# Patient Record
Sex: MALE | Race: WHITE | NOT HISPANIC OR LATINO | Employment: UNEMPLOYED | URBAN - NONMETROPOLITAN AREA
[De-identification: names, ages, dates, MRNs, and addresses within clinical notes are randomized per-mention and may not be internally consistent; named-entity substitution may affect disease eponyms.]

---

## 2021-03-22 ENCOUNTER — OFFICE VISIT (OUTPATIENT)
Dept: FAMILY MEDICINE | Facility: CLINIC | Age: 56
End: 2021-03-22
Payer: COMMERCIAL

## 2021-03-22 ENCOUNTER — LAB VISIT (OUTPATIENT)
Dept: LAB | Facility: CLINIC | Age: 56
End: 2021-03-22
Payer: COMMERCIAL

## 2021-03-22 VITALS
SYSTOLIC BLOOD PRESSURE: 128 MMHG | OXYGEN SATURATION: 98 % | HEART RATE: 70 BPM | TEMPERATURE: 98 F | BODY MASS INDEX: 33.49 KG/M2 | HEIGHT: 65 IN | WEIGHT: 201 LBS | DIASTOLIC BLOOD PRESSURE: 84 MMHG

## 2021-03-22 DIAGNOSIS — I10 ESSENTIAL HYPERTENSION: Primary | ICD-10-CM

## 2021-03-22 DIAGNOSIS — E11.65 CONTROLLED TYPE 2 DIABETES MELLITUS WITH HYPERGLYCEMIA, WITHOUT LONG-TERM CURRENT USE OF INSULIN: ICD-10-CM

## 2021-03-22 DIAGNOSIS — F98.8 ADULT ATTENTION DEFICIT DISORDER: ICD-10-CM

## 2021-03-22 LAB
EST. AVERAGE GLUCOSE BLD GHB EST-MCNC: 134 MG/DL
HBA1C MFR BLD HPLC: 6.6 %

## 2021-03-22 PROCEDURE — 99214 PR OFFICE/OUTPT VISIT, EST, LEVL IV, 30-39 MIN: ICD-10-PCS | Mod: ,,, | Performed by: FAMILY MEDICINE

## 2021-03-22 PROCEDURE — 83036 HEMOGLOBIN GLYCOSYLATED A1C: CPT

## 2021-03-22 PROCEDURE — 99214 OFFICE O/P EST MOD 30 MIN: CPT | Mod: ,,, | Performed by: FAMILY MEDICINE

## 2021-03-22 RX ORDER — DEXTROAMPHETAMINE SACCHARATE, AMPHETAMINE ASPARTATE MONOHYDRATE, DEXTROAMPHETAMINE SULFATE AND AMPHETAMINE SULFATE 5; 5; 5; 5 MG/1; MG/1; MG/1; MG/1
20 CAPSULE, EXTENDED RELEASE ORAL DAILY
Qty: 30 CAPSULE | Refills: 0 | Status: SHIPPED | OUTPATIENT
Start: 2021-03-22 | End: 2021-04-17 | Stop reason: SDUPTHER

## 2021-03-22 RX ORDER — DEXTROAMPHETAMINE SACCHARATE, AMPHETAMINE ASPARTATE MONOHYDRATE, DEXTROAMPHETAMINE SULFATE AND AMPHETAMINE SULFATE 5; 5; 5; 5 MG/1; MG/1; MG/1; MG/1
20 CAPSULE, EXTENDED RELEASE ORAL EVERY MORNING
COMMUNITY
End: 2021-04-17 | Stop reason: SDUPTHER

## 2021-03-22 RX ORDER — DEXTROAMPHETAMINE SACCHARATE, AMPHETAMINE ASPARTATE, DEXTROAMPHETAMINE SULFATE AND AMPHETAMINE SULFATE 5; 5; 5; 5 MG/1; MG/1; MG/1; MG/1
1 TABLET ORAL DAILY
Qty: 30 TABLET | Refills: 0 | Status: SHIPPED | OUTPATIENT
Start: 2021-03-22 | End: 2021-04-17 | Stop reason: SDUPTHER

## 2021-03-23 ENCOUNTER — TELEPHONE (OUTPATIENT)
Dept: FAMILY MEDICINE | Facility: CLINIC | Age: 56
End: 2021-03-23

## 2021-04-17 ENCOUNTER — OFFICE VISIT (OUTPATIENT)
Dept: FAMILY MEDICINE | Facility: CLINIC | Age: 56
End: 2021-04-17
Payer: COMMERCIAL

## 2021-04-17 VITALS
WEIGHT: 204 LBS | SYSTOLIC BLOOD PRESSURE: 160 MMHG | BODY MASS INDEX: 36.14 KG/M2 | HEIGHT: 63 IN | DIASTOLIC BLOOD PRESSURE: 108 MMHG | OXYGEN SATURATION: 96 % | TEMPERATURE: 98 F | HEART RATE: 59 BPM

## 2021-04-17 DIAGNOSIS — E11.9 TYPE 2 DIABETES MELLITUS WITHOUT COMPLICATION, WITHOUT LONG-TERM CURRENT USE OF INSULIN: ICD-10-CM

## 2021-04-17 DIAGNOSIS — I10 ESSENTIAL HYPERTENSION, BENIGN: Primary | ICD-10-CM

## 2021-04-17 DIAGNOSIS — F90.9 ATTENTION DEFICIT HYPERACTIVITY DISORDER (ADHD), UNSPECIFIED ADHD TYPE: ICD-10-CM

## 2021-04-17 PROBLEM — E11.65 CONTROLLED TYPE 2 DIABETES MELLITUS WITH HYPERGLYCEMIA, WITHOUT LONG-TERM CURRENT USE OF INSULIN: Status: RESOLVED | Noted: 2021-03-22 | Resolved: 2021-04-17

## 2021-04-17 LAB
ANION GAP SERPL CALCULATED.3IONS-SCNC: 7 MMOL/L (ref 7–16)
BASOPHILS # BLD AUTO: 0.03 K/UL (ref 0–0.2)
BASOPHILS NFR BLD AUTO: 0.6 % (ref 0–1)
BUN SERPL-MCNC: 14 MG/DL (ref 7–18)
BUN/CREAT SERPL: 13 (ref 6–20)
CALCIUM SERPL-MCNC: 9 MG/DL (ref 8.5–10.1)
CHLORIDE SERPL-SCNC: 101 MMOL/L (ref 98–107)
CO2 SERPL-SCNC: 31 MMOL/L (ref 21–32)
CREAT SERPL-MCNC: 1.11 MG/DL (ref 0.7–1.3)
DIFFERENTIAL METHOD BLD: ABNORMAL
EOSINOPHIL # BLD AUTO: 0.06 K/UL (ref 0–0.5)
EOSINOPHIL NFR BLD AUTO: 1.1 % (ref 1–4)
ERYTHROCYTE [DISTWIDTH] IN BLOOD BY AUTOMATED COUNT: 12.1 % (ref 11.5–14.5)
EST. AVERAGE GLUCOSE BLD GHB EST-MCNC: 157 MG/DL
GLUCOSE SERPL-MCNC: 162 MG/DL (ref 74–106)
HBA1C MFR BLD HPLC: 7.3 % (ref 4.5–6.6)
HCT VFR BLD AUTO: 43.7 % (ref 40–54)
HGB BLD-MCNC: 14.8 G/DL (ref 13.5–18)
IMM GRANULOCYTES # BLD AUTO: 0.02 K/UL (ref 0–0.04)
IMM GRANULOCYTES NFR BLD: 0.4 % (ref 0–0.4)
LYMPHOCYTES # BLD AUTO: 1.55 K/UL (ref 1–4.8)
LYMPHOCYTES NFR BLD AUTO: 28.5 % (ref 27–41)
MCH RBC QN AUTO: 30.2 PG (ref 27–31)
MCHC RBC AUTO-ENTMCNC: 33.9 G/DL (ref 32–36)
MCV RBC AUTO: 89.2 FL (ref 80–96)
MONOCYTES # BLD AUTO: 0.47 K/UL (ref 0–0.8)
MONOCYTES NFR BLD AUTO: 8.6 % (ref 2–6)
MPC BLD CALC-MCNC: 9.7 FL (ref 9.4–12.4)
NEUTROPHILS # BLD AUTO: 3.31 K/UL (ref 1.8–7.7)
NEUTROPHILS NFR BLD AUTO: 60.8 % (ref 53–65)
NRBC # BLD AUTO: 0 X10E3/UL
NRBC, AUTO (.00): 0 %
PLATELET # BLD AUTO: 328 K/UL (ref 150–400)
POTASSIUM SERPL-SCNC: 4.4 MMOL/L (ref 3.5–5.1)
RBC # BLD AUTO: 4.9 M/UL (ref 4.6–6.2)
SODIUM SERPL-SCNC: 135 MMOL/L (ref 136–145)
WBC # BLD AUTO: 5.44 K/UL (ref 4.5–11)

## 2021-04-17 PROCEDURE — 85025 CBC WITH DIFFERENTIAL: ICD-10-PCS | Mod: QW,,, | Performed by: CLINICAL MEDICAL LABORATORY

## 2021-04-17 PROCEDURE — 99051 MED SERV EVE/WKEND/HOLIDAY: CPT | Mod: ,,, | Performed by: NURSE PRACTITIONER

## 2021-04-17 PROCEDURE — 99214 OFFICE O/P EST MOD 30 MIN: CPT | Mod: ,,, | Performed by: NURSE PRACTITIONER

## 2021-04-17 PROCEDURE — 80048 BASIC METABOLIC PNL TOTAL CA: CPT | Mod: QW,,, | Performed by: CLINICAL MEDICAL LABORATORY

## 2021-04-17 PROCEDURE — 85025 COMPLETE CBC W/AUTO DIFF WBC: CPT | Mod: QW,,, | Performed by: CLINICAL MEDICAL LABORATORY

## 2021-04-17 PROCEDURE — 99214 PR OFFICE/OUTPT VISIT, EST, LEVL IV, 30-39 MIN: ICD-10-PCS | Mod: ,,, | Performed by: NURSE PRACTITIONER

## 2021-04-17 PROCEDURE — 99051 PR MEDICAL SERVICES, EVE/WKEND/HOLIDAY: ICD-10-PCS | Mod: ,,, | Performed by: NURSE PRACTITIONER

## 2021-04-17 PROCEDURE — 83036 HEMOGLOBIN A1C: ICD-10-PCS | Mod: QW,,, | Performed by: CLINICAL MEDICAL LABORATORY

## 2021-04-17 PROCEDURE — 83036 HEMOGLOBIN GLYCOSYLATED A1C: CPT | Mod: QW,,, | Performed by: CLINICAL MEDICAL LABORATORY

## 2021-04-17 PROCEDURE — 80048 BASIC METABOLIC PANEL: ICD-10-PCS | Mod: QW,,, | Performed by: CLINICAL MEDICAL LABORATORY

## 2021-04-17 RX ORDER — LISINOPRIL AND HYDROCHLOROTHIAZIDE 20; 25 MG/1; MG/1
1 TABLET ORAL DAILY
Qty: 90 TABLET | Refills: 0 | Status: SHIPPED | OUTPATIENT
Start: 2021-04-17 | End: 2021-05-10 | Stop reason: SDUPTHER

## 2021-04-17 RX ORDER — CITALOPRAM 20 MG/1
20 TABLET, FILM COATED ORAL DAILY
Qty: 90 TABLET | Refills: 0 | Status: SHIPPED | OUTPATIENT
Start: 2021-04-17 | End: 2021-08-06 | Stop reason: SDUPTHER

## 2021-04-17 RX ORDER — LISINOPRIL AND HYDROCHLOROTHIAZIDE 20; 25 MG/1; MG/1
1 TABLET ORAL
COMMUNITY
End: 2021-04-17 | Stop reason: SDUPTHER

## 2021-04-17 RX ORDER — CITALOPRAM 20 MG/1
20 TABLET, FILM COATED ORAL DAILY
COMMUNITY
End: 2021-04-17 | Stop reason: SDUPTHER

## 2021-04-17 RX ORDER — METOPROLOL TARTRATE 50 MG/1
50 TABLET ORAL
COMMUNITY
End: 2021-04-17 | Stop reason: SDUPTHER

## 2021-04-17 RX ORDER — DEXTROAMPHETAMINE SACCHARATE, AMPHETAMINE ASPARTATE MONOHYDRATE, DEXTROAMPHETAMINE SULFATE AND AMPHETAMINE SULFATE 5; 5; 5; 5 MG/1; MG/1; MG/1; MG/1
20 CAPSULE, EXTENDED RELEASE ORAL DAILY
Qty: 30 CAPSULE | Refills: 0 | Status: SHIPPED | OUTPATIENT
Start: 2021-04-17 | End: 2021-05-10 | Stop reason: SDUPTHER

## 2021-04-17 RX ORDER — METFORMIN HYDROCHLORIDE 500 MG/1
500 TABLET ORAL 2 TIMES DAILY WITH MEALS
Qty: 60 TABLET | Refills: 3 | Status: ON HOLD | OUTPATIENT
Start: 2021-04-17 | End: 2021-10-20 | Stop reason: HOSPADM

## 2021-04-17 RX ORDER — DEXTROAMPHETAMINE SACCHARATE, AMPHETAMINE ASPARTATE, DEXTROAMPHETAMINE SULFATE AND AMPHETAMINE SULFATE 5; 5; 5; 5 MG/1; MG/1; MG/1; MG/1
1 TABLET ORAL DAILY
Qty: 30 TABLET | Refills: 0 | Status: ON HOLD | OUTPATIENT
Start: 2021-04-17 | End: 2021-05-08 | Stop reason: HOSPADM

## 2021-04-17 RX ORDER — METOPROLOL TARTRATE 50 MG/1
50 TABLET ORAL 2 TIMES DAILY
Qty: 90 TABLET | Refills: 0 | Status: SHIPPED | OUTPATIENT
Start: 2021-04-17 | End: 2022-02-02 | Stop reason: SDUPTHER

## 2021-04-19 ENCOUNTER — TELEPHONE (OUTPATIENT)
Dept: FAMILY MEDICINE | Facility: CLINIC | Age: 56
End: 2021-04-19

## 2021-04-26 ENCOUNTER — TELEPHONE (OUTPATIENT)
Dept: FAMILY MEDICINE | Facility: CLINIC | Age: 56
End: 2021-04-26

## 2021-04-27 ENCOUNTER — TELEPHONE (OUTPATIENT)
Dept: FAMILY MEDICINE | Facility: CLINIC | Age: 56
End: 2021-04-27

## 2021-05-06 ENCOUNTER — HISTORICAL (OUTPATIENT)
Dept: ADMINISTRATIVE | Facility: HOSPITAL | Age: 56
End: 2021-05-06

## 2021-05-06 ENCOUNTER — HOSPITAL ENCOUNTER (OUTPATIENT)
Facility: HOSPITAL | Age: 56
Discharge: HOME | End: 2021-05-08
Attending: INTERNAL MEDICINE | Admitting: INTERNAL MEDICINE
Payer: COMMERCIAL

## 2021-05-06 ENCOUNTER — TELEPHONE (OUTPATIENT)
Dept: FAMILY MEDICINE | Facility: CLINIC | Age: 56
End: 2021-05-06

## 2021-05-06 DIAGNOSIS — I10 ESSENTIAL HYPERTENSION, BENIGN: ICD-10-CM

## 2021-05-06 DIAGNOSIS — E11.9 TYPE 2 DIABETES MELLITUS WITHOUT COMPLICATION, WITHOUT LONG-TERM CURRENT USE OF INSULIN: ICD-10-CM

## 2021-05-06 DIAGNOSIS — I20.0 UNSTABLE ANGINA: ICD-10-CM

## 2021-05-06 DIAGNOSIS — E87.6 HYPOKALEMIA: ICD-10-CM

## 2021-05-06 DIAGNOSIS — R07.9 CHEST PAIN, UNSPECIFIED TYPE: ICD-10-CM

## 2021-05-06 DIAGNOSIS — R07.9 CHEST PAIN: ICD-10-CM

## 2021-05-06 DIAGNOSIS — F98.8 ADULT ATTENTION DEFICIT DISORDER: ICD-10-CM

## 2021-05-06 LAB
ALBUMIN SERPL BCP-MCNC: 3.5 G/DL (ref 3.4–5)
ALBUMIN/GLOB SERPL: 1 {RATIO}
ALP SERPL-CCNC: 70 U/L (ref 50–136)
ALT SERPL W P-5'-P-CCNC: 22 U/L (ref 12–78)
ANION GAP SERPL CALCULATED.3IONS-SCNC: 10 MMOL/L
APTT PPP: 29.2 SECONDS (ref 25.2–37.3)
AST SERPL W P-5'-P-CCNC: 12 U/L (ref 15–37)
BASOPHILS # BLD AUTO: 0.01 X10E3/UL (ref 0–0.2)
BASOPHILS NFR BLD AUTO: 0.1 % (ref 0–1)
BILIRUB SERPL-MCNC: 0.3 MG/DL (ref 0.2–1)
BUN SERPL-MCNC: 14 MG/DL (ref 7–18)
CALCIUM SERPL-MCNC: 9 MG/DL (ref 8.5–10.1)
CHLORIDE SERPL-SCNC: 97 MMOL/L (ref 101–111)
CO2 SERPL-SCNC: 27 MMOL/L (ref 21–32)
CREAT SERPL-MCNC: 1.1 MG/DL (ref 0.6–1.3)
EOSINOPHIL # BLD AUTO: 0.09 X10E3/UL (ref 0–0.5)
EOSINOPHIL NFR BLD AUTO: 1.2 % (ref 1–4)
ERYTHROCYTE [DISTWIDTH] IN BLOOD BY AUTOMATED COUNT: 12.2 % (ref 11.5–14.5)
GLOBULIN SER-MCNC: 4 G/DL
GLUCOSE SERPL-MCNC: 181 MG/DL (ref 74–106)
HCT VFR BLD AUTO: 39.7 % (ref 40–54)
HGB BLD-MCNC: 13.9 G/DL (ref 13.5–18)
INR BLD: 0.88 (ref 0–3.3)
LYMPHOCYTES # BLD AUTO: 1.89 X10E3/UL (ref 1–4.8)
LYMPHOCYTES NFR BLD AUTO: 24.8 % (ref 27–41)
MCH RBC QN AUTO: 31 PG (ref 27–31)
MCHC RBC AUTO-ENTMCNC: 35 G/DL (ref 32–36)
MCV RBC AUTO: 88 FL (ref 80–96)
MONOCYTES # BLD AUTO: 0.72 X10E3/UL (ref 0–0.8)
MONOCYTES NFR BLD AUTO: 9.4 % (ref 2–6)
MPC BLD CALC-MCNC: 8.7 FL (ref 9.4–12.4)
NEUTROPHILS # BLD AUTO: 4.92 X10E3/UL (ref 1.8–7.7)
NEUTROPHILS NFR BLD AUTO: 64.5 % (ref 53–65)
PLATELET # BLD AUTO: 320 X10E3/UL (ref 150–400)
POTASSIUM SERPL-SCNC: 3.2 MMOL/L (ref 3.6–5)
PROT SERPL-MCNC: 7.5 G/DL (ref 6.4–8.2)
PROTHROMBIN TIME: 12.2 SECONDS (ref 11.7–14.7)
RBC # BLD AUTO: 4.49 X10E6/UL (ref 4.6–6.2)
SODIUM SERPL-SCNC: 131 MMOL/L (ref 135–145)
TROPONIN I SERPL-MCNC: 0 NG/ML (ref 0–0.06)
TROPONIN I SERPL-MCNC: 0 NG/ML (ref 0–0.06)
WBC # BLD AUTO: 7.63 X10E3/UL (ref 4.5–11)

## 2021-05-06 PROCEDURE — G0378 HOSPITAL OBSERVATION PER HR: HCPCS

## 2021-05-06 PROCEDURE — G0379 DIRECT REFER HOSPITAL OBSERV: HCPCS

## 2021-05-07 PROBLEM — E87.6 HYPOKALEMIA: Status: ACTIVE | Noted: 2021-05-07

## 2021-05-07 LAB
ALBUMIN SERPL BCP-MCNC: 3.3 G/DL (ref 3.5–5)
ALBUMIN/GLOB SERPL: 0.8 {RATIO}
ALP SERPL-CCNC: 61 U/L (ref 45–115)
ALT SERPL W P-5'-P-CCNC: 22 U/L (ref 16–61)
ANION GAP SERPL CALCULATED.3IONS-SCNC: 12 MMOL/L (ref 7–16)
AORTIC ROOT ANNULUS: 2.8 CM
AORTIC VALVE CUSP SEPERATION: 1.99 CM
AST SERPL W P-5'-P-CCNC: 14 U/L (ref 15–37)
AV INDEX (PROSTH): 0.64
AV MEAN GRADIENT: 4 MMHG
AV PEAK GRADIENT: 8 MMHG
AV VALVE AREA: 1.8 CM2
AV VELOCITY RATIO: 0.5
BASOPHILS # BLD AUTO: 0.02 K/UL (ref 0–0.2)
BASOPHILS NFR BLD AUTO: 0.3 % (ref 0–1)
BILIRUB SERPL-MCNC: 0.6 MG/DL (ref 0–1.2)
BSA FOR ECHO PROCEDURE: 2.03 M2
BUN SERPL-MCNC: 11 MG/DL (ref 7–18)
BUN/CREAT SERPL: 10 (ref 6–20)
CALCIUM SERPL-MCNC: 9 MG/DL (ref 8.5–10.1)
CHLORIDE SERPL-SCNC: 101 MMOL/L (ref 98–107)
CO2 SERPL-SCNC: 29 MMOL/L (ref 21–32)
CREAT SERPL-MCNC: 1.11 MG/DL (ref 0.7–1.3)
CV ECHO LV RWT: 0.28 CM
CV STRESS BASE HR: 96 BPM
DIASTOLIC BLOOD PRESSURE: 81 MMHG
DIFFERENTIAL METHOD BLD: ABNORMAL
DOP CALC AO PEAK VEL: 1.4 M/S
DOP CALC AO VTI: 22 CM
DOP CALC LVOT AREA: 2.8 CM2
DOP CALC LVOT DIAMETER: 1.9 CM
DOP CALC LVOT PEAK VEL: 0.7 M/S
DOP CALC LVOT STROKE VOLUME: 39.67 CM3
DOP CALCLVOT PEAK VEL VTI: 14 CM
E WAVE DECELERATION TIME: 204 MSEC
ECHO EF ESTIMATED: 60 %
ECHO LV POSTERIOR WALL: 0.71 CM (ref 0.6–1.1)
EJECTION FRACTION: 50 %
EOSINOPHIL # BLD AUTO: 0.04 K/UL (ref 0–0.5)
EOSINOPHIL NFR BLD AUTO: 0.5 % (ref 1–4)
ERYTHROCYTE [DISTWIDTH] IN BLOOD BY AUTOMATED COUNT: 12.1 % (ref 11.5–14.5)
FRACTIONAL SHORTENING: 39 % (ref 28–44)
GLOBULIN SER-MCNC: 3.9 G/DL (ref 2–4)
GLUCOSE SERPL-MCNC: 142 MG/DL (ref 70–105)
GLUCOSE SERPL-MCNC: 177 MG/DL (ref 74–106)
GLUCOSE SERPL-MCNC: 182 MG/DL (ref 70–105)
GLUCOSE SERPL-MCNC: 228 MG/DL (ref 70–105)
GLUCOSE SERPL-MCNC: 259 MG/DL (ref 70–105)
HCT VFR BLD AUTO: 38.6 % (ref 40–54)
HGB BLD-MCNC: 13.1 G/DL (ref 13.5–18)
IMM GRANULOCYTES # BLD AUTO: 0.02 K/UL (ref 0–0.04)
IMM GRANULOCYTES NFR BLD: 0.3 % (ref 0–0.4)
INTERVENTRICULAR SEPTUM: 0.73 CM (ref 0.6–1.1)
IVC OSTIUM: 0.8 CM
LEFT ATRIUM SIZE: 3.3 CM
LEFT INTERNAL DIMENSION IN SYSTOLE: 3.05 CM (ref 2.1–4)
LEFT VENTRICLE MASS INDEX: 60 G/M2
LEFT VENTRICULAR INTERNAL DIMENSION IN DIASTOLE: 5.02 CM (ref 3.5–6)
LEFT VENTRICULAR MASS: 119.64 G
LVOT MG: 1 MMHG
LYMPHOCYTES # BLD AUTO: 2.32 K/UL (ref 1–4.8)
LYMPHOCYTES NFR BLD AUTO: 29 % (ref 27–41)
MCH RBC QN AUTO: 31 PG (ref 27–31)
MCHC RBC AUTO-ENTMCNC: 33.9 G/DL (ref 32–36)
MCV RBC AUTO: 91.3 FL (ref 80–96)
MONOCYTES # BLD AUTO: 0.57 K/UL (ref 0–0.8)
MONOCYTES NFR BLD AUTO: 7.1 % (ref 2–6)
MPC BLD CALC-MCNC: 9 FL (ref 9.4–12.4)
MV PEAK E VEL: 0.63 M/S
NEUTROPHILS # BLD AUTO: 5.02 K/UL (ref 1.8–7.7)
NEUTROPHILS NFR BLD AUTO: 62.8 % (ref 53–65)
NRBC # BLD AUTO: 0 X10E3/UL
NRBC, AUTO (.00): 0 %
OHS CV CPX 1 MINUTE RECOVERY HEART RATE: 102 BPM
OHS CV CPX 85 PERCENT MAX PREDICTED HEART RATE MALE: 139
OHS CV CPX MAX PREDICTED HEART RATE: 164
OHS CV CPX PATIENT IS FEMALE: 0
OHS CV CPX PATIENT IS MALE: 1
OHS CV CPX PEAK DIASTOLIC BLOOD PRESSURE: 76 MMHG
OHS CV CPX PEAK HEAR RATE: 102 BPM
OHS CV CPX PEAK RATE PRESSURE PRODUCT: NORMAL
OHS CV CPX PEAK SYSTOLIC BLOOD PRESSURE: 120 MMHG
OHS CV CPX PERCENT MAX PREDICTED HEART RATE ACHIEVED: 62
OHS CV CPX RATE PRESSURE PRODUCT PRESENTING: NORMAL
PISA TR MAX VEL: 1.8 M/S
PLATELET # BLD AUTO: 288 K/UL (ref 150–400)
POTASSIUM SERPL-SCNC: 3.4 MMOL/L (ref 3.5–5.1)
PROT SERPL-MCNC: 7.2 G/DL (ref 6.4–8.2)
RA MAJOR: 4.8 CM
RA PRESSURE: 3 MMHG
RBC # BLD AUTO: 4.23 M/UL (ref 4.6–6.2)
RIGHT VENTRICULAR END-DIASTOLIC DIMENSION: 4.4 CM
SODIUM SERPL-SCNC: 139 MMOL/L (ref 136–145)
SYSTOLIC BLOOD PRESSURE: 114 MMHG
TR MAX PG: 13 MMHG
TRICUSPID ANNULAR PLANE SYSTOLIC EXCURSION: 2.1 CM
TROPONIN I SERPL-MCNC: <0.017 NG/ML
TV REST PULMONARY ARTERY PRESSURE: 16 MMHG
WBC # BLD AUTO: 7.99 K/UL (ref 4.5–11)

## 2021-05-07 PROCEDURE — G0378 HOSPITAL OBSERVATION PER HR: HCPCS

## 2021-05-07 PROCEDURE — 94640 AIRWAY INHALATION TREATMENT: CPT | Mod: XB

## 2021-05-07 PROCEDURE — 93458 L HRT ARTERY/VENTRICLE ANGIO: CPT | Mod: 26,,, | Performed by: INTERNAL MEDICINE

## 2021-05-07 PROCEDURE — 36415 COLL VENOUS BLD VENIPUNCTURE: CPT | Performed by: INTERNAL MEDICINE

## 2021-05-07 PROCEDURE — 93010 ELECTROCARDIOGRAM REPORT: CPT | Mod: ,,, | Performed by: INTERNAL MEDICINE

## 2021-05-07 PROCEDURE — C1894 INTRO/SHEATH, NON-LASER: HCPCS | Performed by: INTERNAL MEDICINE

## 2021-05-07 PROCEDURE — 27000080 OPTIME MED/SURG SUP & DEVICES GENERAL CLASSIFICATION: Performed by: INTERNAL MEDICINE

## 2021-05-07 PROCEDURE — 99245 PR OFFICE CONSULTATION,LEVEL V: ICD-10-PCS | Mod: 25,,, | Performed by: INTERNAL MEDICINE

## 2021-05-07 PROCEDURE — 96372 THER/PROPH/DIAG INJ SC/IM: CPT | Mod: 59

## 2021-05-07 PROCEDURE — 93458 PR CATH PLACE/CORON ANGIO, IMG SUPER/INTERP,W LEFT HEART VENTRICULOGRAPHY: ICD-10-PCS | Mod: 26,,, | Performed by: INTERNAL MEDICINE

## 2021-05-07 PROCEDURE — C1760 CLOSURE DEV, VASC: HCPCS | Performed by: INTERNAL MEDICINE

## 2021-05-07 PROCEDURE — 96372 THER/PROPH/DIAG INJ SC/IM: CPT

## 2021-05-07 PROCEDURE — 25000003 PHARM REV CODE 250: Performed by: INTERNAL MEDICINE

## 2021-05-07 PROCEDURE — 63600175 PHARM REV CODE 636 W HCPCS: Performed by: INTERNAL MEDICINE

## 2021-05-07 PROCEDURE — 25500020 PHARM REV CODE 255: Performed by: INTERNAL MEDICINE

## 2021-05-07 PROCEDURE — 96374 THER/PROPH/DIAG INJ IV PUSH: CPT

## 2021-05-07 PROCEDURE — 93005 ELECTROCARDIOGRAM TRACING: CPT

## 2021-05-07 PROCEDURE — 82962 GLUCOSE BLOOD TEST: CPT

## 2021-05-07 PROCEDURE — 99152 MOD SED SAME PHYS/QHP 5/>YRS: CPT | Performed by: INTERNAL MEDICINE

## 2021-05-07 PROCEDURE — 27800903 OPTIME MED/SURG SUP & DEVICES OTHER IMPLANTS: Performed by: INTERNAL MEDICINE

## 2021-05-07 PROCEDURE — 25000242 PHARM REV CODE 250 ALT 637 W/ HCPCS: Performed by: INTERNAL MEDICINE

## 2021-05-07 PROCEDURE — 27100168 OPTIME MED/SURG SUP & DEVICES NON-STERILE SUPPLY: Performed by: INTERNAL MEDICINE

## 2021-05-07 PROCEDURE — 99220 PR INITIAL OBSERVATION CARE,LEVL III: CPT | Mod: GC,,, | Performed by: INTERNAL MEDICINE

## 2021-05-07 PROCEDURE — 93458 L HRT ARTERY/VENTRICLE ANGIO: CPT | Performed by: INTERNAL MEDICINE

## 2021-05-07 PROCEDURE — 85025 COMPLETE CBC W/AUTO DIFF WBC: CPT | Performed by: INTERNAL MEDICINE

## 2021-05-07 PROCEDURE — 93010 EKG 12-LEAD: ICD-10-PCS | Mod: ,,, | Performed by: INTERNAL MEDICINE

## 2021-05-07 PROCEDURE — 27201423 OPTIME MED/SURG SUP & DEVICES STERILE SUPPLY: Performed by: INTERNAL MEDICINE

## 2021-05-07 PROCEDURE — 80053 COMPREHEN METABOLIC PANEL: CPT | Performed by: INTERNAL MEDICINE

## 2021-05-07 PROCEDURE — 84484 ASSAY OF TROPONIN QUANT: CPT | Performed by: INTERNAL MEDICINE

## 2021-05-07 PROCEDURE — 99220 PR INITIAL OBSERVATION CARE,LEVL III: ICD-10-PCS | Mod: GC,,, | Performed by: INTERNAL MEDICINE

## 2021-05-07 PROCEDURE — 94761 N-INVAS EAR/PLS OXIMETRY MLT: CPT

## 2021-05-07 PROCEDURE — 99245 OFF/OP CONSLTJ NEW/EST HI 55: CPT | Mod: 25,,, | Performed by: INTERNAL MEDICINE

## 2021-05-07 RX ORDER — SODIUM CHLORIDE 450 MG/100ML
INJECTION, SOLUTION INTRAVENOUS
Status: DISCONTINUED | OUTPATIENT
Start: 2021-05-07 | End: 2021-05-08 | Stop reason: HOSPADM

## 2021-05-07 RX ORDER — ASPIRIN 325 MG
325 TABLET ORAL DAILY
Status: DISCONTINUED | OUTPATIENT
Start: 2021-05-07 | End: 2021-05-08 | Stop reason: HOSPADM

## 2021-05-07 RX ORDER — FENTANYL CITRATE 50 UG/ML
INJECTION, SOLUTION INTRAMUSCULAR; INTRAVENOUS
Status: DISCONTINUED | OUTPATIENT
Start: 2021-05-07 | End: 2021-05-07 | Stop reason: HOSPADM

## 2021-05-07 RX ORDER — INSULIN ASPART 100 [IU]/ML
1-10 INJECTION, SOLUTION INTRAVENOUS; SUBCUTANEOUS
Status: DISCONTINUED | OUTPATIENT
Start: 2021-05-07 | End: 2021-05-08 | Stop reason: HOSPADM

## 2021-05-07 RX ORDER — SODIUM CHLORIDE 0.9 % (FLUSH) 0.9 %
10 SYRINGE (ML) INJECTION
Status: DISCONTINUED | OUTPATIENT
Start: 2021-05-07 | End: 2021-05-08 | Stop reason: HOSPADM

## 2021-05-07 RX ORDER — ACETAMINOPHEN 325 MG/1
650 TABLET ORAL EVERY 4 HOURS PRN
Status: DISCONTINUED | OUTPATIENT
Start: 2021-05-07 | End: 2021-05-08 | Stop reason: HOSPADM

## 2021-05-07 RX ORDER — LIDOCAINE HYDROCHLORIDE 10 MG/ML
INJECTION, SOLUTION EPIDURAL; INFILTRATION; INTRACAUDAL; PERINEURAL
Status: DISCONTINUED | OUTPATIENT
Start: 2021-05-07 | End: 2021-05-07 | Stop reason: HOSPADM

## 2021-05-07 RX ORDER — GLUCAGON 1 MG
1 KIT INJECTION
Status: DISCONTINUED | OUTPATIENT
Start: 2021-05-07 | End: 2021-05-08 | Stop reason: HOSPADM

## 2021-05-07 RX ORDER — REGADENOSON 0.08 MG/ML
0.4 INJECTION, SOLUTION INTRAVENOUS ONCE
Status: COMPLETED | OUTPATIENT
Start: 2021-05-07 | End: 2021-05-07

## 2021-05-07 RX ORDER — ONDANSETRON 4 MG/1
8 TABLET, ORALLY DISINTEGRATING ORAL EVERY 8 HOURS PRN
Status: DISCONTINUED | OUTPATIENT
Start: 2021-05-07 | End: 2021-05-08 | Stop reason: HOSPADM

## 2021-05-07 RX ORDER — SODIUM CHLORIDE 9 MG/ML
INJECTION, SOLUTION INTRAVENOUS CONTINUOUS
Status: DISCONTINUED | OUTPATIENT
Start: 2021-05-07 | End: 2021-05-08 | Stop reason: HOSPADM

## 2021-05-07 RX ORDER — IBUPROFEN 200 MG
16 TABLET ORAL
Status: DISCONTINUED | OUTPATIENT
Start: 2021-05-07 | End: 2021-05-08 | Stop reason: HOSPADM

## 2021-05-07 RX ORDER — POTASSIUM CHLORIDE 20 MEQ/1
40 TABLET, EXTENDED RELEASE ORAL 2 TIMES DAILY
Status: COMPLETED | OUTPATIENT
Start: 2021-05-07 | End: 2021-05-08

## 2021-05-07 RX ORDER — LISINOPRIL AND HYDROCHLOROTHIAZIDE 20; 25 MG/1; MG/1
1 TABLET ORAL DAILY
Status: DISCONTINUED | OUTPATIENT
Start: 2021-05-07 | End: 2021-05-08 | Stop reason: HOSPADM

## 2021-05-07 RX ORDER — IBUPROFEN 200 MG
24 TABLET ORAL
Status: DISCONTINUED | OUTPATIENT
Start: 2021-05-07 | End: 2021-05-08 | Stop reason: HOSPADM

## 2021-05-07 RX ORDER — CITALOPRAM 20 MG/1
20 TABLET, FILM COATED ORAL DAILY
Status: DISCONTINUED | OUTPATIENT
Start: 2021-05-07 | End: 2021-05-08 | Stop reason: HOSPADM

## 2021-05-07 RX ORDER — IPRATROPIUM BROMIDE AND ALBUTEROL SULFATE 2.5; .5 MG/3ML; MG/3ML
3 SOLUTION RESPIRATORY (INHALATION) EVERY 6 HOURS
Status: DISCONTINUED | OUTPATIENT
Start: 2021-05-07 | End: 2021-05-08 | Stop reason: HOSPADM

## 2021-05-07 RX ORDER — MIDAZOLAM HYDROCHLORIDE 1 MG/ML
INJECTION INTRAMUSCULAR; INTRAVENOUS
Status: DISCONTINUED | OUTPATIENT
Start: 2021-05-07 | End: 2021-05-07 | Stop reason: HOSPADM

## 2021-05-07 RX ORDER — ATORVASTATIN CALCIUM 80 MG/1
80 TABLET, FILM COATED ORAL DAILY
Status: DISCONTINUED | OUTPATIENT
Start: 2021-05-07 | End: 2021-05-08 | Stop reason: HOSPADM

## 2021-05-07 RX ORDER — HEPARIN SODIUM 5000 [USP'U]/ML
5000 INJECTION, SOLUTION INTRAVENOUS; SUBCUTANEOUS EVERY 8 HOURS
Status: DISCONTINUED | OUTPATIENT
Start: 2021-05-07 | End: 2021-05-08 | Stop reason: HOSPADM

## 2021-05-07 RX ADMIN — IPRATROPIUM BROMIDE AND ALBUTEROL SULFATE 3 ML: .5; 3 SOLUTION RESPIRATORY (INHALATION) at 12:05

## 2021-05-07 RX ADMIN — REGADENOSON 0.4 MG: 0.08 INJECTION, SOLUTION INTRAVENOUS at 10:05

## 2021-05-07 RX ADMIN — IPRATROPIUM BROMIDE AND ALBUTEROL SULFATE 3 ML: .5; 3 SOLUTION RESPIRATORY (INHALATION) at 07:05

## 2021-05-07 RX ADMIN — IPRATROPIUM BROMIDE AND ALBUTEROL SULFATE 3 ML: .5; 3 SOLUTION RESPIRATORY (INHALATION) at 08:05

## 2021-05-07 RX ADMIN — NITROGLYCERIN 1 INCH: 20 OINTMENT TOPICAL at 05:05

## 2021-05-07 RX ADMIN — HEPARIN SODIUM 5000 UNITS: 5000 INJECTION INTRAVENOUS; SUBCUTANEOUS at 06:05

## 2021-05-07 RX ADMIN — LISINOPRIL AND HYDROCHLOROTHIAZIDE 1 TABLET: 25; 20 TABLET ORAL at 12:05

## 2021-05-07 RX ADMIN — NITROGLYCERIN 1 INCH: 20 OINTMENT TOPICAL at 12:05

## 2021-05-07 RX ADMIN — HEPARIN SODIUM 5000 UNITS: 5000 INJECTION INTRAVENOUS; SUBCUTANEOUS at 03:05

## 2021-05-07 RX ADMIN — HEPARIN SODIUM 5000 UNITS: 5000 INJECTION INTRAVENOUS; SUBCUTANEOUS at 09:05

## 2021-05-07 RX ADMIN — ASPIRIN 325 MG ORAL TABLET 325 MG: 325 PILL ORAL at 08:05

## 2021-05-07 RX ADMIN — INSULIN ASPART 4 UNITS: 100 INJECTION, SOLUTION INTRAVENOUS; SUBCUTANEOUS at 05:05

## 2021-05-07 RX ADMIN — CITALOPRAM HYDROBROMIDE 20 MG: 20 TABLET ORAL at 08:05

## 2021-05-07 RX ADMIN — SODIUM CHLORIDE: 9 INJECTION, SOLUTION INTRAVENOUS at 12:05

## 2021-05-07 RX ADMIN — POTASSIUM CHLORIDE 40 MEQ: 1500 TABLET, EXTENDED RELEASE ORAL at 09:05

## 2021-05-07 RX ADMIN — NITROGLYCERIN 1 INCH: 20 OINTMENT TOPICAL at 06:05

## 2021-05-07 RX ADMIN — ACETAMINOPHEN 650 MG: 325 TABLET ORAL at 06:05

## 2021-05-07 RX ADMIN — POTASSIUM CHLORIDE 40 MEQ: 1500 TABLET, EXTENDED RELEASE ORAL at 08:05

## 2021-05-07 RX ADMIN — ATORVASTATIN CALCIUM 80 MG: 80 TABLET, FILM COATED ORAL at 08:05

## 2021-05-07 RX ADMIN — INSULIN ASPART 2 UNITS: 100 INJECTION, SOLUTION INTRAVENOUS; SUBCUTANEOUS at 12:05

## 2021-05-07 RX ADMIN — INSULIN ASPART 3 UNITS: 100 INJECTION, SOLUTION INTRAVENOUS; SUBCUTANEOUS at 09:05

## 2021-05-08 VITALS
SYSTOLIC BLOOD PRESSURE: 121 MMHG | RESPIRATION RATE: 15 BRPM | WEIGHT: 196 LBS | HEIGHT: 66 IN | OXYGEN SATURATION: 97 % | HEART RATE: 84 BPM | BODY MASS INDEX: 31.5 KG/M2 | TEMPERATURE: 98 F | DIASTOLIC BLOOD PRESSURE: 86 MMHG

## 2021-05-08 LAB
ALBUMIN SERPL BCP-MCNC: 3.2 G/DL (ref 3.5–5)
ALBUMIN/GLOB SERPL: 0.8 {RATIO}
ALP SERPL-CCNC: 59 U/L (ref 45–115)
ALT SERPL W P-5'-P-CCNC: 22 U/L (ref 16–61)
ANION GAP SERPL CALCULATED.3IONS-SCNC: 14 MMOL/L (ref 7–16)
AST SERPL W P-5'-P-CCNC: 12 U/L (ref 15–37)
BASOPHILS # BLD AUTO: 0.03 K/UL (ref 0–0.2)
BASOPHILS NFR BLD AUTO: 0.5 % (ref 0–1)
BILIRUB SERPL-MCNC: 0.4 MG/DL (ref 0–1.2)
BUN SERPL-MCNC: 10 MG/DL (ref 7–18)
BUN/CREAT SERPL: 10 (ref 6–20)
CALCIUM SERPL-MCNC: 8.8 MG/DL (ref 8.5–10.1)
CHLORIDE SERPL-SCNC: 103 MMOL/L (ref 98–107)
CO2 SERPL-SCNC: 28 MMOL/L (ref 21–32)
CREAT SERPL-MCNC: 1.01 MG/DL (ref 0.7–1.3)
DIFFERENTIAL METHOD BLD: ABNORMAL
EOSINOPHIL # BLD AUTO: 0.06 K/UL (ref 0–0.5)
EOSINOPHIL NFR BLD AUTO: 1 % (ref 1–4)
ERYTHROCYTE [DISTWIDTH] IN BLOOD BY AUTOMATED COUNT: 11.9 % (ref 11.5–14.5)
GLOBULIN SER-MCNC: 4 G/DL (ref 2–4)
GLUCOSE SERPL-MCNC: 137 MG/DL (ref 70–105)
GLUCOSE SERPL-MCNC: 143 MG/DL (ref 74–106)
HCT VFR BLD AUTO: 41 % (ref 40–54)
HGB BLD-MCNC: 13.4 G/DL (ref 13.5–18)
IMM GRANULOCYTES # BLD AUTO: 0.02 K/UL (ref 0–0.04)
IMM GRANULOCYTES NFR BLD: 0.3 % (ref 0–0.4)
LYMPHOCYTES # BLD AUTO: 2.08 K/UL (ref 1–4.8)
LYMPHOCYTES NFR BLD AUTO: 33.4 % (ref 27–41)
MCH RBC QN AUTO: 30 PG (ref 27–31)
MCHC RBC AUTO-ENTMCNC: 32.7 G/DL (ref 32–36)
MCV RBC AUTO: 91.9 FL (ref 80–96)
MONOCYTES # BLD AUTO: 0.54 K/UL (ref 0–0.8)
MONOCYTES NFR BLD AUTO: 8.7 % (ref 2–6)
MPC BLD CALC-MCNC: 9.3 FL (ref 9.4–12.4)
NEUTROPHILS # BLD AUTO: 3.5 K/UL (ref 1.8–7.7)
NEUTROPHILS NFR BLD AUTO: 56.1 % (ref 53–65)
NRBC # BLD AUTO: 0 X10E3/UL
NRBC, AUTO (.00): 0 %
PLATELET # BLD AUTO: 327 K/UL (ref 150–400)
POTASSIUM SERPL-SCNC: 3.5 MMOL/L (ref 3.5–5.1)
PROT SERPL-MCNC: 7.2 G/DL (ref 6.4–8.2)
RBC # BLD AUTO: 4.46 M/UL (ref 4.6–6.2)
SODIUM SERPL-SCNC: 141 MMOL/L (ref 136–145)
WBC # BLD AUTO: 6.23 K/UL (ref 4.5–11)

## 2021-05-08 PROCEDURE — 25000242 PHARM REV CODE 250 ALT 637 W/ HCPCS: Performed by: INTERNAL MEDICINE

## 2021-05-08 PROCEDURE — 25000003 PHARM REV CODE 250: Performed by: INTERNAL MEDICINE

## 2021-05-08 PROCEDURE — 63600175 PHARM REV CODE 636 W HCPCS: Performed by: INTERNAL MEDICINE

## 2021-05-08 PROCEDURE — 96372 THER/PROPH/DIAG INJ SC/IM: CPT

## 2021-05-08 PROCEDURE — 85025 COMPLETE CBC W/AUTO DIFF WBC: CPT | Performed by: INTERNAL MEDICINE

## 2021-05-08 PROCEDURE — 99217 PR OBSERVATION CARE DISCHARGE: ICD-10-PCS | Mod: ,,, | Performed by: INTERNAL MEDICINE

## 2021-05-08 PROCEDURE — 99217 PR OBSERVATION CARE DISCHARGE: CPT | Mod: ,,, | Performed by: INTERNAL MEDICINE

## 2021-05-08 PROCEDURE — 82962 GLUCOSE BLOOD TEST: CPT

## 2021-05-08 PROCEDURE — 36415 COLL VENOUS BLD VENIPUNCTURE: CPT | Performed by: INTERNAL MEDICINE

## 2021-05-08 PROCEDURE — 80053 COMPREHEN METABOLIC PANEL: CPT | Performed by: INTERNAL MEDICINE

## 2021-05-08 PROCEDURE — 94640 AIRWAY INHALATION TREATMENT: CPT

## 2021-05-08 PROCEDURE — G0378 HOSPITAL OBSERVATION PER HR: HCPCS

## 2021-05-08 PROCEDURE — 94761 N-INVAS EAR/PLS OXIMETRY MLT: CPT

## 2021-05-08 RX ORDER — ASPIRIN 81 MG/1
81 TABLET ORAL DAILY
Qty: 90 TABLET | Refills: 3 | Status: SHIPPED | OUTPATIENT
Start: 2021-05-08

## 2021-05-08 RX ORDER — PANTOPRAZOLE SODIUM 40 MG/1
40 TABLET, DELAYED RELEASE ORAL DAILY
Status: DISCONTINUED | OUTPATIENT
Start: 2021-05-08 | End: 2021-05-08 | Stop reason: HOSPADM

## 2021-05-08 RX ORDER — ATORVASTATIN CALCIUM 80 MG/1
80 TABLET, FILM COATED ORAL DAILY
Qty: 90 TABLET | Refills: 3 | Status: SHIPPED | OUTPATIENT
Start: 2021-05-08 | End: 2022-02-02 | Stop reason: SDUPTHER

## 2021-05-08 RX ORDER — PANTOPRAZOLE SODIUM 40 MG/1
40 TABLET, DELAYED RELEASE ORAL DAILY
Qty: 30 TABLET | Refills: 2 | Status: ON HOLD | OUTPATIENT
Start: 2021-05-09 | End: 2021-10-20 | Stop reason: HOSPADM

## 2021-05-08 RX ADMIN — NITROGLYCERIN 1 INCH: 20 OINTMENT TOPICAL at 05:05

## 2021-05-08 RX ADMIN — HEPARIN SODIUM 5000 UNITS: 5000 INJECTION INTRAVENOUS; SUBCUTANEOUS at 05:05

## 2021-05-08 RX ADMIN — CITALOPRAM HYDROBROMIDE 20 MG: 20 TABLET ORAL at 09:05

## 2021-05-08 RX ADMIN — ASPIRIN 325 MG ORAL TABLET 325 MG: 325 PILL ORAL at 09:05

## 2021-05-08 RX ADMIN — IPRATROPIUM BROMIDE AND ALBUTEROL SULFATE 3 ML: .5; 3 SOLUTION RESPIRATORY (INHALATION) at 07:05

## 2021-05-08 RX ADMIN — PANTOPRAZOLE SODIUM 40 MG: 40 TABLET, DELAYED RELEASE ORAL at 09:05

## 2021-05-08 RX ADMIN — POTASSIUM CHLORIDE 40 MEQ: 1500 TABLET, EXTENDED RELEASE ORAL at 09:05

## 2021-05-08 RX ADMIN — NITROGLYCERIN 1 INCH: 20 OINTMENT TOPICAL at 12:05

## 2021-05-08 RX ADMIN — IPRATROPIUM BROMIDE AND ALBUTEROL SULFATE 3 ML: .5; 3 SOLUTION RESPIRATORY (INHALATION) at 12:05

## 2021-05-10 ENCOUNTER — OFFICE VISIT (OUTPATIENT)
Dept: FAMILY MEDICINE | Facility: CLINIC | Age: 56
End: 2021-05-10
Payer: COMMERCIAL

## 2021-05-10 VITALS
SYSTOLIC BLOOD PRESSURE: 130 MMHG | BODY MASS INDEX: 35.08 KG/M2 | HEIGHT: 63 IN | DIASTOLIC BLOOD PRESSURE: 70 MMHG | OXYGEN SATURATION: 98 % | WEIGHT: 198 LBS | TEMPERATURE: 98 F | HEART RATE: 61 BPM

## 2021-05-10 DIAGNOSIS — E11.9 CONTROLLED TYPE 2 DIABETES MELLITUS WITHOUT COMPLICATION, WITHOUT LONG-TERM CURRENT USE OF INSULIN: Primary | ICD-10-CM

## 2021-05-10 DIAGNOSIS — F90.9 ATTENTION DEFICIT HYPERACTIVITY DISORDER (ADHD), UNSPECIFIED ADHD TYPE: ICD-10-CM

## 2021-05-10 DIAGNOSIS — I10 ESSENTIAL HYPERTENSION, BENIGN: ICD-10-CM

## 2021-05-10 LAB
ANION GAP SERPL CALCULATED.3IONS-SCNC: 14 MMOL/L (ref 7–16)
BASOPHILS # BLD AUTO: 0.04 K/UL (ref 0–0.2)
BASOPHILS NFR BLD AUTO: 0.6 % (ref 0–1)
BUN SERPL-MCNC: 15 MG/DL (ref 7–18)
BUN/CREAT SERPL: 13 (ref 6–20)
CALCIUM SERPL-MCNC: 9 MG/DL (ref 8.5–10.1)
CHLORIDE SERPL-SCNC: 95 MMOL/L (ref 98–107)
CO2 SERPL-SCNC: 28 MMOL/L (ref 21–32)
CREAT SERPL-MCNC: 1.16 MG/DL (ref 0.7–1.3)
DIFFERENTIAL METHOD BLD: ABNORMAL
EOSINOPHIL # BLD AUTO: 0.2 K/UL (ref 0–0.5)
EOSINOPHIL NFR BLD AUTO: 3 % (ref 1–4)
ERYTHROCYTE [DISTWIDTH] IN BLOOD BY AUTOMATED COUNT: 11.8 % (ref 11.5–14.5)
EST. AVERAGE GLUCOSE BLD GHB EST-MCNC: 144 MG/DL
GLUCOSE SERPL-MCNC: 263 MG/DL (ref 74–106)
HBA1C MFR BLD HPLC: 6.9 % (ref 4.5–6.6)
HCT VFR BLD AUTO: 39.6 % (ref 40–54)
HGB BLD-MCNC: 13.6 G/DL (ref 13.5–18)
IMM GRANULOCYTES # BLD AUTO: 0.02 K/UL (ref 0–0.04)
IMM GRANULOCYTES NFR BLD: 0.3 % (ref 0–0.4)
LYMPHOCYTES # BLD AUTO: 1.97 K/UL (ref 1–4.8)
LYMPHOCYTES NFR BLD AUTO: 29.9 % (ref 27–41)
MCH RBC QN AUTO: 30.2 PG (ref 27–31)
MCHC RBC AUTO-ENTMCNC: 34.3 G/DL (ref 32–36)
MCV RBC AUTO: 88 FL (ref 80–96)
MONOCYTES # BLD AUTO: 0.39 K/UL (ref 0–0.8)
MONOCYTES NFR BLD AUTO: 5.9 % (ref 2–6)
MPC BLD CALC-MCNC: 9.6 FL (ref 9.4–12.4)
NEUTROPHILS # BLD AUTO: 3.97 K/UL (ref 1.8–7.7)
NEUTROPHILS NFR BLD AUTO: 60.3 % (ref 53–65)
NRBC # BLD AUTO: 0 X10E3/UL
NRBC, AUTO (.00): 0 %
PLATELET # BLD AUTO: 374 K/UL (ref 150–400)
POTASSIUM SERPL-SCNC: 3.6 MMOL/L (ref 3.5–5.1)
RBC # BLD AUTO: 4.5 M/UL (ref 4.6–6.2)
SODIUM SERPL-SCNC: 133 MMOL/L (ref 136–145)
WBC # BLD AUTO: 6.59 K/UL (ref 4.5–11)

## 2021-05-10 PROCEDURE — 80048 BASIC METABOLIC PANEL: ICD-10-PCS | Mod: QW,,, | Performed by: CLINICAL MEDICAL LABORATORY

## 2021-05-10 PROCEDURE — 99214 PR OFFICE/OUTPT VISIT, EST, LEVL IV, 30-39 MIN: ICD-10-PCS | Mod: ,,, | Performed by: FAMILY MEDICINE

## 2021-05-10 PROCEDURE — 85025 CBC WITH DIFFERENTIAL: ICD-10-PCS | Mod: QW,,, | Performed by: CLINICAL MEDICAL LABORATORY

## 2021-05-10 PROCEDURE — 85025 COMPLETE CBC W/AUTO DIFF WBC: CPT | Mod: QW,,, | Performed by: CLINICAL MEDICAL LABORATORY

## 2021-05-10 PROCEDURE — 83036 HEMOGLOBIN A1C: ICD-10-PCS | Mod: QW,,, | Performed by: CLINICAL MEDICAL LABORATORY

## 2021-05-10 PROCEDURE — 80048 BASIC METABOLIC PNL TOTAL CA: CPT | Mod: QW,,, | Performed by: CLINICAL MEDICAL LABORATORY

## 2021-05-10 PROCEDURE — 99214 OFFICE O/P EST MOD 30 MIN: CPT | Mod: ,,, | Performed by: FAMILY MEDICINE

## 2021-05-10 PROCEDURE — 83036 HEMOGLOBIN GLYCOSYLATED A1C: CPT | Mod: QW,,, | Performed by: CLINICAL MEDICAL LABORATORY

## 2021-05-10 RX ORDER — LISINOPRIL AND HYDROCHLOROTHIAZIDE 20; 25 MG/1; MG/1
1 TABLET ORAL DAILY
Qty: 90 TABLET | Refills: 0 | Status: SHIPPED | OUTPATIENT
Start: 2021-05-10 | End: 2021-10-21

## 2021-05-10 RX ORDER — DEXTROAMPHETAMINE SACCHARATE, AMPHETAMINE ASPARTATE MONOHYDRATE, DEXTROAMPHETAMINE SULFATE AND AMPHETAMINE SULFATE 5; 5; 5; 5 MG/1; MG/1; MG/1; MG/1
20 CAPSULE, EXTENDED RELEASE ORAL DAILY
Qty: 30 CAPSULE | Refills: 0 | Status: SHIPPED | OUTPATIENT
Start: 2021-05-10 | End: 2021-06-12 | Stop reason: SDUPTHER

## 2021-05-10 RX ORDER — DEXTROAMPHETAMINE SACCHARATE, AMPHETAMINE ASPARTATE, DEXTROAMPHETAMINE SULFATE AND AMPHETAMINE SULFATE 5; 5; 5; 5 MG/1; MG/1; MG/1; MG/1
1 TABLET ORAL DAILY
Qty: 30 TABLET | Refills: 0 | Status: SHIPPED | OUTPATIENT
Start: 2021-05-10 | End: 2021-06-12 | Stop reason: SDUPTHER

## 2021-05-11 ENCOUNTER — TELEPHONE (OUTPATIENT)
Dept: FAMILY MEDICINE | Facility: CLINIC | Age: 56
End: 2021-05-11

## 2021-06-03 ENCOUNTER — OFFICE VISIT (OUTPATIENT)
Dept: CARDIOLOGY | Facility: CLINIC | Age: 56
End: 2021-06-03
Payer: COMMERCIAL

## 2021-06-03 VITALS
HEIGHT: 66 IN | SYSTOLIC BLOOD PRESSURE: 122 MMHG | WEIGHT: 200 LBS | BODY MASS INDEX: 32.14 KG/M2 | OXYGEN SATURATION: 97 % | DIASTOLIC BLOOD PRESSURE: 74 MMHG | HEART RATE: 69 BPM

## 2021-06-03 DIAGNOSIS — I25.110 ATHEROSCLEROSIS OF NATIVE CORONARY ARTERY OF NATIVE HEART WITH UNSTABLE ANGINA PECTORIS: ICD-10-CM

## 2021-06-03 PROCEDURE — 99213 PR OFFICE/OUTPT VISIT, EST, LEVL III, 20-29 MIN: ICD-10-PCS | Mod: S$PBB,,, | Performed by: INTERNAL MEDICINE

## 2021-06-03 PROCEDURE — 99213 OFFICE O/P EST LOW 20 MIN: CPT | Mod: S$PBB,,, | Performed by: INTERNAL MEDICINE

## 2021-06-03 PROCEDURE — 99214 OFFICE O/P EST MOD 30 MIN: CPT | Mod: PBBFAC | Performed by: INTERNAL MEDICINE

## 2021-06-03 RX ORDER — ACETAMINOPHEN 500 MG
1 TABLET ORAL DAILY
COMMUNITY

## 2021-06-12 ENCOUNTER — OFFICE VISIT (OUTPATIENT)
Dept: FAMILY MEDICINE | Facility: CLINIC | Age: 56
End: 2021-06-12
Payer: COMMERCIAL

## 2021-06-12 VITALS
WEIGHT: 198 LBS | HEIGHT: 66 IN | SYSTOLIC BLOOD PRESSURE: 138 MMHG | BODY MASS INDEX: 31.82 KG/M2 | HEART RATE: 72 BPM | DIASTOLIC BLOOD PRESSURE: 78 MMHG | TEMPERATURE: 98 F | OXYGEN SATURATION: 97 %

## 2021-06-12 DIAGNOSIS — F90.9 ATTENTION DEFICIT HYPERACTIVITY DISORDER (ADHD), UNSPECIFIED ADHD TYPE: Primary | ICD-10-CM

## 2021-06-12 PROCEDURE — 99499 NO LOS: ICD-10-PCS | Mod: ,,, | Performed by: NURSE PRACTITIONER

## 2021-06-12 PROCEDURE — 99499 UNLISTED E&M SERVICE: CPT | Mod: ,,, | Performed by: NURSE PRACTITIONER

## 2021-06-12 RX ORDER — DEXTROAMPHETAMINE SACCHARATE, AMPHETAMINE ASPARTATE, DEXTROAMPHETAMINE SULFATE AND AMPHETAMINE SULFATE 5; 5; 5; 5 MG/1; MG/1; MG/1; MG/1
1 TABLET ORAL DAILY
Qty: 30 TABLET | Refills: 0 | Status: SHIPPED | OUTPATIENT
Start: 2021-06-12 | End: 2021-08-06 | Stop reason: SDUPTHER

## 2021-06-12 RX ORDER — DEXTROAMPHETAMINE SACCHARATE, AMPHETAMINE ASPARTATE MONOHYDRATE, DEXTROAMPHETAMINE SULFATE AND AMPHETAMINE SULFATE 5; 5; 5; 5 MG/1; MG/1; MG/1; MG/1
20 CAPSULE, EXTENDED RELEASE ORAL DAILY
Qty: 30 CAPSULE | Refills: 0 | Status: SHIPPED | OUTPATIENT
Start: 2021-06-12 | End: 2021-09-10 | Stop reason: SDUPTHER

## 2021-07-19 ENCOUNTER — TELEPHONE (OUTPATIENT)
Dept: FAMILY MEDICINE | Facility: CLINIC | Age: 56
End: 2021-07-19

## 2021-08-06 ENCOUNTER — OFFICE VISIT (OUTPATIENT)
Dept: FAMILY MEDICINE | Facility: CLINIC | Age: 56
End: 2021-08-06
Payer: COMMERCIAL

## 2021-08-06 VITALS
TEMPERATURE: 98 F | BODY MASS INDEX: 36.5 KG/M2 | WEIGHT: 206 LBS | OXYGEN SATURATION: 97 % | HEART RATE: 76 BPM | DIASTOLIC BLOOD PRESSURE: 70 MMHG | SYSTOLIC BLOOD PRESSURE: 130 MMHG | HEIGHT: 63 IN

## 2021-08-06 DIAGNOSIS — F90.9 ATTENTION DEFICIT HYPERACTIVITY DISORDER (ADHD), UNSPECIFIED ADHD TYPE: ICD-10-CM

## 2021-08-06 DIAGNOSIS — E11.9 CONTROLLED TYPE 2 DIABETES MELLITUS WITHOUT COMPLICATION, WITHOUT LONG-TERM CURRENT USE OF INSULIN: Primary | ICD-10-CM

## 2021-08-06 DIAGNOSIS — I10 ESSENTIAL HYPERTENSION: ICD-10-CM

## 2021-08-06 LAB
ANION GAP SERPL CALCULATED.3IONS-SCNC: 9 MMOL/L (ref 7–16)
BUN SERPL-MCNC: 20 MG/DL (ref 7–18)
BUN/CREAT SERPL: 19 (ref 6–20)
CALCIUM SERPL-MCNC: 9.1 MG/DL (ref 8.5–10.1)
CHLORIDE SERPL-SCNC: 98 MMOL/L (ref 98–107)
CO2 SERPL-SCNC: 31 MMOL/L (ref 21–32)
CREAT SERPL-MCNC: 1.08 MG/DL (ref 0.7–1.3)
EST. AVERAGE GLUCOSE BLD GHB EST-MCNC: 180 MG/DL
GLUCOSE SERPL-MCNC: 257 MG/DL (ref 74–106)
HBA1C MFR BLD HPLC: 8 % (ref 4.5–6.6)
POTASSIUM SERPL-SCNC: 3.8 MMOL/L (ref 3.5–5.1)
SODIUM SERPL-SCNC: 134 MMOL/L (ref 136–145)

## 2021-08-06 PROCEDURE — 99214 OFFICE O/P EST MOD 30 MIN: CPT | Mod: ,,, | Performed by: FAMILY MEDICINE

## 2021-08-06 PROCEDURE — 83036 HEMOGLOBIN GLYCOSYLATED A1C: CPT | Mod: ,,, | Performed by: CLINICAL MEDICAL LABORATORY

## 2021-08-06 PROCEDURE — 80048 BASIC METABOLIC PNL TOTAL CA: CPT | Mod: ,,, | Performed by: CLINICAL MEDICAL LABORATORY

## 2021-08-06 PROCEDURE — 99214 PR OFFICE/OUTPT VISIT, EST, LEVL IV, 30-39 MIN: ICD-10-PCS | Mod: ,,, | Performed by: FAMILY MEDICINE

## 2021-08-06 PROCEDURE — 80048 BASIC METABOLIC PANEL: ICD-10-PCS | Mod: ,,, | Performed by: CLINICAL MEDICAL LABORATORY

## 2021-08-06 PROCEDURE — 83036 HEMOGLOBIN A1C: ICD-10-PCS | Mod: ,,, | Performed by: CLINICAL MEDICAL LABORATORY

## 2021-08-06 RX ORDER — CITALOPRAM 20 MG/1
20 TABLET, FILM COATED ORAL DAILY
Qty: 90 TABLET | Refills: 0 | Status: SHIPPED | OUTPATIENT
Start: 2021-08-06 | End: 2021-10-11 | Stop reason: SDUPTHER

## 2021-08-06 RX ORDER — DEXTROAMPHETAMINE SACCHARATE, AMPHETAMINE ASPARTATE, DEXTROAMPHETAMINE SULFATE AND AMPHETAMINE SULFATE 5; 5; 5; 5 MG/1; MG/1; MG/1; MG/1
1 TABLET ORAL 2 TIMES DAILY
Qty: 60 TABLET | Refills: 0 | Status: SHIPPED | OUTPATIENT
Start: 2021-08-06 | End: 2021-09-10 | Stop reason: SDUPTHER

## 2021-08-16 ENCOUNTER — TELEPHONE (OUTPATIENT)
Dept: FAMILY MEDICINE | Facility: CLINIC | Age: 56
End: 2021-08-16

## 2021-09-10 ENCOUNTER — OFFICE VISIT (OUTPATIENT)
Dept: FAMILY MEDICINE | Facility: CLINIC | Age: 56
End: 2021-09-10
Payer: COMMERCIAL

## 2021-09-10 VITALS
SYSTOLIC BLOOD PRESSURE: 142 MMHG | OXYGEN SATURATION: 97 % | HEIGHT: 63 IN | HEART RATE: 68 BPM | TEMPERATURE: 99 F | DIASTOLIC BLOOD PRESSURE: 90 MMHG | WEIGHT: 209 LBS | BODY MASS INDEX: 37.03 KG/M2

## 2021-09-10 DIAGNOSIS — F90.9 ATTENTION DEFICIT HYPERACTIVITY DISORDER (ADHD), UNSPECIFIED ADHD TYPE: ICD-10-CM

## 2021-09-10 DIAGNOSIS — E11.9 TYPE 2 DIABETES MELLITUS WITHOUT COMPLICATION, WITHOUT LONG-TERM CURRENT USE OF INSULIN: Primary | ICD-10-CM

## 2021-09-10 PROCEDURE — 99214 OFFICE O/P EST MOD 30 MIN: CPT | Mod: ,,, | Performed by: FAMILY MEDICINE

## 2021-09-10 PROCEDURE — 99214 PR OFFICE/OUTPT VISIT, EST, LEVL IV, 30-39 MIN: ICD-10-PCS | Mod: ,,, | Performed by: FAMILY MEDICINE

## 2021-09-10 RX ORDER — DEXTROAMPHETAMINE SACCHARATE, AMPHETAMINE ASPARTATE, DEXTROAMPHETAMINE SULFATE AND AMPHETAMINE SULFATE 5; 5; 5; 5 MG/1; MG/1; MG/1; MG/1
1 TABLET ORAL DAILY
Qty: 30 TABLET | Refills: 0 | Status: SHIPPED | OUTPATIENT
Start: 2021-09-10 | End: 2021-10-11 | Stop reason: SDUPTHER

## 2021-09-10 RX ORDER — DEXTROAMPHETAMINE SACCHARATE, AMPHETAMINE ASPARTATE MONOHYDRATE, DEXTROAMPHETAMINE SULFATE AND AMPHETAMINE SULFATE 5; 5; 5; 5 MG/1; MG/1; MG/1; MG/1
20 CAPSULE, EXTENDED RELEASE ORAL DAILY
Qty: 30 CAPSULE | Refills: 0 | Status: ON HOLD | OUTPATIENT
Start: 2021-09-10 | End: 2021-10-20 | Stop reason: HOSPADM

## 2021-09-10 RX ORDER — METFORMIN HYDROCHLORIDE 1000 MG/1
1000 TABLET ORAL 2 TIMES DAILY WITH MEALS
Qty: 180 TABLET | Refills: 0 | Status: SHIPPED | OUTPATIENT
Start: 2021-09-10 | End: 2021-10-11 | Stop reason: SDUPTHER

## 2021-10-11 ENCOUNTER — OFFICE VISIT (OUTPATIENT)
Dept: FAMILY MEDICINE | Facility: CLINIC | Age: 56
End: 2021-10-11
Payer: COMMERCIAL

## 2021-10-11 VITALS
HEIGHT: 63 IN | DIASTOLIC BLOOD PRESSURE: 70 MMHG | WEIGHT: 202 LBS | OXYGEN SATURATION: 98 % | TEMPERATURE: 99 F | BODY MASS INDEX: 35.79 KG/M2 | SYSTOLIC BLOOD PRESSURE: 134 MMHG | HEART RATE: 76 BPM

## 2021-10-11 DIAGNOSIS — F90.9 ATTENTION DEFICIT HYPERACTIVITY DISORDER (ADHD), UNSPECIFIED ADHD TYPE: ICD-10-CM

## 2021-10-11 PROCEDURE — 99213 OFFICE O/P EST LOW 20 MIN: CPT | Mod: ,,, | Performed by: FAMILY MEDICINE

## 2021-10-11 PROCEDURE — 99213 PR OFFICE/OUTPT VISIT, EST, LEVL III, 20-29 MIN: ICD-10-PCS | Mod: ,,, | Performed by: FAMILY MEDICINE

## 2021-10-11 RX ORDER — DEXTROAMPHETAMINE SACCHARATE, AMPHETAMINE ASPARTATE, DEXTROAMPHETAMINE SULFATE AND AMPHETAMINE SULFATE 5; 5; 5; 5 MG/1; MG/1; MG/1; MG/1
1 TABLET ORAL DAILY
Qty: 30 TABLET | Refills: 0 | Status: ON HOLD | OUTPATIENT
Start: 2021-10-11 | End: 2021-10-20 | Stop reason: HOSPADM

## 2021-10-11 RX ORDER — DEXTROAMPHETAMINE SULFATE, DEXTROAMPHETAMINE SACCHARATE, AMPHETAMINE SULFATE AND AMPHETAMINE ASPARTATE 5; 5; 5; 5 MG/1; MG/1; MG/1; MG/1
20 CAPSULE, EXTENDED RELEASE ORAL EVERY MORNING
Qty: 30 CAPSULE | Refills: 0 | Status: SHIPPED | OUTPATIENT
Start: 2021-10-11 | End: 2021-10-28

## 2021-10-11 RX ORDER — CITALOPRAM 20 MG/1
20 TABLET, FILM COATED ORAL DAILY
Qty: 90 TABLET | Refills: 0 | Status: SHIPPED | OUTPATIENT
Start: 2021-10-11 | End: 2022-02-02 | Stop reason: SDUPTHER

## 2021-10-11 RX ORDER — DEXTROAMPHETAMINE SACCHARATE, AMPHETAMINE ASPARTATE MONOHYDRATE, DEXTROAMPHETAMINE SULFATE AND AMPHETAMINE SULFATE 5; 5; 5; 5 MG/1; MG/1; MG/1; MG/1
20 CAPSULE, EXTENDED RELEASE ORAL DAILY
Qty: 30 CAPSULE | Refills: 0 | Status: CANCELLED | OUTPATIENT
Start: 2021-10-11

## 2021-10-11 RX ORDER — METFORMIN HYDROCHLORIDE 1000 MG/1
1000 TABLET ORAL 2 TIMES DAILY WITH MEALS
Qty: 180 TABLET | Refills: 0 | Status: SHIPPED | OUTPATIENT
Start: 2021-10-11 | End: 2022-02-02 | Stop reason: SDUPTHER

## 2021-10-18 ENCOUNTER — HOSPITAL ENCOUNTER (INPATIENT)
Facility: HOSPITAL | Age: 56
LOS: 2 days | Discharge: HOME | DRG: 638 | End: 2021-10-20
Attending: INTERNAL MEDICINE | Admitting: INTERNAL MEDICINE
Payer: COMMERCIAL

## 2021-10-18 ENCOUNTER — HOSPITAL ENCOUNTER (EMERGENCY)
Facility: HOSPITAL | Age: 56
Discharge: ANOTHER HOSPITAL | End: 2021-10-18
Attending: FAMILY MEDICINE
Payer: COMMERCIAL

## 2021-10-18 VITALS
DIASTOLIC BLOOD PRESSURE: 87 MMHG | RESPIRATION RATE: 20 BRPM | HEART RATE: 75 BPM | WEIGHT: 210 LBS | TEMPERATURE: 98 F | OXYGEN SATURATION: 96 % | HEIGHT: 63 IN | BODY MASS INDEX: 37.21 KG/M2 | SYSTOLIC BLOOD PRESSURE: 135 MMHG

## 2021-10-18 DIAGNOSIS — E11.01 HHNC (HYPERGLYCEMIC HYPEROSMOLAR NONKETOTIC COMA): ICD-10-CM

## 2021-10-18 DIAGNOSIS — E11.00 HYPEROSMOLAR HYPERGLYCEMIC STATE (HHS): Primary | ICD-10-CM

## 2021-10-18 PROBLEM — N17.9 AKI (ACUTE KIDNEY INJURY): Status: ACTIVE | Noted: 2021-10-18

## 2021-10-18 PROBLEM — R07.9 CHEST PAIN: Status: RESOLVED | Noted: 2021-05-06 | Resolved: 2021-10-18

## 2021-10-18 PROBLEM — F90.9 ATTENTION DEFICIT HYPERACTIVITY DISORDER (ADHD): Status: RESOLVED | Noted: 2021-04-17 | Resolved: 2021-10-18

## 2021-10-18 PROBLEM — E87.6 HYPOKALEMIA: Status: RESOLVED | Noted: 2021-05-07 | Resolved: 2021-10-18

## 2021-10-18 PROBLEM — E78.5 HLD (HYPERLIPIDEMIA): Status: ACTIVE | Noted: 2021-10-18

## 2021-10-18 PROBLEM — I25.110 ATHEROSCLEROSIS OF NATIVE CORONARY ARTERY OF NATIVE HEART WITH UNSTABLE ANGINA PECTORIS: Status: RESOLVED | Noted: 2021-06-03 | Resolved: 2021-10-18

## 2021-10-18 LAB
ALBUMIN SERPL BCP-MCNC: 3.6 G/DL (ref 3.5–5)
ALBUMIN/GLOB SERPL: 0.9 {RATIO}
ALP SERPL-CCNC: 132 U/L (ref 45–115)
ALT SERPL W P-5'-P-CCNC: 45 U/L (ref 16–61)
ANION GAP SERPL CALCULATED.3IONS-SCNC: 12 MMOL/L (ref 7–16)
ANION GAP SERPL CALCULATED.3IONS-SCNC: 15 MMOL/L (ref 7–16)
AST SERPL W P-5'-P-CCNC: 20 U/L (ref 15–37)
BASOPHILS # BLD AUTO: 0.02 K/UL (ref 0–0.2)
BASOPHILS NFR BLD AUTO: 0.3 % (ref 0–1)
BILIRUB SERPL-MCNC: 0.6 MG/DL (ref 0–1.2)
BILIRUB UR QL STRIP: NEGATIVE
BUN SERPL-MCNC: 14 MG/DL (ref 7–18)
BUN SERPL-MCNC: 18 MG/DL (ref 7–18)
BUN/CREAT SERPL: 13 (ref 6–20)
BUN/CREAT SERPL: 15 (ref 6–20)
CALCIUM SERPL-MCNC: 9 MG/DL (ref 8.5–10.1)
CALCIUM SERPL-MCNC: 9 MG/DL (ref 8.5–10.1)
CHLORIDE SERPL-SCNC: 81 MMOL/L (ref 98–107)
CHLORIDE SERPL-SCNC: 99 MMOL/L (ref 98–107)
CLARITY UR: CLEAR
CO2 SERPL-SCNC: 28 MMOL/L (ref 21–32)
CO2 SERPL-SCNC: 30 MMOL/L (ref 21–32)
COLOR UR: YELLOW
CREAT SERPL-MCNC: 0.95 MG/DL (ref 0.7–1.3)
CREAT SERPL-MCNC: 1.43 MG/DL (ref 0.7–1.3)
DIFFERENTIAL METHOD BLD: ABNORMAL
EOSINOPHIL # BLD AUTO: 0.06 K/UL (ref 0–0.5)
EOSINOPHIL NFR BLD AUTO: 1 % (ref 1–4)
ERYTHROCYTE [DISTWIDTH] IN BLOOD BY AUTOMATED COUNT: 12.1 % (ref 11.5–14.5)
EST. AVERAGE GLUCOSE BLD GHB EST-MCNC: 290 MG/DL
FLUAV AG UPPER RESP QL IA.RAPID: NEGATIVE
FLUBV AG UPPER RESP QL IA.RAPID: NEGATIVE
GLOBULIN SER-MCNC: 3.9 G/DL (ref 2–4)
GLUCOSE SERPL-MCNC: 1008 MG/DL (ref 74–106)
GLUCOSE SERPL-MCNC: 160 MG/DL (ref 74–106)
GLUCOSE SERPL-MCNC: 224 MG/DL (ref 70–105)
GLUCOSE SERPL-MCNC: 319 MG/DL (ref 70–105)
GLUCOSE SERPL-MCNC: 323 MG/DL (ref 70–105)
GLUCOSE SERPL-MCNC: 342 MG/DL (ref 70–105)
GLUCOSE SERPL-MCNC: 342 MG/DL (ref 70–110)
GLUCOSE SERPL-MCNC: 371 MG/DL (ref 70–105)
GLUCOSE SERPL-MCNC: 371 MG/DL (ref 70–110)
GLUCOSE SERPL-MCNC: 495 MG/DL (ref 70–110)
GLUCOSE SERPL-MCNC: NORMAL MG/DL (ref 70–110)
GLUCOSE UR STRIP-MCNC: >=1000 MG/DL
HBA1C MFR BLD HPLC: 11.3 % (ref 4.5–6.6)
HCT VFR BLD AUTO: 40.1 % (ref 40–54)
HGB BLD-MCNC: 14.4 G/DL (ref 13.5–18)
KETONES UR STRIP-SCNC: NEGATIVE MG/DL
LACTATE SERPL-SCNC: 1.6 MMOL/L (ref 0.4–2)
LACTATE SERPL-SCNC: 3.7 MMOL/L (ref 0.4–2)
LEUKOCYTE ESTERASE UR QL STRIP: NEGATIVE
LYMPHOCYTES # BLD AUTO: 1.45 K/UL (ref 1–4.8)
LYMPHOCYTES NFR BLD AUTO: 23.4 % (ref 27–41)
MAGNESIUM SERPL-MCNC: 2.4 MG/DL (ref 1.7–2.3)
MCH RBC QN AUTO: 32.1 PG (ref 27–31)
MCHC RBC AUTO-ENTMCNC: 35.9 G/DL (ref 32–36)
MCV RBC AUTO: 89.5 FL (ref 80–96)
MONOCYTES # BLD AUTO: 0.45 K/UL (ref 0–0.8)
MONOCYTES NFR BLD AUTO: 7.3 % (ref 2–6)
MPC BLD CALC-MCNC: 9.8 FL (ref 9.4–12.4)
NEUTROPHILS # BLD AUTO: 4.21 K/UL (ref 1.8–7.7)
NEUTROPHILS NFR BLD AUTO: 68 % (ref 53–65)
NITRITE UR QL STRIP: NEGATIVE
NT-PROBNP SERPL-MCNC: 20 PG/ML (ref 1–125)
PH UR STRIP: 6.5 PH UNITS
PLATELET # BLD AUTO: 303 K/UL (ref 150–400)
POTASSIUM SERPL-SCNC: 3.7 MMOL/L (ref 3.5–5.1)
POTASSIUM SERPL-SCNC: 6 MMOL/L (ref 3.5–5.1)
PROT SERPL-MCNC: 7.5 G/DL (ref 6.4–8.2)
PROT UR QL STRIP: NEGATIVE
RBC # BLD AUTO: 4.48 M/UL (ref 4.6–6.2)
RBC # UR STRIP: NEGATIVE /UL
SARS-COV+SARS-COV-2 AG RESP QL IA.RAPID: NEGATIVE
SODIUM SERPL-SCNC: 118 MMOL/L (ref 136–145)
SODIUM SERPL-SCNC: 137 MMOL/L (ref 136–145)
SP GR UR STRIP: <=1.005
TROPONIN I SERPL-MCNC: <0.017 NG/ML
UROBILINOGEN UR STRIP-ACNC: 0.2 MG/DL
WBC # BLD AUTO: 6.19 K/UL (ref 4.5–11)

## 2021-10-18 PROCEDURE — 25000003 PHARM REV CODE 250: Performed by: FAMILY MEDICINE

## 2021-10-18 PROCEDURE — 85025 COMPLETE CBC W/AUTO DIFF WBC: CPT | Performed by: FAMILY MEDICINE

## 2021-10-18 PROCEDURE — 96361 HYDRATE IV INFUSION ADD-ON: CPT

## 2021-10-18 PROCEDURE — 99285 PR EMERGENCY DEPT VISIT,LEVEL V: ICD-10-PCS | Mod: ,,, | Performed by: FAMILY MEDICINE

## 2021-10-18 PROCEDURE — 96365 THER/PROPH/DIAG IV INF INIT: CPT

## 2021-10-18 PROCEDURE — 82962 GLUCOSE BLOOD TEST: CPT

## 2021-10-18 PROCEDURE — 83880 ASSAY OF NATRIURETIC PEPTIDE: CPT | Performed by: FAMILY MEDICINE

## 2021-10-18 PROCEDURE — 25000003 PHARM REV CODE 250: Performed by: NURSE PRACTITIONER

## 2021-10-18 PROCEDURE — 82803 BLOOD GASES ANY COMBINATION: CPT

## 2021-10-18 PROCEDURE — 83735 ASSAY OF MAGNESIUM: CPT | Performed by: NURSE PRACTITIONER

## 2021-10-18 PROCEDURE — 63600175 PHARM REV CODE 636 W HCPCS: Performed by: FAMILY MEDICINE

## 2021-10-18 PROCEDURE — 83036 HEMOGLOBIN GLYCOSYLATED A1C: CPT | Performed by: NURSE PRACTITIONER

## 2021-10-18 PROCEDURE — 36600 WITHDRAWAL OF ARTERIAL BLOOD: CPT

## 2021-10-18 PROCEDURE — 81003 URINALYSIS AUTO W/O SCOPE: CPT | Performed by: FAMILY MEDICINE

## 2021-10-18 PROCEDURE — 96376 TX/PRO/DX INJ SAME DRUG ADON: CPT

## 2021-10-18 PROCEDURE — 83605 ASSAY OF LACTIC ACID: CPT | Performed by: FAMILY MEDICINE

## 2021-10-18 PROCEDURE — 80048 BASIC METABOLIC PNL TOTAL CA: CPT | Mod: XB | Performed by: NURSE PRACTITIONER

## 2021-10-18 PROCEDURE — 84484 ASSAY OF TROPONIN QUANT: CPT | Performed by: FAMILY MEDICINE

## 2021-10-18 PROCEDURE — 87040 BLOOD CULTURE FOR BACTERIA: CPT | Performed by: FAMILY MEDICINE

## 2021-10-18 PROCEDURE — 63600175 PHARM REV CODE 636 W HCPCS: Performed by: NURSE PRACTITIONER

## 2021-10-18 PROCEDURE — 80053 COMPREHEN METABOLIC PANEL: CPT | Performed by: FAMILY MEDICINE

## 2021-10-18 PROCEDURE — 36415 COLL VENOUS BLD VENIPUNCTURE: CPT | Performed by: FAMILY MEDICINE

## 2021-10-18 PROCEDURE — 99285 EMERGENCY DEPT VISIT HI MDM: CPT | Mod: ,,, | Performed by: FAMILY MEDICINE

## 2021-10-18 PROCEDURE — 96366 THER/PROPH/DIAG IV INF ADDON: CPT

## 2021-10-18 PROCEDURE — 20000000 HC ICU ROOM

## 2021-10-18 PROCEDURE — 99285 EMERGENCY DEPT VISIT HI MDM: CPT | Mod: 25

## 2021-10-18 PROCEDURE — 87428 SARSCOV & INF VIR A&B AG IA: CPT | Performed by: FAMILY MEDICINE

## 2021-10-18 RX ORDER — ATORVASTATIN CALCIUM 80 MG/1
80 TABLET, FILM COATED ORAL DAILY
Status: DISCONTINUED | OUTPATIENT
Start: 2021-10-18 | End: 2021-10-20 | Stop reason: HOSPADM

## 2021-10-18 RX ORDER — GLUCAGON 1 MG
1 KIT INJECTION
Status: DISCONTINUED | OUTPATIENT
Start: 2021-10-18 | End: 2021-10-20 | Stop reason: HOSPADM

## 2021-10-18 RX ORDER — INSULIN ASPART 100 [IU]/ML
1-10 INJECTION, SOLUTION INTRAVENOUS; SUBCUTANEOUS
Status: DISCONTINUED | OUTPATIENT
Start: 2021-10-18 | End: 2021-10-20 | Stop reason: HOSPADM

## 2021-10-18 RX ORDER — CITALOPRAM 20 MG/1
20 TABLET, FILM COATED ORAL DAILY
Status: DISCONTINUED | OUTPATIENT
Start: 2021-10-18 | End: 2021-10-20 | Stop reason: HOSPADM

## 2021-10-18 RX ORDER — SODIUM CHLORIDE 9 MG/ML
INJECTION, SOLUTION INTRAVENOUS
Status: COMPLETED | OUTPATIENT
Start: 2021-10-18 | End: 2021-10-18

## 2021-10-18 RX ORDER — METOPROLOL TARTRATE 50 MG/1
50 TABLET ORAL 2 TIMES DAILY
Status: DISCONTINUED | OUTPATIENT
Start: 2021-10-18 | End: 2021-10-20 | Stop reason: HOSPADM

## 2021-10-18 RX ORDER — ENOXAPARIN SODIUM 100 MG/ML
40 INJECTION SUBCUTANEOUS EVERY 24 HOURS
Status: DISCONTINUED | OUTPATIENT
Start: 2021-10-19 | End: 2021-10-20 | Stop reason: HOSPADM

## 2021-10-18 RX ORDER — SODIUM CHLORIDE 9 MG/ML
INJECTION, SOLUTION INTRAVENOUS CONTINUOUS
Status: DISCONTINUED | OUTPATIENT
Start: 2021-10-18 | End: 2021-10-20 | Stop reason: HOSPADM

## 2021-10-18 RX ORDER — ASPIRIN 81 MG/1
81 TABLET ORAL DAILY
Status: DISCONTINUED | OUTPATIENT
Start: 2021-10-18 | End: 2021-10-20 | Stop reason: HOSPADM

## 2021-10-18 RX ADMIN — HUMAN INSULIN 15 UNITS: 100 INJECTION, SOLUTION SUBCUTANEOUS at 10:10

## 2021-10-18 RX ADMIN — INSULIN ASPART 2 UNITS: 100 INJECTION, SOLUTION INTRAVENOUS; SUBCUTANEOUS at 11:10

## 2021-10-18 RX ADMIN — INSULIN ASPART 8 UNITS: 100 INJECTION, SOLUTION INTRAVENOUS; SUBCUTANEOUS at 07:10

## 2021-10-18 RX ADMIN — SODIUM CHLORIDE, POTASSIUM CHLORIDE, SODIUM LACTATE AND CALCIUM CHLORIDE 1000 ML: 600; 310; 30; 20 INJECTION, SOLUTION INTRAVENOUS at 04:10

## 2021-10-18 RX ADMIN — SODIUM CHLORIDE: 9 INJECTION, SOLUTION INTRAVENOUS at 07:10

## 2021-10-18 RX ADMIN — ASPIRIN 81 MG: 81 TABLET, COATED ORAL at 04:10

## 2021-10-18 RX ADMIN — SODIUM CHLORIDE 1000 ML: 9 INJECTION, SOLUTION INTRAVENOUS at 10:10

## 2021-10-18 RX ADMIN — ATORVASTATIN CALCIUM 80 MG: 80 TABLET, FILM COATED ORAL at 04:10

## 2021-10-18 RX ADMIN — SODIUM CHLORIDE: 9 INJECTION, SOLUTION INTRAVENOUS at 11:10

## 2021-10-18 RX ADMIN — METOPROLOL TARTRATE 50 MG: 50 TABLET, FILM COATED ORAL at 09:10

## 2021-10-18 RX ADMIN — SODIUM CHLORIDE 0.1 UNITS/KG/HR: 9 INJECTION, SOLUTION INTRAVENOUS at 11:10

## 2021-10-19 PROBLEM — N17.9 AKI (ACUTE KIDNEY INJURY): Status: RESOLVED | Noted: 2021-10-18 | Resolved: 2021-10-19

## 2021-10-19 LAB
ANION GAP SERPL CALCULATED.3IONS-SCNC: 13 MMOL/L (ref 7–16)
BUN SERPL-MCNC: 10 MG/DL (ref 7–18)
BUN/CREAT SERPL: 11 (ref 6–20)
CALCIUM SERPL-MCNC: 7.9 MG/DL (ref 8.5–10.1)
CHLORIDE SERPL-SCNC: 103 MMOL/L (ref 98–107)
CO2 SERPL-SCNC: 28 MMOL/L (ref 21–32)
CREAT SERPL-MCNC: 0.94 MG/DL (ref 0.7–1.3)
GLUCOSE SERPL-MCNC: 218 MG/DL (ref 70–105)
GLUCOSE SERPL-MCNC: 240 MG/DL (ref 74–106)
GLUCOSE SERPL-MCNC: 252 MG/DL (ref 70–105)
GLUCOSE SERPL-MCNC: 261 MG/DL (ref 70–105)
GLUCOSE SERPL-MCNC: 308 MG/DL (ref 70–105)
GLUCOSE SERPL-MCNC: 336 MG/DL (ref 70–105)
GLUCOSE SERPL-MCNC: 495 MG/DL (ref 70–105)
GLUCOSE SERPL-MCNC: >600 MG/DL (ref 70–105)
POTASSIUM SERPL-SCNC: 4.6 MMOL/L (ref 3.5–5.1)
SODIUM SERPL-SCNC: 139 MMOL/L (ref 136–145)

## 2021-10-19 PROCEDURE — 80048 BASIC METABOLIC PNL TOTAL CA: CPT | Performed by: NURSE PRACTITIONER

## 2021-10-19 PROCEDURE — 99233 PR SUBSEQUENT HOSPITAL CARE,LEVL III: ICD-10-PCS | Mod: ,,, | Performed by: INTERNAL MEDICINE

## 2021-10-19 PROCEDURE — 20000000 HC ICU ROOM

## 2021-10-19 PROCEDURE — 25000003 PHARM REV CODE 250: Performed by: NURSE PRACTITIONER

## 2021-10-19 PROCEDURE — 63600175 PHARM REV CODE 636 W HCPCS: Performed by: NURSE PRACTITIONER

## 2021-10-19 PROCEDURE — 36415 COLL VENOUS BLD VENIPUNCTURE: CPT | Performed by: NURSE PRACTITIONER

## 2021-10-19 PROCEDURE — 99233 SBSQ HOSP IP/OBS HIGH 50: CPT | Mod: ,,, | Performed by: INTERNAL MEDICINE

## 2021-10-19 PROCEDURE — 82962 GLUCOSE BLOOD TEST: CPT

## 2021-10-19 RX ORDER — GLUCAGON 1 MG
1 KIT INJECTION
Status: CANCELLED | OUTPATIENT
Start: 2021-10-19

## 2021-10-19 RX ORDER — METFORMIN HYDROCHLORIDE 500 MG/1
500 TABLET ORAL 2 TIMES DAILY WITH MEALS
Status: CANCELLED | OUTPATIENT
Start: 2021-10-19

## 2021-10-19 RX ORDER — DEXTROAMPHETAMINE SACCHARATE, AMPHETAMINE ASPARTATE MONOHYDRATE, DEXTROAMPHETAMINE SULFATE AND AMPHETAMINE SULFATE 5; 5; 5; 5 MG/1; MG/1; MG/1; MG/1
20 CAPSULE, EXTENDED RELEASE ORAL EVERY MORNING
Status: CANCELLED | OUTPATIENT
Start: 2021-10-19

## 2021-10-19 RX ORDER — IBUPROFEN 200 MG
24 TABLET ORAL
Status: CANCELLED | OUTPATIENT
Start: 2021-10-19

## 2021-10-19 RX ORDER — LISINOPRIL 20 MG/1
20 TABLET ORAL DAILY
Status: DISCONTINUED | OUTPATIENT
Start: 2021-10-19 | End: 2021-10-20 | Stop reason: HOSPADM

## 2021-10-19 RX ORDER — IBUPROFEN 200 MG
16 TABLET ORAL
Status: CANCELLED | OUTPATIENT
Start: 2021-10-19

## 2021-10-19 RX ORDER — SODIUM CHLORIDE 9 MG/ML
INJECTION, SOLUTION INTRAVENOUS CONTINUOUS
Status: CANCELLED | OUTPATIENT
Start: 2021-10-19

## 2021-10-19 RX ADMIN — SODIUM CHLORIDE: 9 INJECTION, SOLUTION INTRAVENOUS at 07:10

## 2021-10-19 RX ADMIN — SODIUM CHLORIDE: 9 INJECTION, SOLUTION INTRAVENOUS at 12:10

## 2021-10-19 RX ADMIN — ENOXAPARIN SODIUM 40 MG: 40 INJECTION SUBCUTANEOUS at 08:10

## 2021-10-19 RX ADMIN — ATORVASTATIN CALCIUM 80 MG: 80 TABLET, FILM COATED ORAL at 08:10

## 2021-10-19 RX ADMIN — METOPROLOL TARTRATE 50 MG: 50 TABLET, FILM COATED ORAL at 08:10

## 2021-10-19 RX ADMIN — INSULIN ASPART 8 UNITS: 100 INJECTION, SOLUTION INTRAVENOUS; SUBCUTANEOUS at 05:10

## 2021-10-19 RX ADMIN — INSULIN ASPART 6 UNITS: 100 INJECTION, SOLUTION INTRAVENOUS; SUBCUTANEOUS at 12:10

## 2021-10-19 RX ADMIN — INSULIN ASPART 4 UNITS: 100 INJECTION, SOLUTION INTRAVENOUS; SUBCUTANEOUS at 04:10

## 2021-10-19 RX ADMIN — SODIUM CHLORIDE: 9 INJECTION, SOLUTION INTRAVENOUS at 02:10

## 2021-10-19 RX ADMIN — LISINOPRIL 20 MG: 20 TABLET ORAL at 12:10

## 2021-10-19 RX ADMIN — CITALOPRAM HYDROBROMIDE 20 MG: 20 TABLET ORAL at 08:10

## 2021-10-19 RX ADMIN — INSULIN ASPART 3 UNITS: 100 INJECTION, SOLUTION INTRAVENOUS; SUBCUTANEOUS at 08:10

## 2021-10-19 RX ADMIN — ASPIRIN 81 MG: 81 TABLET, COATED ORAL at 08:10

## 2021-10-20 VITALS
SYSTOLIC BLOOD PRESSURE: 168 MMHG | OXYGEN SATURATION: 97 % | HEIGHT: 63 IN | WEIGHT: 206.13 LBS | RESPIRATION RATE: 16 BRPM | BODY MASS INDEX: 36.52 KG/M2 | DIASTOLIC BLOOD PRESSURE: 85 MMHG | HEART RATE: 73 BPM | TEMPERATURE: 98 F

## 2021-10-20 LAB — GLUCOSE SERPL-MCNC: 214 MG/DL (ref 70–105)

## 2021-10-20 PROCEDURE — 63600175 PHARM REV CODE 636 W HCPCS: Performed by: NURSE PRACTITIONER

## 2021-10-20 PROCEDURE — 99238 HOSP IP/OBS DSCHRG MGMT 30/<: CPT | Mod: ,,, | Performed by: INTERNAL MEDICINE

## 2021-10-20 PROCEDURE — 82962 GLUCOSE BLOOD TEST: CPT

## 2021-10-20 PROCEDURE — 25000003 PHARM REV CODE 250: Performed by: NURSE PRACTITIONER

## 2021-10-20 PROCEDURE — 99238 PR HOSPITAL DISCHARGE DAY,<30 MIN: ICD-10-PCS | Mod: ,,, | Performed by: INTERNAL MEDICINE

## 2021-10-20 PROCEDURE — G0108 DIAB MANAGE TRN  PER INDIV: HCPCS

## 2021-10-20 RX ADMIN — LISINOPRIL 20 MG: 20 TABLET ORAL at 09:10

## 2021-10-20 RX ADMIN — INSULIN ASPART 4 UNITS: 100 INJECTION, SOLUTION INTRAVENOUS; SUBCUTANEOUS at 05:10

## 2021-10-20 RX ADMIN — ATORVASTATIN CALCIUM 80 MG: 80 TABLET, FILM COATED ORAL at 09:10

## 2021-10-20 RX ADMIN — CITALOPRAM HYDROBROMIDE 20 MG: 20 TABLET ORAL at 09:10

## 2021-10-20 RX ADMIN — SODIUM CHLORIDE: 9 INJECTION, SOLUTION INTRAVENOUS at 02:10

## 2021-10-20 RX ADMIN — ASPIRIN 81 MG: 81 TABLET, COATED ORAL at 09:10

## 2021-10-20 RX ADMIN — METOPROLOL TARTRATE 50 MG: 50 TABLET, FILM COATED ORAL at 09:10

## 2021-10-21 LAB
HCO3 UR-SCNC: 27.2 MMOL/L (ref 21–28)
PCO2 BLDA: 42 MMHG (ref 35–48)
PH SMN: 7.42 [PH] (ref 7.35–7.45)
PO2 BLDA: 102 MMHG (ref 83–108)
POC BASE EXCESS: 2.4 MMOL/L (ref -2–3)
POC SATURATED O2: 98 %

## 2021-10-24 LAB
BACTERIA BLD CULT: NORMAL
BACTERIA BLD CULT: NORMAL

## 2021-10-28 ENCOUNTER — OFFICE VISIT (OUTPATIENT)
Dept: FAMILY MEDICINE | Facility: CLINIC | Age: 56
End: 2021-10-28
Payer: COMMERCIAL

## 2021-10-28 VITALS
DIASTOLIC BLOOD PRESSURE: 82 MMHG | WEIGHT: 195.63 LBS | HEART RATE: 74 BPM | SYSTOLIC BLOOD PRESSURE: 140 MMHG | RESPIRATION RATE: 20 BRPM | HEIGHT: 63 IN | TEMPERATURE: 96 F | OXYGEN SATURATION: 98 % | BODY MASS INDEX: 34.66 KG/M2

## 2021-10-28 DIAGNOSIS — F90.9 ATTENTION DEFICIT HYPERACTIVITY DISORDER (ADHD), UNSPECIFIED ADHD TYPE: ICD-10-CM

## 2021-10-28 DIAGNOSIS — E78.2 MIXED HYPERLIPIDEMIA: ICD-10-CM

## 2021-10-28 DIAGNOSIS — E11.9 TYPE 2 DIABETES MELLITUS WITHOUT COMPLICATION, WITHOUT LONG-TERM CURRENT USE OF INSULIN: ICD-10-CM

## 2021-10-28 DIAGNOSIS — F98.8 ADULT ATTENTION DEFICIT DISORDER: ICD-10-CM

## 2021-10-28 DIAGNOSIS — I10 ESSENTIAL HYPERTENSION, BENIGN: Primary | ICD-10-CM

## 2021-10-28 LAB
CREAT UR-MCNC: 58 MG/DL (ref 39–259)
MICROALBUMIN UR-MCNC: 2 MG/DL (ref 0–2.8)
MICROALBUMIN/CREAT RATIO PNL UR: 34.5 MG/G (ref 0–30)

## 2021-10-28 PROCEDURE — 80048 BASIC METABOLIC PNL TOTAL CA: CPT | Mod: ,,, | Performed by: CLINICAL MEDICAL LABORATORY

## 2021-10-28 PROCEDURE — 99215 OFFICE O/P EST HI 40 MIN: CPT | Mod: ,,, | Performed by: FAMILY MEDICINE

## 2021-10-28 PROCEDURE — 80061 LIPID PANEL: CPT | Mod: ,,, | Performed by: CLINICAL MEDICAL LABORATORY

## 2021-10-28 PROCEDURE — 82043 MICROALBUMIN / CREATININE RATIO URINE: ICD-10-PCS | Mod: ,,, | Performed by: CLINICAL MEDICAL LABORATORY

## 2021-10-28 PROCEDURE — 99215 PR OFFICE/OUTPT VISIT, EST, LEVL V, 40-54 MIN: ICD-10-PCS | Mod: ,,, | Performed by: FAMILY MEDICINE

## 2021-10-28 PROCEDURE — 82570 ASSAY OF URINE CREATININE: CPT | Mod: ,,, | Performed by: CLINICAL MEDICAL LABORATORY

## 2021-10-28 PROCEDURE — 80048 BASIC METABOLIC PANEL: ICD-10-PCS | Mod: ,,, | Performed by: CLINICAL MEDICAL LABORATORY

## 2021-10-28 PROCEDURE — 80061 LIPID PANEL: ICD-10-PCS | Mod: ,,, | Performed by: CLINICAL MEDICAL LABORATORY

## 2021-10-28 PROCEDURE — 82570 MICROALBUMIN / CREATININE RATIO URINE: ICD-10-PCS | Mod: ,,, | Performed by: CLINICAL MEDICAL LABORATORY

## 2021-10-28 PROCEDURE — 82043 UR ALBUMIN QUANTITATIVE: CPT | Mod: ,,, | Performed by: CLINICAL MEDICAL LABORATORY

## 2021-10-28 RX ORDER — DEXTROAMPHETAMINE SACCHARATE, AMPHETAMINE ASPARTATE, DEXTROAMPHETAMINE SULFATE AND AMPHETAMINE SULFATE 5; 5; 5; 5 MG/1; MG/1; MG/1; MG/1
1 TABLET ORAL 2 TIMES DAILY
COMMUNITY
End: 2021-10-28

## 2021-10-28 RX ORDER — FLASH GLUCOSE SENSOR
1 KIT MISCELLANEOUS
Qty: 6 KIT | Refills: 1 | Status: SHIPPED | OUTPATIENT
Start: 2021-10-28 | End: 2022-02-02

## 2021-10-28 RX ORDER — FLASH GLUCOSE SCANNING READER
1 EACH MISCELLANEOUS 4 TIMES DAILY
Qty: 1 EACH | Refills: 5 | Status: SHIPPED | OUTPATIENT
Start: 2021-10-28 | End: 2022-02-02

## 2021-10-28 RX ORDER — DEXTROAMPHETAMINE SULFATE, DEXTROAMPHETAMINE SACCHARATE, AMPHETAMINE SULFATE AND AMPHETAMINE ASPARTATE 5; 5; 5; 5 MG/1; MG/1; MG/1; MG/1
20 CAPSULE, EXTENDED RELEASE ORAL EVERY MORNING
Qty: 30 CAPSULE | Refills: 0 | Status: SHIPPED | OUTPATIENT
Start: 2021-10-28 | End: 2022-04-13

## 2021-10-28 RX ORDER — EMPAGLIFLOZIN 10 MG/1
10 TABLET, FILM COATED ORAL DAILY
COMMUNITY
End: 2021-12-01 | Stop reason: SDUPTHER

## 2021-10-29 LAB
ANION GAP SERPL CALCULATED.3IONS-SCNC: 12 MMOL/L (ref 7–16)
BUN SERPL-MCNC: 10 MG/DL (ref 7–18)
BUN/CREAT SERPL: 9 (ref 6–20)
CALCIUM SERPL-MCNC: 9.6 MG/DL (ref 8.5–10.1)
CHLORIDE SERPL-SCNC: 95 MMOL/L (ref 98–107)
CHOLEST SERPL-MCNC: 103 MG/DL (ref 0–200)
CHOLEST/HDLC SERPL: 3.2 {RATIO}
CO2 SERPL-SCNC: 28 MMOL/L (ref 21–32)
CREAT SERPL-MCNC: 1.1 MG/DL (ref 0.7–1.3)
GLUCOSE SERPL-MCNC: 338 MG/DL (ref 74–106)
HDLC SERPL-MCNC: 32 MG/DL (ref 40–60)
LDLC SERPL CALC-MCNC: 25 MG/DL
LDLC/HDLC SERPL: 0.8 {RATIO}
NONHDLC SERPL-MCNC: 71 MG/DL
POTASSIUM SERPL-SCNC: 4.1 MMOL/L (ref 3.5–5.1)
SODIUM SERPL-SCNC: 131 MMOL/L (ref 136–145)
TRIGL SERPL-MCNC: 229 MG/DL (ref 35–150)
VLDLC SERPL-MCNC: 46 MG/DL

## 2021-12-01 DIAGNOSIS — E11.9 TYPE 2 DIABETES MELLITUS WITHOUT COMPLICATION, WITHOUT LONG-TERM CURRENT USE OF INSULIN: Primary | ICD-10-CM

## 2021-12-01 RX ORDER — EMPAGLIFLOZIN 10 MG/1
10 TABLET, FILM COATED ORAL DAILY
Qty: 30 TABLET | Refills: 3 | Status: SHIPPED | OUTPATIENT
Start: 2021-12-01 | End: 2022-02-02 | Stop reason: SDUPTHER

## 2021-12-06 ENCOUNTER — OFFICE VISIT (OUTPATIENT)
Dept: CARDIOLOGY | Facility: CLINIC | Age: 56
End: 2021-12-06
Payer: COMMERCIAL

## 2021-12-06 VITALS
SYSTOLIC BLOOD PRESSURE: 138 MMHG | HEART RATE: 91 BPM | WEIGHT: 195 LBS | OXYGEN SATURATION: 97 % | BODY MASS INDEX: 34.55 KG/M2 | HEIGHT: 63 IN | DIASTOLIC BLOOD PRESSURE: 92 MMHG

## 2021-12-06 DIAGNOSIS — I10 ESSENTIAL HYPERTENSION: Primary | ICD-10-CM

## 2021-12-06 DIAGNOSIS — E78.2 MIXED HYPERLIPIDEMIA: ICD-10-CM

## 2021-12-06 DIAGNOSIS — I10 ESSENTIAL HYPERTENSION, BENIGN: ICD-10-CM

## 2021-12-06 PROCEDURE — 93010 ELECTROCARDIOGRAM REPORT: CPT | Mod: S$PBB,,, | Performed by: INTERNAL MEDICINE

## 2021-12-06 PROCEDURE — 99213 PR OFFICE/OUTPT VISIT, EST, LEVL III, 20-29 MIN: ICD-10-PCS | Mod: S$PBB,,, | Performed by: INTERNAL MEDICINE

## 2021-12-06 PROCEDURE — 93005 ELECTROCARDIOGRAM TRACING: CPT | Mod: PBBFAC | Performed by: INTERNAL MEDICINE

## 2021-12-06 PROCEDURE — 99213 OFFICE O/P EST LOW 20 MIN: CPT | Mod: S$PBB,,, | Performed by: INTERNAL MEDICINE

## 2021-12-06 PROCEDURE — 99214 OFFICE O/P EST MOD 30 MIN: CPT | Mod: PBBFAC | Performed by: INTERNAL MEDICINE

## 2021-12-06 PROCEDURE — 93010 EKG 12-LEAD: ICD-10-PCS | Mod: S$PBB,,, | Performed by: INTERNAL MEDICINE

## 2022-02-02 ENCOUNTER — OFFICE VISIT (OUTPATIENT)
Dept: FAMILY MEDICINE | Facility: CLINIC | Age: 57
End: 2022-02-02
Payer: COMMERCIAL

## 2022-02-02 VITALS
WEIGHT: 199 LBS | TEMPERATURE: 97 F | DIASTOLIC BLOOD PRESSURE: 82 MMHG | HEIGHT: 63 IN | BODY MASS INDEX: 35.26 KG/M2 | OXYGEN SATURATION: 98 % | HEART RATE: 68 BPM | SYSTOLIC BLOOD PRESSURE: 130 MMHG | RESPIRATION RATE: 20 BRPM

## 2022-02-02 DIAGNOSIS — E78.2 MIXED HYPERLIPIDEMIA: Primary | ICD-10-CM

## 2022-02-02 DIAGNOSIS — I10 ESSENTIAL HYPERTENSION, BENIGN: ICD-10-CM

## 2022-02-02 DIAGNOSIS — F98.8 ADULT ATTENTION DEFICIT DISORDER: ICD-10-CM

## 2022-02-02 DIAGNOSIS — E11.9 TYPE 2 DIABETES MELLITUS WITHOUT COMPLICATION, WITHOUT LONG-TERM CURRENT USE OF INSULIN: ICD-10-CM

## 2022-02-02 DIAGNOSIS — F90.9 ATTENTION DEFICIT HYPERACTIVITY DISORDER (ADHD), UNSPECIFIED ADHD TYPE: ICD-10-CM

## 2022-02-02 DIAGNOSIS — F32.A DEPRESSION, UNSPECIFIED DEPRESSION TYPE: ICD-10-CM

## 2022-02-02 LAB
ANION GAP SERPL CALCULATED.3IONS-SCNC: 9 MMOL/L (ref 7–16)
BUN SERPL-MCNC: 15 MG/DL (ref 7–18)
BUN/CREAT SERPL: 14 (ref 6–20)
CALCIUM SERPL-MCNC: 9.4 MG/DL (ref 8.5–10.1)
CHLORIDE SERPL-SCNC: 95 MMOL/L (ref 98–107)
CHOLEST SERPL-MCNC: 158 MG/DL (ref 0–200)
CHOLEST/HDLC SERPL: 6.3 {RATIO}
CO2 SERPL-SCNC: 31 MMOL/L (ref 21–32)
CREAT SERPL-MCNC: 1.08 MG/DL (ref 0.7–1.3)
GLUCOSE SERPL-MCNC: 166 MG/DL (ref 74–106)
HDLC SERPL-MCNC: 25 MG/DL (ref 40–60)
LDLC SERPL CALC-MCNC: ABNORMAL MG/DL
LDLC/HDLC SERPL: ABNORMAL {RATIO}
NONHDLC SERPL-MCNC: 133 MG/DL
POTASSIUM SERPL-SCNC: 3.8 MMOL/L (ref 3.5–5.1)
SODIUM SERPL-SCNC: 131 MMOL/L (ref 136–145)
TRIGL SERPL-MCNC: 610 MG/DL (ref 35–150)
VLDLC SERPL-MCNC: ABNORMAL MG/DL

## 2022-02-02 PROCEDURE — 80061 LIPID PANEL: CPT | Mod: ,,, | Performed by: CLINICAL MEDICAL LABORATORY

## 2022-02-02 PROCEDURE — 80048 BASIC METABOLIC PANEL: ICD-10-PCS | Mod: ,,, | Performed by: CLINICAL MEDICAL LABORATORY

## 2022-02-02 PROCEDURE — 99214 OFFICE O/P EST MOD 30 MIN: CPT | Mod: ,,, | Performed by: FAMILY MEDICINE

## 2022-02-02 PROCEDURE — 99214 PR OFFICE/OUTPT VISIT, EST, LEVL IV, 30-39 MIN: ICD-10-PCS | Mod: ,,, | Performed by: FAMILY MEDICINE

## 2022-02-02 PROCEDURE — 83036 HEMOGLOBIN GLYCOSYLATED A1C: CPT | Mod: ,,, | Performed by: CLINICAL MEDICAL LABORATORY

## 2022-02-02 PROCEDURE — 83036 HEMOGLOBIN A1C: ICD-10-PCS | Mod: ,,, | Performed by: CLINICAL MEDICAL LABORATORY

## 2022-02-02 PROCEDURE — 80061 LIPID PANEL: ICD-10-PCS | Mod: ,,, | Performed by: CLINICAL MEDICAL LABORATORY

## 2022-02-02 PROCEDURE — 80048 BASIC METABOLIC PNL TOTAL CA: CPT | Mod: ,,, | Performed by: CLINICAL MEDICAL LABORATORY

## 2022-02-02 RX ORDER — METFORMIN HYDROCHLORIDE 1000 MG/1
1000 TABLET ORAL 2 TIMES DAILY WITH MEALS
Qty: 180 TABLET | Refills: 1 | Status: SHIPPED | OUTPATIENT
Start: 2022-02-02 | End: 2022-08-29 | Stop reason: SDUPTHER

## 2022-02-02 RX ORDER — DEXTROAMPHETAMINE SACCHARATE, AMPHETAMINE ASPARTATE MONOHYDRATE, DEXTROAMPHETAMINE SULFATE AND AMPHETAMINE SULFATE 7.5; 7.5; 7.5; 7.5 MG/1; MG/1; MG/1; MG/1
30 CAPSULE, EXTENDED RELEASE ORAL EVERY MORNING
Qty: 30 CAPSULE | Refills: 0 | Status: SHIPPED | OUTPATIENT
Start: 2022-02-02 | End: 2022-04-13 | Stop reason: SDUPTHER

## 2022-02-02 RX ORDER — DEXTROAMPHETAMINE SULFATE, DEXTROAMPHETAMINE SACCHARATE, AMPHETAMINE SULFATE AND AMPHETAMINE ASPARTATE 5; 5; 5; 5 MG/1; MG/1; MG/1; MG/1
20 CAPSULE, EXTENDED RELEASE ORAL EVERY MORNING
Qty: 30 CAPSULE | Refills: 0 | Status: CANCELLED | OUTPATIENT
Start: 2022-02-02

## 2022-02-02 RX ORDER — DEXTROAMPHETAMINE SACCHARATE, AMPHETAMINE ASPARTATE MONOHYDRATE, DEXTROAMPHETAMINE SULFATE AND AMPHETAMINE SULFATE 7.5; 7.5; 7.5; 7.5 MG/1; MG/1; MG/1; MG/1
30 CAPSULE, EXTENDED RELEASE ORAL EVERY MORNING
Qty: 30 CAPSULE | Refills: 0 | Status: SHIPPED | OUTPATIENT
Start: 2022-02-02 | End: 2022-05-02 | Stop reason: SDUPTHER

## 2022-02-02 RX ORDER — METOPROLOL TARTRATE 50 MG/1
50 TABLET ORAL 2 TIMES DAILY
Qty: 180 TABLET | Refills: 1 | Status: SHIPPED | OUTPATIENT
Start: 2022-02-02 | End: 2022-08-29 | Stop reason: SDUPTHER

## 2022-02-02 RX ORDER — CITALOPRAM 20 MG/1
20 TABLET, FILM COATED ORAL DAILY
Qty: 90 TABLET | Refills: 1 | Status: SHIPPED | OUTPATIENT
Start: 2022-02-02 | End: 2022-08-29 | Stop reason: SDUPTHER

## 2022-02-02 RX ORDER — ATORVASTATIN CALCIUM 80 MG/1
80 TABLET, FILM COATED ORAL DAILY
Qty: 90 TABLET | Refills: 3 | Status: SHIPPED | OUTPATIENT
Start: 2022-02-02 | End: 2022-05-02 | Stop reason: SDUPTHER

## 2022-02-02 RX ORDER — LISINOPRIL AND HYDROCHLOROTHIAZIDE 20; 25 MG/1; MG/1
1 TABLET ORAL DAILY
Qty: 90 TABLET | Refills: 1 | Status: SHIPPED | OUTPATIENT
Start: 2022-02-02 | End: 2022-05-02 | Stop reason: SDUPTHER

## 2022-02-02 RX ORDER — DEXTROAMPHETAMINE SACCHARATE, AMPHETAMINE ASPARTATE MONOHYDRATE, DEXTROAMPHETAMINE SULFATE AND AMPHETAMINE SULFATE 7.5; 7.5; 7.5; 7.5 MG/1; MG/1; MG/1; MG/1
30 CAPSULE, EXTENDED RELEASE ORAL EVERY MORNING
Qty: 30 CAPSULE | Refills: 0 | Status: SHIPPED | OUTPATIENT
Start: 2022-02-02 | End: 2022-02-02 | Stop reason: SDUPTHER

## 2022-02-02 RX ORDER — EMPAGLIFLOZIN 10 MG/1
10 TABLET, FILM COATED ORAL DAILY
Qty: 90 TABLET | Refills: 1 | Status: SHIPPED | OUTPATIENT
Start: 2022-02-02 | End: 2022-02-04 | Stop reason: DRUGHIGH

## 2022-02-02 NOTE — ASSESSMENT & PLAN NOTE
Patient states that the a 20 mg does not cover him for entire 12-15 hours that he worse.  Did increase his dose to 30 mg but warned him that this may increase his chances of having a heart attack is he does have coronary artery disease and increase his blood pressure so needs to be watched very closely.

## 2022-02-02 NOTE — PROGRESS NOTES
Julio Cruz DO   [place] [id number] [place]  [address]      PATIENT NAME: Syed Kelley  : 1965  DATE: 22  MRN: [id number]      Billing Provider: Julio Cruz DO  Level of Service:   Patient PCP Information     Provider PCP Type    Waqar Osorio DO General          Reason for Visit / Chief Complaint: Diabetes (Reports his blood sugars have been running in the 140's when he checks them. He does not check them daily. He has an upcoming appointment with nephrologist.), Hypertension, Hyperlipidemia, and ADD (Patient requesting refill on medication. He states he will need generic due to cost/insurance coverage. Patient also wants to discuss increasing dosage)       Update PCP  Update Chief Complaint         History of Present Illness / Problem Focused Workflow     Syed Kelley presents to the clinic with Diabetes (Reports his blood sugars have been running in the 140's when he checks them. He does not check them daily. He has an upcoming appointment with nephrologist.), Hypertension, Hyperlipidemia, and ADD (Patient requesting refill on medication. He states he will need generic due to cost/insurance coverage. Patient also wants to discuss increasing dosage)     Patient is in today for follow-up on attention deficit disorder as well as hypertension and coronary artery disease and hyperlipidemia.  Patient had are CT that showed 50% ejection fraction with nonobstructive coronary artery disease.  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  He denies any chest pain shortness of breath or palpitations he denies any PND or orthopnea.  Patient states that he needs an increased dose of his Adderall as he falls asleep later in the afternoons when      Review of Systems     Review of Systems   Constitutional: Negative for activity change, appetite change, chills, fatigue and fever.   HENT: Negative for nasal congestion, ear discharge,  ear pain, mouth dryness, mouth sores, postnasal drip, sinus pressure/congestion, sore throat and voice change.    Eyes: Negative for pain, discharge, redness, itching and visual disturbance.   Respiratory: Negative for apnea, cough, chest tightness, shortness of breath and wheezing.    Cardiovascular: Negative for chest pain, palpitations and leg swelling.   Gastrointestinal: Negative for abdominal distention, abdominal pain, anal bleeding, blood in stool, change in bowel habit, constipation, diarrhea, nausea, vomiting, reflux and change in bowel habit.   Endocrine: Negative for cold intolerance, heat intolerance, polydipsia, polyphagia and polyuria.   Genitourinary: Negative for difficulty urinating, enuresis, erectile dysfunction, frequency, genital sores, hematuria, hot flashes, menstrual irregularity, urgency and vaginal dryness.   Musculoskeletal: Negative for arthralgias, back pain, gait problem, leg pain, myalgias and neck pain.   Integumentary:  Negative for rash, mole/lesion, breast mass, breast discharge and breast tenderness.   Allergic/Immunologic: Negative for environmental allergies and food allergies.   Neurological: Negative for dizziness, vertigo, tremors, seizures, syncope, facial asymmetry, speech difficulty, weakness, light-headedness, numbness, headaches, disturbances in coordination, memory loss and coordination difficulties.   Hematological: Negative for adenopathy. Does not bruise/bleed easily.   Psychiatric/Behavioral: Negative for agitation, behavioral problems, confusion, decreased concentration, dysphoric mood, hallucinations, self-injury, sleep disturbance and suicidal ideas. The patient is not nervous/anxious and is not hyperactive.    Breast: Negative for mass and tenderness      Medical / Social / Family History     Past Medical History:   Diagnosis Date    Adult attention deficit disorder 3/22/2021    Controlled type 2 diabetes mellitus with hyperglycemia, without long-term current  use of insulin 3/22/2021    Disorder of kidney and ureter     Essential hypertension 3/22/2021    Hyperlipidemia        Past Surgical History:   Procedure Laterality Date    APPENDECTOMY      CARDIAC CATHETERIZATION      LEFT HEART CATHETERIZATION N/A 5/7/2021    Procedure: Left heart cath with possible intervention;  Surgeon: Federico James DO;  Location: [facility] CATH LAB;  Service: Cardiology;  Laterality: N/A;       Social History    reports that he quit smoking about 37 years ago. His smoking use included cigarettes. He started smoking about 41 years ago. He has a 20.00 pack-year smoking history. His smokeless tobacco use includes chew. He reports current alcohol use. He reports that he does not use drugs.    Family History  's family history includes Cancer in his sister; Hypertension in his father; Stroke in his father.    Medications and Allergies     Medications  Outpatient Medications Marked as Taking for the 2/2/22 encounter (Office Visit) with Julio Cruz DO   Medication Sig Dispense Refill    ADDERALL XR 20 mg 24 hr capsule Take 1 capsule (20 mg total) by mouth every morning. 30 capsule 0    aspirin (ECOTRIN) 81 MG EC tablet Take 1 tablet (81 mg total) by mouth once daily. 90 tablet 3    cholecalciferol, vitamin D3, (VITAMIN D3) 50 mcg (2,000 unit) Cap Take 1 capsule by mouth once daily.      docosahexaenoic acid/epa (FISH OIL ORAL) Take 1 capsule by mouth once daily.      [DISCONTINUED] atorvastatin (LIPITOR) 80 MG tablet Take 1 tablet (80 mg total) by mouth once daily. 90 tablet 3    [DISCONTINUED] citalopram (CELEXA) 20 MG tablet Take 1 tablet (20 mg total) by mouth once daily. 90 tablet 0    [DISCONTINUED] JARDIANCE 10 mg tablet Take 1 tablet (10 mg total) by mouth once daily. 30 tablet 3    [DISCONTINUED] lisinopriL-hydrochlorothiazide (PRINZIDE,ZESTORETIC) 20-25 mg Tab Take 1 tablet by mouth once daily 90 tablet 0    [DISCONTINUED] metFORMIN (GLUCOPHAGE) 1000 MG tablet  Take 1 tablet (1,000 mg total) by mouth 2 (two) times daily with meals. 180 tablet 0    [DISCONTINUED] metoprolol tartrate (LOPRESSOR) 50 MG tablet Take 1 tablet (50 mg total) by mouth 2 (two) times daily. 90 tablet 0       Allergies  Review of patient's allergies indicates:   Allergen Reactions    Azithromycin     M-mycin        Physical Examination     Vitals:    02/02/22 1105   BP: 130/82   Pulse: 68   Resp: 20   Temp: 97.1 °F (36.2 °C)     Physical Exam  Constitutional:       General: He is not in acute distress.     Appearance: Normal appearance. He is obese.   HENT:      Head: Normocephalic.      Nose: Nose normal.      Mouth/Throat:      Mouth: Mucous membranes are moist.      Pharynx: Oropharynx is clear. No oropharyngeal exudate or posterior oropharyngeal erythema.   Eyes:      General: No scleral icterus.        Right eye: No discharge.         Left eye: No discharge.      Conjunctiva/sclera: Conjunctivae normal.      Pupils: Pupils are equal, round, and reactive to light.   Neck:      Vascular: No carotid bruit.   Cardiovascular:      Rate and Rhythm: Normal rate and regular rhythm.      Pulses: Normal pulses.      Heart sounds: Normal heart sounds. No murmur heard.  No gallop.    Pulmonary:      Effort: Pulmonary effort is normal. No respiratory distress.      Breath sounds: Normal breath sounds. No wheezing.   Abdominal:      General: Abdomen is flat. Bowel sounds are normal. There is no distension.      Tenderness: There is no abdominal tenderness.   Musculoskeletal:         General: Normal range of motion.      Cervical back: Normal range of motion and neck supple.      Right lower leg: No edema.      Left lower leg: No edema.   Lymphadenopathy:      Cervical: No cervical adenopathy.   Skin:     General: Skin is warm.      Capillary Refill: Capillary refill takes less than 2 seconds.      Findings: No rash.   Neurological:      General: No focal deficit present.      Mental Status: He is alert and  oriented to person, place, and time. Mental status is at baseline.      Cranial Nerves: No cranial nerve deficit.      Sensory: No sensory deficit.      Motor: No weakness.      Coordination: Coordination normal.      Gait: Gait normal.      Deep Tendon Reflexes: Reflexes normal.   Psychiatric:         Mood and Affect: Mood normal.         Behavior: Behavior normal.         Thought Content: Thought content normal.         Judgment: Judgment normal.     Her          Lab Results   Component Value Date    WBC 6.19 10/18/2021    HGB 14.4 10/18/2021    HCT 40.1 10/18/2021    MCV 89.5 10/18/2021     10/18/2021          Sodium   Date Value Ref Range Status   10/28/2021 131 (L) 136 - 145 mmol/L Final     Potassium   Date Value Ref Range Status   10/28/2021 4.1 3.5 - 5.1 mmol/L Final     Chloride   Date Value Ref Range Status   10/28/2021 95 (L) 98 - 107 mmol/L Final     CO2   Date Value Ref Range Status   10/28/2021 28 21 - 32 mmol/L Final     Glucose   Date Value Ref Range Status   10/28/2021 338 (H) 74 - 106 mg/dL Final     BUN   Date Value Ref Range Status   10/28/2021 10 7 - 18 mg/dL Final     Creatinine   Date Value Ref Range Status   10/28/2021 1.10 0.70 - 1.30 mg/dL Final     Calcium   Date Value Ref Range Status   10/28/2021 9.6 8.5 - 10.1 mg/dL Final     Total Protein   Date Value Ref Range Status   10/18/2021 7.5 6.4 - 8.2 g/dL Final     Albumin   Date Value Ref Range Status   10/18/2021 3.6 3.5 - 5.0 g/dL Final     Bilirubin, Total   Date Value Ref Range Status   10/18/2021 0.6 >0.0 - 1.2 mg/dL Final     Alk Phos   Date Value Ref Range Status   10/18/2021 132 (H) 45 - 115 U/L Final     AST   Date Value Ref Range Status   10/18/2021 20 15 - 37 U/L Final     ALT   Date Value Ref Range Status   10/18/2021 45 16 - 61 U/L Final     Anion Gap   Date Value Ref Range Status   10/28/2021 12 7 - 16 mmol/L Final     eGFR    Date Value Ref Range Status   05/06/2021 89       eGFR   Date Value Ref Range  Status   10/28/2021 74 >=60 mL/min/1.73m² Final      CT Head Without Contrast  Narrative: EXAMINATION:  CT head without contrast    CLINICAL HISTORY:  Neuro deficit, acute, stroke suspected;    TECHNIQUE:  Transaxial CT sections were obtained through the brain without contrast.    The CT examination was performed using one or more of the following dose reduction techniques: Automated exposure control, adjustment of the mA and kV according to patient's size, use of acute or iterative reconstruction techniques.    COMPARISON:  No previous head CT available    FINDINGS:  The ventricles are midline in position without evidence of hydrocephalus. There is no mass or area of parenchymal hemorrhage. There is no gross CT evidence of acute cortical stroke. There is no extra-axial hematoma. The partially visualized sinuses are generally clear. There is no obvious skull fracture.  There is mild calcification in the distal internal carotid arteries bilaterally.  There is some moderate calcification of the distal aspect of the right vertebral artery.  Impression: No acute intracranial process    Electronically signed by: Kingsley Mcconnell  Date:    10/18/2021  Time:    11:17     Procedures   Assessment and Plan (including Health Maintenance)      Problem List  Smart Sets  Document Outside HM   :    Plan:         Health Maintenance Due   Topic Date Due    Hepatitis C Screening  Never done    COVID-19 Vaccine (1) Never done    Pneumococcal Vaccines (Age 0-64) (1 of 2 - PPSV23) Never done    Foot Exam  Never done    Eye Exam  Never done    HIV Screening  Never done    TETANUS VACCINE  Never done    Colorectal Cancer Screening  Never done    Shingles Vaccine (1 of 2) Never done    Influenza Vaccine (1) Never done       Problem List Items Addressed This Visit        Psychiatric    Adult attention deficit disorder    Current Assessment & Plan     Patient states that the a 20 mg does not cover him for entire 12-15 hours that he  worse.  Did increase his dose to 30 mg but warned him that this may increase his chances of having a heart attack is he does have coronary artery disease and increase his blood pressure so needs to be watched very closely.            Cardiac/Vascular    Essential hypertension, benign    Current Assessment & Plan     Blood pressure well controlled no change in medications at this time follow-up in 3 months.  Will warned the patient patient about increasing his dose of Adderall may increase his blood pressure he is to monitor it very closely.         Relevant Medications    lisinopriL-hydrochlorothiazide (PRINZIDE,ZESTORETIC) 20-25 mg Tab    metoprolol tartrate (LOPRESSOR) 50 MG tablet    HLD (hyperlipidemia) - Primary    Relevant Medications    atorvastatin (LIPITOR) 80 MG tablet       Endocrine    Type 2 diabetes mellitus without complication, without long-term current use of insulin    Current Assessment & Plan     For the patient has had good control with blood sugars being in the 140-150 range.  Last A1c was 11.3 and previously had been 8 or under.  Did inform the patient that he needs to take his medications watch his diet and exercise 5 days per week if at all possible         Relevant Medications    empagliflozin (JARDIANCE) 10 mg tablet    metFORMIN (GLUCOPHAGE) 1000 MG tablet    Other Relevant Orders    Basic Metabolic Panel    Hemoglobin A1C    Lipid Panel      Other Visit Diagnoses     Attention deficit hyperactivity disorder (ADHD), unspecified ADHD type        Relevant Medications    dextroamphetamine-amphetamine (ADDERALL XR) 30 MG 24 hr capsule    dextroamphetamine-amphetamine (ADDERALL XR) 30 MG 24 hr capsule    Depression, unspecified depression type        Relevant Medications    citalopram (CELEXA) 20 MG tablet          Health Maintenance Topics with due status: Not Due       Topic Last Completion Date    Hemoglobin A1c 10/18/2021    Diabetes Urine Screening 10/28/2021    Lipid Panel 10/28/2021        Future Appointments   Date Time Provider Department Center   5/2/2022  9:00 AM Julio Cruz DO [facility]   12/6/2022  2:15 PM Nicolás Montes MD [facility]        No follow-ups on file.     Signature:  DO Nirali Boyce [address] [place]  [address]    Date of encounter: 2/2/22

## 2022-02-02 NOTE — ASSESSMENT & PLAN NOTE
Blood pressure well controlled no change in medications at this time follow-up in 3 months.  Will warned the patient patient about increasing his dose of Adderall may increase his blood pressure he is to monitor it very closely.

## 2022-02-02 NOTE — ASSESSMENT & PLAN NOTE
For the patient has had good control with blood sugars being in the 140-150 range.  Last A1c was 11.3 and previously had been 8 or under.  Did inform the patient that he needs to take his medications watch his diet and exercise 5 days per week if at all possible

## 2022-02-03 LAB
EST. AVERAGE GLUCOSE BLD GHB EST-MCNC: 224 MG/DL
HBA1C MFR BLD HPLC: 9.3 % (ref 4.5–6.6)

## 2022-02-04 DIAGNOSIS — E11.9 TYPE 2 DIABETES MELLITUS WITHOUT COMPLICATION, WITHOUT LONG-TERM CURRENT USE OF INSULIN: Primary | ICD-10-CM

## 2022-02-04 RX ORDER — EMPAGLIFLOZIN 25 MG/1
25 TABLET, FILM COATED ORAL DAILY
Qty: 30 TABLET | Refills: 5 | Status: SHIPPED | OUTPATIENT
Start: 2022-02-04 | End: 2022-04-25 | Stop reason: ALTCHOICE

## 2022-04-13 ENCOUNTER — OFFICE VISIT (OUTPATIENT)
Dept: FAMILY MEDICINE | Facility: CLINIC | Age: 57
End: 2022-04-13
Payer: COMMERCIAL

## 2022-04-13 VITALS
RESPIRATION RATE: 20 BRPM | TEMPERATURE: 97 F | HEIGHT: 63 IN | HEART RATE: 71 BPM | BODY MASS INDEX: 33.73 KG/M2 | OXYGEN SATURATION: 96 % | SYSTOLIC BLOOD PRESSURE: 126 MMHG | DIASTOLIC BLOOD PRESSURE: 82 MMHG | WEIGHT: 190.38 LBS

## 2022-04-13 DIAGNOSIS — L97.429 HEEL ULCERATION, LEFT, WITH UNSPECIFIED SEVERITY: Primary | ICD-10-CM

## 2022-04-13 PROCEDURE — 1159F MED LIST DOCD IN RCRD: CPT | Mod: CPTII,,, | Performed by: NURSE PRACTITIONER

## 2022-04-13 PROCEDURE — 99213 PR OFFICE/OUTPT VISIT, EST, LEVL III, 20-29 MIN: ICD-10-PCS | Mod: ,,, | Performed by: NURSE PRACTITIONER

## 2022-04-13 PROCEDURE — 1160F PR REVIEW ALL MEDS BY PRESCRIBER/CLIN PHARMACIST DOCUMENTED: ICD-10-PCS | Mod: CPTII,,, | Performed by: NURSE PRACTITIONER

## 2022-04-13 PROCEDURE — 3008F PR BODY MASS INDEX (BMI) DOCUMENTED: ICD-10-PCS | Mod: CPTII,,, | Performed by: NURSE PRACTITIONER

## 2022-04-13 PROCEDURE — 3074F SYST BP LT 130 MM HG: CPT | Mod: CPTII,,, | Performed by: NURSE PRACTITIONER

## 2022-04-13 PROCEDURE — 1160F RVW MEDS BY RX/DR IN RCRD: CPT | Mod: CPTII,,, | Performed by: NURSE PRACTITIONER

## 2022-04-13 PROCEDURE — 1159F PR MEDICATION LIST DOCUMENTED IN MEDICAL RECORD: ICD-10-PCS | Mod: CPTII,,, | Performed by: NURSE PRACTITIONER

## 2022-04-13 PROCEDURE — 99213 OFFICE O/P EST LOW 20 MIN: CPT | Mod: ,,, | Performed by: NURSE PRACTITIONER

## 2022-04-13 PROCEDURE — 4010F ACE/ARB THERAPY RXD/TAKEN: CPT | Mod: CPTII,,, | Performed by: NURSE PRACTITIONER

## 2022-04-13 PROCEDURE — 3074F PR MOST RECENT SYSTOLIC BLOOD PRESSURE < 130 MM HG: ICD-10-PCS | Mod: CPTII,,, | Performed by: NURSE PRACTITIONER

## 2022-04-13 PROCEDURE — 3079F DIAST BP 80-89 MM HG: CPT | Mod: CPTII,,, | Performed by: NURSE PRACTITIONER

## 2022-04-13 PROCEDURE — 3046F PR MOST RECENT HEMOGLOBIN A1C LEVEL > 9.0%: ICD-10-PCS | Mod: CPTII,,, | Performed by: NURSE PRACTITIONER

## 2022-04-13 PROCEDURE — 3008F BODY MASS INDEX DOCD: CPT | Mod: CPTII,,, | Performed by: NURSE PRACTITIONER

## 2022-04-13 PROCEDURE — 4010F PR ACE/ARB THEARPY RXD/TAKEN: ICD-10-PCS | Mod: CPTII,,, | Performed by: NURSE PRACTITIONER

## 2022-04-13 PROCEDURE — 3046F HEMOGLOBIN A1C LEVEL >9.0%: CPT | Mod: CPTII,,, | Performed by: NURSE PRACTITIONER

## 2022-04-13 PROCEDURE — 3079F PR MOST RECENT DIASTOLIC BLOOD PRESSURE 80-89 MM HG: ICD-10-PCS | Mod: CPTII,,, | Performed by: NURSE PRACTITIONER

## 2022-04-13 RX ORDER — SILVER SULFADIAZINE 10 G/1000G
CREAM TOPICAL DAILY
Qty: 25 G | Refills: 0 | Status: SHIPPED | OUTPATIENT
Start: 2022-04-13 | End: 2022-12-22

## 2022-04-15 NOTE — PROGRESS NOTES
REMEDIOS Issa   [address] [address]  [phone]      PATIENT NAME: Syed Kelley  : 1965  DATE: 22  MRN: [id number]      Billing Provider: REMEDIOS Issa  Level of Service:   Patient PCP Information     Provider PCP Type    Waqar Osorio DO General          Reason for Visit / Chief Complaint: Laceration (Patient states cut On back on left heel . Patient believes it is getting infected. )       Update PCP  Update Chief Complaint         History of Present Illness / Problem Focused Workflow     57 year old male presents with complaints of cracked area to left heel for several days  Reports concerns of area being infected  He has hx of diabetes    Open area with small amount of pink tissue exposed. No drainage  No redness or warmth to site      Review of Systems     Review of Systems   Constitutional: Negative for chills, fatigue and fever.   HENT: Negative for congestion.    Eyes: Negative for visual disturbance.   Cardiovascular:        Hyperlipidemia   Gastrointestinal: Negative for abdominal pain, diarrhea and nausea.   Endocrine: Negative for polydipsia and polyuria.        DM   Musculoskeletal: Negative for gait problem.   Skin: Positive for wound (left heel).   Neurological: Negative for dizziness, weakness and headaches.   Psychiatric/Behavioral: Negative for dysphoric mood. The patient is not nervous/anxious.        Medical / Social / Family History     Past Medical History:   Diagnosis Date    Adult attention deficit disorder 3/22/2021    Controlled type 2 diabetes mellitus with hyperglycemia, without long-term current use of insulin 3/22/2021    Disorder of kidney and ureter     Essential hypertension 3/22/2021    Hyperlipidemia        Past Surgical History:   Procedure Laterality Date    APPENDECTOMY      CARDIAC CATHETERIZATION      LEFT HEART CATHETERIZATION N/A 2021    Procedure: Left heart cath with  possible intervention;  Surgeon: Federico James DO;  Location: [facility] CATH LAB;  Service: Cardiology;  Laterality: N/A;       Social History    reports that he quit smoking about 37 years ago. His smoking use included cigarettes. He started smoking about 41 years ago. He has a 20.00 pack-year smoking history. His smokeless tobacco use includes chew. He reports current alcohol use. He reports that he does not use drugs.    Family History  's family history includes Cancer in his sister; Hypertension in his father; Stroke in his father.    Medications and Allergies     Medications  Outpatient Medications Marked as Taking for the 4/13/22 encounter (Office Visit) with Gianna Zamora [facility]   Medication Sig Dispense Refill    aspirin (ECOTRIN) 81 MG EC tablet Take 1 tablet (81 mg total) by mouth once daily. 90 tablet 3    atorvastatin (LIPITOR) 80 MG tablet Take 1 tablet (80 mg total) by mouth once daily. 90 tablet 3    cholecalciferol, vitamin D3, (VITAMIN D3) 50 mcg (2,000 unit) Cap Take 1 capsule by mouth once daily.      citalopram (CELEXA) 20 MG tablet Take 1 tablet (20 mg total) by mouth once daily. 90 tablet 1    dextroamphetamine-amphetamine (ADDERALL XR) 30 MG 24 hr capsule Take 1 capsule (30 mg total) by mouth every morning. 30 capsule 0    docosahexaenoic acid/epa (FISH OIL ORAL) Take 1 capsule by mouth once daily.      empagliflozin (JARDIANCE) 25 mg tablet Take 1 tablet (25 mg total) by mouth once daily. 30 tablet 5    lisinopriL-hydrochlorothiazide (PRINZIDE,ZESTORETIC) 20-25 mg Tab Take 1 tablet by mouth once daily. 90 tablet 1    metFORMIN (GLUCOPHAGE) 1000 MG tablet Take 1 tablet (1,000 mg total) by mouth 2 (two) times daily with meals. 180 tablet 1    metoprolol tartrate (LOPRESSOR) 50 MG tablet Take 1 tablet (50 mg total) by mouth 2 (two) times daily. 180 tablet 1       Allergies  Review of patient's allergies indicates:   Allergen Reactions    Azithromycin     M-mycin         Physical Examination     Vitals:    04/13/22 1024   BP: 126/82   Pulse: 71   Resp: 20   Temp: 97.3 °F (36.3 °C)     Physical Exam  Constitutional:       General: He is not in acute distress.  HENT:      Head: Normocephalic.      Nose: Nose normal. No congestion.      Mouth/Throat:      Mouth: Mucous membranes are moist.   Eyes:      Extraocular Movements: Extraocular movements intact.   Cardiovascular:      Rate and Rhythm: Normal rate.   Pulmonary:      Effort: Pulmonary effort is normal. No respiratory distress.   Abdominal:      General: Bowel sounds are normal.      Palpations: Abdomen is soft.   Musculoskeletal:         General: Normal range of motion.      Cervical back: Normal range of motion.   Skin:     General: Skin is warm.      Comments: Open area to left heel   Neurological:      Mental Status: He is alert and oriented to person, place, and time.   Psychiatric:         Behavior: Behavior normal.           Imaging / Labs     No visits with results within 1 Day(s) from this visit.   Latest known visit with results is:   Office Visit on 02/02/2022   Component Date Value Ref Range Status    Sodium 02/02/2022 131 (A) 136 - 145 mmol/L Final    Potassium 02/02/2022 3.8  3.5 - 5.1 mmol/L Final    Chloride 02/02/2022 95 (A) 98 - 107 mmol/L Final    CO2 02/02/2022 31  21 - 32 mmol/L Final    Anion Gap 02/02/2022 9  7 - 16 mmol/L Final    Glucose 02/02/2022 166 (A) 74 - 106 mg/dL Final    BUN 02/02/2022 15  7 - 18 mg/dL Final    Creatinine 02/02/2022 1.08  0.70 - 1.30 mg/dL Final    BUN/Creatinine Ratio 02/02/2022 14  6 - 20 Final    Calcium 02/02/2022 9.4  8.5 - 10.1 mg/dL Final    eGFR 02/02/2022 75  >=60 mL/min/1.73m² Final    Hemoglobin A1C 02/02/2022 9.3 (A) 4.5 - 6.6 % Final    Estimated Average Glucose 02/02/2022 224  mg/dL Final    Triglycerides 02/02/2022 610 (A) 35 - 150 mg/dL Final    Cholesterol 02/02/2022 158  0 - 200 mg/dL Final    HDL Cholesterol 02/02/2022 25 (A) 40 - 60 mg/dL  Final    Cholesterol/HDL Ratio (Risk Factor) 02/02/2022 6.3   Final    Non-HDL 02/02/2022 133  mg/dL Final    LDL Calculated 02/02/2022    Final    LDL/HDL 02/02/2022    Final    VLDL 02/02/2022    Final     CT Head Without Contrast  Narrative: EXAMINATION:  CT head without contrast    CLINICAL HISTORY:  Neuro deficit, acute, stroke suspected;    TECHNIQUE:  Transaxial CT sections were obtained through the brain without contrast.    The CT examination was performed using one or more of the following dose reduction techniques: Automated exposure control, adjustment of the mA and kV according to patient's size, use of acute or iterative reconstruction techniques.    COMPARISON:  No previous head CT available    FINDINGS:  The ventricles are midline in position without evidence of hydrocephalus. There is no mass or area of parenchymal hemorrhage. There is no gross CT evidence of acute cortical stroke. There is no extra-axial hematoma. The partially visualized sinuses are generally clear. There is no obvious skull fracture.  There is mild calcification in the distal internal carotid arteries bilaterally.  There is some moderate calcification of the distal aspect of the right vertebral artery.  Impression: No acute intracranial process    Electronically signed by: Kingsley Mcconnell  Date:    10/18/2021  Time:    11:17      Assessment and Plan (including Health Maintenance)      Problem List  Smart Sets  Document Outside HM   :    Health Maintenance Due   Topic Date Due    Hepatitis C Screening  Never done    COVID-19 Vaccine (1) Never done    Foot Exam  Never done    Eye Exam  Never done    HIV Screening  Never done    TETANUS VACCINE  Never done    Colorectal Cancer Screening  Never done    Shingles Vaccine (1 of 2) Never done    Influenza Vaccine (1) Never done       Problem List Items Addressed This Visit    None     Visit Diagnoses     Heel ulceration, left, with unspecified severity    -  Primary     Relevant Medications    silver sulfADIAZINE 1% (SILVADENE) 1 % cream        Silvadene to ulcer daily. May shower and keep ulcer covered during the day  Keep weight off of heel as much as possible  Increase protein and monitor glucose  Follow up 2 weeks    Health Maintenance Topics with due status: Not Due       Topic Last Completion Date    Diabetes Urine Screening 10/28/2021    Lipid Panel 02/02/2022    Hemoglobin A1c 02/02/2022       Future Appointments   Date Time Provider Department Center   5/2/2022  9:00 AM Julio Cruz DO [facility] JENNIFER Luo [facility]   12/6/2022  2:15 PM Nicolás Montes MD OB CARD [facility]          Signature:  REMEDIOS Issa  [address] [address]  [phone]    Date of encounter: 4/13/22

## 2022-04-24 ENCOUNTER — HOSPITAL ENCOUNTER (EMERGENCY)
Facility: HOSPITAL | Age: 57
Discharge: HOME | End: 2022-04-24
Attending: EMERGENCY MEDICINE
Payer: COMMERCIAL

## 2022-04-24 VITALS
DIASTOLIC BLOOD PRESSURE: 65 MMHG | HEIGHT: 63 IN | SYSTOLIC BLOOD PRESSURE: 103 MMHG | TEMPERATURE: 98 F | BODY MASS INDEX: 32.96 KG/M2 | HEART RATE: 63 BPM | OXYGEN SATURATION: 94 % | WEIGHT: 186 LBS | RESPIRATION RATE: 19 BRPM

## 2022-04-24 DIAGNOSIS — E11.9 TYPE 2 DIABETES MELLITUS WITHOUT COMPLICATION, WITHOUT LONG-TERM CURRENT USE OF INSULIN: ICD-10-CM

## 2022-04-24 DIAGNOSIS — R73.9 HYPERGLYCEMIA: Primary | ICD-10-CM

## 2022-04-24 LAB
ALBUMIN SERPL BCP-MCNC: 3.8 G/DL (ref 3.5–5)
ALBUMIN/GLOB SERPL: 1.1 {RATIO}
ALP SERPL-CCNC: 96 U/L (ref 45–115)
ALT SERPL W P-5'-P-CCNC: 33 U/L (ref 16–61)
ANION GAP SERPL CALCULATED.3IONS-SCNC: 15 MMOL/L (ref 7–16)
AST SERPL W P-5'-P-CCNC: 16 U/L (ref 15–37)
BACTERIA #/AREA URNS HPF: NORMAL /HPF
BASOPHILS # BLD AUTO: 0.03 K/UL (ref 0–0.2)
BASOPHILS NFR BLD AUTO: 0.4 % (ref 0–1)
BILIRUB SERPL-MCNC: 0.5 MG/DL (ref 0–1.2)
BILIRUB UR QL STRIP: NEGATIVE
BUN SERPL-MCNC: 27 MG/DL (ref 7–18)
BUN/CREAT SERPL: 21 (ref 6–20)
CALCIUM SERPL-MCNC: 9.4 MG/DL (ref 8.5–10.1)
CHLORIDE SERPL-SCNC: 89 MMOL/L (ref 98–107)
CLARITY UR: CLEAR
CO2 SERPL-SCNC: 25 MMOL/L (ref 21–32)
COLOR UR: YELLOW
CREAT SERPL-MCNC: 1.31 MG/DL (ref 0.7–1.3)
DIFFERENTIAL METHOD BLD: ABNORMAL
EOSINOPHIL # BLD AUTO: 0.08 K/UL (ref 0–0.5)
EOSINOPHIL NFR BLD AUTO: 1 % (ref 1–4)
ERYTHROCYTE [DISTWIDTH] IN BLOOD BY AUTOMATED COUNT: 11.9 % (ref 11.5–14.5)
GLOBULIN SER-MCNC: 3.5 G/DL (ref 2–4)
GLUCOSE SERPL-MCNC: 398 MG/DL (ref 70–105)
GLUCOSE SERPL-MCNC: 522 MG/DL (ref 74–106)
GLUCOSE UR STRIP-MCNC: >=1000 MG/DL
HCO3 UR-SCNC: 28.9 MMOL/L (ref 24–28)
HCT VFR BLD AUTO: 44.6 % (ref 40–54)
HCT VFR BLD CALC: 54 % (ref 35–51)
HGB BLD-MCNC: 15.7 G/DL (ref 13.5–18)
IMM GRANULOCYTES # BLD AUTO: 0.09 K/UL (ref 0–0.04)
IMM GRANULOCYTES NFR BLD: 1.1 % (ref 0–0.4)
KETONES UR STRIP-SCNC: NEGATIVE MG/DL
LDH SERPL L TO P-CCNC: 2 MMOL/L (ref 0.3–1.2)
LEUKOCYTE ESTERASE UR QL STRIP: NEGATIVE
LYMPHOCYTES # BLD AUTO: 2.17 K/UL (ref 1–4.8)
LYMPHOCYTES NFR BLD AUTO: 25.9 % (ref 27–41)
MCH RBC QN AUTO: 30.8 PG (ref 27–31)
MCHC RBC AUTO-ENTMCNC: 35.2 G/DL (ref 32–36)
MCV RBC AUTO: 87.6 FL (ref 80–96)
MONOCYTES # BLD AUTO: 0.58 K/UL (ref 0–0.8)
MONOCYTES NFR BLD AUTO: 6.9 % (ref 2–6)
MPC BLD CALC-MCNC: 9.5 FL (ref 9.4–12.4)
NEUTROPHILS # BLD AUTO: 5.44 K/UL (ref 1.8–7.7)
NEUTROPHILS NFR BLD AUTO: 64.7 % (ref 53–65)
NITRITE UR QL STRIP: NEGATIVE
NRBC # BLD AUTO: 0 X10E3/UL
NRBC, AUTO (.00): 0 %
PCO2 BLDA: 50 MMHG (ref 41–51)
PH SMN: 7.37 [PH] (ref 7.32–7.42)
PH UR STRIP: 5 PH UNITS
PLATELET # BLD AUTO: 345 K/UL (ref 150–400)
PO2 BLDA: 34 MMHG (ref 25–40)
POC BASE EXCESS: 2.4 MMOL/L (ref -2–3)
POC CO2: 30.4 MMOL/L
POC IONIZED CALCIUM: 1.19 MMOL/L (ref 1.15–1.35)
POC SATURATED O2: 63 % (ref 40–70)
POCT GLUCOSE: 493 MG/DL (ref 60–95)
POTASSIUM BLD-SCNC: 3.9 MMOL/L (ref 3.4–4.5)
POTASSIUM SERPL-SCNC: 4.3 MMOL/L (ref 3.5–5.1)
PROT SERPL-MCNC: 7.3 G/DL (ref 6.4–8.2)
PROT UR QL STRIP: NEGATIVE
RBC # BLD AUTO: 5.09 M/UL (ref 4.6–6.2)
RBC # UR STRIP: ABNORMAL /UL
RBC #/AREA URNS HPF: NORMAL /HPF
SODIUM BLD-SCNC: 121 MMOL/L (ref 136–145)
SODIUM SERPL-SCNC: 125 MMOL/L (ref 136–145)
SP GR UR STRIP: <=1.005
SQUAMOUS #/AREA URNS LPF: NORMAL /LPF
UROBILINOGEN UR STRIP-ACNC: 0.2 MG/DL
WBC # BLD AUTO: 8.39 K/UL (ref 4.5–11)
WBC #/AREA URNS HPF: NORMAL /HPF

## 2022-04-24 PROCEDURE — 82330 ASSAY OF CALCIUM: CPT

## 2022-04-24 PROCEDURE — 99284 EMERGENCY DEPT VISIT MOD MDM: CPT | Mod: 25

## 2022-04-24 PROCEDURE — 82947 ASSAY GLUCOSE BLOOD QUANT: CPT

## 2022-04-24 PROCEDURE — 81001 URINALYSIS AUTO W/SCOPE: CPT | Performed by: EMERGENCY MEDICINE

## 2022-04-24 PROCEDURE — 84295 ASSAY OF SERUM SODIUM: CPT

## 2022-04-24 PROCEDURE — 99283 PR EMERGENCY DEPT VISIT,LEVEL III: ICD-10-PCS | Mod: ,,, | Performed by: EMERGENCY MEDICINE

## 2022-04-24 PROCEDURE — 85025 COMPLETE CBC W/AUTO DIFF WBC: CPT | Performed by: EMERGENCY MEDICINE

## 2022-04-24 PROCEDURE — 36415 COLL VENOUS BLD VENIPUNCTURE: CPT | Performed by: EMERGENCY MEDICINE

## 2022-04-24 PROCEDURE — 83605 ASSAY OF LACTIC ACID: CPT

## 2022-04-24 PROCEDURE — 82803 BLOOD GASES ANY COMBINATION: CPT

## 2022-04-24 PROCEDURE — 80053 COMPREHEN METABOLIC PANEL: CPT | Performed by: EMERGENCY MEDICINE

## 2022-04-24 PROCEDURE — 82962 GLUCOSE BLOOD TEST: CPT

## 2022-04-24 PROCEDURE — 84132 ASSAY OF SERUM POTASSIUM: CPT

## 2022-04-24 PROCEDURE — 25000003 PHARM REV CODE 250: Performed by: EMERGENCY MEDICINE

## 2022-04-24 PROCEDURE — 96360 HYDRATION IV INFUSION INIT: CPT

## 2022-04-24 PROCEDURE — 99283 EMERGENCY DEPT VISIT LOW MDM: CPT | Mod: ,,, | Performed by: EMERGENCY MEDICINE

## 2022-04-24 PROCEDURE — 85014 HEMATOCRIT: CPT

## 2022-04-24 RX ADMIN — SODIUM CHLORIDE 1000 ML: 9 INJECTION, SOLUTION INTRAVENOUS at 04:04

## 2022-04-24 NOTE — DISCHARGE INSTRUCTIONS
Take all of your medications as prescribed.  Follow your diabetic diet strictly.  Call your primary care provider tomorrow to discuss medication adjustment and possible insulin.  Return to emergency department for any worsening or further problems.  Drink plenty of water.

## 2022-04-24 NOTE — ED PROVIDER NOTES
Encounter Date: 4/24/2022    SCRIBE #1 NOTE: I, Mile Orozco, am scribing for, and in the presence of,  Irving Wheatley MD. I have scribed the entire note.       History     Chief Complaint   Patient presents with    Hyperglycemia     Patient is a 57 year old male with a history of diabetes presents to the emergency department due to hyperglycemia that onset 3 days ago. Patient explains that he noticed that his sugar was too high to read on his machine 3 days ago. He reports that he has been sticking to his usual diet, including fried foods. He reports that his levels have dropped to 480 but have otherwise been too high to read over the last 3 days. Patient states that he ran out of his diabetes medication on Monday but has since got them refilled. He reports symptoms of blurred vision, dry mouth, frequent urination, and lightheadedness. No other symptoms were reported.     The history is provided by the patient. No  was used.     Review of patient's allergies indicates:   Allergen Reactions    Azithromycin     M-mycin      Past Medical History:   Diagnosis Date    Adult attention deficit disorder 3/22/2021    Controlled type 2 diabetes mellitus with hyperglycemia, without long-term current use of insulin 3/22/2021    Disorder of kidney and ureter     Essential hypertension 3/22/2021    Hyperlipidemia      Past Surgical History:   Procedure Laterality Date    APPENDECTOMY      CARDIAC CATHETERIZATION      LEFT HEART CATHETERIZATION N/A 5/7/2021    Procedure: Left heart cath with possible intervention;  Surgeon: Federico James DO;  Location: [facility] CATH LAB;  Service: Cardiology;  Laterality: N/A;     Family History   Problem Relation Age of Onset    Hypertension Father     Stroke Father     Cancer Sister      Social History     Tobacco Use    Smoking status: Former Smoker     Packs/day: 5.00     Years: 4.00     Pack years: 20.00     Types: Cigarettes     Start date: 1981     Quit  date:      Years since quittin.3    Smokeless tobacco: Current User     Types: Chew    Tobacco comment: 1 can/day   Substance Use Topics    Alcohol use: Yes     Comment: 2-3 drinks per/3 months    Drug use: Never     Review of Systems   Constitutional: Negative for appetite change and fever.        Dry mouth   HENT: Negative.    Eyes: Positive for visual disturbance (blurred).   Respiratory: Negative.  Negative for cough.    Cardiovascular: Negative.    Gastrointestinal: Negative.    Endocrine: Negative.    Genitourinary: Positive for frequency. Dysuria: chronic.   Musculoskeletal: Negative.    Skin: Negative.    Allergic/Immunologic: Negative.    Neurological: Positive for light-headedness.   Hematological: Negative.    Psychiatric/Behavioral: Negative.    All other systems reviewed and are negative.      Physical Exam     Initial Vitals [22 1503]   BP Pulse Resp Temp SpO2   120/73 87 19 98.1 °F (36.7 °C) 96 %      MAP       --         Physical Exam    Nursing note and vitals reviewed.  HENT:   Head: Normocephalic and atraumatic.   Mouth/Throat: Oropharynx is clear and moist.   Eyes: Pupils are equal, round, and reactive to light.   Neck: Neck supple.   Normal range of motion.  Cardiovascular: Normal rate and regular rhythm.   Pulmonary/Chest: Effort normal and breath sounds normal.   Abdominal: Abdomen is soft. He exhibits no distension.   Musculoskeletal:         General: Normal range of motion.      Cervical back: Normal range of motion and neck supple.     Neurological: He is alert.   Skin: Skin is warm. Capillary refill takes less than 2 seconds.   Psychiatric: He has a normal mood and affect.         ED Course   Procedures  Labs Reviewed   COMPREHENSIVE METABOLIC PANEL - Abnormal; Notable for the following components:       Result Value    Sodium 125 (*)     Chloride 89 (*)     Glucose 522 (*)     BUN 27 (*)     Creatinine 1.31 (*)     BUN/Creatinine Ratio 21 (*)     All other components  within normal limits   URINALYSIS, REFLEX TO URINE CULTURE - Abnormal; Notable for the following components:    Glucose, UA >=1000 (*)     Blood, UA Trace-Intact (*)     All other components within normal limits   CBC WITH DIFFERENTIAL - Abnormal; Notable for the following components:    Lymphocytes % 25.9 (*)     Monocytes % 6.9 (*)     Immature Granulocytes % 1.1 (*)     Immature Granulocytes, Absolute 0.09 (*)     All other components within normal limits   URINALYSIS, MICROSCOPIC - Normal   CBC W/ AUTO DIFFERENTIAL    Narrative:     The following orders were created for panel order CBC auto differential.  Procedure                               Abnormality         Status                     ---------                               -----------         ------                     CBC with Differential[[id number]]        Abnormal            Final result                 Please view results for these tests on the individual orders.          Imaging Results    None          Medications   sodium chloride 0.9% bolus 1,000 mL (1,000 mLs Intravenous New Bag 4/24/22 1600)                Attending Attestation:           Physician Attestation for Scribe:  Physician Attestation Statement for Scribe #1: I, Irving Wheatley MD, reviewed documentation, as scribed by Mile Orozco in my presence, and it is both accurate and complete.             ED Course as of 04/24/22 1717   Sun Apr 24, 2022   1601 WBC: 8.39 [BB]   1601 Hemoglobin: 15.7 [BB]   1601 Hematocrit: 44.6 [BB]   1601 Platelets: 345 [BB]   1641 Leukocytes, UA: Negative [BB]   1641 NITRITE UA: Negative [BB]   1641 Ketones, UA: Negative [BB]   1714 Patient eventually told us that yesterday he also reduced on of his diabetes medications to have his normal dose because he could not afford the full dose. [BB]      ED Course User Index  [BB] Irving Wheatley MD             Clinical Impression:   Final diagnoses:  [R73.9] Hyperglycemia (Primary)  [E11.9] Type 2 diabetes mellitus  without complication, without long-term current use of insulin          ED Disposition Condition    Discharge Stable        ED Prescriptions     None        Follow-up Information    None          Irving Wheatley MD  04/24/22 2337

## 2022-04-24 NOTE — ED TRIAGE NOTES
Pt here with c/o elevated blood sugar at home for the past few days. Pt reports dizziness, dry mouth, and blurred vision.

## 2022-04-25 ENCOUNTER — OFFICE VISIT (OUTPATIENT)
Dept: FAMILY MEDICINE | Facility: CLINIC | Age: 57
End: 2022-04-25
Payer: COMMERCIAL

## 2022-04-25 VITALS
SYSTOLIC BLOOD PRESSURE: 88 MMHG | HEART RATE: 60 BPM | WEIGHT: 185 LBS | TEMPERATURE: 98 F | RESPIRATION RATE: 20 BRPM | OXYGEN SATURATION: 98 % | HEIGHT: 63 IN | BODY MASS INDEX: 32.78 KG/M2 | DIASTOLIC BLOOD PRESSURE: 56 MMHG

## 2022-04-25 DIAGNOSIS — I10 ESSENTIAL HYPERTENSION: ICD-10-CM

## 2022-04-25 DIAGNOSIS — E11.9 TYPE 2 DIABETES MELLITUS WITHOUT COMPLICATION, WITHOUT LONG-TERM CURRENT USE OF INSULIN: Primary | ICD-10-CM

## 2022-04-25 DIAGNOSIS — E78.2 MIXED HYPERLIPIDEMIA: ICD-10-CM

## 2022-04-25 LAB — GLUCOSE SERPL-MCNC: 346 MG/DL (ref 70–110)

## 2022-04-25 PROCEDURE — 3078F DIAST BP <80 MM HG: CPT | Mod: CPTII,,, | Performed by: FAMILY MEDICINE

## 2022-04-25 PROCEDURE — 3008F BODY MASS INDEX DOCD: CPT | Mod: CPTII,,, | Performed by: FAMILY MEDICINE

## 2022-04-25 PROCEDURE — 3008F PR BODY MASS INDEX (BMI) DOCUMENTED: ICD-10-PCS | Mod: CPTII,,, | Performed by: FAMILY MEDICINE

## 2022-04-25 PROCEDURE — 3074F SYST BP LT 130 MM HG: CPT | Mod: CPTII,,, | Performed by: FAMILY MEDICINE

## 2022-04-25 PROCEDURE — 3078F PR MOST RECENT DIASTOLIC BLOOD PRESSURE < 80 MM HG: ICD-10-PCS | Mod: CPTII,,, | Performed by: FAMILY MEDICINE

## 2022-04-25 PROCEDURE — 1159F PR MEDICATION LIST DOCUMENTED IN MEDICAL RECORD: ICD-10-PCS | Mod: CPTII,,, | Performed by: FAMILY MEDICINE

## 2022-04-25 PROCEDURE — 99214 PR OFFICE/OUTPT VISIT, EST, LEVL IV, 30-39 MIN: ICD-10-PCS | Mod: ,,, | Performed by: FAMILY MEDICINE

## 2022-04-25 PROCEDURE — 4010F ACE/ARB THERAPY RXD/TAKEN: CPT | Mod: CPTII,,, | Performed by: FAMILY MEDICINE

## 2022-04-25 PROCEDURE — 4010F PR ACE/ARB THEARPY RXD/TAKEN: ICD-10-PCS | Mod: CPTII,,, | Performed by: FAMILY MEDICINE

## 2022-04-25 PROCEDURE — 3074F PR MOST RECENT SYSTOLIC BLOOD PRESSURE < 130 MM HG: ICD-10-PCS | Mod: CPTII,,, | Performed by: FAMILY MEDICINE

## 2022-04-25 PROCEDURE — 1160F RVW MEDS BY RX/DR IN RCRD: CPT | Mod: CPTII,,, | Performed by: FAMILY MEDICINE

## 2022-04-25 PROCEDURE — 3046F PR MOST RECENT HEMOGLOBIN A1C LEVEL > 9.0%: ICD-10-PCS | Mod: CPTII,,, | Performed by: FAMILY MEDICINE

## 2022-04-25 PROCEDURE — 99214 OFFICE O/P EST MOD 30 MIN: CPT | Mod: ,,, | Performed by: FAMILY MEDICINE

## 2022-04-25 PROCEDURE — 1159F MED LIST DOCD IN RCRD: CPT | Mod: CPTII,,, | Performed by: FAMILY MEDICINE

## 2022-04-25 PROCEDURE — 1160F PR REVIEW ALL MEDS BY PRESCRIBER/CLIN PHARMACIST DOCUMENTED: ICD-10-PCS | Mod: CPTII,,, | Performed by: FAMILY MEDICINE

## 2022-04-25 PROCEDURE — 3046F HEMOGLOBIN A1C LEVEL >9.0%: CPT | Mod: CPTII,,, | Performed by: FAMILY MEDICINE

## 2022-04-25 RX ORDER — DAPAGLIFLOZIN 5 MG/1
5 TABLET, FILM COATED ORAL DAILY
Qty: 30 TABLET | Refills: 2 | Status: SHIPPED | OUTPATIENT
Start: 2022-04-25 | End: 2022-05-02 | Stop reason: SDUPTHER

## 2022-04-26 NOTE — ASSESSMENT & PLAN NOTE
Last triglycerides was 610 and cholesterol was 158 and unable to check for calculate the LDL.  The patient is to continue the atorvastatin that he is concurrently on an official.  Recheck his levels in 1 month.

## 2022-04-26 NOTE — ASSESSMENT & PLAN NOTE
Last A1c was 9.3 months ago.  Patient has not been taking GERD is because he ran out.  Will give him samples of Farxiga today 5 mg once daily and recheck his A1c and 60 days.  Encouraged him to follow his diet.  Follow up here in 1 month.  Patient is to check his blood sugars at least twice daily.

## 2022-04-26 NOTE — PROGRESS NOTES
Julio Cruz DO   [address] [place]  [address]      PATIENT NAME: Syed Kelley  : 1965  DATE: 22  MRN: [id number]      Billing Provider: Julio Cruz DO  Level of Service:   Patient PCP Information     Provider PCP Type    Waqar Osorio DO General          Reason for Visit / Chief Complaint: Diabetes (Follow up from the ER on 22. Patient has been having uncontrolled blood sugars. He is having blurred vision,frequent urination and dry mouth.IS taking metformin but unable to afford jardiance,so he has not been taking this medication.)       Update PCP  Update Chief Complaint         History of Present Illness / Problem Focused Workflow     Syed Kelley presents to the clinic with Diabetes (Follow up from the ER on 22. Patient has been having uncontrolled blood sugars. He is having blurred vision,frequent urination and dry mouth.IS taking metformin but unable to afford jardiance,so he has not been taking this medication.)     Patient's blood sugars have been poorly controlled and has been seeing the emergency room for blood sugars in the 500 range.  He has had urgency frequency as well as polyuria and polydipsia as and polyphagia.  Patient denies any chest pain    Diabetes  Pertinent negatives for diabetes include no chest pain, no fatigue and no polydipsia.       Review of Systems     Review of Systems   Constitutional: Negative for activity change, appetite change, chills, fatigue and fever.   HENT: Negative for nasal congestion, ear discharge, ear pain, mouth dryness, mouth sores, postnasal drip, sinus pressure/congestion, sore throat and voice change.    Eyes: Negative for pain, discharge, redness and itching.   Respiratory: Negative for apnea, cough and shortness of breath.    Cardiovascular: Negative for chest pain, palpitations and leg swelling.   Gastrointestinal: Negative for abdominal distention, abdominal pain, anal bleeding, blood in  stool, change in bowel habit, constipation, diarrhea, nausea, vomiting and change in bowel habit.   Endocrine: Negative for cold intolerance, heat intolerance and polydipsia.   Genitourinary: Negative for difficulty urinating, enuresis, frequency and hematuria.   Musculoskeletal: Negative for arthralgias and back pain.   Integumentary:  Negative for breast mass and breast discharge.   Allergic/Immunologic: Negative for environmental allergies and food allergies.   Breast: Negative for mass      Medical / Social / Family History     Past Medical History:   Diagnosis Date    Adult attention deficit disorder 3/22/2021    Controlled type 2 diabetes mellitus with hyperglycemia, without long-term current use of insulin 3/22/2021    Disorder of kidney and ureter     Essential hypertension 3/22/2021    Hyperlipidemia        Past Surgical History:   Procedure Laterality Date    APPENDECTOMY      CARDIAC CATHETERIZATION      LEFT HEART CATHETERIZATION N/A 5/7/2021    Procedure: Left heart cath with possible intervention;  Surgeon: Federico James DO;  [facility] CATH LAB;  Service: Cardiology;  Laterality: N/A;       Social History    reports that he quit smoking about 37 years ago. His smoking use included cigarettes. He started smoking about 41 years ago. He has a 20.00 pack-year smoking history. His smokeless tobacco use includes chew. He reports current alcohol use. He reports that he does not use drugs.    Family History  's family history includes Cancer in his sister; Hypertension in his father; Stroke in his father.    Medications and Allergies     Medications  Outpatient Medications Marked as Taking for the 4/25/22 encounter (Office Visit) with Julio Cruz DO   Medication Sig Dispense Refill    aspirin (ECOTRIN) 81 MG EC tablet Take 1 tablet (81 mg total) by mouth once daily. 90 tablet 3    atorvastatin (LIPITOR) 80 MG tablet Take 1 tablet (80 mg total) by mouth once daily. 90 tablet 3     cholecalciferol, vitamin D3, (VITAMIN D3) 50 mcg (2,000 unit) Cap Take 1 capsule by mouth once daily.      citalopram (CELEXA) 20 MG tablet Take 1 tablet (20 mg total) by mouth once daily. 90 tablet 1    dextroamphetamine-amphetamine (ADDERALL XR) 30 MG 24 hr capsule Take 1 capsule (30 mg total) by mouth every morning. 30 capsule 0    docosahexaenoic acid/epa (FISH OIL ORAL) Take 1 capsule by mouth once daily.      lisinopriL-hydrochlorothiazide (PRINZIDE,ZESTORETIC) 20-25 mg Tab Take 1 tablet by mouth once daily. 90 tablet 1    metFORMIN (GLUCOPHAGE) 1000 MG tablet Take 1 tablet (1,000 mg total) by mouth 2 (two) times daily with meals. 180 tablet 1    metoprolol tartrate (LOPRESSOR) 50 MG tablet Take 1 tablet (50 mg total) by mouth 2 (two) times daily. 180 tablet 1    silver sulfADIAZINE 1% (SILVADENE) 1 % cream Apply topically once daily. 25 g 0       Allergies  Review of patient's allergies indicates:   Allergen Reactions    Azithromycin     M-mycin        Physical Examination     Vitals:    04/25/22 1120   BP: (!) 88/56   Pulse: 60   Resp: 20   Temp: 97.5 °F (36.4 °C)     Physical Exam  Constitutional:       General: He is not in acute distress.     Appearance: Normal appearance. He is obese.   HENT:      Head: Normocephalic.      Nose: Nose normal.      Mouth/Throat:      Mouth: Mucous membranes are moist.      Pharynx: Oropharynx is clear. No oropharyngeal exudate or posterior oropharyngeal erythema.   Eyes:      General: No scleral icterus.        Right eye: No discharge.         Left eye: No discharge.      Conjunctiva/sclera: Conjunctivae normal.      Pupils: Pupils are equal, round, and reactive to light.   Neck:      Vascular: No carotid bruit.   Cardiovascular:      Rate and Rhythm: Normal rate and regular rhythm.      Pulses: Normal pulses.      Heart sounds: Normal heart sounds. No murmur heard.  Pulmonary:      Effort: Pulmonary effort is normal.      Breath sounds: Normal breath sounds.  No wheezing.   Abdominal:      General: Abdomen is flat. Bowel sounds are normal.      Tenderness: There is no abdominal tenderness.   Musculoskeletal:         General: Normal range of motion.      Cervical back: Normal range of motion.      Right lower leg: No edema.      Left lower leg: No edema.   Skin:     General: Skin is warm.      Capillary Refill: Capillary refill takes less than 2 seconds.      Findings: No rash.   Neurological:      General: No focal deficit present.      Mental Status: He is alert and oriented to person, place, and time. Mental status is at baseline.   Psychiatric:         Mood and Affect: Mood normal.         Behavior: Behavior normal.         Thought Content: Thought content normal.         Judgment: Judgment normal.               Lab Results   Component Value Date    WBC 8.39 04/24/2022    HGB 15.7 04/24/2022    HCT 54 (H) 04/24/2022    MCV 87.6 04/24/2022     04/24/2022          Sodium   Date Value Ref Range Status   04/24/2022 125 (L) 136 - 145 mmol/L Final     Potassium   Date Value Ref Range Status   04/24/2022 4.3 3.5 - 5.1 mmol/L Final     Chloride   Date Value Ref Range Status   04/24/2022 89 (L) 98 - 107 mmol/L Final     CO2   Date Value Ref Range Status   04/24/2022 25 21 - 32 mmol/L Final     Glucose   Date Value Ref Range Status   04/24/2022 522 (HH) 74 - 106 mg/dL Final     BUN   Date Value Ref Range Status   04/24/2022 27 (H) 7 - 18 mg/dL Final     Creatinine   Date Value Ref Range Status   04/24/2022 1.31 (H) 0.70 - 1.30 mg/dL Final     Calcium   Date Value Ref Range Status   04/24/2022 9.4 8.5 - 10.1 mg/dL Final     Total Protein   Date Value Ref Range Status   04/24/2022 7.3 6.4 - 8.2 g/dL Final     Albumin   Date Value Ref Range Status   04/24/2022 3.8 3.5 - 5.0 g/dL Final     Bilirubin, Total   Date Value Ref Range Status   04/24/2022 0.5 0.0 - 1.2 mg/dL Final     Alk Phos   Date Value Ref Range Status   04/24/2022 96 45 - 115 U/L Final     AST   Date Value Ref  Range Status   04/24/2022 16 15 - 37 U/L Final     ALT   Date Value Ref Range Status   04/24/2022 33 16 - 61 U/L Final     Anion Gap   Date Value Ref Range Status   04/24/2022 15 7 - 16 mmol/L Final     eGFR    Date Value Ref Range Status   05/06/2021 89       eGFR   Date Value Ref Range Status   04/24/2022 60 >=60 mL/min/1.73m² Final      CT Head Without Contrast  Narrative: EXAMINATION:  CT head without contrast    CLINICAL HISTORY:  Neuro deficit, acute, stroke suspected;    TECHNIQUE:  Transaxial CT sections were obtained through the brain without contrast.    The CT examination was performed using one or more of the following dose reduction techniques: Automated exposure control, adjustment of the mA and kV according to patient's size, use of acute or iterative reconstruction techniques.    COMPARISON:  No previous head CT available    FINDINGS:  The ventricles are midline in position without evidence of hydrocephalus. There is no mass or area of parenchymal hemorrhage. There is no gross CT evidence of acute cortical stroke. There is no extra-axial hematoma. The partially visualized sinuses are generally clear. There is no obvious skull fracture.  There is mild calcification in the distal internal carotid arteries bilaterally.  There is some moderate calcification of the distal aspect of the right vertebral artery.  Impression: No acute intracranial process    Electronically signed by: Kingsley Mcconnell  Date:    10/18/2021  Time:    11:17     Procedures   Assessment and Plan (including Health Maintenance)      Problem List  Smart Sets  Document Outside HM   :    Plan:         Health Maintenance Due   Topic Date Due    Hepatitis C Screening  Never done    COVID-19 Vaccine (1) Never done    Eye Exam  Never done    HIV Screening  Never done    TETANUS VACCINE  Never done    Colorectal Cancer Screening  Never done    Shingles Vaccine (1 of 2) Never done    Influenza Vaccine (1) Never done     Hemoglobin A1c  05/02/2022       Problem List Items Addressed This Visit        Cardiac/Vascular    Essential hypertension    Current Assessment & Plan     Blood pressure well-controlled no change in medicines.  Follow up in 30 days.           HLD (hyperlipidemia)    Current Assessment & Plan     Last triglycerides was 610 and cholesterol was 158 and unable to check for calculate the LDL.  The patient is to continue the atorvastatin that he is concurrently on an official.  Recheck his levels in 1 month.              Endocrine    Type 2 diabetes mellitus without complication, without long-term current use of insulin - Primary    Current Assessment & Plan     Last A1c was 9.3 months ago.  Patient has not been taking GERD is because he ran out.  Will give him samples of Farxiga today 5 mg once daily and recheck his A1c and 60 days.  Encouraged him to follow his diet.  Follow up here in 1 month.  Patient is to check his blood sugars at least twice daily.           Relevant Medications    dapagliflozin (FARXIGA) 5 mg Tab tablet    Other Relevant Orders    POCT glucose (Completed)    Hemoglobin A1C          Health Maintenance Topics with due status: Not Due       Topic Last Completion Date    Diabetes Urine Screening 10/28/2021    Lipid Panel 02/02/2022    Foot Exam 04/13/2022       Future Appointments   Date Time Provider Department Center   5/2/2022  8:45 AM Julio Cruz DO [facility]   12/6/2022  2:15 PM Nicolás Montes MD [facility]        Follow up in about 4 weeks (around 5/23/2022).     Signature:  DO [facility]  [address] [place] [address], [place]  [address]    Date of encounter: 4/25/22

## 2022-04-27 LAB — GLUCOSE SERPL-MCNC: 566 MG/DL (ref 70–105)

## 2022-05-02 ENCOUNTER — OFFICE VISIT (OUTPATIENT)
Dept: FAMILY MEDICINE | Facility: CLINIC | Age: 57
End: 2022-05-02
Payer: COMMERCIAL

## 2022-05-02 VITALS
OXYGEN SATURATION: 97 % | BODY MASS INDEX: 32.71 KG/M2 | SYSTOLIC BLOOD PRESSURE: 110 MMHG | HEIGHT: 63 IN | WEIGHT: 184.63 LBS | HEART RATE: 65 BPM | RESPIRATION RATE: 18 BRPM | TEMPERATURE: 97 F | DIASTOLIC BLOOD PRESSURE: 78 MMHG

## 2022-05-02 DIAGNOSIS — I10 ESSENTIAL HYPERTENSION, BENIGN: ICD-10-CM

## 2022-05-02 DIAGNOSIS — F98.8 ADULT ATTENTION DEFICIT DISORDER: Primary | ICD-10-CM

## 2022-05-02 DIAGNOSIS — I10 ESSENTIAL HYPERTENSION: ICD-10-CM

## 2022-05-02 DIAGNOSIS — E78.2 MIXED HYPERLIPIDEMIA: ICD-10-CM

## 2022-05-02 DIAGNOSIS — E11.9 TYPE 2 DIABETES MELLITUS WITHOUT COMPLICATION, WITHOUT LONG-TERM CURRENT USE OF INSULIN: ICD-10-CM

## 2022-05-02 DIAGNOSIS — F90.9 ATTENTION DEFICIT HYPERACTIVITY DISORDER (ADHD), UNSPECIFIED ADHD TYPE: ICD-10-CM

## 2022-05-02 PROCEDURE — 3046F PR MOST RECENT HEMOGLOBIN A1C LEVEL > 9.0%: ICD-10-PCS | Mod: CPTII,,, | Performed by: FAMILY MEDICINE

## 2022-05-02 PROCEDURE — 99214 PR OFFICE/OUTPT VISIT, EST, LEVL IV, 30-39 MIN: ICD-10-PCS | Mod: ,,, | Performed by: FAMILY MEDICINE

## 2022-05-02 PROCEDURE — 3074F PR MOST RECENT SYSTOLIC BLOOD PRESSURE < 130 MM HG: ICD-10-PCS | Mod: CPTII,,, | Performed by: FAMILY MEDICINE

## 2022-05-02 PROCEDURE — 4010F ACE/ARB THERAPY RXD/TAKEN: CPT | Mod: CPTII,,, | Performed by: FAMILY MEDICINE

## 2022-05-02 PROCEDURE — 4010F PR ACE/ARB THEARPY RXD/TAKEN: ICD-10-PCS | Mod: CPTII,,, | Performed by: FAMILY MEDICINE

## 2022-05-02 PROCEDURE — 1160F PR REVIEW ALL MEDS BY PRESCRIBER/CLIN PHARMACIST DOCUMENTED: ICD-10-PCS | Mod: CPTII,,, | Performed by: FAMILY MEDICINE

## 2022-05-02 PROCEDURE — 3008F PR BODY MASS INDEX (BMI) DOCUMENTED: ICD-10-PCS | Mod: CPTII,,, | Performed by: FAMILY MEDICINE

## 2022-05-02 PROCEDURE — 3008F BODY MASS INDEX DOCD: CPT | Mod: CPTII,,, | Performed by: FAMILY MEDICINE

## 2022-05-02 PROCEDURE — 99214 OFFICE O/P EST MOD 30 MIN: CPT | Mod: ,,, | Performed by: FAMILY MEDICINE

## 2022-05-02 PROCEDURE — 3078F DIAST BP <80 MM HG: CPT | Mod: CPTII,,, | Performed by: FAMILY MEDICINE

## 2022-05-02 PROCEDURE — 1160F RVW MEDS BY RX/DR IN RCRD: CPT | Mod: CPTII,,, | Performed by: FAMILY MEDICINE

## 2022-05-02 PROCEDURE — 1159F MED LIST DOCD IN RCRD: CPT | Mod: CPTII,,, | Performed by: FAMILY MEDICINE

## 2022-05-02 PROCEDURE — 3074F SYST BP LT 130 MM HG: CPT | Mod: CPTII,,, | Performed by: FAMILY MEDICINE

## 2022-05-02 PROCEDURE — 3078F PR MOST RECENT DIASTOLIC BLOOD PRESSURE < 80 MM HG: ICD-10-PCS | Mod: CPTII,,, | Performed by: FAMILY MEDICINE

## 2022-05-02 PROCEDURE — 1159F PR MEDICATION LIST DOCUMENTED IN MEDICAL RECORD: ICD-10-PCS | Mod: CPTII,,, | Performed by: FAMILY MEDICINE

## 2022-05-02 PROCEDURE — 3046F HEMOGLOBIN A1C LEVEL >9.0%: CPT | Mod: CPTII,,, | Performed by: FAMILY MEDICINE

## 2022-05-02 RX ORDER — DEXTROAMPHETAMINE SACCHARATE, AMPHETAMINE ASPARTATE MONOHYDRATE, DEXTROAMPHETAMINE SULFATE AND AMPHETAMINE SULFATE 7.5; 7.5; 7.5; 7.5 MG/1; MG/1; MG/1; MG/1
30 CAPSULE, EXTENDED RELEASE ORAL EVERY MORNING
Qty: 60 CAPSULE | Refills: 0 | Status: SHIPPED | OUTPATIENT
Start: 2022-05-02 | End: 2022-06-08 | Stop reason: SDUPTHER

## 2022-05-02 RX ORDER — DAPAGLIFLOZIN 5 MG/1
10 TABLET, FILM COATED ORAL DAILY
Qty: 60 TABLET | Refills: 2 | Status: SHIPPED | OUTPATIENT
Start: 2022-05-02 | End: 2022-05-09 | Stop reason: DRUGHIGH

## 2022-05-02 RX ORDER — ATORVASTATIN CALCIUM 80 MG/1
80 TABLET, FILM COATED ORAL DAILY
Qty: 90 TABLET | Refills: 3 | Status: SHIPPED | OUTPATIENT
Start: 2022-05-02 | End: 2022-10-04 | Stop reason: SDUPTHER

## 2022-05-02 RX ORDER — LISINOPRIL AND HYDROCHLOROTHIAZIDE 20; 25 MG/1; MG/1
1 TABLET ORAL DAILY
Qty: 90 TABLET | Refills: 1 | Status: SHIPPED | OUTPATIENT
Start: 2022-05-02 | End: 2022-12-22 | Stop reason: SDUPTHER

## 2022-05-02 RX ORDER — PHENYLEPHRINE HCL 10 MG
600 TABLET ORAL DAILY
COMMUNITY
Start: 2022-01-06 | End: 2022-05-02 | Stop reason: SDUPTHER

## 2022-05-02 NOTE — PROGRESS NOTES
Julio Cruz DO   [address]  [place]  [address]      PATIENT NAME: Syed Kelley  : 1965  DATE: 22  MRN: [id number]      Billing Provider: Julio Cruz DO  Level of Service:   Patient PCP Information     Provider PCP Type    Waqar Osorio DO General          Reason for Visit / Chief Complaint: Diabetes (3 month follow up ) and Hypertension       Update PCP  Update Chief Complaint         History of Present Illness / Problem Focused Workflow     Syed Kelley presents to the clinic with Diabetes (3 month follow up ) and Hypertension     Patient is in today for follow-up on his ADHD. He states he has been tested previously and has taken Adderall twice a day to perform his job.  He also  patient's blood pressure has not been very well controlled in the past but has been better over the last month.  He states his blood sugars are not well controlled because he does not need correctly.  He does take his medicines include metformin as well as 1st finger.      Review of Systems     Review of Systems   Constitutional: Negative for activity change, appetite change, chills, fatigue and fever.   HENT: Negative for nasal congestion, ear discharge, ear pain, mouth dryness, mouth sores, postnasal drip, sinus pressure/congestion, sore throat and voice change.    Eyes: Negative for pain, discharge, redness, itching and visual disturbance.   Respiratory: Negative for apnea, cough, chest tightness, shortness of breath and wheezing.    Cardiovascular: Negative for chest pain, palpitations and leg swelling.   Gastrointestinal: Negative for abdominal distention, abdominal pain, anal bleeding, blood in stool, change in bowel habit, constipation, diarrhea, nausea, vomiting, reflux and change in bowel habit.   Endocrine: Positive for polyphagia and polyuria. Negative for cold intolerance, heat intolerance and polydipsia.   Genitourinary: Negative for difficulty urinating, enuresis,  erectile dysfunction, frequency, genital sores, hematuria and urgency.   Musculoskeletal: Negative for arthralgias, back pain, gait problem, leg pain, myalgias and neck pain.   Integumentary:  Negative for rash, mole/lesion, breast mass and breast discharge.   Allergic/Immunologic: Negative for environmental allergies and food allergies.   Neurological: Negative for dizziness, vertigo, tremors, seizures, syncope, facial asymmetry, speech difficulty, weakness, light-headedness, numbness, headaches, disturbances in coordination, memory loss and coordination difficulties.   Hematological: Negative for adenopathy. Does not bruise/bleed easily.   Psychiatric/Behavioral: Negative for agitation, behavioral problems, confusion, decreased concentration, dysphoric mood, hallucinations, self-injury, sleep disturbance and suicidal ideas. The patient is not nervous/anxious and is not hyperactive.    Breast: Negative for mass      Medical / Social / Family History     Past Medical History:   Diagnosis Date    Adult attention deficit disorder 3/22/2021    Controlled type 2 diabetes mellitus with hyperglycemia, without long-term current use of insulin 3/22/2021    Disorder of kidney and ureter     Essential hypertension 3/22/2021    Hyperlipidemia        Past Surgical History:   Procedure Laterality Date    APPENDECTOMY      CARDIAC CATHETERIZATION      LEFT HEART CATHETERIZATION N/A 5/7/2021    Procedure: Left heart cath with possible intervention;  Surgeon: Federico James DO;  Location: [facility] CATH LAB;  Service: Cardiology;  Laterality: N/A;       Social History    reports that he quit smoking about 37 years ago. His smoking use included cigarettes. He started smoking about 41 years ago. He has a 20.00 pack-year smoking history. His smokeless tobacco use includes chew. He reports current alcohol use. He reports that he does not use drugs.    Family History  's family history includes Cancer in his sister;  Hypertension in his father; Stroke in his father.    Medications and Allergies     Medications  Outpatient Medications Marked as Taking for the 5/2/22 encounter (Office Visit) with Julio Cruz, DO   Medication Sig Dispense Refill    aspirin (ECOTRIN) 81 MG EC tablet Take 1 tablet (81 mg total) by mouth once daily. 90 tablet 3    cholecalciferol, vitamin D3, (VITAMIN D3) 50 mcg (2,000 unit) Cap Take 1 capsule by mouth once daily.      citalopram (CELEXA) 20 MG tablet Take 1 tablet (20 mg total) by mouth once daily. 90 tablet 1    docosahexaenoic acid/epa (FISH OIL ORAL) Take 1 capsule by mouth once daily.      metFORMIN (GLUCOPHAGE) 1000 MG tablet Take 1 tablet (1,000 mg total) by mouth 2 (two) times daily with meals. 180 tablet 1    metoprolol tartrate (LOPRESSOR) 50 MG tablet Take 1 tablet (50 mg total) by mouth 2 (two) times daily. 180 tablet 1    silver sulfADIAZINE 1% (SILVADENE) 1 % cream Apply topically once daily. 25 g 0    [DISCONTINUED] atorvastatin (LIPITOR) 80 MG tablet Take 1 tablet (80 mg total) by mouth once daily. 90 tablet 3    [DISCONTINUED] dapagliflozin (FARXIGA) 5 mg Tab tablet Take 1 tablet (5 mg total) by mouth once daily. 30 tablet 2    [DISCONTINUED] dextroamphetamine-amphetamine (ADDERALL XR) 30 MG 24 hr capsule Take 1 capsule (30 mg total) by mouth every morning. 30 capsule 0    [DISCONTINUED] lisinopriL-hydrochlorothiazide (PRINZIDE,ZESTORETIC) 20-25 mg Tab Take 1 tablet by mouth once daily. 90 tablet 1    [DISCONTINUED] omega 3-dha-epa-fish oil 600 mg-216 mg- 324 mg-1,200 mg CpDR Take 600 mg by mouth once daily.         Allergies  Review of patient's allergies indicates:   Allergen Reactions    Azithromycin     M-mycin        Physical Examination     Vitals:    05/02/22 0958   BP: 110/78   Pulse: 65   Resp: 18   Temp: 97.3 °F (36.3 °C)     Physical Exam  Constitutional:       Appearance: Normal appearance. He is obese.   HENT:      Head: Normocephalic.      Nose: Nose  normal. No congestion or rhinorrhea.      Mouth/Throat:      Mouth: Mucous membranes are moist.      Pharynx: Oropharynx is clear. No oropharyngeal exudate or posterior oropharyngeal erythema.   Eyes:      General: No scleral icterus.        Right eye: No discharge.         Left eye: No discharge.      Conjunctiva/sclera: Conjunctivae normal.      Pupils: Pupils are equal, round, and reactive to light.   Neck:      Vascular: No carotid bruit.   Cardiovascular:      Rate and Rhythm: Normal rate and regular rhythm.      Pulses: Normal pulses.      Heart sounds: Normal heart sounds. No murmur heard.  Pulmonary:      Effort: Pulmonary effort is normal.      Breath sounds: Normal breath sounds. No wheezing.   Abdominal:      General: Abdomen is flat. Bowel sounds are normal.      Tenderness: There is no abdominal tenderness.   Musculoskeletal:         General: Normal range of motion.      Cervical back: Normal range of motion and neck supple.      Right lower leg: No edema.      Left lower leg: No edema.   Skin:     General: Skin is warm.      Capillary Refill: Capillary refill takes less than 2 seconds.      Findings: No rash.   Neurological:      General: No focal deficit present.      Mental Status: He is alert and oriented to person, place, and time. Mental status is at baseline.   Psychiatric:         Mood and Affect: Mood normal.         Behavior: Behavior normal.         Thought Content: Thought content normal.         Judgment: Judgment normal.     History          Lab Results   Component Value Date    WBC 8.39 04/24/2022    HGB 15.7 04/24/2022    HCT 54 (H) 04/24/2022    MCV 87.6 04/24/2022     04/24/2022          Sodium   Date Value Ref Range Status   04/24/2022 125 (L) 136 - 145 mmol/L Final     Potassium   Date Value Ref Range Status   04/24/2022 4.3 3.5 - 5.1 mmol/L Final     Chloride   Date Value Ref Range Status   04/24/2022 89 (L) 98 - 107 mmol/L Final     CO2   Date Value Ref Range Status    04/24/2022 25 21 - 32 mmol/L Final     Glucose   Date Value Ref Range Status   04/24/2022 522 (HH) 74 - 106 mg/dL Final     BUN   Date Value Ref Range Status   04/24/2022 27 (H) 7 - 18 mg/dL Final     Creatinine   Date Value Ref Range Status   04/24/2022 1.31 (H) 0.70 - 1.30 mg/dL Final     Calcium   Date Value Ref Range Status   04/24/2022 9.4 8.5 - 10.1 mg/dL Final     Total Protein   Date Value Ref Range Status   04/24/2022 7.3 6.4 - 8.2 g/dL Final     Albumin   Date Value Ref Range Status   04/24/2022 3.8 3.5 - 5.0 g/dL Final     Bilirubin, Total   Date Value Ref Range Status   04/24/2022 0.5 0.0 - 1.2 mg/dL Final     Alk Phos   Date Value Ref Range Status   04/24/2022 96 45 - 115 U/L Final     AST   Date Value Ref Range Status   04/24/2022 16 15 - 37 U/L Final     ALT   Date Value Ref Range Status   04/24/2022 33 16 - 61 U/L Final     Anion Gap   Date Value Ref Range Status   04/24/2022 15 7 - 16 mmol/L Final     eGFR    Date Value Ref Range Status   05/06/2021 89       eGFR   Date Value Ref Range Status   04/24/2022 60 >=60 mL/min/1.73m² Final      CT Head Without Contrast  Narrative: EXAMINATION:  CT head without contrast    CLINICAL HISTORY:  Neuro deficit, acute, stroke suspected;    TECHNIQUE:  Transaxial CT sections were obtained through the brain without contrast.    The CT examination was performed using one or more of the following dose reduction techniques: Automated exposure control, adjustment of the mA and kV according to patient's size, use of acute or iterative reconstruction techniques.    COMPARISON:  No previous head CT available    FINDINGS:  The ventricles are midline in position without evidence of hydrocephalus. There is no mass or area of parenchymal hemorrhage. There is no gross CT evidence of acute cortical stroke. There is no extra-axial hematoma. The partially visualized sinuses are generally clear. There is no obvious skull fracture.  There is mild calcification in  the distal internal carotid arteries bilaterally.  There is some moderate calcification of the distal aspect of the right vertebral artery.  Impression: No acute intracranial process    Electronically signed by: Kingsley Mcconnell  Date:    10/18/2021  Time:    11:17     Procedures   Assessment and Plan (including Health Maintenance)      Problem List  Smart Sets  Document Outside HM   :    Plan:         Health Maintenance Due   Topic Date Due    Hepatitis C Screening  Never done    COVID-19 Vaccine (1) Never done    Eye Exam  Never done    HIV Screening  Never done    TETANUS VACCINE  Never done    Colorectal Cancer Screening  Never done    Shingles Vaccine (1 of 2) Never done    Hemoglobin A1c  05/02/2022       Problem List Items Addressed This Visit        Psychiatric    Adult attention deficit disorder - Primary    Current Assessment & Plan     History of attention deficit disorder currently takes Adderall.  Patient insisted he needs to take this twice daily as he is a  and and has trouble concentrating he does not take it twice.  Discussed this with him at length and advised him that if he takes it too late in the difficulty with not sleeping.  Patient will need to inform individual who does his physical exam of his medication.  At the DOT physical              Cardiac/Vascular    Essential hypertension    Current Assessment & Plan     Blood pressure well controlled.  No change in medicines.  Follow up in 3 months.           Relevant Medications    lisinopriL-hydrochlorothiazide (PRINZIDE,ZESTORETIC) 20-25 mg Tab    HLD (hyperlipidemia)    Current Assessment & Plan     The patient is on atorvastatin and his last LDL was not calculated due to his triglycerides being over 600. His non HDL was 123 and his HDL is 25. Will recheck his lipid levels at his next visit in 2-3 months.           Relevant Medications    atorvastatin (LIPITOR) 80 MG tablet       Endocrine    Type 2 diabetes mellitus  without complication, without long-term current use of insulin    Current Assessment & Plan     Diabetes has been poorly controlled.  Patient states is probably because of his diet.  Patient has been taking his metformin.  Will need to increase his Farxiga to 10 mg once daily.  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            Relevant Medications    dapagliflozin (FARXIGA) 5 mg Tab tablet      Other Visit Diagnoses     Attention deficit hyperactivity disorder (ADHD), unspecified ADHD type        Relevant Medications    dextroamphetamine-amphetamine (ADDERALL XR) 30 MG 24 hr capsule    Essential hypertension, benign        Relevant Medications    lisinopriL-hydrochlorothiazide (PRINZIDE,ZESTORETIC) 20-25 mg Tab          Health Maintenance Topics with due status: Not Due       Topic Last Completion Date    Diabetes Urine Screening 10/28/2021    Lipid Panel 02/02/2022    Foot Exam 04/13/2022    Influenza Vaccine Not Due       Future Appointments   Date Time Provider Department Center   6/8/2022  9:00 AM Julio Cruz DO [facility]   12/6/2022  2:15 PM Nicolás Montes MD [facility]        Follow up in about 3 months (around 8/2/2022).     Signature:  DO [facility]  [address] [place] [address]  [place]  [id number]    Date of encounter: 5/2/22

## 2022-05-03 NOTE — ASSESSMENT & PLAN NOTE
Diabetes has been poorly controlled.  Patient states is probably because of his diet.  Patient has been taking his metformin.  Will need to increase his Farxiga to 10 mg once daily.  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

## 2022-05-03 NOTE — ASSESSMENT & PLAN NOTE
History of attention deficit disorder currently takes Adderall.  Patient insisted he needs to take this twice daily as he is a  and and has trouble concentrating he does not take it twice.  Discussed this with him at length and advised him that if he takes it too late in the difficulty with not sleeping.  Patient will need to inform individual who does his physical exam of his medication.  At the DOT physical

## 2022-05-03 NOTE — ASSESSMENT & PLAN NOTE
The patient is on atorvastatin and his last LDL was not calculated due to his triglycerides being over 600. His non HDL was 123 and his HDL is 25. Will recheck his lipid levels at his next visit in 2-3 months.

## 2022-05-09 ENCOUNTER — TELEPHONE (OUTPATIENT)
Dept: FAMILY MEDICINE | Facility: CLINIC | Age: 57
End: 2022-05-09
Payer: COMMERCIAL

## 2022-05-09 DIAGNOSIS — E11.9 TYPE 2 DIABETES MELLITUS WITHOUT COMPLICATION, WITHOUT LONG-TERM CURRENT USE OF INSULIN: Primary | ICD-10-CM

## 2022-05-12 RX ORDER — DAPAGLIFLOZIN 10 MG/1
10 TABLET, FILM COATED ORAL DAILY
Qty: 30 TABLET | Refills: 2 | Status: SHIPPED | OUTPATIENT
Start: 2022-05-12 | End: 2022-08-15 | Stop reason: ALTCHOICE

## 2022-05-31 ENCOUNTER — CLINICAL SUPPORT (OUTPATIENT)
Dept: PRIMARY CARE CLINIC | Facility: CLINIC | Age: 57
End: 2022-05-31

## 2022-05-31 DIAGNOSIS — Z02.4 ENCOUNTER FOR DEPARTMENT OF TRANSPORTATION (DOT) EXAMINATION FOR DRIVING LICENSE RENEWAL: ICD-10-CM

## 2022-05-31 PROCEDURE — 99499 PR PHYSICAL - DOT/CDL: ICD-10-PCS | Mod: ,,, | Performed by: NURSE PRACTITIONER

## 2022-05-31 PROCEDURE — 99499 UNLISTED E&M SERVICE: CPT | Mod: ,,, | Performed by: NURSE PRACTITIONER

## 2022-05-31 NOTE — PROGRESS NOTES
Subjective:       Patient ID: Syed Kelley is a 57 y.o. male.    Chief Complaint: No chief complaint on file.    HPI  Review of Systems      Objective:      Physical Exam    Assessment:       Problem List Items Addressed This Visit    None     Visit Diagnoses     Encounter for Department of Transportation (DOT) examination for driving license renewal              Plan:       See scanned documents in .

## 2022-06-08 ENCOUNTER — OFFICE VISIT (OUTPATIENT)
Dept: FAMILY MEDICINE | Facility: CLINIC | Age: 57
End: 2022-06-08
Payer: COMMERCIAL

## 2022-06-08 VITALS
RESPIRATION RATE: 18 BRPM | OXYGEN SATURATION: 98 % | HEIGHT: 63 IN | WEIGHT: 178 LBS | SYSTOLIC BLOOD PRESSURE: 122 MMHG | BODY MASS INDEX: 31.54 KG/M2 | HEART RATE: 71 BPM | TEMPERATURE: 97 F | DIASTOLIC BLOOD PRESSURE: 84 MMHG

## 2022-06-08 DIAGNOSIS — Z79.899 LONG-TERM USE OF HIGH-RISK MEDICATION: ICD-10-CM

## 2022-06-08 DIAGNOSIS — E11.9 TYPE 2 DIABETES MELLITUS WITHOUT COMPLICATION, WITHOUT LONG-TERM CURRENT USE OF INSULIN: ICD-10-CM

## 2022-06-08 DIAGNOSIS — B37.49 YEAST DERMATITIS OF PENIS: ICD-10-CM

## 2022-06-08 DIAGNOSIS — D36.9 ADENOMATOUS POLYPS: ICD-10-CM

## 2022-06-08 DIAGNOSIS — F98.8 ADULT ATTENTION DEFICIT DISORDER: ICD-10-CM

## 2022-06-08 DIAGNOSIS — F90.9 ATTENTION DEFICIT HYPERACTIVITY DISORDER (ADHD), UNSPECIFIED ADHD TYPE: ICD-10-CM

## 2022-06-08 DIAGNOSIS — I10 ESSENTIAL HYPERTENSION: Primary | ICD-10-CM

## 2022-06-08 LAB
ANION GAP SERPL CALCULATED.3IONS-SCNC: 14 MMOL/L (ref 7–16)
BILIRUB SERPL-MCNC: NEGATIVE MG/DL
BLOOD URINE, POC: ABNORMAL
BUN SERPL-MCNC: 26 MG/DL (ref 7–18)
BUN/CREAT SERPL: 24 (ref 6–20)
CALCIUM SERPL-MCNC: 8.9 MG/DL (ref 8.5–10.1)
CHLORIDE SERPL-SCNC: 90 MMOL/L (ref 98–107)
CHOLEST SERPL-MCNC: 182 MG/DL (ref 0–200)
CHOLEST/HDLC SERPL: 6.3 {RATIO}
CO2 SERPL-SCNC: 28 MMOL/L (ref 21–32)
COLOR, POC UA: YELLOW
CREAT SERPL-MCNC: 1.07 MG/DL (ref 0.7–1.3)
CTP QC/QA: YES
GLUCOSE SERPL-MCNC: 402 MG/DL (ref 74–106)
GLUCOSE UR QL STRIP: 1000
HDLC SERPL-MCNC: 29 MG/DL (ref 40–60)
KETONES UR QL STRIP: 15
LDLC SERPL CALC-MCNC: ABNORMAL MG/DL
LDLC/HDLC SERPL: ABNORMAL {RATIO}
LEUKOCYTE ESTERASE URINE, POC: NEGATIVE
NITRITE, POC UA: NEGATIVE
NONHDLC SERPL-MCNC: 153 MG/DL
PH, POC UA: 5
POC (AMP) AMPHETAMINE: ABNORMAL
POC (BAR) BARBITURATES: NEGATIVE
POC (BUP) BUPRENORPHINE: NEGATIVE
POC (BZO) BENZODIAZEPINES: NEGATIVE
POC (COC) COCAINE: NEGATIVE
POC (MDMA) METHYLENEDIOXYMETHAMPHETAMINE 3,4: NEGATIVE
POC (MET) METHAMPHETAMINE: NEGATIVE
POC (MOP) OPIATES: NEGATIVE
POC (MTD) METHADONE: NEGATIVE
POC (OXY) OXYCODONE: NEGATIVE
POC (PCP) PHENCYCLIDINE: NEGATIVE
POC (TCA) TRICYCLIC ANTIDEPRESSANTS: NEGATIVE
POC TEMPERATURE (URINE): ABNORMAL
POC THC: NEGATIVE
POTASSIUM SERPL-SCNC: 4.4 MMOL/L (ref 3.5–5.1)
PROTEIN, POC: NEGATIVE
SODIUM SERPL-SCNC: 128 MMOL/L (ref 136–145)
SPECIFIC GRAVITY, POC UA: 1
TRIGL SERPL-MCNC: 884 MG/DL (ref 35–150)
UROBILINOGEN, POC UA: 0.2
VLDLC SERPL-MCNC: ABNORMAL MG/DL

## 2022-06-08 PROCEDURE — 1159F MED LIST DOCD IN RCRD: CPT | Mod: CPTII,,, | Performed by: FAMILY MEDICINE

## 2022-06-08 PROCEDURE — 83036 HEMOGLOBIN GLYCOSYLATED A1C: CPT | Mod: ,,, | Performed by: CLINICAL MEDICAL LABORATORY

## 2022-06-08 PROCEDURE — 80305 POCT URINE DRUG SCREEN PRESUMP: ICD-10-PCS | Mod: QW,,, | Performed by: FAMILY MEDICINE

## 2022-06-08 PROCEDURE — 99396 PR PREVENTIVE VISIT,EST,40-64: ICD-10-PCS | Mod: ,,, | Performed by: FAMILY MEDICINE

## 2022-06-08 PROCEDURE — 3074F SYST BP LT 130 MM HG: CPT | Mod: CPTII,,, | Performed by: FAMILY MEDICINE

## 2022-06-08 PROCEDURE — 3046F HEMOGLOBIN A1C LEVEL >9.0%: CPT | Mod: CPTII,,, | Performed by: FAMILY MEDICINE

## 2022-06-08 PROCEDURE — 3046F PR MOST RECENT HEMOGLOBIN A1C LEVEL > 9.0%: ICD-10-PCS | Mod: CPTII,,, | Performed by: FAMILY MEDICINE

## 2022-06-08 PROCEDURE — 1159F PR MEDICATION LIST DOCUMENTED IN MEDICAL RECORD: ICD-10-PCS | Mod: CPTII,,, | Performed by: FAMILY MEDICINE

## 2022-06-08 PROCEDURE — 80061 LIPID PANEL: CPT | Mod: ,,, | Performed by: CLINICAL MEDICAL LABORATORY

## 2022-06-08 PROCEDURE — 3008F BODY MASS INDEX DOCD: CPT | Mod: CPTII,,, | Performed by: FAMILY MEDICINE

## 2022-06-08 PROCEDURE — 4010F ACE/ARB THERAPY RXD/TAKEN: CPT | Mod: CPTII,,, | Performed by: FAMILY MEDICINE

## 2022-06-08 PROCEDURE — 4010F PR ACE/ARB THEARPY RXD/TAKEN: ICD-10-PCS | Mod: CPTII,,, | Performed by: FAMILY MEDICINE

## 2022-06-08 PROCEDURE — 80061 LIPID PANEL: ICD-10-PCS | Mod: ,,, | Performed by: CLINICAL MEDICAL LABORATORY

## 2022-06-08 PROCEDURE — 3008F PR BODY MASS INDEX (BMI) DOCUMENTED: ICD-10-PCS | Mod: CPTII,,, | Performed by: FAMILY MEDICINE

## 2022-06-08 PROCEDURE — 3074F PR MOST RECENT SYSTOLIC BLOOD PRESSURE < 130 MM HG: ICD-10-PCS | Mod: CPTII,,, | Performed by: FAMILY MEDICINE

## 2022-06-08 PROCEDURE — 87086 URINE CULTURE/COLONY COUNT: CPT | Mod: GZ,,, | Performed by: CLINICAL MEDICAL LABORATORY

## 2022-06-08 PROCEDURE — 80305 DRUG TEST PRSMV DIR OPT OBS: CPT | Mod: QW,,, | Performed by: FAMILY MEDICINE

## 2022-06-08 PROCEDURE — 99396 PREV VISIT EST AGE 40-64: CPT | Mod: ,,, | Performed by: FAMILY MEDICINE

## 2022-06-08 PROCEDURE — 3079F PR MOST RECENT DIASTOLIC BLOOD PRESSURE 80-89 MM HG: ICD-10-PCS | Mod: CPTII,,, | Performed by: FAMILY MEDICINE

## 2022-06-08 PROCEDURE — 81003 POCT URINALYSIS W/O SCOPE: ICD-10-PCS | Mod: QW,59,, | Performed by: FAMILY MEDICINE

## 2022-06-08 PROCEDURE — 83036 HEMOGLOBIN A1C: ICD-10-PCS | Mod: ,,, | Performed by: CLINICAL MEDICAL LABORATORY

## 2022-06-08 PROCEDURE — 81003 URINALYSIS AUTO W/O SCOPE: CPT | Mod: QW,59,, | Performed by: FAMILY MEDICINE

## 2022-06-08 PROCEDURE — 80048 BASIC METABOLIC PNL TOTAL CA: CPT | Mod: ,,, | Performed by: CLINICAL MEDICAL LABORATORY

## 2022-06-08 PROCEDURE — 80048 BASIC METABOLIC PANEL: ICD-10-PCS | Mod: ,,, | Performed by: CLINICAL MEDICAL LABORATORY

## 2022-06-08 PROCEDURE — 3079F DIAST BP 80-89 MM HG: CPT | Mod: CPTII,,, | Performed by: FAMILY MEDICINE

## 2022-06-08 PROCEDURE — 87086 CULTURE, URINE: ICD-10-PCS | Mod: GZ,,, | Performed by: CLINICAL MEDICAL LABORATORY

## 2022-06-08 RX ORDER — DEXTROAMPHETAMINE SACCHARATE, AMPHETAMINE ASPARTATE MONOHYDRATE, DEXTROAMPHETAMINE SULFATE AND AMPHETAMINE SULFATE 7.5; 7.5; 7.5; 7.5 MG/1; MG/1; MG/1; MG/1
30 CAPSULE, EXTENDED RELEASE ORAL EVERY MORNING
Qty: 60 CAPSULE | Refills: 0 | Status: SHIPPED | OUTPATIENT
Start: 2022-06-08 | End: 2022-06-08 | Stop reason: SDUPTHER

## 2022-06-08 RX ORDER — DEXTROAMPHETAMINE SACCHARATE, AMPHETAMINE ASPARTATE MONOHYDRATE, DEXTROAMPHETAMINE SULFATE AND AMPHETAMINE SULFATE 7.5; 7.5; 7.5; 7.5 MG/1; MG/1; MG/1; MG/1
30 CAPSULE, EXTENDED RELEASE ORAL EVERY MORNING
Qty: 60 CAPSULE | Refills: 0 | Status: SHIPPED | OUTPATIENT
Start: 2022-06-08 | End: 2022-08-15 | Stop reason: SDUPTHER

## 2022-06-08 RX ORDER — INSULIN DETEMIR 100 [IU]/ML
5 INJECTION, SOLUTION SUBCUTANEOUS NIGHTLY
Qty: 10 ML | Refills: 11 | Status: SHIPPED | OUTPATIENT
Start: 2022-06-08 | End: 2022-12-22 | Stop reason: ALTCHOICE

## 2022-06-08 RX ORDER — FLUCONAZOLE 100 MG/1
100 TABLET ORAL DAILY
Qty: 7 TABLET | Refills: 1 | Status: SHIPPED | OUTPATIENT
Start: 2022-06-08 | End: 2022-06-15

## 2022-06-08 RX ORDER — SYRINGE-NEEDLE,INSULIN,0.5 ML 31 GX5/16"
1 SYRINGE, EMPTY DISPOSABLE MISCELLANEOUS DAILY
Qty: 100 EACH | Refills: 1 | Status: SHIPPED | OUTPATIENT
Start: 2022-06-08 | End: 2022-08-15 | Stop reason: SDUPTHER

## 2022-06-08 NOTE — ASSESSMENT & PLAN NOTE
Blood pressure is well controlled at this time.  No change in medicines.  Follow-up every 3 months.

## 2022-06-08 NOTE — ASSESSMENT & PLAN NOTE
History of attention deficit disorder on medication same.  Patient is on higher doses of this and may turn out to be an issue with 's license bureau have advised him that he needs to let the node stay is more than once daily.  Follow-up here at least every 3 months.   is been pulled we did give him 3 months of medication.

## 2022-06-08 NOTE — ASSESSMENT & PLAN NOTE
Diabetes mellitus not controlled with the dapagliflozin and metformin.  Will add insulin 5 units of Levemir daily to begin and he checks blood sugars at least twice daily but preferably AC and HS.  Follow-up in 1 week or p.r.n. discussed the 1800 calorie diet.  Did recommend that he use boost twice daily instead of snacks.  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

## 2022-06-08 NOTE — ASSESSMENT & PLAN NOTE
Patient has what appears to be a yeast dermatitis on his penis.  Will place him on Diflucan g daily 5 days.  If this recurs a may have to take him off his Farxiga.

## 2022-06-08 NOTE — ASSESSMENT & PLAN NOTE
Patient has history of adenomatous polyps and the lead follow-up in 5 years for repeat colonoscopy

## 2022-06-09 LAB
EST. AVERAGE GLUCOSE BLD GHB EST-MCNC: 350 MG/DL
HBA1C MFR BLD HPLC: 13.1 % (ref 4.5–6.6)

## 2022-06-10 LAB — UA COMPLETE W REFLEX CULTURE PNL UR: NO GROWTH

## 2022-06-14 ENCOUNTER — TELEPHONE (OUTPATIENT)
Dept: FAMILY MEDICINE | Facility: CLINIC | Age: 57
End: 2022-06-14
Payer: COMMERCIAL

## 2022-06-14 DIAGNOSIS — E11.9 TYPE 2 DIABETES MELLITUS WITHOUT COMPLICATION, WITHOUT LONG-TERM CURRENT USE OF INSULIN: ICD-10-CM

## 2022-06-14 NOTE — TELEPHONE ENCOUNTER
Spoke with patient regarding his elevated blood sugars. He was educated on diet. Patient was looking at sugar content only and not looking at carbohydrates on labels. He has been eating cereal for breakfast(had cornflakes this morning). Instructed patient on how to take medications due to he had questions regarding his medicine. I spoke with Zoila regarding blood sugar readings. She said for patient to increase his levemir to 20units at bedtime. Patient will begin this tonight. Again instructed patient to go to the ER if his blood sugars continued to be elevated and if he developed any symptoms such as lethargy,pain,change in vision,etc.

## 2022-08-03 NOTE — TELEPHONE ENCOUNTER
Called in 1 vial of Levemir to [facility] in [place] Patient's wife notified and they will have transferred to pharmacy near location in [place].

## 2022-08-15 ENCOUNTER — OFFICE VISIT (OUTPATIENT)
Dept: FAMILY MEDICINE | Facility: CLINIC | Age: 57
End: 2022-08-15
Payer: COMMERCIAL

## 2022-08-15 VITALS
DIASTOLIC BLOOD PRESSURE: 78 MMHG | HEIGHT: 63 IN | OXYGEN SATURATION: 99 % | BODY MASS INDEX: 32.07 KG/M2 | HEART RATE: 67 BPM | WEIGHT: 181 LBS | RESPIRATION RATE: 20 BRPM | SYSTOLIC BLOOD PRESSURE: 124 MMHG | TEMPERATURE: 98 F

## 2022-08-15 DIAGNOSIS — E11.9 TYPE 2 DIABETES MELLITUS WITHOUT COMPLICATION, WITHOUT LONG-TERM CURRENT USE OF INSULIN: Primary | ICD-10-CM

## 2022-08-15 DIAGNOSIS — E78.2 MIXED HYPERLIPIDEMIA: ICD-10-CM

## 2022-08-15 DIAGNOSIS — I10 ESSENTIAL HYPERTENSION: ICD-10-CM

## 2022-08-15 DIAGNOSIS — F90.9 ATTENTION DEFICIT HYPERACTIVITY DISORDER (ADHD), UNSPECIFIED ADHD TYPE: ICD-10-CM

## 2022-08-15 DIAGNOSIS — F98.8 ADULT ATTENTION DEFICIT DISORDER: ICD-10-CM

## 2022-08-15 PROCEDURE — 4010F PR ACE/ARB THEARPY RXD/TAKEN: ICD-10-PCS | Mod: CPTII,,, | Performed by: FAMILY MEDICINE

## 2022-08-15 PROCEDURE — 3046F PR MOST RECENT HEMOGLOBIN A1C LEVEL > 9.0%: ICD-10-PCS | Mod: CPTII,,, | Performed by: FAMILY MEDICINE

## 2022-08-15 PROCEDURE — 1160F RVW MEDS BY RX/DR IN RCRD: CPT | Mod: CPTII,,, | Performed by: FAMILY MEDICINE

## 2022-08-15 PROCEDURE — 3008F PR BODY MASS INDEX (BMI) DOCUMENTED: ICD-10-PCS | Mod: CPTII,,, | Performed by: FAMILY MEDICINE

## 2022-08-15 PROCEDURE — 99214 PR OFFICE/OUTPT VISIT, EST, LEVL IV, 30-39 MIN: ICD-10-PCS | Mod: ,,, | Performed by: FAMILY MEDICINE

## 2022-08-15 PROCEDURE — 1159F PR MEDICATION LIST DOCUMENTED IN MEDICAL RECORD: ICD-10-PCS | Mod: CPTII,,, | Performed by: FAMILY MEDICINE

## 2022-08-15 PROCEDURE — 3074F PR MOST RECENT SYSTOLIC BLOOD PRESSURE < 130 MM HG: ICD-10-PCS | Mod: CPTII,,, | Performed by: FAMILY MEDICINE

## 2022-08-15 PROCEDURE — 80061 LIPID PANEL: CPT | Mod: ,,, | Performed by: CLINICAL MEDICAL LABORATORY

## 2022-08-15 PROCEDURE — 83036 HEMOGLOBIN GLYCOSYLATED A1C: CPT | Mod: ,,, | Performed by: CLINICAL MEDICAL LABORATORY

## 2022-08-15 PROCEDURE — 80048 BASIC METABOLIC PANEL: ICD-10-PCS | Mod: ,,, | Performed by: CLINICAL MEDICAL LABORATORY

## 2022-08-15 PROCEDURE — 3074F SYST BP LT 130 MM HG: CPT | Mod: CPTII,,, | Performed by: FAMILY MEDICINE

## 2022-08-15 PROCEDURE — 3008F BODY MASS INDEX DOCD: CPT | Mod: CPTII,,, | Performed by: FAMILY MEDICINE

## 2022-08-15 PROCEDURE — 3078F DIAST BP <80 MM HG: CPT | Mod: CPTII,,, | Performed by: FAMILY MEDICINE

## 2022-08-15 PROCEDURE — 80061 LIPID PANEL: ICD-10-PCS | Mod: ,,, | Performed by: CLINICAL MEDICAL LABORATORY

## 2022-08-15 PROCEDURE — 3046F HEMOGLOBIN A1C LEVEL >9.0%: CPT | Mod: CPTII,,, | Performed by: FAMILY MEDICINE

## 2022-08-15 PROCEDURE — 83036 HEMOGLOBIN A1C: ICD-10-PCS | Mod: ,,, | Performed by: CLINICAL MEDICAL LABORATORY

## 2022-08-15 PROCEDURE — 1160F PR REVIEW ALL MEDS BY PRESCRIBER/CLIN PHARMACIST DOCUMENTED: ICD-10-PCS | Mod: CPTII,,, | Performed by: FAMILY MEDICINE

## 2022-08-15 PROCEDURE — 80048 BASIC METABOLIC PNL TOTAL CA: CPT | Mod: ,,, | Performed by: CLINICAL MEDICAL LABORATORY

## 2022-08-15 PROCEDURE — 1159F MED LIST DOCD IN RCRD: CPT | Mod: CPTII,,, | Performed by: FAMILY MEDICINE

## 2022-08-15 PROCEDURE — 3078F PR MOST RECENT DIASTOLIC BLOOD PRESSURE < 80 MM HG: ICD-10-PCS | Mod: CPTII,,, | Performed by: FAMILY MEDICINE

## 2022-08-15 PROCEDURE — 99214 OFFICE O/P EST MOD 30 MIN: CPT | Mod: ,,, | Performed by: FAMILY MEDICINE

## 2022-08-15 PROCEDURE — 4010F ACE/ARB THERAPY RXD/TAKEN: CPT | Mod: CPTII,,, | Performed by: FAMILY MEDICINE

## 2022-08-15 RX ORDER — INSULIN DEGLUDEC 200 U/ML
20 INJECTION, SOLUTION SUBCUTANEOUS DAILY
Qty: 3 PEN | Refills: 5 | Status: SHIPPED | OUTPATIENT
Start: 2022-08-15 | End: 2022-12-22

## 2022-08-15 RX ORDER — DEXTROAMPHETAMINE SACCHARATE, AMPHETAMINE ASPARTATE MONOHYDRATE, DEXTROAMPHETAMINE SULFATE AND AMPHETAMINE SULFATE 7.5; 7.5; 7.5; 7.5 MG/1; MG/1; MG/1; MG/1
30 CAPSULE, EXTENDED RELEASE ORAL EVERY MORNING
Qty: 60 CAPSULE | Refills: 0 | Status: SHIPPED | OUTPATIENT
Start: 2022-08-15 | End: 2022-08-29 | Stop reason: SDUPTHER

## 2022-08-15 RX ORDER — GLYBURIDE 5 MG/1
5 TABLET ORAL
Qty: 90 TABLET | Refills: 3 | Status: SHIPPED | OUTPATIENT
Start: 2022-08-15 | End: 2023-02-17 | Stop reason: SDUPTHER

## 2022-08-15 RX ORDER — DEXTROAMPHETAMINE SACCHARATE, AMPHETAMINE ASPARTATE MONOHYDRATE, DEXTROAMPHETAMINE SULFATE AND AMPHETAMINE SULFATE 7.5; 7.5; 7.5; 7.5 MG/1; MG/1; MG/1; MG/1
30 CAPSULE, EXTENDED RELEASE ORAL EVERY MORNING
Qty: 60 CAPSULE | Refills: 0 | Status: SHIPPED | OUTPATIENT
Start: 2022-08-15 | End: 2022-08-29

## 2022-08-15 RX ORDER — CALCIUM CARB/VITAMIN D3/VIT K1 500-100-40
TABLET,CHEWABLE ORAL
COMMUNITY
Start: 2022-06-08 | End: 2024-01-20 | Stop reason: SDUPTHER

## 2022-08-15 NOTE — PROGRESS NOTES
He   Julio Cruz DO   [address] [place]  [address]      PATIENT NAME: Syed Kelley  : 1965  DATE: 8/15/22  MRN: [id number]      Billing Provider: Julio Cruz DO  Level of Service:   Patient PCP Information     Provider PCP Type    Julio Cruz DO General          Reason for Visit / Chief Complaint: Diabetes (2 month follow up. Pt is currently on Farxiga and levemir but states both of these medications are too expensive.) and ADHD (2 month follow up for adderall refill. )       Update PCP  Update Chief Complaint         History of Present Illness / Problem Focused Workflow     Syed Kelley presents to the clinic with Diabetes (2 month follow up. Pt is currently on Farxiga and levemir but states both of these medications are too expensive.) and ADHD (2 month follow up for adderall refill. )     Patient is in for follow-up on his diabetes.  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  His last hemoglobin A1c was 13.3.  He states he cannot afford his medications to include the Farxiga and the current insulin that he is on.  He denies any chest pain shortness of breath palpitations PND orthopnea he does take medications for ADHD.      Review of Systems     Review of Systems   Constitutional: Negative for activity change, appetite change, chills, fatigue and fever.   HENT: Negative for nasal congestion, ear discharge, ear pain, mouth dryness, mouth sores, postnasal drip, sinus pressure/congestion, sore throat and voice change.    Eyes: Negative for pain, discharge, redness, itching and visual disturbance.   Respiratory: Positive for shortness of breath. Negative for apnea, cough, chest tightness and wheezing.    Cardiovascular: Negative for chest pain, palpitations and leg swelling.   Gastrointestinal: Negative for abdominal distention, abdominal pain, anal bleeding, blood in stool, change in bowel habit, constipation, diarrhea,  nausea, vomiting, reflux and change in bowel habit.   Endocrine: Negative for cold intolerance, heat intolerance, polydipsia, polyphagia and polyuria.   Genitourinary: Negative for difficulty urinating, enuresis, erectile dysfunction, frequency, genital sores, hematuria and urgency.   Musculoskeletal: Negative for arthralgias, back pain, gait problem, leg pain, myalgias and neck pain.   Integumentary:  Negative for rash, mole/lesion, breast mass and breast discharge.   Allergic/Immunologic: Negative for environmental allergies and food allergies.   Neurological: Negative for dizziness, vertigo, tremors, seizures, syncope, facial asymmetry, speech difficulty, weakness, light-headedness, numbness, headaches, disturbances in coordination, memory loss and coordination difficulties.   Hematological: Negative for adenopathy. Does not bruise/bleed easily.   Psychiatric/Behavioral: Negative for agitation, behavioral problems, confusion, decreased concentration, dysphoric mood, hallucinations, self-injury, sleep disturbance and suicidal ideas. The patient is not nervous/anxious and is not hyperactive.    Breast: Negative for mass      Medical / Social / Family History     Past Medical History:   Diagnosis Date    Adult attention deficit disorder 3/22/2021    Controlled type 2 diabetes mellitus with hyperglycemia, without long-term current use of insulin 3/22/2021    Disorder of kidney and ureter     Essential hypertension 3/22/2021    Hyperlipidemia        Past Surgical History:   Procedure Laterality Date    APPENDECTOMY      CARDIAC CATHETERIZATION      LEFT HEART CATHETERIZATION N/A 5/7/2021    Procedure: Left heart cath with possible intervention;  Surgeon: Federico James DO;  Location: [facility] CATH LAB;  Service: Cardiology;  Laterality: N/A;       Social History    reports that he quit smoking about 37 years ago. His smoking use included cigarettes. He started smoking about 41 years ago. He has a 20.00  "pack-year smoking history. His smokeless tobacco use includes chew. He reports current alcohol use. He reports that he does not use drugs.    Family History  's family history includes Cancer in his sister; Hypertension in his father; Stroke in his father.    Medications and Allergies     Medications  Outpatient Medications Marked as Taking for the 8/15/22 encounter (Office Visit) with Julio Cruz, DO   Medication Sig Dispense Refill    aspirin (ECOTRIN) 81 MG EC tablet Take 1 tablet (81 mg total) by mouth once daily. 90 tablet 3    atorvastatin (LIPITOR) 80 MG tablet Take 1 tablet (80 mg total) by mouth once daily. 90 tablet 3    cholecalciferol, vitamin D3, (VITAMIN D3) 50 mcg (2,000 unit) Cap Take 1 capsule by mouth once daily.      citalopram (CELEXA) 20 MG tablet Take 1 tablet (20 mg total) by mouth once daily. 90 tablet 1    insulin detemir U-100 (LEVEMIR U-100 INSULIN) 100 unit/mL injection Inject 5 Units into the skin every evening. (Patient taking differently: Inject 20 Units into the skin every evening.) 10 mL 11    insulin syringe-needle U-100 0.3 mL 31 gauge x 5/16" Syrg Use once daily for insulin injection      lisinopriL-hydrochlorothiazide (PRINZIDE,ZESTORETIC) 20-25 mg Tab Take 1 tablet by mouth once daily. 90 tablet 1    metFORMIN (GLUCOPHAGE) 1000 MG tablet Take 1 tablet (1,000 mg total) by mouth 2 (two) times daily with meals. 180 tablet 1    metoprolol tartrate (LOPRESSOR) 50 MG tablet Take 1 tablet (50 mg total) by mouth 2 (two) times daily. 180 tablet 1    silver sulfADIAZINE 1% (SILVADENE) 1 % cream Apply topically once daily. 25 g 0    [DISCONTINUED] dapagliflozin (FARXIGA) 10 mg tablet Take 1 tablet (10 mg total) by mouth once daily. 30 tablet 2    [DISCONTINUED] dextroamphetamine-amphetamine (ADDERALL XR) 30 MG 24 hr capsule Take 1 capsule (30 mg total) by mouth every morning. And take one at 1 pm Do not fill before august 9, 2022 60 capsule 0       Allergies  Review of " patient's allergies indicates:   Allergen Reactions    Azithromycin     M-mycin        Physical Examination     Vitals:    08/15/22 1430   BP: 124/78   Pulse: 67   Resp: 20   Temp: 97.8 °F (36.6 °C)     Physical Exam  Constitutional:       General: He is not in acute distress.     Appearance: Normal appearance. He is obese.   HENT:      Head: Normocephalic.      Nose: Nose normal.      Mouth/Throat:      Mouth: Mucous membranes are moist.      Pharynx: Oropharynx is clear. No oropharyngeal exudate or posterior oropharyngeal erythema.   Eyes:      General: No scleral icterus.        Right eye: No discharge.         Left eye: No discharge.      Conjunctiva/sclera: Conjunctivae normal.      Pupils: Pupils are equal, round, and reactive to light.   Cardiovascular:      Rate and Rhythm: Normal rate and regular rhythm.      Pulses: Normal pulses.      Heart sounds: Normal heart sounds. No murmur heard.  Pulmonary:      Effort: Pulmonary effort is normal.      Breath sounds: Normal breath sounds. No wheezing.   Abdominal:      General: Abdomen is flat. Bowel sounds are normal.      Tenderness: There is no abdominal tenderness.   Musculoskeletal:         General: Normal range of motion.      Cervical back: Normal range of motion and neck supple.      Right lower leg: No edema.      Left lower leg: No edema.   Skin:     General: Skin is warm.      Capillary Refill: Capillary refill takes less than 2 seconds.      Findings: No rash.   Neurological:      General: No focal deficit present.      Mental Status: He is alert and oriented to person, place, and time. Mental status is at baseline.      Cranial Nerves: No cranial nerve deficit.      Sensory: No sensory deficit.      Motor: No weakness.      Coordination: Coordination normal.      Gait: Gait normal.      Deep Tendon Reflexes: Reflexes normal.   Psychiatric:         Mood and Affect: Mood normal.         Behavior: Behavior normal.         Thought Content: Thought  content normal.         Judgment: Judgment normal.               Lab Results   Component Value Date    WBC 8.39 04/24/2022    HGB 15.7 04/24/2022    HCT 54 (H) 04/24/2022    MCV 87.6 04/24/2022     04/24/2022          Sodium   Date Value Ref Range Status   06/08/2022 128 (L) 136 - 145 mmol/L Final     Potassium   Date Value Ref Range Status   06/08/2022 4.4 3.5 - 5.1 mmol/L Final     Chloride   Date Value Ref Range Status   06/08/2022 90 (L) 98 - 107 mmol/L Final     CO2   Date Value Ref Range Status   06/08/2022 28 21 - 32 mmol/L Final     Glucose   Date Value Ref Range Status   06/08/2022 402 (H) 74 - 106 mg/dL Final     BUN   Date Value Ref Range Status   06/08/2022 26 (H) 7 - 18 mg/dL Final     Creatinine   Date Value Ref Range Status   06/08/2022 1.07 0.70 - 1.30 mg/dL Final     Calcium   Date Value Ref Range Status   06/08/2022 8.9 8.5 - 10.1 mg/dL Final     Total Protein   Date Value Ref Range Status   04/24/2022 7.3 6.4 - 8.2 g/dL Final     Albumin   Date Value Ref Range Status   04/24/2022 3.8 3.5 - 5.0 g/dL Final     Bilirubin, Total   Date Value Ref Range Status   04/24/2022 0.5 0.0 - 1.2 mg/dL Final     Alk Phos   Date Value Ref Range Status   04/24/2022 96 45 - 115 U/L Final     AST   Date Value Ref Range Status   04/24/2022 16 15 - 37 U/L Final     ALT   Date Value Ref Range Status   04/24/2022 33 16 - 61 U/L Final     Anion Gap   Date Value Ref Range Status   06/08/2022 14 7 - 16 mmol/L Final     eGFR    Date Value Ref Range Status   05/06/2021 89       eGFR   Date Value Ref Range Status   06/08/2022 76 >=60 mL/min/1.73m² Final      CT Head Without Contrast  Narrative: EXAMINATION:  CT head without contrast    CLINICAL HISTORY:  Neuro deficit, acute, stroke suspected;    TECHNIQUE:  Transaxial CT sections were obtained through the brain without contrast.    The CT examination was performed using one or more of the following dose reduction techniques: Automated exposure control,  adjustment of the mA and kV according to patient's size, use of acute or iterative reconstruction techniques.    COMPARISON:  No previous head CT available    FINDINGS:  The ventricles are midline in position without evidence of hydrocephalus. There is no mass or area of parenchymal hemorrhage. There is no gross CT evidence of acute cortical stroke. There is no extra-axial hematoma. The partially visualized sinuses are generally clear. There is no obvious skull fracture.  There is mild calcification in the distal internal carotid arteries bilaterally.  There is some moderate calcification of the distal aspect of the right vertebral artery.  Impression: No acute intracranial process    Electronically signed by: Kingsley Mcconnell  Date:    10/18/2021  Time:    11:17     Procedures   Assessment and Plan (including Health Maintenance)      Problem List  Smart Sets  Document Outside HM   :    Plan:         Health Maintenance Due   Topic Date Due    Hepatitis C Screening  Never done    Pneumococcal Vaccines (Age 0-64) (1 - PCV) Never done    Eye Exam  Never done    HIV Screening  Never done    TETANUS VACCINE  Never done    Colorectal Cancer Screening  Never done       Problem List Items Addressed This Visit        Psychiatric    Adult attention deficit disorder    Current Assessment & Plan     Patient with history of attention deficit disorder we refilled his medication.  Patient does have a refill on file at the pharmacy.  Will see him back in 3 months.  Discussed the side effects of the medicine to include palpitations.           Relevant Medications    dextroamphetamine-amphetamine (ADDERALL XR) 30 MG 24 hr capsule    dextroamphetamine-amphetamine (ADDERALL XR) 30 MG 24 hr capsule       Cardiac/Vascular    Essential hypertension    Current Assessment & Plan     Blood pressure is well controlled.  No change in his medication.  Follow-up in 3 months.           HLD (hyperlipidemia)    Current Assessment & Plan      Hyperlipidemia that is been poorly controlled.  Patient states he is taking his atorvastatin however his triglycerides are in the 800 range.  Will recheck his lipid panel today.  Recommend he take official are MegaRed.  If this is not effective then we will add Lovaza              Endocrine    Type 2 diabetes mellitus without complication, without long-term current use of insulin - Primary    Current Assessment & Plan     Last hemoglobin A1c 2 months ago was 13.3.  Patient is not following his diet and not taking his medicines likely due to cost.  Change him to glyburide today 5 mg once daily and continue the metformin.  Gave him samples of Tresiba U 200 and will continue med 20 units daily.  He is to check his blood sugar 3-4 times daily.  go back on an 1800 calorie diet.  Follow-up in 1 month or p.r.n..           Relevant Medications    glyBURIDE (DIABETA) 5 MG tablet    insulin degludec (TRESIBA FLEXTOUCH U-200) 200 unit/mL (3 mL) insulin pen    Other Relevant Orders    Basic Metabolic Panel    Hemoglobin A1C    Lipid Panel      Other Visit Diagnoses     Attention deficit hyperactivity disorder (ADHD), unspecified ADHD type        Relevant Medications    dextroamphetamine-amphetamine (ADDERALL XR) 30 MG 24 hr capsule    dextroamphetamine-amphetamine (ADDERALL XR) 30 MG 24 hr capsule          Health Maintenance Topics with due status: Not Due       Topic Last Completion Date    Diabetes Urine Screening 10/28/2021    Foot Exam 04/13/2022    Lipid Panel 06/08/2022    Hemoglobin A1c 06/08/2022    Influenza Vaccine Not Due       Future Appointments   Date Time Provider Department Center   11/14/2022  8:30 AM Julio Cruz DO [facility] JENNIFER Lozano   12/6/2022  2:15 PM Nicolás Montes MD [facility]   6/12/2023  9:00 AM Julio Cruz DO [facility] [place]        No follow-ups on file.     Signature:  DO [facility]  [address]  [place]  [address]    Date  of encounter: 8/15/22

## 2022-08-15 NOTE — ASSESSMENT & PLAN NOTE
Last hemoglobin A1c 2 months ago was 13.3.  Patient is not following his diet and not taking his medicines likely due to cost.  Change him to glyburide today 5 mg once daily and continue the metformin.  Gave him samples of Tresiba U 200 and will continue med 20 units daily.  He is to check his blood sugar 3-4 times daily.  go back on an 1800 calorie diet.  Follow-up in 1 month or p.r.n..

## 2022-08-15 NOTE — ASSESSMENT & PLAN NOTE
Hyperlipidemia that is been poorly controlled.  Patient states he is taking his atorvastatin however his triglycerides are in the 800 range.  Will recheck his lipid panel today.  Recommend he take official are MegaRed.  If this is not effective then we will add Lovaza

## 2022-08-15 NOTE — ASSESSMENT & PLAN NOTE
Patient with history of attention deficit disorder we refilled his medication.  Patient does have a refill on file at the pharmacy.  Will see him back in 3 months.  Discussed the side effects of the medicine to include palpitations.

## 2022-08-16 LAB
ANION GAP SERPL CALCULATED.3IONS-SCNC: 17 MMOL/L (ref 7–16)
BUN SERPL-MCNC: 20 MG/DL (ref 7–18)
BUN/CREAT SERPL: 16 (ref 6–20)
CALCIUM SERPL-MCNC: 9 MG/DL (ref 8.5–10.1)
CHLORIDE SERPL-SCNC: 93 MMOL/L (ref 98–107)
CHOLEST SERPL-MCNC: 150 MG/DL (ref 0–200)
CHOLEST/HDLC SERPL: 4.2 {RATIO}
CO2 SERPL-SCNC: 24 MMOL/L (ref 21–32)
CREAT SERPL-MCNC: 1.23 MG/DL (ref 0.7–1.3)
EGFR (NO RACE VARIABLE) (RUSH/TITUS): 68 ML/MIN/1.73M²
EST. AVERAGE GLUCOSE BLD GHB EST-MCNC: 310 MG/DL
GLUCOSE SERPL-MCNC: 407 MG/DL (ref 74–106)
HBA1C MFR BLD HPLC: 11.9 % (ref 4.5–6.6)
HDLC SERPL-MCNC: 36 MG/DL (ref 40–60)
LDLC SERPL CALC-MCNC: 43 MG/DL
LDLC/HDLC SERPL: 1.2 {RATIO}
NONHDLC SERPL-MCNC: 114 MG/DL
POTASSIUM SERPL-SCNC: 4.1 MMOL/L (ref 3.5–5.1)
SODIUM SERPL-SCNC: 130 MMOL/L (ref 136–145)
TRIGL SERPL-MCNC: 355 MG/DL (ref 35–150)
VLDLC SERPL-MCNC: 71 MG/DL

## 2022-08-27 DIAGNOSIS — I10 ESSENTIAL HYPERTENSION, BENIGN: ICD-10-CM

## 2022-08-27 DIAGNOSIS — F32.A DEPRESSION, UNSPECIFIED DEPRESSION TYPE: ICD-10-CM

## 2022-08-29 DIAGNOSIS — F98.8 ADULT ATTENTION DEFICIT DISORDER: ICD-10-CM

## 2022-08-29 DIAGNOSIS — F32.A DEPRESSION, UNSPECIFIED DEPRESSION TYPE: ICD-10-CM

## 2022-08-29 DIAGNOSIS — F90.9 ATTENTION DEFICIT HYPERACTIVITY DISORDER (ADHD), UNSPECIFIED ADHD TYPE: ICD-10-CM

## 2022-08-29 DIAGNOSIS — I10 ESSENTIAL HYPERTENSION, BENIGN: ICD-10-CM

## 2022-08-29 DIAGNOSIS — E11.9 TYPE 2 DIABETES MELLITUS WITHOUT COMPLICATION, WITHOUT LONG-TERM CURRENT USE OF INSULIN: ICD-10-CM

## 2022-08-29 RX ORDER — CITALOPRAM 20 MG/1
20 TABLET, FILM COATED ORAL DAILY
Qty: 90 TABLET | Refills: 1 | Status: SHIPPED | OUTPATIENT
Start: 2022-08-29 | End: 2022-12-22 | Stop reason: SDUPTHER

## 2022-08-29 RX ORDER — DEXTROAMPHETAMINE SACCHARATE, AMPHETAMINE ASPARTATE MONOHYDRATE, DEXTROAMPHETAMINE SULFATE AND AMPHETAMINE SULFATE 7.5; 7.5; 7.5; 7.5 MG/1; MG/1; MG/1; MG/1
CAPSULE, EXTENDED RELEASE ORAL
Qty: 60 CAPSULE | Refills: 0 | Status: SHIPPED | OUTPATIENT
Start: 2022-08-29 | End: 2023-04-04

## 2022-08-29 RX ORDER — METOPROLOL TARTRATE 50 MG/1
TABLET ORAL
Qty: 180 TABLET | Refills: 0 | OUTPATIENT
Start: 2022-08-29

## 2022-08-29 RX ORDER — METFORMIN HYDROCHLORIDE 1000 MG/1
1000 TABLET ORAL 2 TIMES DAILY WITH MEALS
Qty: 180 TABLET | Refills: 1 | Status: SHIPPED | OUTPATIENT
Start: 2022-08-29 | End: 2022-12-22 | Stop reason: SDUPTHER

## 2022-08-29 RX ORDER — CITALOPRAM 20 MG/1
TABLET, FILM COATED ORAL
Qty: 30 TABLET | Refills: 0 | OUTPATIENT
Start: 2022-08-29

## 2022-08-29 RX ORDER — PEN NEEDLE, DIABETIC 32GX 5/32"
NEEDLE, DISPOSABLE MISCELLANEOUS
Qty: 200 EACH | Refills: 3 | Status: SHIPPED | OUTPATIENT
Start: 2022-08-29 | End: 2022-12-22 | Stop reason: ALTCHOICE

## 2022-08-29 RX ORDER — METOPROLOL TARTRATE 50 MG/1
50 TABLET ORAL 2 TIMES DAILY
Qty: 180 TABLET | Refills: 1 | Status: SHIPPED | OUTPATIENT
Start: 2022-08-29 | End: 2022-12-22 | Stop reason: SDUPTHER

## 2022-08-29 NOTE — TELEPHONE ENCOUNTER
[facility] unable to get Adderall XR 30mg from . Cancelled patient's prescriptions at [facility] that were sent/written on 08/15/22. Spoke with Johana the pharmacist. [facility] on [address] in [place] has medication. Patient asked that we sent prescription to this pharmacy along with a few of his other routine medicines. Explained that I would have to get approval from Dr. Cruz.

## 2022-09-07 ENCOUNTER — TELEPHONE (OUTPATIENT)
Dept: FAMILY MEDICINE | Facility: CLINIC | Age: 57
End: 2022-09-07
Payer: COMMERCIAL

## 2022-09-07 NOTE — TELEPHONE ENCOUNTER
Pt's wife states unable to get Adderall refilled at [facility] on [address] , due to back order. Pt's wife wants to transfer medication to [facility]  for refill. Informed wife that clinic can not sent [place] and can not call in refills of this medication. Informed wife, she can call   [facility] in [place], [place] and see if they can transfer medication through their system. Pt's wife requesting medication that is not on back order. Attempted to contact wife to inform that to receive medication change, pt will have to return to clinic. Left voice mail to contact clinic .

## 2022-09-08 NOTE — TELEPHONE ENCOUNTER
"Patient called requesting Dr. Cruz change his ADHD medication due to availability of Adderall XR.  Explained to patient that we cannot change this medication without an office visit to discuss need for changes. (See nurse's notes for additional conversation details)  Patient was upset about not being to get his medication changed. Explained to patient that due to [place] guidelines and our policy for control substances that no control substances can be changed or rewritten without documentation during an office visit. Patient threatened to find a new doctor to write his medication. Explained to patient that was his choice if he decided to find a new provider. Patient stated that he came to see Dr. Cruz because "he was an older doctor and he felt more comfortable." Told patient we would be glad to refer him to psychiatrist/psychologist to write his ADHD medicine for him,but patient refused stating that his testing was "good for 5 years" and it had only been 1 year since having testing done. Patient notified that the psychiatrist can write his ADHD medication if he decided to pursue that route. Patient stated that he would be in to see the doctor when he returns from [place]. Instructed patient that the type of visit for this medication is not a walk in visit that it must be a scheduled visit. He stated it will "take forever" to see the doctor for an appointment.Patient ended conversation by saying, " I am going to hang up the phone before I say something I regret". Spoke with Dr. Cruz regarding conversation with patient. Dr. Cruz instructed we send letter to patient about this clinic not writing his control medications.  "

## 2022-10-04 DIAGNOSIS — E78.2 MIXED HYPERLIPIDEMIA: ICD-10-CM

## 2022-10-04 RX ORDER — ATORVASTATIN CALCIUM 80 MG/1
80 TABLET, FILM COATED ORAL DAILY
Qty: 90 TABLET | Refills: 3 | Status: SHIPPED | OUTPATIENT
Start: 2022-10-04 | End: 2023-02-17 | Stop reason: SDUPTHER

## 2022-10-10 ENCOUNTER — OFFICE VISIT (OUTPATIENT)
Dept: FAMILY MEDICINE | Facility: CLINIC | Age: 57
End: 2022-10-10
Payer: COMMERCIAL

## 2022-10-10 VITALS
TEMPERATURE: 99 F | WEIGHT: 203 LBS | OXYGEN SATURATION: 97 % | HEART RATE: 66 BPM | SYSTOLIC BLOOD PRESSURE: 126 MMHG | BODY MASS INDEX: 35.97 KG/M2 | HEIGHT: 63 IN | DIASTOLIC BLOOD PRESSURE: 87 MMHG

## 2022-10-10 DIAGNOSIS — F90.9 ATTENTION DEFICIT HYPERACTIVITY DISORDER (ADHD), UNSPECIFIED ADHD TYPE: Primary | ICD-10-CM

## 2022-10-10 PROCEDURE — 1160F RVW MEDS BY RX/DR IN RCRD: CPT | Mod: CPTII,,, | Performed by: FAMILY MEDICINE

## 2022-10-10 PROCEDURE — 3074F PR MOST RECENT SYSTOLIC BLOOD PRESSURE < 130 MM HG: ICD-10-PCS | Mod: CPTII,,, | Performed by: FAMILY MEDICINE

## 2022-10-10 PROCEDURE — 1159F MED LIST DOCD IN RCRD: CPT | Mod: CPTII,,, | Performed by: FAMILY MEDICINE

## 2022-10-10 PROCEDURE — 3046F HEMOGLOBIN A1C LEVEL >9.0%: CPT | Mod: CPTII,,, | Performed by: FAMILY MEDICINE

## 2022-10-10 PROCEDURE — 3074F SYST BP LT 130 MM HG: CPT | Mod: CPTII,,, | Performed by: FAMILY MEDICINE

## 2022-10-10 PROCEDURE — 3046F PR MOST RECENT HEMOGLOBIN A1C LEVEL > 9.0%: ICD-10-PCS | Mod: CPTII,,, | Performed by: FAMILY MEDICINE

## 2022-10-10 PROCEDURE — 1160F PR REVIEW ALL MEDS BY PRESCRIBER/CLIN PHARMACIST DOCUMENTED: ICD-10-PCS | Mod: CPTII,,, | Performed by: FAMILY MEDICINE

## 2022-10-10 PROCEDURE — 1159F PR MEDICATION LIST DOCUMENTED IN MEDICAL RECORD: ICD-10-PCS | Mod: CPTII,,, | Performed by: FAMILY MEDICINE

## 2022-10-10 PROCEDURE — 4010F ACE/ARB THERAPY RXD/TAKEN: CPT | Mod: CPTII,,, | Performed by: FAMILY MEDICINE

## 2022-10-10 PROCEDURE — 4010F PR ACE/ARB THEARPY RXD/TAKEN: ICD-10-PCS | Mod: CPTII,,, | Performed by: FAMILY MEDICINE

## 2022-10-10 PROCEDURE — 99213 PR OFFICE/OUTPT VISIT, EST, LEVL III, 20-29 MIN: ICD-10-PCS | Mod: ,,, | Performed by: FAMILY MEDICINE

## 2022-10-10 PROCEDURE — 3079F PR MOST RECENT DIASTOLIC BLOOD PRESSURE 80-89 MM HG: ICD-10-PCS | Mod: CPTII,,, | Performed by: FAMILY MEDICINE

## 2022-10-10 PROCEDURE — 3079F DIAST BP 80-89 MM HG: CPT | Mod: CPTII,,, | Performed by: FAMILY MEDICINE

## 2022-10-10 PROCEDURE — 99213 OFFICE O/P EST LOW 20 MIN: CPT | Mod: ,,, | Performed by: FAMILY MEDICINE

## 2022-10-10 PROCEDURE — 3008F BODY MASS INDEX DOCD: CPT | Mod: CPTII,,, | Performed by: FAMILY MEDICINE

## 2022-10-10 PROCEDURE — 3008F PR BODY MASS INDEX (BMI) DOCUMENTED: ICD-10-PCS | Mod: CPTII,,, | Performed by: FAMILY MEDICINE

## 2022-10-10 RX ORDER — DEXTROAMPHETAMINE SACCHARATE, AMPHETAMINE ASPARTATE MONOHYDRATE, DEXTROAMPHETAMINE SULFATE AND AMPHETAMINE SULFATE 5; 5; 5; 5 MG/1; MG/1; MG/1; MG/1
20 CAPSULE, EXTENDED RELEASE ORAL 2 TIMES DAILY
Qty: 60 CAPSULE | Refills: 0 | Status: SHIPPED | OUTPATIENT
Start: 2022-10-10 | End: 2023-04-04

## 2022-10-10 NOTE — PROGRESS NOTES
Subjective:       Patient ID: Syed Kelley is a 57 y.o. male.    Chief Complaint: Medication Refill (Refill on adderall. )    Pt needing refill of adderall, left his previous doctor, has been on this for 3 years, requesting reduction in dose due to unavailability at pharmacy of 30mg xr.     Review of Systems   Constitutional:  Negative for activity change, appetite change, chills, diaphoresis, fatigue, fever and unexpected weight change.   HENT:  Negative for nasal congestion, dental problem, drooling, ear discharge, ear pain, facial swelling, hearing loss, mouth sores, nosebleeds, postnasal drip, rhinorrhea, sinus pressure/congestion, sneezing, sore throat, tinnitus, trouble swallowing, voice change and goiter.    Eyes:  Negative for photophobia, pain, discharge, redness, itching and visual disturbance.   Respiratory:  Negative for apnea, cough, choking, chest tightness, shortness of breath, wheezing and stridor.    Cardiovascular:  Negative for chest pain, palpitations, leg swelling and claudication.   Gastrointestinal:  Negative for abdominal distention, abdominal pain, anal bleeding, blood in stool, change in bowel habit, constipation, diarrhea, nausea, vomiting, reflux, fecal incontinence and change in bowel habit.   Endocrine: Negative for cold intolerance, heat intolerance, polydipsia, polyphagia and polyuria.   Genitourinary:  Negative for bladder incontinence, decreased urine volume, difficulty urinating, discharge, dysuria, enuresis, erectile dysfunction, flank pain, frequency, genital sores, hematuria, penile pain, testicular pain and urgency.   Musculoskeletal:  Negative for arthralgias, back pain, gait problem, joint swelling, leg pain, myalgias, neck pain, neck stiffness and joint deformity.   Integumentary:  Negative for pallor, rash, wound and mole/lesion.   Allergic/Immunologic: Negative for environmental allergies, food allergies and frequent infections.   Neurological:  Negative for dizziness,  vertigo, tremors, seizures, syncope, facial asymmetry, speech difficulty, weakness, light-headedness, numbness, headaches, coordination difficulties, memory loss and coordination difficulties.   Hematological:  Negative for adenopathy. Does not bruise/bleed easily.   Psychiatric/Behavioral:  Negative for agitation, behavioral problems, confusion, decreased concentration, dysphoric mood, hallucinations, self-injury, sleep disturbance and suicidal ideas. The patient is not nervous/anxious and is not hyperactive.        Objective:      Physical Exam  Vitals reviewed.   Constitutional:       Appearance: Normal appearance. He is normal weight.   HENT:      Head: Normocephalic and atraumatic.      Right Ear: Tympanic membrane and ear canal normal.      Left Ear: Tympanic membrane, ear canal and external ear normal.      Nose: Nose normal.      Mouth/Throat:      Mouth: Mucous membranes are moist.      Pharynx: Oropharynx is clear.   Eyes:      Extraocular Movements: Extraocular movements intact.      Conjunctiva/sclera: Conjunctivae normal.      Pupils: Pupils are equal, round, and reactive to light.   Cardiovascular:      Rate and Rhythm: Normal rate and regular rhythm.      Pulses: Normal pulses.      Heart sounds: Normal heart sounds.   Pulmonary:      Effort: Pulmonary effort is normal.      Breath sounds: Normal breath sounds.   Abdominal:      General: Abdomen is flat. Bowel sounds are normal.      Palpations: Abdomen is soft.   Musculoskeletal:         General: Normal range of motion.      Cervical back: Normal range of motion and neck supple.   Skin:     General: Skin is warm and dry.   Neurological:      General: No focal deficit present.      Mental Status: He is alert and oriented to person, place, and time. Mental status is at baseline.   Psychiatric:         Mood and Affect: Mood normal.         Behavior: Behavior normal.         Thought Content: Thought content normal.         Judgment: Judgment normal.        Assessment:       1. Attention deficit hyperactivity disorder (ADHD), unspecified ADHD type          Plan:     Attention deficit hyperactivity disorder (ADHD), unspecified ADHD type  -     dextroamphetamine-amphetamine (ADDERALL XR) 20 MG 24 hr capsule; Take 1 capsule (20 mg total) by mouth 2 (two) times a day.  Dispense: 60 capsule; Refill: 0     Will refill adhd meds as patient is well controlled, will reduce to 20mg bid.

## 2022-11-21 ENCOUNTER — HOSPITAL ENCOUNTER (EMERGENCY)
Facility: HOSPITAL | Age: 57
Discharge: HOME | End: 2022-11-21
Payer: COMMERCIAL

## 2022-11-21 VITALS
BODY MASS INDEX: 32.78 KG/M2 | HEART RATE: 101 BPM | HEIGHT: 63 IN | RESPIRATION RATE: 16 BRPM | WEIGHT: 185 LBS | SYSTOLIC BLOOD PRESSURE: 126 MMHG | DIASTOLIC BLOOD PRESSURE: 59 MMHG | OXYGEN SATURATION: 99 % | TEMPERATURE: 100 F

## 2022-11-21 DIAGNOSIS — R05.9 COUGH: ICD-10-CM

## 2022-11-21 DIAGNOSIS — U07.1 COVID-19: Primary | ICD-10-CM

## 2022-11-21 DIAGNOSIS — R50.9 FEVER: ICD-10-CM

## 2022-11-21 LAB
FLUAV AG UPPER RESP QL IA.RAPID: NEGATIVE
FLUBV AG UPPER RESP QL IA.RAPID: NEGATIVE
GLUCOSE SERPL-MCNC: 120 MG/DL (ref 70–105)
SARS-COV+SARS-COV-2 AG RESP QL IA.RAPID: POSITIVE

## 2022-11-21 PROCEDURE — 87426 SARSCOV CORONAVIRUS AG IA: CPT | Performed by: NURSE PRACTITIONER

## 2022-11-21 PROCEDURE — 82962 GLUCOSE BLOOD TEST: CPT

## 2022-11-21 PROCEDURE — 99283 PR EMERGENCY DEPT VISIT,LEVEL III: ICD-10-PCS | Mod: ,,, | Performed by: NURSE PRACTITIONER

## 2022-11-21 PROCEDURE — 96372 THER/PROPH/DIAG INJ SC/IM: CPT | Performed by: NURSE PRACTITIONER

## 2022-11-21 PROCEDURE — 99284 EMERGENCY DEPT VISIT MOD MDM: CPT | Mod: 25

## 2022-11-21 PROCEDURE — 25000003 PHARM REV CODE 250: Performed by: EMERGENCY MEDICINE

## 2022-11-21 PROCEDURE — 99283 EMERGENCY DEPT VISIT LOW MDM: CPT | Mod: ,,, | Performed by: NURSE PRACTITIONER

## 2022-11-21 PROCEDURE — 63600175 PHARM REV CODE 636 W HCPCS: Performed by: NURSE PRACTITIONER

## 2022-11-21 PROCEDURE — 87804 INFLUENZA ASSAY W/OPTIC: CPT | Performed by: NURSE PRACTITIONER

## 2022-11-21 RX ORDER — FAMOTIDINE 20 MG/1
20 TABLET, FILM COATED ORAL 2 TIMES DAILY
Qty: 14 TABLET | Refills: 0 | Status: SHIPPED | OUTPATIENT
Start: 2022-11-21 | End: 2023-02-17

## 2022-11-21 RX ORDER — CEFTRIAXONE 1 G/1
1 INJECTION, POWDER, FOR SOLUTION INTRAMUSCULAR; INTRAVENOUS
Status: COMPLETED | OUTPATIENT
Start: 2022-11-21 | End: 2022-11-21

## 2022-11-21 RX ORDER — CETIRIZINE HYDROCHLORIDE 10 MG/1
10 TABLET ORAL NIGHTLY
Qty: 7 TABLET | Refills: 0 | Status: SHIPPED | OUTPATIENT
Start: 2022-11-21 | End: 2023-02-17

## 2022-11-21 RX ORDER — LANOLIN ALCOHOL/MO/W.PET/CERES
100 CREAM (GRAM) TOPICAL 2 TIMES DAILY
Qty: 14 TABLET | Refills: 0 | Status: SHIPPED | OUTPATIENT
Start: 2022-11-21 | End: 2022-11-28

## 2022-11-21 RX ORDER — ACETAMINOPHEN 325 MG/1
650 TABLET ORAL
Status: COMPLETED | OUTPATIENT
Start: 2022-11-21 | End: 2022-11-21

## 2022-11-21 RX ORDER — IBUPROFEN 100 MG/5ML
2000 SUSPENSION, ORAL (FINAL DOSE FORM) ORAL 2 TIMES DAILY
Qty: 28 TABLET | Refills: 0 | Status: SHIPPED | OUTPATIENT
Start: 2022-11-21 | End: 2022-11-28

## 2022-11-21 RX ADMIN — ACETAMINOPHEN 650 MG: 325 TABLET ORAL at 07:11

## 2022-11-21 RX ADMIN — CEFTRIAXONE SODIUM 1 G: 1 INJECTION, POWDER, FOR SOLUTION INTRAMUSCULAR; INTRAVENOUS at 10:11

## 2022-11-22 NOTE — ED NOTES
Late entry: 11/22/2022 @ [address] [place] [address]'s pharmacy called regarding Combivent inhaler that patient was prescribed, stating that insurance does not cover it. Spoke with , prescription for Albuterol HFA inhaler 90mcg/actuation, take 2 puffs every 2-3 hours PRN dispense 18g no refills received. Prescription given to pharmacist.

## 2022-11-22 NOTE — ED PROVIDER NOTES
"Encounter Date: 2022       History     Chief Complaint   Patient presents with    Fever    Generalized Body Aches     56 y/o WM with PMH of HTN, HLD and DM presents with c/o fever that started today with an associated cough, also reports generalized body aches. Unsure what his temp was as he did not check it. He denies chest pain, shortness of breath, wheezing, nausea, vomiting or diaphoresis. Reports he "chews tobacco". Denies smoking, alcohol or illicit drug use. He has taken nothing for his symptoms. There are no exacerbating or remitting factors. No known sick contacts.    The history is provided by the patient.   Review of patient's allergies indicates:   Allergen Reactions    Azithromycin     M-mycin      Past Medical History:   Diagnosis Date    Adult attention deficit disorder 3/22/2021    Controlled type 2 diabetes mellitus with hyperglycemia, without long-term current use of insulin 3/22/2021    Disorder of kidney and ureter     Essential hypertension 3/22/2021    Hyperlipidemia      Past Surgical History:   Procedure Laterality Date    APPENDECTOMY      CARDIAC CATHETERIZATION      LEFT HEART CATHETERIZATION N/A 2021    Procedure: Left heart cath with possible intervention;  Surgeon: Federico James DO;  Location: [facility] CATH LAB;  Service: Cardiology;  Laterality: N/A;     Family History   Problem Relation Age of Onset    Hypertension Father     Stroke Father     Cancer Sister      Social History     Tobacco Use    Smoking status: Former     Packs/day: 5.00     Years: 4.00     Pack years: 20.00     Types: Cigarettes     Start date:      Quit date:      Years since quittin.9    Smokeless tobacco: Current     Types: Chew    Tobacco comments:     1 can/day   Substance Use Topics    Alcohol use: Yes     Comment: 2-3 drinks per/3 months    Drug use: Never     Review of Systems   Constitutional:  Positive for chills, fatigue and fever.   Respiratory:  Positive for cough. Negative for " shortness of breath and wheezing.    Cardiovascular:  Negative for chest pain and palpitations.   Gastrointestinal:  Negative for abdominal pain, nausea and vomiting.   Genitourinary:  Negative for dysuria and hematuria.   Musculoskeletal:  Negative for back pain.   Skin:  Negative for rash.   Neurological:  Negative for dizziness and headaches.     Physical Exam     Initial Vitals [11/21/22 1941]   BP Pulse Resp Temp SpO2   (!) 126/59 101 16 (!) 101.6 °F (38.7 °C) (!) 92 %      MAP       --         Physical Exam    Constitutional: He appears well-developed and well-nourished. He is active and cooperative.   Cardiovascular:  Normal rate, regular rhythm, normal heart sounds and normal pulses.           Pulmonary/Chest: Effort normal. He has wheezes (faint expiratory) in the right middle field and the right lower field.   Abdominal: Abdomen is soft. Bowel sounds are normal. There is no abdominal tenderness.     Neurological: He is alert and oriented to person, place, and time.   Skin: Skin is warm, dry and intact. Capillary refill takes less than 2 seconds.   Psychiatric: He has a normal mood and affect. His speech is normal and behavior is normal. Judgment and thought content normal. Cognition and memory are normal.   On re-examination, temp improved to 100.0 and O2 sat 99% on room air.   Medical Screening Exam   See Full Note    ED Course   Procedures  Labs Reviewed   SARS ANTIGEN(CHING) - Abnormal; Notable for the following components:       Result Value    COVID-19 Ag Positive (*)     All other components within normal limits    Narrative:     Positive SARS-CoV antigen results indicate the presence of viral antigens; correlation with patient history and presence of clinical signs & symptoms consistent with COVID-19 are necessary to determine infection status.   POCT GLUCOSE MONITORING CONTINUOUS - Abnormal; Notable for the following components:    POC Glucose 120 (*)     All other components within normal limits    RAPID INFLUENZA A/B - Normal          Imaging Results              X-Ray Chest PA And Lateral (In process)                      Medications   cefTRIAXone injection 1 g (has no administration in time range)   acetaminophen tablet 650 mg (650 mg Oral Given 11/21/22 1947)                     Counseled on supportive measures. Warning s/s discussed and return precautions given; the patient and his wife have v/u.    Clinical Impression:   Final diagnoses:  [R05.9] Cough  [R50.9] Fever  [U07.1] COVID-19 (Primary)        ED Disposition Condition    Discharge Stable          ED Prescriptions       Medication Sig Dispense Start Date End Date Auth. Provider    nirmatrelvir-ritonavir 300 mg (150 mg x 2)-100 mg copackaged tablets (EUA) Take 3 tablets by mouth 2 (two) times daily for 5 days. Each dose contains 2 nirmatrelvir (pink tablets) and 1 ritonavir (white tablet). Take all 3 tablets together 30 tablet 11/21/2022 11/26/2022 REMEDIOS Williamson    ipratropium-albuteroL (COMBIVENT)  mcg/actuation inhaler Inhale 1 puff into the lungs 4 (four) times daily. Rescue 4 g 11/21/2022 -- REMEDIOS Williamson    zinc 50 mg Tab Take 1 tablet by mouth 2 (two) times a day. for 7 days 14 each 11/21/2022 11/28/2022 REMEDIOS Williamson    ascorbic acid, vitamin C, (VITAMIN C) 1000 MG tablet Take 2 tablets (2,000 mg total) by mouth 2 (two) times daily. Take with food for 7 days 28 tablet 11/21/2022 11/28/2022 REMEDIOS Williamson    thiamine 100 MG tablet Take 1 tablet (100 mg total) by mouth 2 (two) times a day. for 7 days 14 tablet 11/21/2022 11/28/2022 REMEDIOS Williamson    cetirizine (ZYRTEC) 10 MG tablet Take 1 tablet (10 mg total) by mouth every evening. for 7 days 7 tablet 11/21/2022 11/28/2022 REMEDIOS Williamson    famotidine (PEPCID) 20 MG tablet Take 1 tablet (20 mg total) by mouth 2 (two) times daily. for 7 days 14 tablet 11/21/2022 11/28/2022 REMEDIOS Williamson          Follow-up Information       Follow up With Specialties  Details Why Contact Info    Primary Care Provider  In 1 week               REMEDIOS Williamson  11/21/22 5939       REMEDIOS Williamson  11/21/22 2206

## 2022-11-22 NOTE — DISCHARGE INSTRUCTIONS
Take prescriptions as directed, alternate tylenol and ibuprofen as needed for pain/fever. Ensure you are drinking plenty of fluids. Use incentive spirometer every 2 hours as directed. You need to quarantine for 5 full days; if you still have symptoms after 5 days you need to isolate until you are symptom and or fever free for 24 hours without medication. Return to the ED for any worsening signs or symptoms or otherwise as needed.

## 2022-12-07 DIAGNOSIS — I10 ESSENTIAL HYPERTENSION: Primary | ICD-10-CM

## 2022-12-22 ENCOUNTER — OFFICE VISIT (OUTPATIENT)
Dept: FAMILY MEDICINE | Facility: CLINIC | Age: 57
End: 2022-12-22
Payer: COMMERCIAL

## 2022-12-22 VITALS
TEMPERATURE: 99 F | RESPIRATION RATE: 19 BRPM | HEART RATE: 68 BPM | WEIGHT: 207 LBS | BODY MASS INDEX: 36.68 KG/M2 | HEIGHT: 63 IN | SYSTOLIC BLOOD PRESSURE: 126 MMHG | OXYGEN SATURATION: 98 % | DIASTOLIC BLOOD PRESSURE: 84 MMHG

## 2022-12-22 DIAGNOSIS — F98.8 ADULT ATTENTION DEFICIT DISORDER: ICD-10-CM

## 2022-12-22 DIAGNOSIS — F32.A DEPRESSION, UNSPECIFIED DEPRESSION TYPE: ICD-10-CM

## 2022-12-22 DIAGNOSIS — E11.9 TYPE 2 DIABETES MELLITUS WITHOUT COMPLICATION, WITHOUT LONG-TERM CURRENT USE OF INSULIN: ICD-10-CM

## 2022-12-22 DIAGNOSIS — I10 ESSENTIAL HYPERTENSION, BENIGN: ICD-10-CM

## 2022-12-22 DIAGNOSIS — E78.2 MIXED HYPERLIPIDEMIA: Primary | ICD-10-CM

## 2022-12-22 LAB
ANION GAP SERPL CALCULATED.3IONS-SCNC: 10 MMOL/L (ref 7–16)
BASOPHILS # BLD AUTO: 0.03 K/UL (ref 0–0.2)
BASOPHILS NFR BLD AUTO: 0.4 % (ref 0–1)
BUN SERPL-MCNC: 14 MG/DL (ref 7–18)
BUN/CREAT SERPL: 14 (ref 6–20)
CALCIUM SERPL-MCNC: 9.1 MG/DL (ref 8.5–10.1)
CHLORIDE SERPL-SCNC: 101 MMOL/L (ref 98–107)
CHOLEST SERPL-MCNC: 128 MG/DL (ref 0–200)
CHOLEST/HDLC SERPL: 4 {RATIO}
CO2 SERPL-SCNC: 29 MMOL/L (ref 21–32)
CREAT SERPL-MCNC: 0.97 MG/DL (ref 0.7–1.3)
CREAT UR-MCNC: 166 MG/DL (ref 39–259)
DIFFERENTIAL METHOD BLD: ABNORMAL
EGFR (NO RACE VARIABLE) (RUSH/TITUS): 91 ML/MIN/1.73M²
EOSINOPHIL # BLD AUTO: 0.12 K/UL (ref 0–0.5)
EOSINOPHIL NFR BLD AUTO: 1.7 % (ref 1–4)
ERYTHROCYTE [DISTWIDTH] IN BLOOD BY AUTOMATED COUNT: 12.8 % (ref 11.5–14.5)
EST. AVERAGE GLUCOSE BLD GHB EST-MCNC: 140 MG/DL
GLUCOSE SERPL-MCNC: 111 MG/DL (ref 74–106)
HBA1C MFR BLD HPLC: 6.8 % (ref 4.5–6.6)
HCT VFR BLD AUTO: 44.7 % (ref 40–54)
HDLC SERPL-MCNC: 32 MG/DL (ref 40–60)
HGB BLD-MCNC: 14.6 G/DL (ref 13.5–18)
IMM GRANULOCYTES # BLD AUTO: 0.03 K/UL (ref 0–0.04)
IMM GRANULOCYTES NFR BLD: 0.4 % (ref 0–0.4)
LDLC SERPL CALC-MCNC: 53 MG/DL
LDLC/HDLC SERPL: 1.7 {RATIO}
LYMPHOCYTES # BLD AUTO: 1.7 K/UL (ref 1–4.8)
LYMPHOCYTES NFR BLD AUTO: 24.4 % (ref 27–41)
MCH RBC QN AUTO: 30.2 PG (ref 27–31)
MCHC RBC AUTO-ENTMCNC: 32.7 G/DL (ref 32–36)
MCV RBC AUTO: 92.5 FL (ref 80–96)
MICROALBUMIN UR-MCNC: 1.9 MG/DL (ref 0–2.8)
MICROALBUMIN/CREAT RATIO PNL UR: 11.4 MG/G (ref 0–30)
MONOCYTES # BLD AUTO: 0.52 K/UL (ref 0–0.8)
MONOCYTES NFR BLD AUTO: 7.5 % (ref 2–6)
MPC BLD CALC-MCNC: 9.1 FL (ref 9.4–12.4)
NEUTROPHILS # BLD AUTO: 4.56 K/UL (ref 1.8–7.7)
NEUTROPHILS NFR BLD AUTO: 65.6 % (ref 53–65)
NONHDLC SERPL-MCNC: 96 MG/DL
NRBC # BLD AUTO: 0 X10E3/UL
NRBC, AUTO (.00): 0 %
PLATELET # BLD AUTO: 356 K/UL (ref 150–400)
POTASSIUM SERPL-SCNC: 3.8 MMOL/L (ref 3.5–5.1)
RBC # BLD AUTO: 4.83 M/UL (ref 4.6–6.2)
SODIUM SERPL-SCNC: 136 MMOL/L (ref 136–145)
TRIGL SERPL-MCNC: 215 MG/DL (ref 35–150)
VLDLC SERPL-MCNC: 43 MG/DL
WBC # BLD AUTO: 6.96 K/UL (ref 4.5–11)

## 2022-12-22 PROCEDURE — 3008F BODY MASS INDEX DOCD: CPT | Mod: CPTII,,, | Performed by: FAMILY MEDICINE

## 2022-12-22 PROCEDURE — 3079F PR MOST RECENT DIASTOLIC BLOOD PRESSURE 80-89 MM HG: ICD-10-PCS | Mod: CPTII,,, | Performed by: FAMILY MEDICINE

## 2022-12-22 PROCEDURE — 85025 CBC WITH DIFFERENTIAL: ICD-10-PCS | Mod: ,,, | Performed by: CLINICAL MEDICAL LABORATORY

## 2022-12-22 PROCEDURE — 99215 OFFICE O/P EST HI 40 MIN: CPT | Mod: ,,, | Performed by: FAMILY MEDICINE

## 2022-12-22 PROCEDURE — 82570 ASSAY OF URINE CREATININE: CPT | Mod: ,,, | Performed by: CLINICAL MEDICAL LABORATORY

## 2022-12-22 PROCEDURE — 3046F HEMOGLOBIN A1C LEVEL >9.0%: CPT | Mod: CPTII,,, | Performed by: FAMILY MEDICINE

## 2022-12-22 PROCEDURE — 4010F PR ACE/ARB THEARPY RXD/TAKEN: ICD-10-PCS | Mod: CPTII,,, | Performed by: FAMILY MEDICINE

## 2022-12-22 PROCEDURE — 3008F PR BODY MASS INDEX (BMI) DOCUMENTED: ICD-10-PCS | Mod: CPTII,,, | Performed by: FAMILY MEDICINE

## 2022-12-22 PROCEDURE — 80061 LIPID PANEL: CPT | Mod: ,,, | Performed by: CLINICAL MEDICAL LABORATORY

## 2022-12-22 PROCEDURE — 4010F ACE/ARB THERAPY RXD/TAKEN: CPT | Mod: CPTII,,, | Performed by: FAMILY MEDICINE

## 2022-12-22 PROCEDURE — 80048 BASIC METABOLIC PNL TOTAL CA: CPT | Mod: ,,, | Performed by: CLINICAL MEDICAL LABORATORY

## 2022-12-22 PROCEDURE — 82043 MICROALBUMIN / CREATININE RATIO URINE: ICD-10-PCS | Mod: ,,, | Performed by: CLINICAL MEDICAL LABORATORY

## 2022-12-22 PROCEDURE — 83036 HEMOGLOBIN GLYCOSYLATED A1C: CPT | Mod: ,,, | Performed by: CLINICAL MEDICAL LABORATORY

## 2022-12-22 PROCEDURE — 82570 MICROALBUMIN / CREATININE RATIO URINE: ICD-10-PCS | Mod: ,,, | Performed by: CLINICAL MEDICAL LABORATORY

## 2022-12-22 PROCEDURE — 3046F PR MOST RECENT HEMOGLOBIN A1C LEVEL > 9.0%: ICD-10-PCS | Mod: CPTII,,, | Performed by: FAMILY MEDICINE

## 2022-12-22 PROCEDURE — 85025 COMPLETE CBC W/AUTO DIFF WBC: CPT | Mod: ,,, | Performed by: CLINICAL MEDICAL LABORATORY

## 2022-12-22 PROCEDURE — 3074F SYST BP LT 130 MM HG: CPT | Mod: CPTII,,, | Performed by: FAMILY MEDICINE

## 2022-12-22 PROCEDURE — 1159F MED LIST DOCD IN RCRD: CPT | Mod: CPTII,,, | Performed by: FAMILY MEDICINE

## 2022-12-22 PROCEDURE — 80048 BASIC METABOLIC PANEL: ICD-10-PCS | Mod: ,,, | Performed by: CLINICAL MEDICAL LABORATORY

## 2022-12-22 PROCEDURE — 83036 HEMOGLOBIN A1C: ICD-10-PCS | Mod: ,,, | Performed by: CLINICAL MEDICAL LABORATORY

## 2022-12-22 PROCEDURE — 3074F PR MOST RECENT SYSTOLIC BLOOD PRESSURE < 130 MM HG: ICD-10-PCS | Mod: CPTII,,, | Performed by: FAMILY MEDICINE

## 2022-12-22 PROCEDURE — 82043 UR ALBUMIN QUANTITATIVE: CPT | Mod: ,,, | Performed by: CLINICAL MEDICAL LABORATORY

## 2022-12-22 PROCEDURE — 80061 LIPID PANEL: ICD-10-PCS | Mod: ,,, | Performed by: CLINICAL MEDICAL LABORATORY

## 2022-12-22 PROCEDURE — 99215 PR OFFICE/OUTPT VISIT, EST, LEVL V, 40-54 MIN: ICD-10-PCS | Mod: ,,, | Performed by: FAMILY MEDICINE

## 2022-12-22 PROCEDURE — 3079F DIAST BP 80-89 MM HG: CPT | Mod: CPTII,,, | Performed by: FAMILY MEDICINE

## 2022-12-22 PROCEDURE — 1159F PR MEDICATION LIST DOCUMENTED IN MEDICAL RECORD: ICD-10-PCS | Mod: CPTII,,, | Performed by: FAMILY MEDICINE

## 2022-12-22 RX ORDER — LISINOPRIL AND HYDROCHLOROTHIAZIDE 20; 25 MG/1; MG/1
1 TABLET ORAL DAILY
Qty: 90 TABLET | Refills: 1 | Status: SHIPPED | OUTPATIENT
Start: 2022-12-22 | End: 2023-08-23 | Stop reason: SDUPTHER

## 2022-12-22 RX ORDER — INSULIN DEGLUDEC 200 U/ML
25 INJECTION, SOLUTION SUBCUTANEOUS DAILY
Qty: 3 PEN | Refills: 3 | Status: SHIPPED | OUTPATIENT
Start: 2022-12-22 | End: 2023-11-10 | Stop reason: SDUPTHER

## 2022-12-22 RX ORDER — ALBUTEROL SULFATE 90 UG/1
2 AEROSOL, METERED RESPIRATORY (INHALATION) EVERY 4 HOURS PRN
COMMUNITY
Start: 2022-11-22 | End: 2023-10-17 | Stop reason: SDUPTHER

## 2022-12-22 RX ORDER — METFORMIN HYDROCHLORIDE 1000 MG/1
1000 TABLET ORAL 2 TIMES DAILY WITH MEALS
Qty: 180 TABLET | Refills: 1 | Status: SHIPPED | OUTPATIENT
Start: 2022-12-22 | End: 2023-08-23 | Stop reason: SDUPTHER

## 2022-12-22 RX ORDER — PEN NEEDLE, DIABETIC 30 GX3/16"
NEEDLE, DISPOSABLE MISCELLANEOUS
COMMUNITY
Start: 2022-09-18 | End: 2024-01-20 | Stop reason: SDUPTHER

## 2022-12-22 RX ORDER — CITALOPRAM 20 MG/1
20 TABLET, FILM COATED ORAL DAILY
Qty: 90 TABLET | Refills: 1 | Status: SHIPPED | OUTPATIENT
Start: 2022-12-22 | End: 2023-08-23 | Stop reason: SDUPTHER

## 2022-12-22 RX ORDER — METOPROLOL TARTRATE 50 MG/1
50 TABLET ORAL 2 TIMES DAILY
Qty: 180 TABLET | Refills: 1 | Status: SHIPPED | OUTPATIENT
Start: 2022-12-22 | End: 2023-08-23 | Stop reason: SDUPTHER

## 2022-12-22 NOTE — ASSESSMENT & PLAN NOTE
Last hemoglobin A1c in August of 2022 was 11.9.  Patient does have blood sugar records for the last 3-4 months that show his blood sugars in the 1-200 range.  No hypoglycemic episodes.  Patient is in today for paperwork on his [employer] license.  Will obtain a A1c to determine his control over the last 3 months.  Patient currently taking glyburide 5 mg once a day and Tresiba 25 units daily.  No changes at this time but will call him with results of his test.  Follow-up every 3 months.

## 2022-12-22 NOTE — PROGRESS NOTES
Julio Cruz DO   [address] [place]  [address]      PATIENT NAME: Syed Kelley  : 1965  DATE: 22  MRN: [id number]      Billing Provider: Julio Cruz DO  Level of Service:   Patient PCP Information       Provider PCP Type    Primary Doctor No General            Reason for Visit / Chief Complaint: Hypertension (Routine visit for medication refills. ) and Diabetes (Routine visit for medication refills. )       Update PCP  Update Chief Complaint         History of Present Illness / Problem Focused Workflow     Syed Kelley presents to the clinic with Hypertension (Routine visit for medication refills. ) and Diabetes (Routine visit for medication refills. )     Patient presents today for follow-up on his hypertension as well as diabetes and hyperlipidemia.  He is tolerating his medications well.  He recently seen Dr. Lee for his ADHD and he is treating him with Adderall 20 mg extended release twice daily.  He currently denies any chest pain shortness breath palpitations PND orthopnea.  Has no numbness or tingling in his feet or legs.  He denies any hypoglycemic episodes.  Life has been keeping a log of his blood sugars have been running between 100 in 200.    Hypertension  Pertinent negatives include no chest pain, headaches, neck pain, palpitations or shortness of breath.   Diabetes  Pertinent negatives for hypoglycemia include no confusion, dizziness, headaches, nervousness/anxiousness, seizures, speech difficulty or tremors. Pertinent negatives for diabetes include no chest pain, no fatigue, no polydipsia, no polyphagia, no polyuria and no weakness.   Review of Systems     Review of Systems   Constitutional:  Negative for activity change, appetite change, chills, fatigue and fever.   HENT:  Negative for nasal congestion, ear discharge, ear pain, mouth dryness, mouth sores, postnasal drip, sinus pressure/congestion, sore throat and voice change.    Eyes:   Negative for pain, discharge, redness, itching and visual disturbance.   Respiratory:  Negative for apnea, cough, chest tightness, shortness of breath and wheezing.    Cardiovascular:  Negative for chest pain, palpitations and leg swelling.   Gastrointestinal:  Negative for abdominal distention, abdominal pain, anal bleeding, blood in stool, change in bowel habit, constipation, diarrhea, nausea, vomiting, reflux and change in bowel habit.   Endocrine: Negative for cold intolerance, heat intolerance, polydipsia, polyphagia and polyuria.   Genitourinary:  Negative for difficulty urinating, enuresis, erectile dysfunction, frequency, genital sores, hematuria and urgency.   Musculoskeletal:  Negative for arthralgias, back pain, gait problem, leg pain, myalgias and neck pain.   Integumentary:  Negative for rash, mole/lesion, breast mass and breast discharge.   Allergic/Immunologic: Negative for environmental allergies and food allergies.   Neurological:  Negative for dizziness, vertigo, tremors, seizures, syncope, facial asymmetry, speech difficulty, weakness, light-headedness, numbness, headaches, coordination difficulties, memory loss and coordination difficulties.   Hematological:  Negative for adenopathy. Does not bruise/bleed easily.   Psychiatric/Behavioral:  Negative for agitation, behavioral problems, confusion, decreased concentration, dysphoric mood, hallucinations, self-injury, sleep disturbance and suicidal ideas. The patient is not nervous/anxious and is not hyperactive.    Breast: Negative for mass    Medical / Social / Family History     Past Medical History:   Diagnosis Date    Adult attention deficit disorder 3/22/2021    Controlled type 2 diabetes mellitus with hyperglycemia, without long-term current use of insulin 3/22/2021    Disorder of kidney and ureter     Essential hypertension 3/22/2021    Hyperlipidemia        Past Surgical History:   Procedure Laterality Date    APPENDECTOMY      CARDIAC  "CATHETERIZATION      LEFT HEART CATHETERIZATION N/A 5/7/2021    Procedure: Left heart cath with possible intervention;  Surgeon: Federico James DO;  Location: [facility] CATH LAB;  Service: Cardiology;  Laterality: N/A;       Social History    reports that he quit smoking about 37 years ago. His smoking use included cigarettes. He started smoking about 42 years ago. He has a 20.00 pack-year smoking history. His smokeless tobacco use includes chew. He reports current alcohol use. He reports that he does not use drugs.    Family History  's family history includes Cancer in his sister; Hypertension in his father; Stroke in his father.    Medications and Allergies     Medications  Outpatient Medications Marked as Taking for the 12/22/22 encounter (Office Visit) with Julio Cruz DO   Medication Sig Dispense Refill    albuterol (PROVENTIL/VENTOLIN HFA) 90 mcg/actuation inhaler Inhale 2 puffs into the lungs every 4 (four) hours as needed for Shortness of Breath.      aspirin (ECOTRIN) 81 MG EC tablet Take 1 tablet (81 mg total) by mouth once daily. 90 tablet 3    atorvastatin (LIPITOR) 80 MG tablet Take 1 tablet (80 mg total) by mouth once daily. 90 tablet 3    cholecalciferol, vitamin D3, (VITAMIN D3) 50 mcg (2,000 unit) Cap Take 1 capsule by mouth once daily.      dextroamphetamine-amphetamine (ADDERALL XR) 20 MG 24 hr capsule Take 1 capsule (20 mg total) by mouth 2 (two) times a day. 60 capsule 0    dextroamphetamine-amphetamine (ADDERALL XR) 30 MG 24 hr capsule Take 1 capsule by mouth every morning and 1 capsule by mouth at 1:00 pm. 60 capsule 0    docosahexaenoic acid/epa (FISH OIL ORAL) Take 1 capsule by mouth once daily.      glyBURIDE (DIABETA) 5 MG tablet Take 1 tablet (5 mg total) by mouth daily with breakfast. 90 tablet 3    insulin syringe-needle U-100 0.3 mL 31 gauge x 5/16" Syrg Use once daily for insulin injection      pen needle, diabetic 31 gauge x 5/16" Ndle Use to administer insulin twice " daily      [DISCONTINUED] citalopram (CELEXA) 20 MG tablet Take 1 tablet (20 mg total) by mouth once daily. 90 tablet 1    [DISCONTINUED] insulin degludec (TRESIBA FLEXTOUCH U-200) 200 unit/mL (3 mL) insulin pen Inject 20 Units into the skin once daily. (Patient taking differently: Inject 25 Units into the skin once daily.) 3 pen 5    [DISCONTINUED] lisinopriL-hydrochlorothiazide (PRINZIDE,ZESTORETIC) 20-25 mg Tab Take 1 tablet by mouth once daily. 90 tablet 1    [DISCONTINUED] metFORMIN (GLUCOPHAGE) 1000 MG tablet Take 1 tablet (1,000 mg total) by mouth 2 (two) times daily with meals. 180 tablet 1    [DISCONTINUED] metoprolol tartrate (LOPRESSOR) 50 MG tablet Take 1 tablet (50 mg total) by mouth 2 (two) times daily. 180 tablet 1       Allergies  Review of patient's allergies indicates:   Allergen Reactions    Azithromycin     M-mycin        Physical Examination     Vitals:    12/22/22 0848   BP: 126/84   Pulse: 68   Resp: 19   Temp: 98.5 °F (36.9 °C)     Physical Exam  Constitutional:       Appearance: Normal appearance. He is obese.   HENT:      Head: Normocephalic.      Nose: Nose normal.      Mouth/Throat:      Mouth: Mucous membranes are moist.      Pharynx: Oropharynx is clear. No oropharyngeal exudate or posterior oropharyngeal erythema.   Eyes:      General: No scleral icterus.        Right eye: No discharge.         Left eye: No discharge.      Conjunctiva/sclera: Conjunctivae normal.      Pupils: Pupils are equal, round, and reactive to light.   Cardiovascular:      Rate and Rhythm: Normal rate and regular rhythm.      Pulses:           Dorsalis pedis pulses are 1+ on the right side and 1+ on the left side.        Posterior tibial pulses are 1+ on the right side and 1+ on the left side.      Heart sounds: Normal heart sounds.   Pulmonary:      Effort: Pulmonary effort is normal.      Breath sounds: Normal breath sounds. No wheezing.   Abdominal:      General: Abdomen is flat. Bowel sounds are normal.       Tenderness: There is no abdominal tenderness.   Musculoskeletal:         General: Normal range of motion.      Right lower leg: No edema.      Left lower leg: No edema.        Feet:    Feet:      Right foot:      Protective Sensation: 10 sites tested.  7 sites sensed.      Skin integrity: Skin integrity normal.      Toenail Condition: Fungal disease present.     Left foot:      Protective Sensation: 10 sites tested.  7 sites sensed.      Skin integrity: Callus present.      Toenail Condition: Fungal disease present.  Skin:     General: Skin is warm.      Capillary Refill: Capillary refill takes less than 2 seconds.      Findings: No rash.   Neurological:      General: No focal deficit present.      Mental Status: He is alert and oriented to person, place, and time. Mental status is at baseline.      Cranial Nerves: No cranial nerve deficit.      Sensory: No sensory deficit.      Motor: No weakness.      Coordination: Coordination normal.      Gait: Gait normal.      Deep Tendon Reflexes: Reflexes normal.   Psychiatric:         Mood and Affect: Mood normal.         Behavior: Behavior normal.         Thought Content: Thought content normal.         Judgment: Judgment normal.             Lab Results   Component Value Date    WBC 8.39 04/24/2022    HGB 15.7 04/24/2022    HCT 54 (H) 04/24/2022    MCV 87.6 04/24/2022     04/24/2022          Sodium   Date Value Ref Range Status   08/15/2022 130 (L) 136 - 145 mmol/L Final     Potassium   Date Value Ref Range Status   08/15/2022 4.1 3.5 - 5.1 mmol/L Final     Chloride   Date Value Ref Range Status   08/15/2022 93 (L) 98 - 107 mmol/L Final     CO2   Date Value Ref Range Status   08/15/2022 24 21 - 32 mmol/L Final     Glucose   Date Value Ref Range Status   08/15/2022 407 (H) 74 - 106 mg/dL Final     BUN   Date Value Ref Range Status   08/15/2022 20 (H) 7 - 18 mg/dL Final     Creatinine   Date Value Ref Range Status   08/15/2022 1.23 0.70 - 1.30 mg/dL Final     Calcium    Date Value Ref Range Status   08/15/2022 9.0 8.5 - 10.1 mg/dL Final     Total Protein   Date Value Ref Range Status   04/24/2022 7.3 6.4 - 8.2 g/dL Final     Albumin   Date Value Ref Range Status   04/24/2022 3.8 3.5 - 5.0 g/dL Final     Bilirubin, Total   Date Value Ref Range Status   04/24/2022 0.5 0.0 - 1.2 mg/dL Final     Alk Phos   Date Value Ref Range Status   04/24/2022 96 45 - 115 U/L Final     AST   Date Value Ref Range Status   04/24/2022 16 15 - 37 U/L Final     ALT   Date Value Ref Range Status   04/24/2022 33 16 - 61 U/L Final     Anion Gap   Date Value Ref Range Status   08/15/2022 17 (H) 7 - 16 mmol/L Final     eGFR    Date Value Ref Range Status   05/06/2021 89       eGFR   Date Value Ref Range Status   06/08/2022 76 >=60 mL/min/1.73m² Final      X-Ray Chest PA And Lateral  Narrative: EXAMINATION:  XR CHEST PA AND LATERAL    CLINICAL HISTORY:  Fever, unspecified    TECHNIQUE:  PA and lateral views of the chest were performed.    COMPARISON:  05/06/2021    FINDINGS:  Heart size normal. Lungs clear. No pneumothorax or pleural effusion.  Impression: No acute findings.    Electronically signed by: Vipul Scott  Date:    11/22/2022  Time:    07:42     Procedures   Assessment and Plan (including Health Maintenance)      Problem List  Smart Sets  Document Outside HM   :    Plan:         Health Maintenance Due   Topic Date Due    Hepatitis C Screening  Never done    Pneumococcal Vaccines (Age 0-64) (1 - PCV) Never done    Eye Exam  Never done    HIV Screening  Never done    TETANUS VACCINE  Never done    Aspirin/Antiplatelet Therapy  Never done    Colorectal Cancer Screening  Never done    Influenza Vaccine (1) Never done    Hemoglobin A1c  11/15/2022    Diabetes Urine Screening  10/28/2022       Problem List Items Addressed This Visit          Psychiatric    Adult attention deficit disorder    Current Assessment & Plan     ADHD currently being treated by Dr. Foy.  Continue his medications  per Dr. Foy.           Depression    Current Assessment & Plan     Depression currently stable.  Continue him on Celexa 20 mg daily.  Follow-up every 6 months.         Relevant Medications    citalopram (CELEXA) 20 MG tablet       Cardiac/Vascular    Essential hypertension, benign    Current Assessment & Plan     Blood pressure is well controlled at this time.  No change in medicines.  Follow-up in 3 months.         Relevant Medications    metoprolol tartrate (LOPRESSOR) 50 MG tablet    lisinopriL-hydrochlorothiazide (PRINZIDE,ZESTORETIC) 20-25 mg Tab    Other Relevant Orders    Basic Metabolic Panel    CBC Auto Differential    HLD (hyperlipidemia) - Primary    Current Assessment & Plan     Last cholesterol was 150 and LDL was 43 and triglycerides were 355.  Continue his atorvastatin at 80 mg daily.  Follow-up every 6 months.           Relevant Orders    Hemoglobin A1C    Basic Metabolic Panel    Lipid Panel    Microalbumin/Creatinine Ratio, Urine       Endocrine    Type 2 diabetes mellitus without complication, without long-term current use of insulin    Current Assessment & Plan     Last hemoglobin A1c in August of 2022 was 11.9.  Patient does have blood sugar records for the last 3-4 months that show his blood sugars in the 1-200 range.  No hypoglycemic episodes.  Patient is in today for paperwork on his [employer] license.  Will obtain a A1c to determine his control over the last 3 months.  Patient currently taking glyburide 5 mg once a day and Tresiba 25 units daily.  No changes at this time but will call him with results of his test.  Follow-up every 3 months.         Relevant Medications    metFORMIN (GLUCOPHAGE) 1000 MG tablet    insulin degludec (TRESIBA FLEXTOUCH U-200) 200 unit/mL (3 mL) insulin pen    Other Relevant Orders    Foot Exam Performed (Completed)       Health Maintenance Topics with due status: Not Due       Topic Last Completion Date    Lipid Panel 08/15/2022    Foot Exam 12/22/2022       Future  Appointments   Date Time Provider Department Center   6/12/2023  9:00 AM Julio Cruz DO [facility]        Follow up in about 3 months (around 3/22/2023).     Signature:  DO Isma Boyceatur [address] [place]  [address]    Date of encounter: 12/22/22

## 2022-12-22 NOTE — ASSESSMENT & PLAN NOTE
Last cholesterol was 150 and LDL was 43 and triglycerides were 355.  Continue his atorvastatin at 80 mg daily.  Follow-up every 6 months.

## 2023-01-31 ENCOUNTER — CLINICAL SUPPORT (OUTPATIENT)
Dept: PRIMARY CARE CLINIC | Facility: CLINIC | Age: 58
End: 2023-01-31

## 2023-01-31 DIAGNOSIS — Z02.4 ENCOUNTER FOR DEPARTMENT OF TRANSPORTATION (DOT) EXAMINATION FOR DRIVING LICENSE RENEWAL: Primary | ICD-10-CM

## 2023-01-31 PROCEDURE — 99499 PR PHYSICAL - DOT/CDL: ICD-10-PCS | Mod: ,,, | Performed by: NURSE PRACTITIONER

## 2023-01-31 PROCEDURE — 99499 UNLISTED E&M SERVICE: CPT | Mod: ,,, | Performed by: NURSE PRACTITIONER

## 2023-01-31 NOTE — PROGRESS NOTES
Subjective:       Patient ID: Syed Kelley is a 58 y.o. male.    Chief Complaint: No chief complaint on file.    HPI  Review of Systems      Objective:      Physical Exam    Assessment:       Problem List Items Addressed This Visit    None  Visit Diagnoses       Encounter for Department of Transportation (DOT) examination for driving license renewal    -  Primary            Plan:       See scanned documents in .

## 2023-02-11 ENCOUNTER — HOSPITAL ENCOUNTER (EMERGENCY)
Facility: HOSPITAL | Age: 58
Discharge: HOME | End: 2023-02-11
Payer: COMMERCIAL

## 2023-02-11 VITALS
DIASTOLIC BLOOD PRESSURE: 78 MMHG | SYSTOLIC BLOOD PRESSURE: 132 MMHG | RESPIRATION RATE: 18 BRPM | HEART RATE: 84 BPM | HEIGHT: 63 IN | WEIGHT: 185 LBS | BODY MASS INDEX: 32.78 KG/M2 | TEMPERATURE: 98 F | OXYGEN SATURATION: 97 %

## 2023-02-11 DIAGNOSIS — L97.519 DIABETIC ULCER OF TOE OF RIGHT FOOT ASSOCIATED WITH TYPE 2 DIABETES MELLITUS, UNSPECIFIED ULCER STAGE: Primary | ICD-10-CM

## 2023-02-11 DIAGNOSIS — E11.621 DIABETIC ULCER OF TOE OF RIGHT FOOT ASSOCIATED WITH TYPE 2 DIABETES MELLITUS, UNSPECIFIED ULCER STAGE: Primary | ICD-10-CM

## 2023-02-11 DIAGNOSIS — L97.529 DIABETIC ULCER OF TOE OF LEFT FOOT ASSOCIATED WITH TYPE 2 DIABETES MELLITUS, UNSPECIFIED ULCER STAGE: ICD-10-CM

## 2023-02-11 DIAGNOSIS — E11.621 DIABETIC ULCER OF TOE OF LEFT FOOT ASSOCIATED WITH TYPE 2 DIABETES MELLITUS, UNSPECIFIED ULCER STAGE: ICD-10-CM

## 2023-02-11 LAB
ANION GAP SERPL CALCULATED.3IONS-SCNC: 14 MMOL/L (ref 7–16)
BASOPHILS # BLD AUTO: 0.03 K/UL (ref 0–0.2)
BASOPHILS NFR BLD AUTO: 0.3 % (ref 0–1)
BUN SERPL-MCNC: 15 MG/DL (ref 7–18)
BUN/CREAT SERPL: 15 (ref 6–20)
CALCIUM SERPL-MCNC: 8.6 MG/DL (ref 8.5–10.1)
CHLORIDE SERPL-SCNC: 97 MMOL/L (ref 98–107)
CO2 SERPL-SCNC: 28 MMOL/L (ref 21–32)
CREAT SERPL-MCNC: 0.97 MG/DL (ref 0.7–1.3)
DIFFERENTIAL METHOD BLD: ABNORMAL
EGFR (NO RACE VARIABLE) (RUSH/TITUS): 90 ML/MIN/1.73M²
EOSINOPHIL # BLD AUTO: 0.16 K/UL (ref 0–0.5)
EOSINOPHIL NFR BLD AUTO: 1.6 % (ref 1–4)
ERYTHROCYTE [DISTWIDTH] IN BLOOD BY AUTOMATED COUNT: 12.6 % (ref 11.5–14.5)
GLUCOSE SERPL-MCNC: 191 MG/DL (ref 70–105)
GLUCOSE SERPL-MCNC: 203 MG/DL (ref 74–106)
HCT VFR BLD AUTO: 40.6 % (ref 40–54)
HGB BLD-MCNC: 14.2 G/DL (ref 13.5–18)
IMM GRANULOCYTES # BLD AUTO: 0.05 K/UL (ref 0–0.04)
IMM GRANULOCYTES NFR BLD: 0.5 % (ref 0–0.4)
LYMPHOCYTES # BLD AUTO: 2.52 K/UL (ref 1–4.8)
LYMPHOCYTES NFR BLD AUTO: 24.5 % (ref 27–41)
MCH RBC QN AUTO: 30.8 PG (ref 27–31)
MCHC RBC AUTO-ENTMCNC: 35 G/DL (ref 32–36)
MCV RBC AUTO: 88.1 FL (ref 80–96)
MONOCYTES # BLD AUTO: 0.71 K/UL (ref 0–0.8)
MONOCYTES NFR BLD AUTO: 6.9 % (ref 2–6)
MPC BLD CALC-MCNC: 8.6 FL (ref 9.4–12.4)
NEUTROPHILS # BLD AUTO: 6.81 K/UL (ref 1.8–7.7)
NEUTROPHILS NFR BLD AUTO: 66.2 % (ref 53–65)
NRBC # BLD AUTO: 0 X10E3/UL
NRBC, AUTO (.00): 0 %
PLATELET # BLD AUTO: 293 K/UL (ref 150–400)
POTASSIUM SERPL-SCNC: 3.6 MMOL/L (ref 3.5–5.1)
RBC # BLD AUTO: 4.61 M/UL (ref 4.6–6.2)
SODIUM SERPL-SCNC: 135 MMOL/L (ref 136–145)
WBC # BLD AUTO: 10.28 K/UL (ref 4.5–11)

## 2023-02-11 PROCEDURE — 80048 BASIC METABOLIC PNL TOTAL CA: CPT | Performed by: NURSE PRACTITIONER

## 2023-02-11 PROCEDURE — 99284 EMERGENCY DEPT VISIT MOD MDM: CPT | Mod: ,,, | Performed by: NURSE PRACTITIONER

## 2023-02-11 PROCEDURE — 99284 PR EMERGENCY DEPT VISIT,LEVEL IV: ICD-10-PCS | Mod: ,,, | Performed by: NURSE PRACTITIONER

## 2023-02-11 PROCEDURE — 99284 EMERGENCY DEPT VISIT MOD MDM: CPT

## 2023-02-11 PROCEDURE — 82962 GLUCOSE BLOOD TEST: CPT

## 2023-02-11 PROCEDURE — 85025 COMPLETE CBC W/AUTO DIFF WBC: CPT | Performed by: NURSE PRACTITIONER

## 2023-02-11 PROCEDURE — 87040 BLOOD CULTURE FOR BACTERIA: CPT | Performed by: NURSE PRACTITIONER

## 2023-02-11 RX ORDER — CLINDAMYCIN HYDROCHLORIDE 150 MG/1
300 CAPSULE ORAL 4 TIMES DAILY
Qty: 56 CAPSULE | Refills: 0 | Status: SHIPPED | OUTPATIENT
Start: 2023-02-11 | End: 2023-02-18

## 2023-02-12 NOTE — DISCHARGE INSTRUCTIONS
Take prescriptions as directed. Follow up with your primary care provider on Monday for recheck. A referral has been sent to general surgery for evaluation, they will call you with an appointment date and time. Keep your wounds clean and dry. Wash twice a day with antibacterial soap and water and then paint then with betadine and allow that to dry. Return to the ED for worsening signs and symptoms or otherwise as needed.

## 2023-02-12 NOTE — ED TRIAGE NOTES
Pt presents w/a wound to his greater toe and second toe on his right foot that he noticed 2 weeks ago. Pt is a diabetic

## 2023-02-12 NOTE — ED PROVIDER NOTES
Encounter Date: 2023       History     Chief Complaint   Patient presents with    Wound Check     59 y/o WM with PMH of ADHD, HTN, HLD and DM Type II presents to the emergency department for evaluation of a wound to his right great toe. Reports he first noticed it about 2 weeks ago and noticed today that it has not improved. He also noticed a small wound to his second toe today and well and a wound to his left great toe once he got into the exam room. He states he has been doing nothing to take care of the wound. He denies fevers or chills. Denies nausea or vomiting. Denies any drainage or pain to the wound. There are no exacerbating or remitting factors.     The history is provided by the patient.   Review of patient's allergies indicates:   Allergen Reactions    Azithromycin     M-mycin      Past Medical History:   Diagnosis Date    Adult attention deficit disorder 3/22/2021    Controlled type 2 diabetes mellitus with hyperglycemia, without long-term current use of insulin 3/22/2021    Disorder of kidney and ureter     Essential hypertension 3/22/2021    Hyperlipidemia      Past Surgical History:   Procedure Laterality Date    APPENDECTOMY      CARDIAC CATHETERIZATION      LEFT HEART CATHETERIZATION N/A 2021    Procedure: Left heart cath with possible intervention;  Surgeon: Federico James DO;  Location: [facility] CATH LAB;  Service: Cardiology;  Laterality: N/A;     Family History   Problem Relation Age of Onset    Hypertension Father     Stroke Father     Cancer Sister      Social History     Tobacco Use    Smoking status: Former     Packs/day: 5.00     Years: 4.00     Pack years: 20.00     Types: Cigarettes     Start date:      Quit date:      Years since quittin.1    Smokeless tobacco: Current     Types: Chew    Tobacco comments:     1 can/day   Substance Use Topics    Alcohol use: Yes     Comment: 2-3 drinks per/3 months    Drug use: Never     Review of Systems   Constitutional:   Negative for chills, fatigue and fever.   HENT:  Negative for congestion.    Respiratory:  Negative for shortness of breath.    Cardiovascular:  Negative for chest pain.   Gastrointestinal:  Negative for abdominal pain, diarrhea, nausea and vomiting.   Genitourinary:  Negative for dysuria and hematuria.   Musculoskeletal:  Negative for arthralgias, gait problem, joint swelling and myalgias.   Skin:  Positive for wound.   Neurological:  Negative for dizziness, weakness and headaches.   All other systems reviewed and are negative.    Physical Exam     Initial Vitals [02/11/23 1847]   BP Pulse Resp Temp SpO2   (!) 140/85 81 17 98.4 °F (36.9 °C) 98 %      MAP       --         Physical Exam    Constitutional: He appears well-developed and well-nourished. He is active and cooperative.  Non-toxic appearance. He does not have a sickly appearance. He does not appear ill.   Cardiovascular:  Normal rate, regular rhythm, normal heart sounds and normal pulses.           Pulmonary/Chest: Effort normal and breath sounds normal.   Abdominal: Abdomen is soft. Bowel sounds are normal. There is no abdominal tenderness.   Musculoskeletal:      Right foot: Normal capillary refill. Normal pulse.      Left foot: Normal capillary refill. Normal pulse.      Comments: There is some necrosis to the soft tissue on the distal end of his right great toe. There is some erythema and necrosis to right second toe as well as left great toe to a much lesser degree (<1cm). There is no surrounding erythema, warmth or drainage noted.   He does have some decreased sensation bilaterally; distal pulses palpable bilaterally     Neurological: He is alert and oriented to person, place, and time.   Skin: Skin is warm, dry and intact. Capillary refill takes less than 2 seconds.   Psychiatric: He has a normal mood and affect. His speech is normal and behavior is normal. Judgment and thought content normal. Cognition and memory are normal.       Medical Screening  Exam   See Full Note    ED Course   Procedures  Labs Reviewed   BASIC METABOLIC PANEL - Abnormal; Notable for the following components:       Result Value    Sodium 135 (*)     Chloride 97 (*)     Glucose 203 (*)     All other components within normal limits   CBC WITH DIFFERENTIAL - Abnormal; Notable for the following components:    MPV 8.6 (*)     Neutrophils % 66.2 (*)     Lymphocytes % 24.5 (*)     Monocytes % 6.9 (*)     Immature Granulocytes % 0.5 (*)     Immature Granulocytes, Absolute 0.05 (*)     All other components within normal limits   POCT GLUCOSE MONITORING CONTINUOUS - Abnormal; Notable for the following components:    POC Glucose 191 (*)     All other components within normal limits   CULTURE, BLOOD   CULTURE, BLOOD   CBC W/ AUTO DIFFERENTIAL    Narrative:     The following orders were created for panel order CBC auto differential.  Procedure                               Abnormality         Status                     ---------                               -----------         ------                     CBC with Differential[[id number]]        Abnormal            Final result                 Please view results for these tests on the individual orders.          Imaging Results              X-Ray Toe 2 or More Views Right (Final result)  Result time 02/11/23 19:53:40      Final result by Mohsen Roberto II, MD (02/11/23 19:53:40)                   Impression:      Soft tissue swelling.  No other significant findings.      Electronically signed by: Mohsen Roberto  Date:    02/11/2023  Time:    19:53               Narrative:    EXAMINATION:  XR TOE 2 OR MORE VIEWS RIGHT    CLINICAL HISTORY:  wound right great toe and second toe;    COMPARISON:  None available    TECHNIQUE:  XR TOE 2 OR MORE VIEWS RIGHT    FINDINGS:  No evidence of fracture seen.  The alignment of the joints appears normal.  No degenerative change is present.  Soft tissue swelling seen distal 2nd digit.  No other soft tissue  abnormality is seen.                                       Medications - No data to display  Medical Decision Making:   Initial Assessment:   57 y/o WM with PMH of ADHD, HTN, HLD and DM Type II presents to the emergency department for evaluation of a wound to his right great toe. Reports he first noticed it about 2 weeks ago and noticed today that it has not improved. He also noticed a small wound to his second toe today and well and a wound to his left great toe once he got into the exam room. He states he has been doing nothing to take care of the wound. He denies fevers or chills. Denies nausea or vomiting. Denies any drainage or pain to the wound. There are no exacerbating or remitting factors.     The history is provided by the patient.   Differential Diagnosis:   Diabetic wound  Osteomyelitis  Cellulitis  Clinical Tests:   Lab Tests: Ordered and Reviewed  Radiological Study: Ordered and Reviewed  ED Management:  Rx for antibiotic. Referral sent to general surgery for evaluation. Counseled on supportive measures. Strict wound care instructions given and strict instructions to follow up with his primary care provider on Monday for recheck and continued care and management. Warning s/s discussed and return precautions given; the patient has v/u.                   Clinical Impression:   Final diagnoses:  [E11.621, L97.519] Diabetic ulcer of toe of right foot associated with type 2 diabetes mellitus, unspecified ulcer stage (Primary)  [E11.621, L97.529] Diabetic ulcer of toe of left foot associated with type 2 diabetes mellitus, unspecified ulcer stage        ED Disposition Condition    Discharge Stable          ED Prescriptions       Medication Sig Dispense Start Date End Date Auth. Provider    clindamycin (CLEOCIN) 150 MG capsule Take 2 capsules (300 mg total) by mouth 4 (four) times daily. for 7 days 56 capsule 2/11/2023 2/18/2023 REMEDIOS Williamson          Follow-up Information       Follow up With Specialties  Details Why Contact Info    Primary Care Provider  In 2 days               REMEDIOS Williamson  02/11/23 2013

## 2023-02-12 NOTE — ED NOTES
Pt c/o wound to right foot 1st and 2nd digits, upon removing socks pt and family member noted left foot 1st digit has wound -

## 2023-02-12 NOTE — ED NOTES
Instructions reviewed with pt and spouse - pt and spouse voiced understanding - all questions answered - pt ambulatory at time of discharge with no acute distress noted

## 2023-02-17 ENCOUNTER — HOSPITAL ENCOUNTER (OUTPATIENT)
Facility: HOSPITAL | Age: 58
Discharge: HOME | End: 2023-02-18
Attending: SURGERY | Admitting: SURGERY
Payer: COMMERCIAL

## 2023-02-17 ENCOUNTER — OFFICE VISIT (OUTPATIENT)
Dept: FAMILY MEDICINE | Facility: CLINIC | Age: 58
End: 2023-02-17
Payer: COMMERCIAL

## 2023-02-17 VITALS
RESPIRATION RATE: 22 BRPM | WEIGHT: 186 LBS | SYSTOLIC BLOOD PRESSURE: 124 MMHG | TEMPERATURE: 98 F | BODY MASS INDEX: 32.96 KG/M2 | DIASTOLIC BLOOD PRESSURE: 78 MMHG | HEART RATE: 68 BPM | HEIGHT: 63 IN | OXYGEN SATURATION: 97 %

## 2023-02-17 DIAGNOSIS — E78.2 MIXED HYPERLIPIDEMIA: ICD-10-CM

## 2023-02-17 DIAGNOSIS — I96 DRY GANGRENE: ICD-10-CM

## 2023-02-17 DIAGNOSIS — T14.8XXA WOUND INFECTION: Primary | ICD-10-CM

## 2023-02-17 DIAGNOSIS — L97.519 DIABETIC ULCER OF RIGHT GREAT TOE: ICD-10-CM

## 2023-02-17 DIAGNOSIS — E11.621 DIABETIC ULCER OF RIGHT GREAT TOE: ICD-10-CM

## 2023-02-17 DIAGNOSIS — L08.9 WOUND INFECTION: Primary | ICD-10-CM

## 2023-02-17 DIAGNOSIS — R07.9 CHEST PAIN: ICD-10-CM

## 2023-02-17 DIAGNOSIS — E11.9 TYPE 2 DIABETES MELLITUS WITHOUT COMPLICATION, WITHOUT LONG-TERM CURRENT USE OF INSULIN: Primary | ICD-10-CM

## 2023-02-17 PROBLEM — E11.59 OBESITY, DIABETES, AND HYPERTENSION SYNDROME: Status: ACTIVE | Noted: 2023-02-17

## 2023-02-17 PROBLEM — S91.109A WOUND, OPEN, TOE, INITIAL ENCOUNTER: Status: RESOLVED | Noted: 2023-02-17 | Resolved: 2023-02-17

## 2023-02-17 PROBLEM — S91.109A WOUND, OPEN, TOE, INITIAL ENCOUNTER: Status: ACTIVE | Noted: 2023-02-17

## 2023-02-17 PROBLEM — I73.9 PAD (PERIPHERAL ARTERY DISEASE): Status: ACTIVE | Noted: 2023-02-17

## 2023-02-17 PROBLEM — E11.69 OBESITY, DIABETES, AND HYPERTENSION SYNDROME: Status: ACTIVE | Noted: 2023-02-17

## 2023-02-17 PROBLEM — I15.2 OBESITY, DIABETES, AND HYPERTENSION SYNDROME: Status: ACTIVE | Noted: 2023-02-17

## 2023-02-17 PROBLEM — E66.9 OBESITY, DIABETES, AND HYPERTENSION SYNDROME: Status: ACTIVE | Noted: 2023-02-17

## 2023-02-17 LAB
ABORH RETYPE: NORMAL
ALBUMIN SERPL BCP-MCNC: 3.6 G/DL (ref 3.5–5)
ALBUMIN/GLOB SERPL: 0.8 {RATIO}
ALP SERPL-CCNC: 86 U/L (ref 45–115)
ALT SERPL W P-5'-P-CCNC: 23 U/L (ref 16–61)
ANION GAP SERPL CALCULATED.3IONS-SCNC: 9 MMOL/L (ref 7–16)
APTT PPP: 30.5 SECONDS (ref 25.2–37.3)
AST SERPL W P-5'-P-CCNC: 18 U/L (ref 15–37)
BACTERIA BLD CULT: NORMAL
BACTERIA BLD CULT: NORMAL
BASOPHILS # BLD AUTO: 0.04 K/UL (ref 0–0.2)
BASOPHILS NFR BLD AUTO: 0.4 % (ref 0–1)
BILIRUB SERPL-MCNC: 0.6 MG/DL (ref ?–1.2)
BUN SERPL-MCNC: 14 MG/DL (ref 7–18)
BUN/CREAT SERPL: 13 (ref 6–20)
CALCIUM SERPL-MCNC: 9.2 MG/DL (ref 8.5–10.1)
CHLORIDE SERPL-SCNC: 94 MMOL/L (ref 98–107)
CO2 SERPL-SCNC: 33 MMOL/L (ref 21–32)
CREAT SERPL-MCNC: 1.09 MG/DL (ref 0.7–1.3)
CRP SERPL-MCNC: 2.4 MG/DL (ref 0–0.8)
DIFFERENTIAL METHOD BLD: ABNORMAL
EGFR (NO RACE VARIABLE) (RUSH/TITUS): 79 ML/MIN/1.73M²
EOSINOPHIL # BLD AUTO: 0.1 K/UL (ref 0–0.5)
EOSINOPHIL NFR BLD AUTO: 1 % (ref 1–4)
ERYTHROCYTE [DISTWIDTH] IN BLOOD BY AUTOMATED COUNT: 12.5 % (ref 11.5–14.5)
ERYTHROCYTE [SEDIMENTATION RATE] IN BLOOD BY WESTERGREN METHOD: 38 MM/HR (ref 0–20)
FERRITIN SERPL-MCNC: 95 NG/ML (ref 26–388)
GLOBULIN SER-MCNC: 4.4 G/DL (ref 2–4)
GLUCOSE SERPL-MCNC: 104 MG/DL (ref 74–106)
GLUCOSE SERPL-MCNC: 202 MG/DL (ref 70–105)
HCT VFR BLD AUTO: 41.8 % (ref 40–54)
HGB BLD-MCNC: 14.4 G/DL (ref 13.5–18)
IMM GRANULOCYTES # BLD AUTO: 0.06 K/UL (ref 0–0.04)
IMM GRANULOCYTES NFR BLD: 0.6 % (ref 0–0.4)
INDIRECT COOMBS: NORMAL
INR BLD: 0.95
IRON SATN MFR SERPL: 20 % (ref 14–50)
IRON SERPL-MCNC: 63 ΜG/DL (ref 65–175)
LYMPHOCYTES # BLD AUTO: 1.83 K/UL (ref 1–4.8)
LYMPHOCYTES NFR BLD AUTO: 17.9 % (ref 27–41)
MCH RBC QN AUTO: 31.1 PG (ref 27–31)
MCHC RBC AUTO-ENTMCNC: 34.4 G/DL (ref 32–36)
MCV RBC AUTO: 90.3 FL (ref 80–96)
MONOCYTES # BLD AUTO: 0.73 K/UL (ref 0–0.8)
MONOCYTES NFR BLD AUTO: 7.1 % (ref 2–6)
MPC BLD CALC-MCNC: 8.8 FL (ref 9.4–12.4)
NEUTROPHILS # BLD AUTO: 7.47 K/UL (ref 1.8–7.7)
NEUTROPHILS NFR BLD AUTO: 73 % (ref 53–65)
NRBC # BLD AUTO: 0 X10E3/UL
NRBC, AUTO (.00): 0 %
PLATELET # BLD AUTO: 339 K/UL (ref 150–400)
POTASSIUM SERPL-SCNC: 4.4 MMOL/L (ref 3.5–5.1)
PROT SERPL-MCNC: 8 G/DL (ref 6.4–8.2)
PROTHROMBIN TIME: 12.3 SECONDS (ref 11.7–14.7)
RBC # BLD AUTO: 4.63 M/UL (ref 4.6–6.2)
RH BLD: NORMAL
SODIUM SERPL-SCNC: 132 MMOL/L (ref 136–145)
TIBC SERPL-MCNC: 319 ΜG/DL (ref 250–450)
TSH SERPL DL<=0.005 MIU/L-ACNC: 3.07 UIU/ML (ref 0.36–3.74)
WBC # BLD AUTO: 10.23 K/UL (ref 4.5–11)

## 2023-02-17 PROCEDURE — 87070 CULTURE, WOUND: ICD-10-PCS | Mod: ,,, | Performed by: CLINICAL MEDICAL LABORATORY

## 2023-02-17 PROCEDURE — 85610 PROTHROMBIN TIME: CPT | Performed by: NURSE PRACTITIONER

## 2023-02-17 PROCEDURE — 99285 EMERGENCY DEPT VISIT HI MDM: CPT | Mod: ,,, | Performed by: NURSE PRACTITIONER

## 2023-02-17 PROCEDURE — 99223 1ST HOSP IP/OBS HIGH 75: CPT | Mod: ,,, | Performed by: STUDENT IN AN ORGANIZED HEALTH CARE EDUCATION/TRAINING PROGRAM

## 2023-02-17 PROCEDURE — 3078F PR MOST RECENT DIASTOLIC BLOOD PRESSURE < 80 MM HG: ICD-10-PCS | Mod: CPTII,,, | Performed by: FAMILY MEDICINE

## 2023-02-17 PROCEDURE — 3074F PR MOST RECENT SYSTOLIC BLOOD PRESSURE < 130 MM HG: ICD-10-PCS | Mod: CPTII,,, | Performed by: FAMILY MEDICINE

## 2023-02-17 PROCEDURE — 84443 ASSAY THYROID STIM HORMONE: CPT | Performed by: STUDENT IN AN ORGANIZED HEALTH CARE EDUCATION/TRAINING PROGRAM

## 2023-02-17 PROCEDURE — 25000003 PHARM REV CODE 250: Performed by: NURSE PRACTITIONER

## 2023-02-17 PROCEDURE — 85651 RBC SED RATE NONAUTOMATED: CPT | Performed by: NURSE PRACTITIONER

## 2023-02-17 PROCEDURE — 85025 COMPLETE CBC W/AUTO DIFF WBC: CPT | Performed by: NURSE PRACTITIONER

## 2023-02-17 PROCEDURE — 99223 PR INITIAL HOSPITAL CARE,LEVL III: ICD-10-PCS | Mod: ,,, | Performed by: NURSE PRACTITIONER

## 2023-02-17 PROCEDURE — 3008F PR BODY MASS INDEX (BMI) DOCUMENTED: ICD-10-PCS | Mod: CPTII,,, | Performed by: FAMILY MEDICINE

## 2023-02-17 PROCEDURE — 99223 PR INITIAL HOSPITAL CARE,LEVL III: ICD-10-PCS | Mod: ,,, | Performed by: STUDENT IN AN ORGANIZED HEALTH CARE EDUCATION/TRAINING PROGRAM

## 2023-02-17 PROCEDURE — 87077 CULTURE, WOUND: ICD-10-PCS | Mod: ,,, | Performed by: CLINICAL MEDICAL LABORATORY

## 2023-02-17 PROCEDURE — 99214 OFFICE O/P EST MOD 30 MIN: CPT | Mod: ,,, | Performed by: FAMILY MEDICINE

## 2023-02-17 PROCEDURE — 86900 BLOOD TYPING SEROLOGIC ABO: CPT | Performed by: NURSE PRACTITIONER

## 2023-02-17 PROCEDURE — 96365 THER/PROPH/DIAG IV INF INIT: CPT

## 2023-02-17 PROCEDURE — 3008F BODY MASS INDEX DOCD: CPT | Mod: CPTII,,, | Performed by: FAMILY MEDICINE

## 2023-02-17 PROCEDURE — 25000003 PHARM REV CODE 250: Performed by: STUDENT IN AN ORGANIZED HEALTH CARE EDUCATION/TRAINING PROGRAM

## 2023-02-17 PROCEDURE — 87186 SC STD MICRODIL/AGAR DIL: CPT | Mod: ,,, | Performed by: CLINICAL MEDICAL LABORATORY

## 2023-02-17 PROCEDURE — 86140 C-REACTIVE PROTEIN: CPT | Performed by: NURSE PRACTITIONER

## 2023-02-17 PROCEDURE — 99284 EMERGENCY DEPT VISIT MOD MDM: CPT

## 2023-02-17 PROCEDURE — 83540 ASSAY OF IRON: CPT | Performed by: STUDENT IN AN ORGANIZED HEALTH CARE EDUCATION/TRAINING PROGRAM

## 2023-02-17 PROCEDURE — 3074F SYST BP LT 130 MM HG: CPT | Mod: CPTII,,, | Performed by: FAMILY MEDICINE

## 2023-02-17 PROCEDURE — 99214 PR OFFICE/OUTPT VISIT, EST, LEVL IV, 30-39 MIN: ICD-10-PCS | Mod: ,,, | Performed by: FAMILY MEDICINE

## 2023-02-17 PROCEDURE — 80053 COMPREHEN METABOLIC PANEL: CPT | Performed by: NURSE PRACTITIONER

## 2023-02-17 PROCEDURE — 99285 PR EMERGENCY DEPT VISIT,LEVEL V: ICD-10-PCS | Mod: ,,, | Performed by: NURSE PRACTITIONER

## 2023-02-17 PROCEDURE — 85730 THROMBOPLASTIN TIME PARTIAL: CPT | Performed by: NURSE PRACTITIONER

## 2023-02-17 PROCEDURE — 63600175 PHARM REV CODE 636 W HCPCS: Performed by: STUDENT IN AN ORGANIZED HEALTH CARE EDUCATION/TRAINING PROGRAM

## 2023-02-17 PROCEDURE — 82728 ASSAY OF FERRITIN: CPT | Performed by: STUDENT IN AN ORGANIZED HEALTH CARE EDUCATION/TRAINING PROGRAM

## 2023-02-17 PROCEDURE — 3078F DIAST BP <80 MM HG: CPT | Mod: CPTII,,, | Performed by: FAMILY MEDICINE

## 2023-02-17 PROCEDURE — 87070 CULTURE OTHR SPECIMN AEROBIC: CPT | Mod: ,,, | Performed by: CLINICAL MEDICAL LABORATORY

## 2023-02-17 PROCEDURE — 83550 IRON BINDING TEST: CPT | Performed by: STUDENT IN AN ORGANIZED HEALTH CARE EDUCATION/TRAINING PROGRAM

## 2023-02-17 PROCEDURE — 82962 GLUCOSE BLOOD TEST: CPT

## 2023-02-17 PROCEDURE — 11000001 HC ACUTE MED/SURG PRIVATE ROOM

## 2023-02-17 PROCEDURE — 99223 1ST HOSP IP/OBS HIGH 75: CPT | Mod: ,,, | Performed by: NURSE PRACTITIONER

## 2023-02-17 PROCEDURE — 63600175 PHARM REV CODE 636 W HCPCS: Performed by: NURSE PRACTITIONER

## 2023-02-17 PROCEDURE — 87186 CULTURE, WOUND: ICD-10-PCS | Mod: ,,, | Performed by: CLINICAL MEDICAL LABORATORY

## 2023-02-17 PROCEDURE — 87077 CULTURE AEROBIC IDENTIFY: CPT | Mod: ,,, | Performed by: CLINICAL MEDICAL LABORATORY

## 2023-02-17 RX ORDER — ALBUTEROL SULFATE 0.83 MG/ML
2.5 SOLUTION RESPIRATORY (INHALATION) EVERY 4 HOURS PRN
Status: DISCONTINUED | OUTPATIENT
Start: 2023-02-17 | End: 2023-02-18 | Stop reason: HOSPADM

## 2023-02-17 RX ORDER — SODIUM CHLORIDE 0.9 % (FLUSH) 0.9 %
10 SYRINGE (ML) INJECTION EVERY 12 HOURS PRN
Status: DISCONTINUED | OUTPATIENT
Start: 2023-02-17 | End: 2023-02-18 | Stop reason: HOSPADM

## 2023-02-17 RX ORDER — DEXTROSE 40 %
15 GEL (GRAM) ORAL
Status: DISCONTINUED | OUTPATIENT
Start: 2023-02-17 | End: 2023-02-18 | Stop reason: HOSPADM

## 2023-02-17 RX ORDER — SODIUM CHLORIDE 9 MG/ML
INJECTION, SOLUTION INTRAVENOUS CONTINUOUS
Status: DISCONTINUED | OUTPATIENT
Start: 2023-02-17 | End: 2023-02-18 | Stop reason: HOSPADM

## 2023-02-17 RX ORDER — ASPIRIN 81 MG/1
81 TABLET ORAL DAILY
Status: DISCONTINUED | OUTPATIENT
Start: 2023-02-18 | End: 2023-02-18 | Stop reason: HOSPADM

## 2023-02-17 RX ORDER — ONDANSETRON 4 MG/1
4 TABLET, FILM COATED ORAL ONCE
Status: COMPLETED | OUTPATIENT
Start: 2023-02-17 | End: 2023-02-17

## 2023-02-17 RX ORDER — DEXTROSE 40 %
30 GEL (GRAM) ORAL
Status: DISCONTINUED | OUTPATIENT
Start: 2023-02-17 | End: 2023-02-18 | Stop reason: HOSPADM

## 2023-02-17 RX ORDER — GLYBURIDE 5 MG/1
5 TABLET ORAL
Qty: 90 TABLET | Refills: 3 | Status: SHIPPED | OUTPATIENT
Start: 2023-02-17 | End: 2023-08-23 | Stop reason: SDUPTHER

## 2023-02-17 RX ORDER — ALBUTEROL SULFATE 90 UG/1
2 AEROSOL, METERED RESPIRATORY (INHALATION) EVERY 4 HOURS PRN
Status: DISCONTINUED | OUTPATIENT
Start: 2023-02-17 | End: 2023-02-17

## 2023-02-17 RX ORDER — INSULIN ASPART 100 [IU]/ML
1-10 INJECTION, SOLUTION INTRAVENOUS; SUBCUTANEOUS
Status: DISCONTINUED | OUTPATIENT
Start: 2023-02-17 | End: 2023-02-18 | Stop reason: HOSPADM

## 2023-02-17 RX ORDER — GLUCAGON 1 MG
1 KIT INJECTION
Status: DISCONTINUED | OUTPATIENT
Start: 2023-02-17 | End: 2023-02-18 | Stop reason: HOSPADM

## 2023-02-17 RX ORDER — ENOXAPARIN SODIUM 100 MG/ML
40 INJECTION SUBCUTANEOUS EVERY 24 HOURS
Status: DISCONTINUED | OUTPATIENT
Start: 2023-02-17 | End: 2023-02-18 | Stop reason: HOSPADM

## 2023-02-17 RX ORDER — ONDANSETRON 2 MG/ML
4 INJECTION INTRAMUSCULAR; INTRAVENOUS EVERY 6 HOURS PRN
Status: DISCONTINUED | OUTPATIENT
Start: 2023-02-17 | End: 2023-02-18 | Stop reason: HOSPADM

## 2023-02-17 RX ORDER — CITALOPRAM 20 MG/1
20 TABLET, FILM COATED ORAL DAILY
Status: DISCONTINUED | OUTPATIENT
Start: 2023-02-18 | End: 2023-02-18 | Stop reason: HOSPADM

## 2023-02-17 RX ORDER — NALOXONE HCL 0.4 MG/ML
0.02 VIAL (ML) INJECTION
Status: DISCONTINUED | OUTPATIENT
Start: 2023-02-17 | End: 2023-02-18 | Stop reason: HOSPADM

## 2023-02-17 RX ORDER — METOPROLOL TARTRATE 50 MG/1
50 TABLET ORAL 2 TIMES DAILY
Status: DISCONTINUED | OUTPATIENT
Start: 2023-02-17 | End: 2023-02-18 | Stop reason: HOSPADM

## 2023-02-17 RX ORDER — ATORVASTATIN CALCIUM 80 MG/1
80 TABLET, FILM COATED ORAL DAILY
Status: DISCONTINUED | OUTPATIENT
Start: 2023-02-18 | End: 2023-02-18 | Stop reason: HOSPADM

## 2023-02-17 RX ORDER — HYDROCODONE BITARTRATE AND ACETAMINOPHEN 7.5; 325 MG/1; MG/1
1 TABLET ORAL EVERY 6 HOURS PRN
Status: DISCONTINUED | OUTPATIENT
Start: 2023-02-17 | End: 2023-02-18 | Stop reason: HOSPADM

## 2023-02-17 RX ORDER — MORPHINE SULFATE 4 MG/ML
4 INJECTION, SOLUTION INTRAMUSCULAR; INTRAVENOUS EVERY 4 HOURS PRN
Status: DISCONTINUED | OUTPATIENT
Start: 2023-02-17 | End: 2023-02-18 | Stop reason: HOSPADM

## 2023-02-17 RX ORDER — ATORVASTATIN CALCIUM 80 MG/1
80 TABLET, FILM COATED ORAL DAILY
Qty: 90 TABLET | Refills: 3 | Status: SHIPPED | OUTPATIENT
Start: 2023-02-17 | End: 2023-08-23 | Stop reason: SDUPTHER

## 2023-02-17 RX ADMIN — INSULIN DETEMIR 20 UNITS: 100 INJECTION, SOLUTION SUBCUTANEOUS at 08:02

## 2023-02-17 RX ADMIN — SODIUM CHLORIDE 1000 ML: 9 INJECTION, SOLUTION INTRAVENOUS at 02:02

## 2023-02-17 RX ADMIN — ENOXAPARIN SODIUM 40 MG: 100 INJECTION SUBCUTANEOUS at 04:02

## 2023-02-17 RX ADMIN — VANCOMYCIN HYDROCHLORIDE 1750 MG: 500 INJECTION, POWDER, LYOPHILIZED, FOR SOLUTION INTRAVENOUS at 03:02

## 2023-02-17 RX ADMIN — PIPERACILLIN AND TAZOBACTAM 4.5 G: 4; .5 INJECTION, POWDER, FOR SOLUTION INTRAVENOUS; PARENTERAL at 02:02

## 2023-02-17 RX ADMIN — INSULIN ASPART 4 UNITS: 100 INJECTION, SOLUTION INTRAVENOUS; SUBCUTANEOUS at 06:02

## 2023-02-17 RX ADMIN — ONDANSETRON HYDROCHLORIDE 4 MG: 4 TABLET, FILM COATED ORAL at 06:02

## 2023-02-17 RX ADMIN — SODIUM CHLORIDE: 9 INJECTION, SOLUTION INTRAVENOUS at 04:02

## 2023-02-17 RX ADMIN — METOPROLOL TARTRATE 50 MG: 50 TABLET, FILM COATED ORAL at 08:02

## 2023-02-17 NOTE — SUBJECTIVE & OBJECTIVE
Past Medical History:   Diagnosis Date    Adult attention deficit disorder 3/22/2021    Controlled type 2 diabetes mellitus with hyperglycemia, without long-term current use of insulin 3/22/2021    Disorder of kidney and ureter     Essential hypertension 3/22/2021    Hyperlipidemia        Past Surgical History:   Procedure Laterality Date    APPENDECTOMY      CARDIAC CATHETERIZATION      LEFT HEART CATHETERIZATION N/A 5/7/2021    Procedure: Left heart cath with possible intervention;  Surgeon: Federico James DO;  Location: [facility] CATH LAB;  Service: Cardiology;  Laterality: N/A;       Review of patient's allergies indicates:   Allergen Reactions    Azithromycin     M-mycin        No current facility-administered medications on file prior to encounter.     Current Outpatient Medications on File Prior to Encounter   Medication Sig    albuterol (PROVENTIL/VENTOLIN HFA) 90 mcg/actuation inhaler Inhale 2 puffs into the lungs every 4 (four) hours as needed for Shortness of Breath.    aspirin (ECOTRIN) 81 MG EC tablet Take 1 tablet (81 mg total) by mouth once daily.    atorvastatin (LIPITOR) 80 MG tablet Take 1 tablet (80 mg total) by mouth once daily.    cholecalciferol, vitamin D3, (VITAMIN D3) 50 mcg (2,000 unit) Cap Take 1 capsule by mouth once daily.    citalopram (CELEXA) 20 MG tablet Take 1 tablet (20 mg total) by mouth once daily.    clindamycin (CLEOCIN) 150 MG capsule Take 2 capsules (300 mg total) by mouth 4 (four) times daily. for 7 days    dextroamphetamine-amphetamine (ADDERALL XR) 20 MG 24 hr capsule Take 1 capsule (20 mg total) by mouth 2 (two) times a day.    dextroamphetamine-amphetamine (ADDERALL XR) 30 MG 24 hr capsule Take 1 capsule by mouth every morning and 1 capsule by mouth at 1:00 pm.    docosahexaenoic acid/epa (FISH OIL ORAL) Take 1 capsule by mouth once daily.    glyBURIDE (DIABETA) 5 MG tablet Take 1 tablet (5 mg total) by mouth daily with breakfast.    insulin degludec (TRESIBA FLEXTOUCH  "U-200) 200 unit/mL (3 mL) insulin pen Inject 26 Units into the skin once daily.    insulin syringe-needle U-100 0.3 mL 31 gauge x 5/16" Syrg Use once daily for insulin injection    lisinopriL-hydrochlorothiazide (PRINZIDE,ZESTORETIC) 20-25 mg Tab Take 1 tablet by mouth once daily.    metFORMIN (GLUCOPHAGE) 1000 MG tablet Take 1 tablet (1,000 mg total) by mouth 2 (two) times daily with meals.    metoprolol tartrate (LOPRESSOR) 50 MG tablet Take 1 tablet (50 mg total) by mouth 2 (two) times daily.    pen needle, diabetic 31 gauge x 5/16" Ndle Use to administer insulin twice daily    [DISCONTINUED] atorvastatin (LIPITOR) 80 MG tablet Take 1 tablet (80 mg total) by mouth once daily.    [DISCONTINUED] cetirizine (ZYRTEC) 10 MG tablet Take 1 tablet (10 mg total) by mouth every evening. for 7 days (Patient not taking: Reported on 2022)    [DISCONTINUED] famotidine (PEPCID) 20 MG tablet Take 1 tablet (20 mg total) by mouth 2 (two) times daily. for 7 days (Patient not taking: Reported on 2022)    [DISCONTINUED] glyBURIDE (DIABETA) 5 MG tablet Take 1 tablet (5 mg total) by mouth daily with breakfast.    [DISCONTINUED] ipratropium-albuteroL (COMBIVENT)  mcg/actuation inhaler Inhale 1 puff into the lungs 4 (four) times daily. Rescue (Patient not taking: Reported on 2022)     Family History       Problem Relation (Age of Onset)    Cancer Sister    Hypertension Father    Stroke Father          Tobacco Use    Smoking status: Former     Packs/day: 5.00     Years: 4.00     Pack years: 20.00     Types: Cigarettes     Start date:      Quit date:      Years since quittin.1    Smokeless tobacco: Current     Types: Chew    Tobacco comments:     1 can/day   Substance and Sexual Activity    Alcohol use: Yes     Comment: 2-3 drinks per/3 months    Drug use: Never    Sexual activity: Yes     Partners: Female     Review of Systems   Constitutional:  Negative for chills, fatigue, fever and unexpected weight " change.   HENT:  Negative for congestion, mouth sores and sore throat.    Eyes:  Negative for photophobia and visual disturbance.   Respiratory:  Negative for cough, chest tightness, shortness of breath and wheezing.    Cardiovascular:  Negative for chest pain, palpitations and leg swelling.   Gastrointestinal:  Negative for abdominal pain, diarrhea, nausea and vomiting.   Endocrine: Negative for cold intolerance and heat intolerance.   Genitourinary:  Negative for difficulty urinating, dysuria, frequency and urgency.   Musculoskeletal:  Negative for arthralgias, back pain and myalgias.   Skin:  Positive for wound (enlarging necrotic wounds to the great toes bilaterally.). Negative for pallor and rash.   Neurological:  Negative for tremors, seizures, syncope, weakness, numbness and headaches.   Hematological:  Does not bruise/bleed easily.   Psychiatric/Behavioral:  Negative for agitation, confusion, hallucinations and suicidal ideas.    Objective:     Vital Signs (Most Recent):  Temp: 98 °F (36.7 °C) (02/17/23 1208)  Pulse: 70 (02/17/23 1428)  Resp: 19 (02/17/23 1428)  BP: 127/83 (02/17/23 1428)  SpO2: 95 % (02/17/23 1428) Vital Signs (24h Range):  Temp:  [97.8 °F (36.6 °C)-98 °F (36.7 °C)] 98 °F (36.7 °C)  Pulse:  [68-70] 70  Resp:  [17-22] 19  SpO2:  [95 %-97 %] 95 %  BP: (124-128)/(78-83) 127/83     Weight: 83.9 kg (185 lb)  Body mass index is 32.77 kg/m².    Physical Exam  Vitals reviewed.   Constitutional:       Appearance: Normal appearance.   HENT:      Head: Normocephalic and atraumatic.      Nose: Nose normal.   Eyes:      Extraocular Movements: Extraocular movements intact.      Conjunctiva/sclera: Conjunctivae normal.   Neck:      Trachea: Trachea normal.   Cardiovascular:      Rate and Rhythm: Normal rate and regular rhythm.      Pulses: Normal pulses.      Heart sounds: Normal heart sounds.   Pulmonary:      Effort: Pulmonary effort is normal.      Breath sounds: Normal breath sounds and air entry.    Abdominal:      General: Bowel sounds are normal.      Palpations: Abdomen is soft.   Musculoskeletal:         General: Normal range of motion.      Cervical back: Neck supple.   Skin:     General: Skin is warm and dry.      Capillary Refill: Capillary refill takes less than 2 seconds.      Comments: Bilateral necrotic tips of the great toes.  Small areas of necrosis on other toes as well.   Neurological:      General: No focal deficit present.      Mental Status: He is alert and oriented to person, place, and time.      Cranial Nerves: Cranial nerves 2-12 are intact.      Comments: Grossly normal motor and sensory function without focal deficit appreciated.   Psychiatric:         Mood and Affect: Mood and affect normal.         Behavior: Behavior normal. Behavior is cooperative.         Thought Content: Thought content normal.       Significant Labs: All pertinent labs within the past 24 hours have been reviewed.    Significant Imaging: I have reviewed all pertinent imaging results/findings within the past 24 hours.

## 2023-02-17 NOTE — PROGRESS NOTES
Is   Julio Cruz DO   [address] [place]  [address]      PATIENT NAME: Syed Kelley  : 1965  DATE: 23  MRN: [id number]      Billing Provider: Julio Cruz DO  Level of Service:   Patient PCP Information       Provider PCP Type    Julio Cruz DO General            Reason for Visit / Chief Complaint: Wound Infection (Patient states he first noticed black area to tip of right great toe 3 weeks ago. He did not go to the doctor until Saturday(2023). He went to [facility] and was given some antibiotics. He is still taking medication. While at the ER he noticed also the 2nd toe and left great toe were infected. Today patient has black eschar to 3 toes(right great toe,left great toe and 2nd right toe))       Update PCP  Update Chief Complaint         History of Present Illness / Problem Focused Workflow     Syed Kelley presents to the clinic with Wound Infection (Patient states he first noticed black area to tip of right great toe 3 weeks ago. He did not go to the doctor until Saturday(2023). He went to [facility] and was given some antibiotics. He is still taking medication. While at the ER he noticed also the 2nd toe and left great toe were infected. Today patient has black eschar to 3 toes(right great toe,left great toe and 2nd right toe))     Patient has had wound on his right large toe left large toe for proximally 3 weeks.  He is a  and has not been able to get in to have them looked after heat.  States now the toe on the right foot has turned black on the end.  He denies any drainage from the any of the toes.  He has 3 toes to include is large right large left and 2nd toe on the right foot.  States his diabetes is been well controlled.  His last hemoglobin A1c was 6.8.  Patient denies any fever chills or sweats.      Review of Systems     Review of Systems   Constitutional:  Negative for activity change, appetite change,  chills, fatigue and fever.   HENT:  Negative for nasal congestion, ear discharge, ear pain, mouth dryness, mouth sores, postnasal drip, sinus pressure/congestion, sore throat and voice change.    Eyes:  Negative for pain, discharge, redness, itching and visual disturbance.   Respiratory:  Negative for apnea, cough, chest tightness, shortness of breath and wheezing.    Cardiovascular:  Negative for chest pain, palpitations and leg swelling.   Gastrointestinal:  Negative for abdominal distention, abdominal pain, anal bleeding, blood in stool, change in bowel habit, constipation, diarrhea, nausea, vomiting, reflux and change in bowel habit.   Endocrine: Negative for cold intolerance, heat intolerance, polydipsia, polyphagia and polyuria.   Genitourinary:  Negative for difficulty urinating, enuresis, erectile dysfunction, frequency, genital sores, hematuria and urgency.   Musculoskeletal:  Negative for arthralgias, back pain, gait problem, leg pain, myalgias and neck pain.   Integumentary:  Positive for wound. Negative for rash, mole/lesion, breast mass and breast discharge.   Allergic/Immunologic: Negative for environmental allergies and food allergies.   Neurological:  Negative for dizziness, vertigo, tremors, seizures, syncope, facial asymmetry, speech difficulty, weakness, light-headedness, numbness, headaches, coordination difficulties, memory loss and coordination difficulties.   Hematological:  Negative for adenopathy. Does not bruise/bleed easily.   Psychiatric/Behavioral:  Negative for agitation, behavioral problems, confusion, decreased concentration, dysphoric mood, hallucinations, self-injury, sleep disturbance and suicidal ideas. The patient is not nervous/anxious and is not hyperactive.    Breast: Negative for mass    Medical / Social / Family History     Past Medical History:   Diagnosis Date    Adult attention deficit disorder 3/22/2021    Controlled type 2 diabetes mellitus with hyperglycemia, without  long-term current use of insulin 3/22/2021    Disorder of kidney and ureter     Essential hypertension 3/22/2021    Hyperlipidemia        Past Surgical History:   Procedure Laterality Date    APPENDECTOMY      CARDIAC CATHETERIZATION      LEFT HEART CATHETERIZATION N/A 5/7/2021    Procedure: Left heart cath with possible intervention;  Surgeon: Federico James DO;  Location: [facility] CATH LAB;  Service: Cardiology;  Laterality: N/A;       Social History    reports that he quit smoking about 38 years ago. His smoking use included cigarettes. He started smoking about 42 years ago. He has a 20.00 pack-year smoking history. His smokeless tobacco use includes chew. He reports current alcohol use. He reports that he does not use drugs.    Family History  's family history includes Cancer in his sister; Hypertension in his father; Stroke in his father.    Medications and Allergies     Medications  Outpatient Medications Marked as Taking for the 2/17/23 encounter (Office Visit) with Julio Cruz DO   Medication Sig Dispense Refill    albuterol (PROVENTIL/VENTOLIN HFA) 90 mcg/actuation inhaler Inhale 2 puffs into the lungs every 4 (four) hours as needed for Shortness of Breath.      aspirin (ECOTRIN) 81 MG EC tablet Take 1 tablet (81 mg total) by mouth once daily. 90 tablet 3    cholecalciferol, vitamin D3, (VITAMIN D3) 50 mcg (2,000 unit) Cap Take 1 capsule by mouth once daily.      citalopram (CELEXA) 20 MG tablet Take 1 tablet (20 mg total) by mouth once daily. 90 tablet 1    clindamycin (CLEOCIN) 150 MG capsule Take 2 capsules (300 mg total) by mouth 4 (four) times daily. for 7 days 56 capsule 0    dextroamphetamine-amphetamine (ADDERALL XR) 20 MG 24 hr capsule Take 1 capsule (20 mg total) by mouth 2 (two) times a day. 60 capsule 0    dextroamphetamine-amphetamine (ADDERALL XR) 30 MG 24 hr capsule Take 1 capsule by mouth every morning and 1 capsule by mouth at 1:00 pm. 60 capsule 0    docosahexaenoic acid/epa  "(FISH OIL ORAL) Take 1 capsule by mouth once daily.      insulin degludec (TRESIBA FLEXTOUCH U-200) 200 unit/mL (3 mL) insulin pen Inject 26 Units into the skin once daily. 3 pen 3    insulin syringe-needle U-100 0.3 mL 31 gauge x 5/16" Syrg Use once daily for insulin injection      lisinopriL-hydrochlorothiazide (PRINZIDE,ZESTORETIC) 20-25 mg Tab Take 1 tablet by mouth once daily. 90 tablet 1    metFORMIN (GLUCOPHAGE) 1000 MG tablet Take 1 tablet (1,000 mg total) by mouth 2 (two) times daily with meals. 180 tablet 1    metoprolol tartrate (LOPRESSOR) 50 MG tablet Take 1 tablet (50 mg total) by mouth 2 (two) times daily. 180 tablet 1    pen needle, diabetic 31 gauge x 5/16" Ndle Use to administer insulin twice daily      [DISCONTINUED] atorvastatin (LIPITOR) 80 MG tablet Take 1 tablet (80 mg total) by mouth once daily. 90 tablet 3    [DISCONTINUED] glyBURIDE (DIABETA) 5 MG tablet Take 1 tablet (5 mg total) by mouth daily with breakfast. 90 tablet 3       Allergies  Review of patient's allergies indicates:   Allergen Reactions    Azithromycin     M-mycin        Physical Examination     Vitals:    02/17/23 0928   BP: 124/78   Pulse: 68   Resp: (!) 22   Temp: 97.8 °F (36.6 °C)     Physical Exam  Constitutional:       Appearance: Normal appearance.   HENT:      Head: Normocephalic.      Nose: Nose normal.      Mouth/Throat:      Mouth: Mucous membranes are moist.      Pharynx: Oropharynx is clear. No oropharyngeal exudate or posterior oropharyngeal erythema.   Eyes:      General: No scleral icterus.        Right eye: No discharge.         Left eye: No discharge.      Conjunctiva/sclera: Conjunctivae normal.      Pupils: Pupils are equal, round, and reactive to light.   Cardiovascular:      Rate and Rhythm: Normal rate and regular rhythm.      Pulses: Normal pulses.      Heart sounds: Normal heart sounds.   Pulmonary:      Effort: Pulmonary effort is normal.      Breath sounds: Normal breath sounds.   Abdominal:      " General: Abdomen is flat. Bowel sounds are normal.      Tenderness: There is no abdominal tenderness.   Musculoskeletal:         General: Normal range of motion.   Skin:     General: Skin is warm.      Capillary Refill: Capillary refill takes less than 2 seconds.      Findings: Lesion present.      Comments: Right foot distal large toe there is a black eschar located over a 3 cm ulceration distal toe.  There is erythema that extends to the proximal phalanx and the 2nd toe on right foot there is erythema and swelling to the distal toe.  Left large toe there is also noted ulceration of about 3 cm with no black eschar but the erythema and swelling noted   Neurological:      General: No focal deficit present.      Mental Status: He is alert and oriented to person, place, and time. Mental status is at baseline.      Cranial Nerves: No cranial nerve deficit.      Sensory: Sensory deficit present.      Motor: No weakness.      Coordination: Coordination normal.      Gait: Gait normal.   Psychiatric:         Mood and Affect: Mood normal.         Behavior: Behavior normal.             Lab Results   Component Value Date    WBC 10.28 02/11/2023    HGB 14.2 02/11/2023    HCT 40.6 02/11/2023    MCV 88.1 02/11/2023     02/11/2023          Sodium   Date Value Ref Range Status   02/11/2023 135 (L) 136 - 145 mmol/L Final     Potassium   Date Value Ref Range Status   02/11/2023 3.6 3.5 - 5.1 mmol/L Final     Chloride   Date Value Ref Range Status   02/11/2023 97 (L) 98 - 107 mmol/L Final     CO2   Date Value Ref Range Status   02/11/2023 28 21 - 32 mmol/L Final     Glucose   Date Value Ref Range Status   02/11/2023 203 (H) 74 - 106 mg/dL Final     BUN   Date Value Ref Range Status   02/11/2023 15 7 - 18 mg/dL Final     Creatinine   Date Value Ref Range Status   02/11/2023 0.97 0.70 - 1.30 mg/dL Final     Calcium   Date Value Ref Range Status   02/11/2023 8.6 8.5 - 10.1 mg/dL Final     Total Protein   Date Value Ref Range  Status   04/24/2022 7.3 6.4 - 8.2 g/dL Final     Albumin   Date Value Ref Range Status   04/24/2022 3.8 3.5 - 5.0 g/dL Final     Bilirubin, Total   Date Value Ref Range Status   04/24/2022 0.5 0.0 - 1.2 mg/dL Final     Alk Phos   Date Value Ref Range Status   04/24/2022 96 45 - 115 U/L Final     AST   Date Value Ref Range Status   04/24/2022 16 15 - 37 U/L Final     ALT   Date Value Ref Range Status   04/24/2022 33 16 - 61 U/L Final     Anion Gap   Date Value Ref Range Status   02/11/2023 14 7 - 16 mmol/L Final     eGFR    Date Value Ref Range Status   05/06/2021 89       eGFR   Date Value Ref Range Status   06/08/2022 76 >=60 mL/min/1.73m² Final      X-Ray Toe 2 or More Views Right  Narrative: EXAMINATION:  XR TOE 2 OR MORE VIEWS RIGHT    CLINICAL HISTORY:  wound right great toe and second toe;    COMPARISON:  None available    TECHNIQUE:  XR TOE 2 OR MORE VIEWS RIGHT    FINDINGS:  No evidence of fracture seen.  The alignment of the joints appears normal.  No degenerative change is present.  Soft tissue swelling seen distal 2nd digit.  No other soft tissue abnormality is seen.  Impression: Soft tissue swelling.  No other significant findings.    Electronically signed by: Mohsen Roberto  Date:    02/11/2023  Time:    19:53     Procedures   Lab Results   Component Value Date    CHOL 128 12/22/2022    CHOL 150 08/15/2022    CHOL 182 06/08/2022     Lab Results   Component Value Date    HDL 32 (L) 12/22/2022    HDL 36 (L) 08/15/2022    HDL 29 (L) 06/08/2022     Lab Results   Component Value Date    LDLCALC 53 12/22/2022    LDLCALC 43 08/15/2022    LDLCALC  06/08/2022      Comment:      Unable to calculate due to one of the following values:  Cholesterol <5  HDL Cholesterol <5  Triglycerides <10 or >400     Lab Results   Component Value Date    TRIG 215 (H) 12/22/2022    TRIG 355 (H) 08/15/2022    TRIG 884 (H) 06/08/2022     Lab Results   Component Value Date    CHOLHDL 4.0 12/22/2022    CHOLHDL 4.2  08/15/2022    CHOLHDL 6.3 06/08/2022      Lab Results   Component Value Date    HGBA1C 6.8 (H) 12/22/2022      No results found for: TSH, Y5FLXNQ, S0ZQPHN, THYROIDAB, FREET4   Assessment and Plan (including Health Maintenance)      Problem List  Smart Sets  Document Outside HM   :    Plan:         Health Maintenance Due   Topic Date Due    Hepatitis C Screening  Never done    Pneumococcal Vaccines (Age 0-64) (1 - PCV) Never done    Eye Exam  Never done    HIV Screening  Never done    TETANUS VACCINE  Never done    Colorectal Cancer Screening  Never done    Influenza Vaccine (1) Never done       Problem List Items Addressed This Visit          Cardiac/Vascular    HLD (hyperlipidemia)    Current Assessment & Plan     Last cholesterol was 128 and LDL was 53.  Good control of his lipids using 80 of atorvastatin daily.  No change in treatment.  Follow-up in 3 months.         Relevant Medications    atorvastatin (LIPITOR) 80 MG tablet       Endocrine    Type 2 diabetes mellitus without complication, without long-term current use of insulin - Primary    Current Assessment & Plan     Per the patient has diabetes is been controlled with his last hemoglobin A1c being 6.8 in December of 2022.  Patient states his blood sugars are less than 150 when he checks them.  No changes in his treatment at this time but I suspicion that his blood sugars are out of control and is contributing to the infected ulcers on his large toes.         Relevant Medications    glyBURIDE (DIABETA) 5 MG tablet    Diabetic ulcer of right great toe    Current Assessment & Plan     Patient has cellulitis of his right 2nd toe and gangrenous ulceration distal large toe right foot.  He also has a distal ulcer on his left large toe with erythema of the entire toe.  Referred him to the emergency room at [facility] or possible IV antibiotics and/or amputation or debridement of the wound.         Relevant Medications    glyBURIDE (DIABETA) 5 MG tablet    Other Relevant  Orders    Culture, Wound       Health Maintenance Topics with due status: Not Due       Topic Last Completion Date    Diabetes Urine Screening 12/22/2022    Foot Exam 12/22/2022    Lipid Panel 12/22/2022    Hemoglobin A1c 12/22/2022    Aspirin/Antiplatelet Therapy 02/17/2023       Future Appointments   Date Time Provider Department Center   2/22/2023  3:30 PM Julio Cruz DO [facility] [place]   3/2/2023  2:15 PM Jn Estrada MD [facility] [place]   3/16/2023  1:30 PM Julio Cruz DO [facility] [place]   6/12/2023  9:00 AM [facility] [place]        Follow up in about 4 weeks (around 3/17/2023).     Signature:  DO [facility]  [address] [place] [address], [place]  [address]    Date of encounter: 2/17/23

## 2023-02-17 NOTE — HPI
58yowm with PMH of HTN, DM, HLD presents for 3 weeks of worsening necrotic areas on the toes bilaterally.  This began several weeks ago and has become progressively worse.  Reports enlarging black areas worse on the tips of his great toes bilaterally.  He denies any systemic symptoms.  Was on clindamycin OP.  Denies pain, denies neuropathy, no f/c, cp, sob, wheeze, cough.  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  ROS otherwise negative.

## 2023-02-17 NOTE — ASSESSMENT & PLAN NOTE
Will shoot for tight control  Basal at home 26u daily along with metformin and sulfonyurea  Will keep on 20u levimir daily  SSI  Hold orals

## 2023-02-17 NOTE — ASSESSMENT & PLAN NOTE
Body mass index is 32.77 kg/m². Morbid obesity complicates all aspects of disease management from diagnostic modalities to treatment. Weight loss encouraged and health benefits explained to patient.     Adjust hypertensives as indicated  Basal, ssi insulin

## 2023-02-17 NOTE — LETTER
February 18, 2023    Syed Kelley  [address] [facility] [place] [address]                   [address] [place] [address] [place] [address]  Phone: [phone]  Fax: [phone]   February 18, 2023     Patient: Syed Kelley   YOB: 1965   Date of Visit: 2/17/2023       To Whom it May Concern:    Syed Kelley was seen in Hospital on 2/17/2023. He may return to work on Tuesday 2/21/23.    Please excuse him from any work missed.    If you have any questions or concerns, please don't hesitate to call.    Sincerely,         Erma Dang RN

## 2023-02-17 NOTE — ASSESSMENT & PLAN NOTE
Patient has cellulitis of his right 2nd toe and gangrenous ulceration distal large toe right foot.  He also has a distal ulcer on his left large toe with erythema of the entire toe.  Referred him to the emergency room at [facility] or possible IV antibiotics and/or amputation or debridement of the wound.

## 2023-02-17 NOTE — SUBJECTIVE & OBJECTIVE
"No current facility-administered medications on file prior to encounter.     Current Outpatient Medications on File Prior to Encounter   Medication Sig    albuterol (PROVENTIL/VENTOLIN HFA) 90 mcg/actuation inhaler Inhale 2 puffs into the lungs every 4 (four) hours as needed for Shortness of Breath.    aspirin (ECOTRIN) 81 MG EC tablet Take 1 tablet (81 mg total) by mouth once daily.    atorvastatin (LIPITOR) 80 MG tablet Take 1 tablet (80 mg total) by mouth once daily.    cholecalciferol, vitamin D3, (VITAMIN D3) 50 mcg (2,000 unit) Cap Take 1 capsule by mouth once daily.    citalopram (CELEXA) 20 MG tablet Take 1 tablet (20 mg total) by mouth once daily.    clindamycin (CLEOCIN) 150 MG capsule Take 2 capsules (300 mg total) by mouth 4 (four) times daily. for 7 days    dextroamphetamine-amphetamine (ADDERALL XR) 20 MG 24 hr capsule Take 1 capsule (20 mg total) by mouth 2 (two) times a day.    dextroamphetamine-amphetamine (ADDERALL XR) 30 MG 24 hr capsule Take 1 capsule by mouth every morning and 1 capsule by mouth at 1:00 pm.    docosahexaenoic acid/epa (FISH OIL ORAL) Take 1 capsule by mouth once daily.    glyBURIDE (DIABETA) 5 MG tablet Take 1 tablet (5 mg total) by mouth daily with breakfast.    insulin degludec (TRESIBA FLEXTOUCH U-200) 200 unit/mL (3 mL) insulin pen Inject 26 Units into the skin once daily.    insulin syringe-needle U-100 0.3 mL 31 gauge x 5/16" Syrg Use once daily for insulin injection    lisinopriL-hydrochlorothiazide (PRINZIDE,ZESTORETIC) 20-25 mg Tab Take 1 tablet by mouth once daily.    metFORMIN (GLUCOPHAGE) 1000 MG tablet Take 1 tablet (1,000 mg total) by mouth 2 (two) times daily with meals.    metoprolol tartrate (LOPRESSOR) 50 MG tablet Take 1 tablet (50 mg total) by mouth 2 (two) times daily.    pen needle, diabetic 31 gauge x 5/16" Ndle Use to administer insulin twice daily    [DISCONTINUED] atorvastatin (LIPITOR) 80 MG tablet Take 1 tablet (80 mg total) by mouth once daily.    " [DISCONTINUED] cetirizine (ZYRTEC) 10 MG tablet Take 1 tablet (10 mg total) by mouth every evening. for 7 days (Patient not taking: Reported on 2022)    [DISCONTINUED] famotidine (PEPCID) 20 MG tablet Take 1 tablet (20 mg total) by mouth 2 (two) times daily. for 7 days (Patient not taking: Reported on 2022)    [DISCONTINUED] glyBURIDE (DIABETA) 5 MG tablet Take 1 tablet (5 mg total) by mouth daily with breakfast.    [DISCONTINUED] ipratropium-albuteroL (COMBIVENT)  mcg/actuation inhaler Inhale 1 puff into the lungs 4 (four) times daily. Rescue (Patient not taking: Reported on 2022)       Review of patient's allergies indicates:   Allergen Reactions    Azithromycin     M-mycin        Past Medical History:   Diagnosis Date    Adult attention deficit disorder 3/22/2021    Controlled type 2 diabetes mellitus with hyperglycemia, without long-term current use of insulin 3/22/2021    Disorder of kidney and ureter     Essential hypertension 3/22/2021    Hyperlipidemia      Past Surgical History:   Procedure Laterality Date    APPENDECTOMY      CARDIAC CATHETERIZATION      LEFT HEART CATHETERIZATION N/A 2021    Procedure: Left heart cath with possible intervention;  Surgeon: Federico James DO;  Location: [facility] CATH LAB;  Service: Cardiology;  Laterality: N/A;     Family History       Problem Relation (Age of Onset)    Cancer Sister    Hypertension Father    Stroke Father          Tobacco Use    Smoking status: Former     Packs/day: 5.00     Years: 4.00     Pack years: 20.00     Types: Cigarettes     Start date:      Quit date:      Years since quittin.1    Smokeless tobacco: Current     Types: Chew    Tobacco comments:     1 can/day   Substance and Sexual Activity    Alcohol use: Yes     Comment: 2-3 drinks per/3 months    Drug use: Never    Sexual activity: Yes     Partners: Female     Review of Systems   Constitutional:  Negative for fever.   Cardiovascular:  Negative for chest  pain.   Skin:  Positive for wound.   Objective:     Vital Signs (Most Recent):  Temp: 98 °F (36.7 °C) (02/17/23 1208)  Pulse: 70 (02/17/23 1428)  Resp: 19 (02/17/23 1428)  BP: 127/83 (02/17/23 1428)  SpO2: 95 % (02/17/23 1428) Vital Signs (24h Range):  Temp:  [97.8 °F (36.6 °C)-98 °F (36.7 °C)] 98 °F (36.7 °C)  Pulse:  [68-70] 70  Resp:  [17-22] 19  SpO2:  [95 %-97 %] 95 %  BP: (124-128)/(78-83) 127/83     Weight: 83.9 kg (185 lb)  Body mass index is 32.77 kg/m².    Physical Exam  Vitals and nursing note reviewed. Exam conducted with a chaperone present.   HENT:      Head: Normocephalic.      Nose: Nose normal.   Eyes:      Conjunctiva/sclera: Conjunctivae normal.   Cardiovascular:      Rate and Rhythm: Normal rate.   Pulmonary:      Effort: Pulmonary effort is normal.   Abdominal:      Palpations: Abdomen is soft.   Musculoskeletal:      Cervical back: Normal range of motion.   Skin:     General: Skin is warm and dry.      Capillary Refill: Capillary refill takes less than 2 seconds.      Comments: Dry gangrene tips of bilateral great toes and 2nd toe right foot   Neurological:      Mental Status: He is alert and oriented to person, place, and time.       Significant Labs:  I have reviewed all pertinent lab results within the past 24 hours.  CBC:   Recent Labs   Lab 02/17/23  1315   WBC 10.23   RBC 4.63   HGB 14.4   HCT 41.8      MCV 90.3   MCH 31.1*   MCHC 34.4     BMP:   Recent Labs   Lab 02/17/23  1315      *   K 4.4   CL 94*   CO2 33*   BUN 14   CREATININE 1.09   CALCIUM 9.2     CMP:   Recent Labs   Lab 02/17/23  1315      CALCIUM 9.2   ALBUMIN 3.6   PROT 8.0   *   K 4.4   CO2 33*   CL 94*   BUN 14   CREATININE 1.09   ALKPHOS 86   ALT 23   AST 18   BILITOT 0.6     LFTs:   Recent Labs   Lab 02/17/23  1315   ALT 23   AST 18   ALKPHOS 86   BILITOT 0.6   PROT 8.0   ALBUMIN 3.6     Coagulation:   Recent Labs   Lab 02/17/23  1315   LABPROT 12.3   INR 0.95   APTT 30.5       Significant  Diagnostics:  I have reviewed all pertinent imaging results/findings within the past 24 hours.

## 2023-02-17 NOTE — ASSESSMENT & PLAN NOTE
Per the patient has diabetes is been controlled with his last hemoglobin A1c being 6.8 in December of 2022.  Patient states his blood sugars are less than 150 when he checks them.  No changes in his treatment at this time but I suspicion that his blood sugars are out of control and is contributing to the infected ulcers on his large toes.

## 2023-02-17 NOTE — ED PROVIDER NOTES
Encounter Date: 2023       History     Chief Complaint   Patient presents with    Wound Infection     Bilateral feet. Sent from doctor for gangrene.      58 year old male presents to ED with complaint of wound infection to feet. Patient states he noted the wounds approximately 3 weeks ago initially to right great toe. He states he was seen in ED on last week for ulcers. He went to PCP's office on today due to lack of improvement to his feet and was instructed to come to ED for further evaluation for possible gangrene. Patient denies fever, chills, nausea/vomiting. Endorses drainage from toes. Denies loss of sensation to feet, numbness, tingling.     The history is provided by the patient and medical records.   Review of patient's allergies indicates:   Allergen Reactions    Azithromycin     M-mycin      Past Medical History:   Diagnosis Date    Adult attention deficit disorder 3/22/2021    Controlled type 2 diabetes mellitus with hyperglycemia, without long-term current use of insulin 3/22/2021    Disorder of kidney and ureter     Essential hypertension 3/22/2021    Hyperlipidemia      Past Surgical History:   Procedure Laterality Date    APPENDECTOMY      CARDIAC CATHETERIZATION      LEFT HEART CATHETERIZATION N/A 2021    Procedure: Left heart cath with possible intervention;  Surgeon: Federico James DO;  Location: [facility] CATH LAB;  Service: Cardiology;  Laterality: N/A;     Family History   Problem Relation Age of Onset    Hypertension Father     Stroke Father     Cancer Sister      Social History     Tobacco Use    Smoking status: Former     Packs/day: 5.00     Years: 4.00     Pack years: 20.00     Types: Cigarettes     Start date:      Quit date:      Years since quittin.1    Smokeless tobacco: Current     Types: Chew    Tobacco comments:     1 can/day   Substance Use Topics    Alcohol use: Yes     Comment: 2-3 drinks per/3 months    Drug use: Never     Review of Systems    Constitutional:  Negative for chills and fever.   Musculoskeletal:  Negative for arthralgias and gait problem.   Skin:  Positive for color change and wound.   Neurological:  Negative for dizziness, weakness and numbness.   All other systems reviewed and are negative.    Physical Exam     Initial Vitals [02/17/23 1208]   BP Pulse Resp Temp SpO2   128/83 70 17 98 °F (36.7 °C) 97 %      MAP       --         Physical Exam    Nursing note and vitals reviewed.  Constitutional: He appears well-developed and well-nourished.   HENT:   Head: Normocephalic and atraumatic.   Eyes: EOM are normal. Pupils are equal, round, and reactive to light.   Neck: Neck supple.   Normal range of motion.  Cardiovascular:  Normal rate and regular rhythm.           Pulses:       Dorsalis pedis pulses are 2+ on the right side and 2+ on the left side.   Pulmonary/Chest: He has no wheezes. He has no rhonchi.   Abdominal: Abdomen is soft. He exhibits no distension. no abdominal tenderness   Musculoskeletal:         General: Tenderness and edema present.      Cervical back: Normal range of motion and neck supple.        Feet:      Neurological: He is alert and oriented to person, place, and time. No cranial nerve deficit or sensory deficit.   Skin: Skin is warm. Capillary refill takes less than 2 seconds. There is erythema.   Psychiatric: He has a normal mood and affect. Thought content normal.       Medical Screening Exam   See Full Note    ED Course   Procedures  Labs Reviewed   COMPREHENSIVE METABOLIC PANEL - Abnormal; Notable for the following components:       Result Value    Sodium 132 (*)     Chloride 94 (*)     CO2 33 (*)     Globulin 4.4 (*)     All other components within normal limits   C-REACTIVE PROTEIN - Abnormal; Notable for the following components:    CRP 2.40 (*)     All other components within normal limits   SEDIMENTATION RATE, AUTOMATED - Abnormal; Notable for the following components:    ESR Westergren 38 (*)     All other  components within normal limits   CBC WITH DIFFERENTIAL - Abnormal; Notable for the following components:    MCH 31.1 (*)     MPV 8.8 (*)     Neutrophils % 73.0 (*)     Lymphocytes % 17.9 (*)     Monocytes % 7.1 (*)     Immature Granulocytes % 0.6 (*)     Immature Granulocytes, Absolute 0.06 (*)     All other components within normal limits   PROTIME-INR - Normal   APTT - Normal   CBC W/ AUTO DIFFERENTIAL    Narrative:     The following orders were created for panel order CBC auto differential.  Procedure                               Abnormality         Status                     ---------                               -----------         ------                     CBC with Differential[[id number]]        Abnormal            Final result                 Please view results for these tests on the individual orders.   TYPE & SCREEN   ABORH RETYPE          Imaging Results    None          Medications   sodium chloride 0.9% bolus 1,000 mL 1,000 mL (1,000 mLs Intravenous New Bag 2/17/23 1401)   vancomycin (VANCOCIN) 1,750 mg in dextrose 5 % (D5W) 500 mL IVPB (has no administration in time range)   piperacillin-tazobactam (ZOSYN) 4.5 g in dextrose 5 % in water (D5W) 5 % 100 mL IVPB (MB+) (0 g Intravenous Stopped 2/17/23 1447)     Medical Decision Making:   Initial Assessment:   Wound infection  Differential Diagnosis:   Wound infection  Clinical Tests:   Lab Tests: Ordered and Reviewed  ED Management:  [facility]    Patient presents for emergent evaluation of acute wound infection that poses a threat to life and/or bodily function.    In the ED patient found to have acute wound infection.    I ordered labs and personally reviewed them.  Labs significant for sodium 132, chloride 94; CRP 2.40; ESR 38    Admission [facility]  I discussed the patient presentation labs, ekg, X-rays, CT findings with the consultant for Dr. Cohen, Surgery (speciality).    Patient was managed in the ED with IV NS, IV Vanc, Zosyn.    The response to treatment  was good.    Patient required emergent consultation to Dr. Mcgowan (admitting physician) for admission.                   Clinical Impression:   Final diagnoses:  [T14.8XXA, L08.9] Wound infection (Primary)        ED Disposition Condition    Observation Stable                REMEDIOS Craig  02/17/23 8431

## 2023-02-17 NOTE — ASSESSMENT & PLAN NOTE
Last cholesterol was 128 and LDL was 53.  Good control of his lipids using 80 of atorvastatin daily.  No change in treatment.  Follow-up in 3 months.

## 2023-02-17 NOTE — H&P
[facility] - Emergency Department  General Surgery  History & Physical    Patient Name: Syed Kelley  MRN: [id number]  Admission Date: 2/17/2023  Attending Physician: Yael Cohen MD   Primary Care Provider: Julio Cruz DO    Patient information was obtained from ER records.     Subjective:     Chief Complaint/Reason for Admission: necrotic tips toes    History of Present Illness: 59 y/o male with diabetes,   Necrotic tips of toes, possibly form friction of shoes  Dry gangrenous tips toes, not painful no cellulitis no drainage  Referred to ER from Dr. Cruz      No current facility-administered medications on file prior to encounter.     Current Outpatient Medications on File Prior to Encounter   Medication Sig    albuterol (PROVENTIL/VENTOLIN HFA) 90 mcg/actuation inhaler Inhale 2 puffs into the lungs every 4 (four) hours as needed for Shortness of Breath.    aspirin (ECOTRIN) 81 MG EC tablet Take 1 tablet (81 mg total) by mouth once daily.    atorvastatin (LIPITOR) 80 MG tablet Take 1 tablet (80 mg total) by mouth once daily.    cholecalciferol, vitamin D3, (VITAMIN D3) 50 mcg (2,000 unit) Cap Take 1 capsule by mouth once daily.    citalopram (CELEXA) 20 MG tablet Take 1 tablet (20 mg total) by mouth once daily.    clindamycin (CLEOCIN) 150 MG capsule Take 2 capsules (300 mg total) by mouth 4 (four) times daily. for 7 days    dextroamphetamine-amphetamine (ADDERALL XR) 20 MG 24 hr capsule Take 1 capsule (20 mg total) by mouth 2 (two) times a day.    dextroamphetamine-amphetamine (ADDERALL XR) 30 MG 24 hr capsule Take 1 capsule by mouth every morning and 1 capsule by mouth at 1:00 pm.    docosahexaenoic acid/epa (FISH OIL ORAL) Take 1 capsule by mouth once daily.    glyBURIDE (DIABETA) 5 MG tablet Take 1 tablet (5 mg total) by mouth daily with breakfast.    insulin degludec (TRESIBA FLEXTOUCH U-200) 200 unit/mL (3 mL) insulin pen Inject 26 Units into the skin once daily.    insulin syringe-needle  "U-100 0.3 mL 31 gauge x 5/16" Syrg Use once daily for insulin injection    lisinopriL-hydrochlorothiazide (PRINZIDE,ZESTORETIC) 20-25 mg Tab Take 1 tablet by mouth once daily.    metFORMIN (GLUCOPHAGE) 1000 MG tablet Take 1 tablet (1,000 mg total) by mouth 2 (two) times daily with meals.    metoprolol tartrate (LOPRESSOR) 50 MG tablet Take 1 tablet (50 mg total) by mouth 2 (two) times daily.    pen needle, diabetic 31 gauge x 5/16" Ndle Use to administer insulin twice daily    [DISCONTINUED] atorvastatin (LIPITOR) 80 MG tablet Take 1 tablet (80 mg total) by mouth once daily.    [DISCONTINUED] cetirizine (ZYRTEC) 10 MG tablet Take 1 tablet (10 mg total) by mouth every evening. for 7 days (Patient not taking: Reported on 12/22/2022)    [DISCONTINUED] famotidine (PEPCID) 20 MG tablet Take 1 tablet (20 mg total) by mouth 2 (two) times daily. for 7 days (Patient not taking: Reported on 12/22/2022)    [DISCONTINUED] glyBURIDE (DIABETA) 5 MG tablet Take 1 tablet (5 mg total) by mouth daily with breakfast.    [DISCONTINUED] ipratropium-albuteroL (COMBIVENT)  mcg/actuation inhaler Inhale 1 puff into the lungs 4 (four) times daily. Rescue (Patient not taking: Reported on 12/22/2022)       Review of patient's allergies indicates:   Allergen Reactions    Azithromycin     M-mycin        Past Medical History:   Diagnosis Date    Adult attention deficit disorder 3/22/2021    Controlled type 2 diabetes mellitus with hyperglycemia, without long-term current use of insulin 3/22/2021    Disorder of kidney and ureter     Essential hypertension 3/22/2021    Hyperlipidemia      Past Surgical History:   Procedure Laterality Date    APPENDECTOMY      CARDIAC CATHETERIZATION      LEFT HEART CATHETERIZATION N/A 5/7/2021    Procedure: Left heart cath with possible intervention;  Surgeon: Federico James DO;  Location: [facility] CATH LAB;  Service: Cardiology;  Laterality: N/A;     Family History       Problem Relation (Age of Onset)    " Cancer Sister    Hypertension Father    Stroke Father          Tobacco Use    Smoking status: Former     Packs/day: 5.00     Years: 4.00     Pack years: 20.00     Types: Cigarettes     Start date:      Quit date:      Years since quittin.1    Smokeless tobacco: Current     Types: Chew    Tobacco comments:     1 can/day   Substance and Sexual Activity    Alcohol use: Yes     Comment: 2-3 drinks per/3 months    Drug use: Never    Sexual activity: Yes     Partners: Female     Review of Systems  Objective:     Vital Signs (Most Recent):  Temp: 98 °F (36.7 °C) (23 1208)  Pulse: 70 (23 1428)  Resp: 19 (23 1428)  BP: 127/83 (23 1428)  SpO2: 95 % (23 1428) Vital Signs (24h Range):  Temp:  [97.8 °F (36.6 °C)-98 °F (36.7 °C)] 98 °F (36.7 °C)  Pulse:  [68-70] 70  Resp:  [17-22] 19  SpO2:  [95 %-97 %] 95 %  BP: (124-128)/(78-83) 127/83     Weight: 83.9 kg (185 lb)  Body mass index is 32.77 kg/m².    Physical Exam    Significant Labs:  I have reviewed all pertinent lab results within the past 24 hours.  CBC:   Recent Labs   Lab 23  1315   WBC 10.23   RBC 4.63   HGB 14.4   HCT 41.8      MCV 90.3   MCH 31.1*   MCHC 34.4     BMP:   Recent Labs   Lab 23  1315      *   K 4.4   CL 94*   CO2 33*   BUN 14   CREATININE 1.09   CALCIUM 9.2     CMP:   Recent Labs   Lab 23  1315      CALCIUM 9.2   ALBUMIN 3.6   PROT 8.0   *   K 4.4   CO2 33*   CL 94*   BUN 14   CREATININE 1.09   ALKPHOS 86   ALT 23   AST 18   BILITOT 0.6     LFTs:   Recent Labs   Lab 23  1315   ALT 23   AST 18   ALKPHOS 86   BILITOT 0.6   PROT 8.0   ALBUMIN 3.6       Significant Diagnostics:  I have reviewed all pertinent imaging results/findings within the past 24 hours.    Assessment/Plan:     * Dry gangrene   tips toes,   Palpable DP pulses  Npo after mn for debridement   JEANNIE aorta US      Type 2 diabetes mellitus without complication, without long-term current use of  insulin  02/17 adult Sliding scale       VTE Risk Mitigation (From admission, onward)           Ordered     enoxaparin injection 40 mg  Daily         02/17/23 1531     IP VTE HIGH RISK PATIENT  Once         02/17/23 1531     Place sequential compression device  Until discontinued         02/17/23 1531                    Bree Mayes, KAIT  General Surgery  [facility] - Emergency Department

## 2023-02-17 NOTE — HPI
59 y/o male with diabetes,   Necrotic tips of toes, possibly form friction of shoes  Dry gangrenous tips toes, not painful no cellulitis no drainage  Referred to ER from Dr. Cruz

## 2023-02-17 NOTE — SUBJECTIVE & OBJECTIVE
"No current facility-administered medications on file prior to encounter.     Current Outpatient Medications on File Prior to Encounter   Medication Sig    albuterol (PROVENTIL/VENTOLIN HFA) 90 mcg/actuation inhaler Inhale 2 puffs into the lungs every 4 (four) hours as needed for Shortness of Breath.    aspirin (ECOTRIN) 81 MG EC tablet Take 1 tablet (81 mg total) by mouth once daily.    atorvastatin (LIPITOR) 80 MG tablet Take 1 tablet (80 mg total) by mouth once daily.    cholecalciferol, vitamin D3, (VITAMIN D3) 50 mcg (2,000 unit) Cap Take 1 capsule by mouth once daily.    citalopram (CELEXA) 20 MG tablet Take 1 tablet (20 mg total) by mouth once daily.    clindamycin (CLEOCIN) 150 MG capsule Take 2 capsules (300 mg total) by mouth 4 (four) times daily. for 7 days    dextroamphetamine-amphetamine (ADDERALL XR) 20 MG 24 hr capsule Take 1 capsule (20 mg total) by mouth 2 (two) times a day.    dextroamphetamine-amphetamine (ADDERALL XR) 30 MG 24 hr capsule Take 1 capsule by mouth every morning and 1 capsule by mouth at 1:00 pm.    docosahexaenoic acid/epa (FISH OIL ORAL) Take 1 capsule by mouth once daily.    glyBURIDE (DIABETA) 5 MG tablet Take 1 tablet (5 mg total) by mouth daily with breakfast.    insulin degludec (TRESIBA FLEXTOUCH U-200) 200 unit/mL (3 mL) insulin pen Inject 26 Units into the skin once daily.    insulin syringe-needle U-100 0.3 mL 31 gauge x 5/16" Syrg Use once daily for insulin injection    lisinopriL-hydrochlorothiazide (PRINZIDE,ZESTORETIC) 20-25 mg Tab Take 1 tablet by mouth once daily.    metFORMIN (GLUCOPHAGE) 1000 MG tablet Take 1 tablet (1,000 mg total) by mouth 2 (two) times daily with meals.    metoprolol tartrate (LOPRESSOR) 50 MG tablet Take 1 tablet (50 mg total) by mouth 2 (two) times daily.    pen needle, diabetic 31 gauge x 5/16" Ndle Use to administer insulin twice daily    [DISCONTINUED] atorvastatin (LIPITOR) 80 MG tablet Take 1 tablet (80 mg total) by mouth once daily.    " [DISCONTINUED] cetirizine (ZYRTEC) 10 MG tablet Take 1 tablet (10 mg total) by mouth every evening. for 7 days (Patient not taking: Reported on 2022)    [DISCONTINUED] famotidine (PEPCID) 20 MG tablet Take 1 tablet (20 mg total) by mouth 2 (two) times daily. for 7 days (Patient not taking: Reported on 2022)    [DISCONTINUED] glyBURIDE (DIABETA) 5 MG tablet Take 1 tablet (5 mg total) by mouth daily with breakfast.    [DISCONTINUED] ipratropium-albuteroL (COMBIVENT)  mcg/actuation inhaler Inhale 1 puff into the lungs 4 (four) times daily. Rescue (Patient not taking: Reported on 2022)       Review of patient's allergies indicates:   Allergen Reactions    Azithromycin     M-mycin        Past Medical History:   Diagnosis Date    Adult attention deficit disorder 3/22/2021    Controlled type 2 diabetes mellitus with hyperglycemia, without long-term current use of insulin 3/22/2021    Disorder of kidney and ureter     Essential hypertension 3/22/2021    Hyperlipidemia      Past Surgical History:   Procedure Laterality Date    APPENDECTOMY      CARDIAC CATHETERIZATION      LEFT HEART CATHETERIZATION N/A 2021    Procedure: Left heart cath with possible intervention;  Surgeon: Federico James DO;  Location: [facility] CATH LAB;  Service: Cardiology;  Laterality: N/A;     Family History       Problem Relation (Age of Onset)    Cancer Sister    Hypertension Father    Stroke Father          Tobacco Use    Smoking status: Former     Packs/day: 5.00     Years: 4.00     Pack years: 20.00     Types: Cigarettes     Start date:      Quit date:      Years since quittin.1    Smokeless tobacco: Current     Types: Chew    Tobacco comments:     1 can/day   Substance and Sexual Activity    Alcohol use: Yes     Comment: 2-3 drinks per/3 months    Drug use: Never    Sexual activity: Yes     Partners: Female     Review of Systems  Objective:     Vital Signs (Most Recent):  Temp: 98 °F (36.7 °C) (23  1208)  Pulse: 70 (02/17/23 1428)  Resp: 19 (02/17/23 1428)  BP: 127/83 (02/17/23 1428)  SpO2: 95 % (02/17/23 1428) Vital Signs (24h Range):  Temp:  [97.8 °F (36.6 °C)-98 °F (36.7 °C)] 98 °F (36.7 °C)  Pulse:  [68-70] 70  Resp:  [17-22] 19  SpO2:  [95 %-97 %] 95 %  BP: (124-128)/(78-83) 127/83     Weight: 83.9 kg (185 lb)  Body mass index is 32.77 kg/m².    Physical Exam    Significant Labs:  I have reviewed all pertinent lab results within the past 24 hours.  CBC:   Recent Labs   Lab 02/17/23  1315   WBC 10.23   RBC 4.63   HGB 14.4   HCT 41.8      MCV 90.3   MCH 31.1*   MCHC 34.4     BMP:   Recent Labs   Lab 02/17/23  1315      *   K 4.4   CL 94*   CO2 33*   BUN 14   CREATININE 1.09   CALCIUM 9.2     CMP:   Recent Labs   Lab 02/17/23  1315      CALCIUM 9.2   ALBUMIN 3.6   PROT 8.0   *   K 4.4   CO2 33*   CL 94*   BUN 14   CREATININE 1.09   ALKPHOS 86   ALT 23   AST 18   BILITOT 0.6     LFTs:   Recent Labs   Lab 02/17/23  1315   ALT 23   AST 18   ALKPHOS 86   BILITOT 0.6   PROT 8.0   ALBUMIN 3.6       Significant Diagnostics:  I have reviewed all pertinent imaging results/findings within the past 24 hours.

## 2023-02-17 NOTE — PROGRESS NOTES
Pharmacy consulted for vancomycin dosing. We will begin vancomycin 1750 mg IV every 24 hours and check a trough 2/20/23 1530. Pharmacy will monitor daily and adjust as necessary.

## 2023-02-18 ENCOUNTER — ANESTHESIA (OUTPATIENT)
Dept: SURGERY | Facility: HOSPITAL | Age: 58
End: 2023-02-18
Payer: COMMERCIAL

## 2023-02-18 ENCOUNTER — ANESTHESIA EVENT (OUTPATIENT)
Dept: SURGERY | Facility: HOSPITAL | Age: 58
End: 2023-02-18
Payer: COMMERCIAL

## 2023-02-18 VITALS
HEART RATE: 71 BPM | TEMPERATURE: 98 F | WEIGHT: 185 LBS | RESPIRATION RATE: 16 BRPM | OXYGEN SATURATION: 94 % | HEIGHT: 63 IN | SYSTOLIC BLOOD PRESSURE: 120 MMHG | BODY MASS INDEX: 32.78 KG/M2 | DIASTOLIC BLOOD PRESSURE: 62 MMHG

## 2023-02-18 LAB
ANION GAP SERPL CALCULATED.3IONS-SCNC: 12 MMOL/L (ref 7–16)
BASOPHILS # BLD AUTO: 0.02 K/UL (ref 0–0.2)
BASOPHILS NFR BLD AUTO: 0.3 % (ref 0–1)
BUN SERPL-MCNC: 15 MG/DL (ref 7–18)
BUN/CREAT SERPL: 15 (ref 6–20)
CALCIUM SERPL-MCNC: 8.5 MG/DL (ref 8.5–10.1)
CHLORIDE SERPL-SCNC: 103 MMOL/L (ref 98–107)
CO2 SERPL-SCNC: 30 MMOL/L (ref 21–32)
CREAT SERPL-MCNC: 1.03 MG/DL (ref 0.7–1.3)
DIFFERENTIAL METHOD BLD: ABNORMAL
EGFR (NO RACE VARIABLE) (RUSH/TITUS): 84 ML/MIN/1.73M²
EOSINOPHIL # BLD AUTO: 0.12 K/UL (ref 0–0.5)
EOSINOPHIL NFR BLD AUTO: 1.8 % (ref 1–4)
ERYTHROCYTE [DISTWIDTH] IN BLOOD BY AUTOMATED COUNT: 12.4 % (ref 11.5–14.5)
GLUCOSE SERPL-MCNC: 100 MG/DL (ref 74–106)
GLUCOSE SERPL-MCNC: 105 MG/DL (ref 70–105)
GLUCOSE SERPL-MCNC: 247 MG/DL (ref 70–105)
HCT VFR BLD AUTO: 37 % (ref 40–54)
HGB BLD-MCNC: 12.8 G/DL (ref 13.5–18)
IMM GRANULOCYTES # BLD AUTO: 0.02 K/UL (ref 0–0.04)
IMM GRANULOCYTES NFR BLD: 0.3 % (ref 0–0.4)
LYMPHOCYTES # BLD AUTO: 2.16 K/UL (ref 1–4.8)
LYMPHOCYTES NFR BLD AUTO: 33 % (ref 27–41)
MAGNESIUM SERPL-MCNC: 1.8 MG/DL (ref 1.7–2.3)
MCH RBC QN AUTO: 30.5 PG (ref 27–31)
MCHC RBC AUTO-ENTMCNC: 34.6 G/DL (ref 32–36)
MCV RBC AUTO: 88.3 FL (ref 80–96)
MONOCYTES # BLD AUTO: 0.48 K/UL (ref 0–0.8)
MONOCYTES NFR BLD AUTO: 7.3 % (ref 2–6)
MPC BLD CALC-MCNC: 8.9 FL (ref 9.4–12.4)
NEUTROPHILS # BLD AUTO: 3.75 K/UL (ref 1.8–7.7)
NEUTROPHILS NFR BLD AUTO: 57.3 % (ref 53–65)
NRBC # BLD AUTO: 0 X10E3/UL
NRBC, AUTO (.00): 0 %
PHOSPHATE SERPL-MCNC: 4.4 MG/DL (ref 2.5–4.5)
PLATELET # BLD AUTO: 273 K/UL (ref 150–400)
POTASSIUM SERPL-SCNC: 3.8 MMOL/L (ref 3.5–5.1)
RBC # BLD AUTO: 4.19 M/UL (ref 4.6–6.2)
SODIUM SERPL-SCNC: 141 MMOL/L (ref 136–145)
WBC # BLD AUTO: 6.55 K/UL (ref 4.5–11)

## 2023-02-18 PROCEDURE — 37000008 HC ANESTHESIA 1ST 15 MINUTES: Performed by: SURGERY

## 2023-02-18 PROCEDURE — 25000003 PHARM REV CODE 250: Performed by: STUDENT IN AN ORGANIZED HEALTH CARE EDUCATION/TRAINING PROGRAM

## 2023-02-18 PROCEDURE — D9220A PRA ANESTHESIA: ICD-10-PCS | Mod: CRNA,,, | Performed by: NURSE ANESTHETIST, CERTIFIED REGISTERED

## 2023-02-18 PROCEDURE — D9220A PRA ANESTHESIA: Mod: ANES,,, | Performed by: ANESTHESIOLOGY

## 2023-02-18 PROCEDURE — 99231 SBSQ HOSP IP/OBS SF/LOW 25: CPT | Mod: GC,,, | Performed by: STUDENT IN AN ORGANIZED HEALTH CARE EDUCATION/TRAINING PROGRAM

## 2023-02-18 PROCEDURE — 99231 PR SUBSEQUENT HOSPITAL CARE,LEVL I: ICD-10-PCS | Mod: GC,,, | Performed by: STUDENT IN AN ORGANIZED HEALTH CARE EDUCATION/TRAINING PROGRAM

## 2023-02-18 PROCEDURE — 37000009 HC ANESTHESIA EA ADD 15 MINS: Performed by: SURGERY

## 2023-02-18 PROCEDURE — 11042 PR DEBRIDEMENT, SKIN, SUB-Q TISSUE,=<20 SQ CM: ICD-10-PCS | Mod: ,,, | Performed by: SURGERY

## 2023-02-18 PROCEDURE — 25000003 PHARM REV CODE 250: Performed by: NURSE PRACTITIONER

## 2023-02-18 PROCEDURE — 27000716 HC OXISENSOR PROBE, ANY SIZE: Performed by: ANESTHESIOLOGY

## 2023-02-18 PROCEDURE — D9220A PRA ANESTHESIA: Mod: CRNA,,, | Performed by: NURSE ANESTHETIST, CERTIFIED REGISTERED

## 2023-02-18 PROCEDURE — 27000655: Performed by: ANESTHESIOLOGY

## 2023-02-18 PROCEDURE — 82962 GLUCOSE BLOOD TEST: CPT

## 2023-02-18 PROCEDURE — 11042 DBRDMT SUBQ TIS 1ST 20SQCM/<: CPT | Mod: ,,, | Performed by: SURGERY

## 2023-02-18 PROCEDURE — 25000003 PHARM REV CODE 250: Performed by: SURGERY

## 2023-02-18 PROCEDURE — 25000003 PHARM REV CODE 250: Performed by: NURSE ANESTHETIST, CERTIFIED REGISTERED

## 2023-02-18 PROCEDURE — 63600175 PHARM REV CODE 636 W HCPCS: Performed by: NURSE ANESTHETIST, CERTIFIED REGISTERED

## 2023-02-18 PROCEDURE — D9220A PRA ANESTHESIA: ICD-10-PCS | Mod: ANES,,, | Performed by: ANESTHESIOLOGY

## 2023-02-18 PROCEDURE — 36000707: Performed by: SURGERY

## 2023-02-18 PROCEDURE — 36000706: Performed by: SURGERY

## 2023-02-18 PROCEDURE — 27000177 HC AIRWAY, LARYNGEAL MASK: Performed by: ANESTHESIOLOGY

## 2023-02-18 PROCEDURE — 99238 PR HOSPITAL DISCHARGE DAY,<30 MIN: ICD-10-PCS | Mod: 25,,, | Performed by: SURGERY

## 2023-02-18 PROCEDURE — 80048 BASIC METABOLIC PNL TOTAL CA: CPT | Performed by: STUDENT IN AN ORGANIZED HEALTH CARE EDUCATION/TRAINING PROGRAM

## 2023-02-18 PROCEDURE — 99238 HOSP IP/OBS DSCHRG MGMT 30/<: CPT | Mod: 25,,, | Performed by: SURGERY

## 2023-02-18 PROCEDURE — 84100 ASSAY OF PHOSPHORUS: CPT | Performed by: STUDENT IN AN ORGANIZED HEALTH CARE EDUCATION/TRAINING PROGRAM

## 2023-02-18 PROCEDURE — 83735 ASSAY OF MAGNESIUM: CPT | Performed by: STUDENT IN AN ORGANIZED HEALTH CARE EDUCATION/TRAINING PROGRAM

## 2023-02-18 PROCEDURE — 71000033 HC RECOVERY, INTIAL HOUR: Performed by: SURGERY

## 2023-02-18 PROCEDURE — 85025 COMPLETE CBC W/AUTO DIFF WBC: CPT | Performed by: STUDENT IN AN ORGANIZED HEALTH CARE EDUCATION/TRAINING PROGRAM

## 2023-02-18 PROCEDURE — 63600175 PHARM REV CODE 636 W HCPCS: Performed by: STUDENT IN AN ORGANIZED HEALTH CARE EDUCATION/TRAINING PROGRAM

## 2023-02-18 RX ORDER — HYDROCODONE BITARTRATE AND ACETAMINOPHEN 7.5; 325 MG/1; MG/1
1 TABLET ORAL EVERY 6 HOURS PRN
Qty: 20 TABLET | Refills: 0 | Status: SHIPPED | OUTPATIENT
Start: 2023-02-18 | End: 2023-10-19 | Stop reason: ALTCHOICE

## 2023-02-18 RX ORDER — CEFAZOLIN SODIUM 1 G/3ML
INJECTION, POWDER, FOR SOLUTION INTRAMUSCULAR; INTRAVENOUS
Status: DISCONTINUED | OUTPATIENT
Start: 2023-02-18 | End: 2023-02-18

## 2023-02-18 RX ORDER — LIDOCAINE HYDROCHLORIDE 20 MG/ML
INJECTION, SOLUTION EPIDURAL; INFILTRATION; INTRACAUDAL; PERINEURAL
Status: DISCONTINUED | OUTPATIENT
Start: 2023-02-18 | End: 2023-02-18

## 2023-02-18 RX ORDER — DIPHENHYDRAMINE HYDROCHLORIDE 50 MG/ML
INJECTION INTRAMUSCULAR; INTRAVENOUS
Status: DISCONTINUED | OUTPATIENT
Start: 2023-02-18 | End: 2023-02-18

## 2023-02-18 RX ORDER — FENTANYL CITRATE 50 UG/ML
INJECTION, SOLUTION INTRAMUSCULAR; INTRAVENOUS
Status: DISCONTINUED | OUTPATIENT
Start: 2023-02-18 | End: 2023-02-18

## 2023-02-18 RX ORDER — EPHEDRINE SULFATE 50 MG/ML
INJECTION, SOLUTION INTRAVENOUS
Status: DISCONTINUED | OUTPATIENT
Start: 2023-02-18 | End: 2023-02-18

## 2023-02-18 RX ORDER — MIDAZOLAM HYDROCHLORIDE 1 MG/ML
INJECTION INTRAMUSCULAR; INTRAVENOUS
Status: DISCONTINUED | OUTPATIENT
Start: 2023-02-18 | End: 2023-02-18

## 2023-02-18 RX ORDER — SODIUM CHLORIDE 9 MG/ML
INJECTION, SOLUTION INTRAVENOUS CONTINUOUS
Status: DISCONTINUED | OUTPATIENT
Start: 2023-02-18 | End: 2023-02-18 | Stop reason: HOSPADM

## 2023-02-18 RX ORDER — PROPOFOL 10 MG/ML
VIAL (ML) INTRAVENOUS
Status: DISCONTINUED | OUTPATIENT
Start: 2023-02-18 | End: 2023-02-18

## 2023-02-18 RX ORDER — ONDANSETRON 2 MG/ML
INJECTION INTRAMUSCULAR; INTRAVENOUS
Status: DISCONTINUED | OUTPATIENT
Start: 2023-02-18 | End: 2023-02-18

## 2023-02-18 RX ADMIN — FENTANYL CITRATE 25 MCG: 50 INJECTION INTRAMUSCULAR; INTRAVENOUS at 09:02

## 2023-02-18 RX ADMIN — EPHEDRINE SULFATE 10 MG: 50 INJECTION INTRAVENOUS at 09:02

## 2023-02-18 RX ADMIN — ONDANSETRON 8 MG: 2 INJECTION INTRAMUSCULAR; INTRAVENOUS at 09:02

## 2023-02-18 RX ADMIN — INSULIN ASPART 4 UNITS: 100 INJECTION, SOLUTION INTRAVENOUS; SUBCUTANEOUS at 02:02

## 2023-02-18 RX ADMIN — EPHEDRINE SULFATE 20 MG: 50 INJECTION INTRAVENOUS at 09:02

## 2023-02-18 RX ADMIN — DIPHENHYDRAMINE HYDROCHLORIDE 6.25 MG: 50 INJECTION, SOLUTION INTRAMUSCULAR; INTRAVENOUS at 09:02

## 2023-02-18 RX ADMIN — CITALOPRAM HYDROBROMIDE 20 MG: 20 TABLET ORAL at 01:02

## 2023-02-18 RX ADMIN — SODIUM CHLORIDE: 9 INJECTION, SOLUTION INTRAVENOUS at 10:02

## 2023-02-18 RX ADMIN — MIDAZOLAM 2 MG: 1 INJECTION INTRAMUSCULAR; INTRAVENOUS at 08:02

## 2023-02-18 RX ADMIN — CEFAZOLIN 2 G: 1 INJECTION, POWDER, FOR SOLUTION INTRAMUSCULAR; INTRAVENOUS; PARENTERAL at 08:02

## 2023-02-18 RX ADMIN — ONDANSETRON 4 MG: 2 INJECTION INTRAMUSCULAR; INTRAVENOUS at 08:02

## 2023-02-18 RX ADMIN — FENTANYL CITRATE 50 MCG: 50 INJECTION INTRAMUSCULAR; INTRAVENOUS at 08:02

## 2023-02-18 RX ADMIN — ATORVASTATIN CALCIUM 80 MG: 80 TABLET, FILM COATED ORAL at 01:02

## 2023-02-18 RX ADMIN — SODIUM CHLORIDE: 9 INJECTION, SOLUTION INTRAVENOUS at 06:02

## 2023-02-18 RX ADMIN — PROPOFOL 150 MG: 10 INJECTION, EMULSION INTRAVENOUS at 08:02

## 2023-02-18 RX ADMIN — SODIUM CHLORIDE: 9 INJECTION, SOLUTION INTRAVENOUS at 08:02

## 2023-02-18 RX ADMIN — METOPROLOL TARTRATE 50 MG: 50 TABLET, FILM COATED ORAL at 01:02

## 2023-02-18 RX ADMIN — LIDOCAINE HYDROCHLORIDE 40 MG: 20 INJECTION, SOLUTION INTRAVENOUS at 08:02

## 2023-02-18 NOTE — OP NOTE
[facility] - Periop Services  Surgery Department  Operative Note    SUMMARY     Date of Procedure: 2/18/2023     Procedure: Procedure(s) (LRB):  DEBRIDEMENT, LOWER EXTREMITY (Bilateral)     Surgeon(s) and Role:     * Yael Cohen MD - Primary    Assisting Surgeon: None    Pre-Operative Diagnosis: Dry gangrene [I96]    Post-Operative Diagnosis: Post-Op Diagnosis Codes:     * Dry gangrene [I96]    Anesthesia: General/MAC    Operative Findings (including complications, if any):  Necrotic tissue at the tips of 3 toes    Description of Technical Procedures:  Taken operative suite foot bilateral feet prepped draped.  Utilize scalpel sharply excise full-thickness skin and subcutaneous tissues to toes on the right 1 on the left the tips controlled bleeding with cautery.  Sterile dressing applied    Significant Surgical Tasks Conducted by the Assistant(s), if Applicable:  None    Estimated Blood Loss (EBL): * No values recorded between 2/18/2023  9:12 AM and 2/18/2023  9:30 AM *2cc           Implants: * No implants in log *    Specimens:   Specimen (24h ago, onward)      None                    Condition: Good    Disposition: PACU - hemodynamically stable.    Attestation: I was present and scrubbed for the entire procedure.

## 2023-02-18 NOTE — SUBJECTIVE & OBJECTIVE
Interval History: No overnight events and patient without any complaints today. Plan for debridement today.     Review of Systems   Constitutional:  Negative for chills, fatigue, fever and unexpected weight change.   HENT:  Negative for congestion, hearing loss and trouble swallowing.    Eyes:  Negative for visual disturbance.   Respiratory:  Negative for cough, chest tightness, shortness of breath and wheezing.    Cardiovascular:  Negative for chest pain, palpitations and leg swelling.   Gastrointestinal:  Negative for abdominal pain, anal bleeding, blood in stool, constipation, diarrhea, nausea and vomiting.   Endocrine: Negative for polyuria.   Genitourinary:  Negative for decreased urine volume, difficulty urinating, dysuria and hematuria.   Musculoskeletal:  Negative for arthralgias, back pain, myalgias and neck pain.   Skin:  Positive for rash.   Neurological:  Negative for dizziness, weakness, light-headedness and headaches.   Psychiatric/Behavioral:  Negative for suicidal ideas.    Objective:     Vital Signs (Most Recent):  Temp: 97.8 °F (36.6 °C) (02/18/23 1018)  Pulse: 70 (02/18/23 1030)  Resp: 16 (02/18/23 1018)  BP: (!) 141/79 (02/18/23 1030)  SpO2: (!) 94 % (02/18/23 1030)   Vital Signs (24h Range):  Temp:  [97.5 °F (36.4 °C)-98.6 °F (37 °C)] 97.8 °F (36.6 °C)  Pulse:  [60-78] 70  Resp:  [13-26] 16  SpO2:  [90 %-100 %] 94 %  BP: (113-153)/(67-87) 141/79     Weight: 83.9 kg (185 lb)  Body mass index is 32.77 kg/m².    Intake/Output Summary (Last 24 hours) at 2/18/2023 1335  Last data filed at 2/18/2023 0930  Gross per 24 hour   Intake 2300 ml   Output 250 ml   Net 2050 ml      Physical Exam  Vitals and nursing note reviewed.   Constitutional:       General: He is not in acute distress.     Appearance: Normal appearance. He is obese. He is not ill-appearing, toxic-appearing or diaphoretic.   HENT:      Head: Normocephalic and atraumatic.      Right Ear: External ear normal.      Left Ear: External ear  normal.      Nose: Nose normal.      Mouth/Throat:      Mouth: Mucous membranes are moist.      Pharynx: Oropharynx is clear.   Eyes:      Extraocular Movements: Extraocular movements intact.      Pupils: Pupils are equal, round, and reactive to light.   Neck:      Vascular: No carotid bruit.   Cardiovascular:      Rate and Rhythm: Normal rate and regular rhythm.      Pulses: Normal pulses.      Heart sounds: Normal heart sounds. No murmur heard.     Comments: Dorsalis pedis and posterior tibial pulses intact.   Pulmonary:      Effort: Pulmonary effort is normal. No respiratory distress.      Breath sounds: Normal breath sounds. No wheezing, rhonchi or rales.   Abdominal:      General: Bowel sounds are normal.      Palpations: Abdomen is soft.      Tenderness: There is no abdominal tenderness.   Musculoskeletal:      Right lower leg: No edema.      Left lower leg: No edema.      Comments: Necrotsis on R first and second toes and Left big toe.    Skin:     General: Skin is warm and dry.      Capillary Refill: Capillary refill takes less than 2 seconds.   Neurological:      Mental Status: He is alert and oriented to person, place, and time.      Sensory: No sensory deficit.      Motor: No weakness.   Psychiatric:         Mood and Affect: Mood normal.         Behavior: Behavior normal.       Significant Labs: All pertinent labs within the past 24 hours have been reviewed.    Significant Imaging: I have reviewed all pertinent imaging results/findings within the past 24 hours.

## 2023-02-18 NOTE — PATIENT INSTRUCTIONS
Clean wounds both feet daily with soap water re-dress with nonadherent dressing Vaseline gauze or Telfa keep covered.  Continue Cleocin.  Follow-up 1 week

## 2023-02-18 NOTE — OR NURSING
0935 Pt arrived from OR. S. AMIN. Pulses present.Dressing CDI.     1005 Pt VSS. Transported to [facility].  1015 Handoff given to Tomasa MARQUEZ.   Hr 63 Bp 137/81 Temp 97.8 02 sat 95%

## 2023-02-18 NOTE — ANESTHESIA PREPROCEDURE EVALUATION
02/18/2023  Syed Kelley is a 58 y.o., male.      Pre-op Assessment    I have reviewed the Patient Summary Reports.    I have reviewed the NPO Status.   I have reviewed the Medications.     Review of Systems  Social:  Non-Smoker, No Alcohol Use    Hematology/Oncology:  Hematology Normal   Oncology Normal     EENT/Dental:EENT/Dental Normal   Cardiovascular:   Hypertension hyperlipidemia ECG has been reviewed.  Peripheral Arterial Disease    Pulmonary:  Pulmonary Normal    Renal/:   Chronic Renal Disease    Hepatic/GI:  Hepatic/GI Normal    Musculoskeletal:  Musculoskeletal Normal    Neurological:  Neurology Normal    Endocrine:   Diabetes  Obesity / BMI > 30  Dermatological:  Skin Normal    Psych:   depression          Physical Exam  General: Well nourished, Cooperative, Alert and Oriented    Airway:  Mallampati: II / II  Mouth Opening: Normal  TM Distance: Normal  Neck ROM: Normal ROM    Dental:  Intact    Chest/Lungs:  Clear to auscultation    Heart:  Rate: Normal  Rhythm: Regular Rhythm  Sounds: Normal    Skin:Diabetic ulcer of right great toe      Chemistry        Component Value Date/Time     02/18/2023 0353    K 3.8 02/18/2023 0353     02/18/2023 0353    CO2 30 02/18/2023 0353    BUN 15 02/18/2023 0353    CREATININE 1.03 02/18/2023 0353     02/18/2023 0353        Component Value Date/Time    CALCIUM 8.5 02/18/2023 0353    ALKPHOS 86 02/17/2023 1315    AST 18 02/17/2023 1315    ALT 23 02/17/2023 1315    BILITOT 0.6 02/17/2023 1315    ESTGFRAFRICA 89 05/06/2021 1625    EGFRNONAA 76 06/08/2022 1102        Lab Results   Component Value Date    WBC 6.55 02/18/2023    HGB 12.8 (L) 02/18/2023    HCT 37.0 (L) 02/18/2023     02/18/2023     Results for orders placed or performed in visit on 12/06/21   EKG 12-lead    Collection Time: 12/06/21  3:36 PM    Narrative    Test Reason :  I10,    Vent. Rate : 091 BPM     Atrial Rate : 091 BPM     P-R Int : 158 ms          QRS Dur : 088 ms      QT Int : 352 ms       P-R-T Axes : 069 042 050 degrees     QTc Int : 432 ms    Normal sinus rhythm  Normal ECG  When compared with ECG of 07-MAY-2021 08:15,  No significant change was found  Confirmed by Nicolás Montes MD ([id number]) on 12/8/2021 10:08:02 AM    Referred By: MADELIN MONTES           Confirmed By:Nicolás Montes MD     Echo Summary        The left ventricle is normal in size with low normal systolic function.   The estimated ejection fraction is 50%.   Mild right ventricular enlargement with normal right ventricular systolic function.      Anesthesia Plan  Type of Anesthesia, risks & benefits discussed:    Anesthesia Type: Gen Supraglottic Airway  Intra-op Monitoring Plan: Standard ASA Monitors  Post Op Pain Control Plan: multimodal analgesia  Induction:  IV  Airway Plan: Direct  Informed Consent: Informed consent signed with the Patient and all parties understand the risks and agree with anesthesia plan.  All questions answered.   ASA Score: 3  Day of Surgery Review of History & Physical: H&P Update referred to the surgeon/provider.    Ready For Surgery From Anesthesia Perspective.     .

## 2023-02-18 NOTE — INTERVAL H&P NOTE
The patient has been examined and the H&P has been reviewed:    I concur with the findings and no changes have occurred since H&P was written.    Anesthesia/Surgery risks, benefits and alternative options discussed and understood by patient/family.          Active Hospital Problems    Diagnosis  POA    *Dry gangrene [I96]  Yes    PAD (peripheral artery disease) [I73.9]  Yes    Obesity, diabetes, and hypertension syndrome [E11.69, E66.9, E11.59, I15.2]  Yes    Type 2 diabetes mellitus without complication, without long-term current use of insulin [E11.9]  Yes      Resolved Hospital Problems    Diagnosis Date Resolved POA    Wound, open, toe, initial encounter [S91.109A] 02/17/2023 Yes

## 2023-02-18 NOTE — PROGRESS NOTES
[facility]  [facility] Medicine  Progress Note    Patient Name: Syed Kelley  MRN: [id number]  Patient Class: IP- Inpatient   Admission Date: 2/17/2023  Length of Stay: 1 days  Attending Physician: Yael Cohen MD  Primary Care Provider: Julio Cruz DO        Subjective:     Principal Problem:Dry gangrene        HPI:  58yowm with PMH of HTN, DM, HLD presents for 3 weeks of worsening necrotic areas on the toes bilaterally.  This began several weeks ago and has become progressively worse.  Reports enlarging black areas worse on the tips of his great toes bilaterally.  He denies any systemic symptoms.  Was on clindamycin OP.  Denies pain, denies neuropathy, no f/c, cp, sob, wheeze, cough.  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  ROS otherwise negative.       Overview/Hospital Course:  No notes on file    Interval History: No overnight events and patient without any complaints today. Plan for debridement today.     Review of Systems   Constitutional:  Negative for chills, fatigue, fever and unexpected weight change.   HENT:  Negative for congestion, hearing loss and trouble swallowing.    Eyes:  Negative for visual disturbance.   Respiratory:  Negative for cough, chest tightness, shortness of breath and wheezing.    Cardiovascular:  Negative for chest pain, palpitations and leg swelling.   Gastrointestinal:  Negative for abdominal pain, anal bleeding, blood in stool, constipation, diarrhea, nausea and vomiting.   Endocrine: Negative for polyuria.   Genitourinary:  Negative for decreased urine volume, difficulty urinating, dysuria and hematuria.   Musculoskeletal:  Negative for arthralgias, back pain, myalgias and neck pain.   Skin:  Positive for rash.   Neurological:  Negative for dizziness, weakness, light-headedness and headaches.   Psychiatric/Behavioral:  Negative for suicidal ideas.    Objective:     Vital Signs (Most Recent):  Temp: 97.8 °F (36.6 °C) (02/18/23  1018)  Pulse: 70 (02/18/23 1030)  Resp: 16 (02/18/23 1018)  BP: (!) 141/79 (02/18/23 1030)  SpO2: (!) 94 % (02/18/23 1030)   Vital Signs (24h Range):  Temp:  [97.5 °F (36.4 °C)-98.6 °F (37 °C)] 97.8 °F (36.6 °C)  Pulse:  [60-78] 70  Resp:  [13-26] 16  SpO2:  [90 %-100 %] 94 %  BP: (113-153)/(67-87) 141/79     Weight: 83.9 kg (185 lb)  Body mass index is 32.77 kg/m².    Intake/Output Summary (Last 24 hours) at 2/18/2023 1335  Last data filed at 2/18/2023 0930  Gross per 24 hour   Intake 2300 ml   Output 250 ml   Net 2050 ml      Physical Exam  Vitals and nursing note reviewed.   Constitutional:       General: He is not in acute distress.     Appearance: Normal appearance. He is obese. He is not ill-appearing, toxic-appearing or diaphoretic.   HENT:      Head: Normocephalic and atraumatic.      Right Ear: External ear normal.      Left Ear: External ear normal.      Nose: Nose normal.      Mouth/Throat:      Mouth: Mucous membranes are moist.      Pharynx: Oropharynx is clear.   Eyes:      Extraocular Movements: Extraocular movements intact.      Pupils: Pupils are equal, round, and reactive to light.   Neck:      Vascular: No carotid bruit.   Cardiovascular:      Rate and Rhythm: Normal rate and regular rhythm.      Pulses: Normal pulses.      Heart sounds: Normal heart sounds. No murmur heard.     Comments: Dorsalis pedis and posterior tibial pulses intact.   Pulmonary:      Effort: Pulmonary effort is normal. No respiratory distress.      Breath sounds: Normal breath sounds. No wheezing, rhonchi or rales.   Abdominal:      General: Bowel sounds are normal.      Palpations: Abdomen is soft.      Tenderness: There is no abdominal tenderness.   Musculoskeletal:      Right lower leg: No edema.      Left lower leg: No edema.      Comments: Necrotsis on R first and second toes and Left big toe.    Skin:     General: Skin is warm and dry.      Capillary Refill: Capillary refill takes less than 2 seconds.   Neurological:       Mental Status: He is alert and oriented to person, place, and time.      Sensory: No sensory deficit.      Motor: No weakness.   Psychiatric:         Mood and Affect: Mood normal.         Behavior: Behavior normal.       Significant Labs: All pertinent labs within the past 24 hours have been reviewed.    Significant Imaging: I have reviewed all pertinent imaging results/findings within the past 24 hours.      Assessment/Plan:      * Dry gangrene  Per vascular surgery  - 2/18: Debridement by Dr. Cohen      PAD (peripheral artery disease)  - Asa/statin      Type 2 diabetes mellitus without complication, without long-term current use of insulin  Will shoot for tight control  Basal at home 26u daily along with metformin and sulfonyurea  Will keep on 20u levimir daily  SSI  Hold orals    Obesity, diabetes, and hypertension syndrome  Body mass index is 32.77 kg/m². Morbid obesity complicates all aspects of disease management from diagnostic modalities to treatment. Weight loss encouraged and health benefits explained to patient.     Adjust hypertensives as indicated  Basal, ssi insulin    Essential hypertension, benign  - Home medications        VTE Risk Mitigation (From admission, onward)         Ordered     enoxaparin injection 40 mg  Daily         02/17/23 1531     IP VTE HIGH RISK PATIENT  Once         02/17/23 1531     Place sequential compression device  Until discontinued         02/17/23 1531                Discharge Planning   MILANA: 2/18/2023     Code Status: Full Code   Is the patient medically ready for discharge?:     Reason for patient still in hospital (select all that apply): Other (specify) Pt discharge today                     Linda Arteaga DO  Department of Hospital Medicine   [facility]

## 2023-02-18 NOTE — ANESTHESIA PROCEDURE NOTES
Intubation    Date/Time: 2/18/2023 8:51 AM  Performed by: Linda Larsen CRNA  Authorized by: Miah Ball MD     Intubation:     Induction:  Intravenous    Intubated:  Postinduction    Mask Ventilation:  Easy with oral airway    Attempts:  1    Attempted By:  CRNA    Method of Intubation:  Other (see comments)    Difficult Airway Encountered?: No      Complications:  None    Airway Device:  Supraglottic airway/LMA    Airway Device Size:  4.0    Style/Cuff Inflation:  Cuffed (inflated to minimal occlusive pressure)    Inflation Amount (mL):  10    Placement Verified By:  Capnometry    Complicating Factors:  None    Findings Post-Intubation:  BS equal bilateral and atraumatic/condition of teeth unchanged

## 2023-02-18 NOTE — TRANSFER OF CARE
"Anesthesia Transfer of Care Note    Patient: Syed Kelley    Procedure(s) Performed: Procedure(s) (LRB):  DEBRIDEMENT, LOWER EXTREMITY (Bilateral)    Patient location: PACU    Anesthesia Type: general    Transport from OR: Transported from OR on 6-10 L/min O2 by face mask with adequate spontaneous ventilation    Post pain: adequate analgesia    Post assessment: no apparent anesthetic complications    Post vital signs: stable    Level of consciousness: awake and alert    Nausea/Vomiting: no nausea/vomiting    Complications: none    Transfer of care protocol was followed      Last vitals:   Visit Vitals  /75   Pulse 71   Temp 36.7 °C (98.1 °F)   Resp 15   Ht 5' 3" (1.6 m)   Wt 83.9 kg (185 lb)   SpO2 100%   BMI 32.77 kg/m²     "

## 2023-02-18 NOTE — DISCHARGE SUMMARY
[facility]  Discharge Note  Short Stay    Procedure(s) (LRB):  DEBRIDEMENT, LOWER EXTREMITY (Bilateral)      OUTCOME: Patient tolerated treatment/procedure well without complication and is now ready for discharge.    DISPOSITION: Home or Self Care    FINAL DIAGNOSIS:  Dry gangrene    FOLLOWUP: In clinic    DISCHARGE INSTRUCTIONS:  No discharge procedures on file.     TIME SPENT ON DISCHARGE: 15 minutes

## 2023-02-19 NOTE — ANESTHESIA POSTPROCEDURE EVALUATION
Anesthesia Post Evaluation    Patient: Syed Kelley    Procedure(s) Performed: Procedure(s) (LRB):  DEBRIDEMENT, LOWER EXTREMITY (Bilateral)    Final Anesthesia Type: general      Patient location during evaluation: PACU  Patient participation: Yes- Able to Participate  Level of consciousness: awake and alert  Post-procedure vital signs: reviewed and stable  Pain management: adequate  Airway patency: patent  AC mitigation strategies: Multimodal analgesia  PONV status at discharge: No PONV  Anesthetic complications: no      Cardiovascular status: blood pressure returned to baseline  Respiratory status: unassisted  Hydration status: euvolemic  Follow-up not needed.          Vitals Value Taken Time   /62 02/18/23 1215   Temp 36.6 °C (97.8 °F) 02/18/23 1018   Pulse 71 02/18/23 1215   Resp 16 02/18/23 1018   SpO2 94 % 02/18/23 1215         Event Time   Out of Recovery 02/18/2023 10:05:00         Pain/Basilio Score: Basilio Score: 9 (2/18/2023 10:00 AM)

## 2023-02-20 LAB
MICROORGANISM SPEC CULT: ABNORMAL

## 2023-02-22 ENCOUNTER — OFFICE VISIT (OUTPATIENT)
Dept: FAMILY MEDICINE | Facility: CLINIC | Age: 58
End: 2023-02-22
Payer: COMMERCIAL

## 2023-02-22 VITALS
RESPIRATION RATE: 17 BRPM | BODY MASS INDEX: 36.5 KG/M2 | HEIGHT: 63 IN | WEIGHT: 206 LBS | HEART RATE: 75 BPM | OXYGEN SATURATION: 98 % | TEMPERATURE: 98 F | DIASTOLIC BLOOD PRESSURE: 78 MMHG | SYSTOLIC BLOOD PRESSURE: 122 MMHG

## 2023-02-22 DIAGNOSIS — E11.621 DIABETIC ULCER OF RIGHT GREAT TOE: ICD-10-CM

## 2023-02-22 DIAGNOSIS — L97.519 DIABETIC ULCER OF RIGHT GREAT TOE: ICD-10-CM

## 2023-02-22 PROCEDURE — 99213 OFFICE O/P EST LOW 20 MIN: CPT | Mod: ,,, | Performed by: FAMILY MEDICINE

## 2023-02-22 PROCEDURE — 3078F PR MOST RECENT DIASTOLIC BLOOD PRESSURE < 80 MM HG: ICD-10-PCS | Mod: CPTII,,, | Performed by: FAMILY MEDICINE

## 2023-02-22 PROCEDURE — 1159F MED LIST DOCD IN RCRD: CPT | Mod: CPTII,,, | Performed by: FAMILY MEDICINE

## 2023-02-22 PROCEDURE — 3074F SYST BP LT 130 MM HG: CPT | Mod: CPTII,,, | Performed by: FAMILY MEDICINE

## 2023-02-22 PROCEDURE — 99213 PR OFFICE/OUTPT VISIT, EST, LEVL III, 20-29 MIN: ICD-10-PCS | Mod: ,,, | Performed by: FAMILY MEDICINE

## 2023-02-22 PROCEDURE — 3008F BODY MASS INDEX DOCD: CPT | Mod: CPTII,,, | Performed by: FAMILY MEDICINE

## 2023-02-22 PROCEDURE — 3078F DIAST BP <80 MM HG: CPT | Mod: CPTII,,, | Performed by: FAMILY MEDICINE

## 2023-02-22 PROCEDURE — 3074F PR MOST RECENT SYSTOLIC BLOOD PRESSURE < 130 MM HG: ICD-10-PCS | Mod: CPTII,,, | Performed by: FAMILY MEDICINE

## 2023-02-22 PROCEDURE — 1159F PR MEDICATION LIST DOCUMENTED IN MEDICAL RECORD: ICD-10-PCS | Mod: CPTII,,, | Performed by: FAMILY MEDICINE

## 2023-02-22 PROCEDURE — 3008F PR BODY MASS INDEX (BMI) DOCUMENTED: ICD-10-PCS | Mod: CPTII,,, | Performed by: FAMILY MEDICINE

## 2023-02-24 RX ORDER — SULFAMETHOXAZOLE AND TRIMETHOPRIM 800; 160 MG/1; MG/1
1 TABLET ORAL 2 TIMES DAILY
Qty: 28 TABLET | Refills: 0 | Status: SHIPPED | OUTPATIENT
Start: 2023-02-24 | End: 2023-03-10

## 2023-02-24 NOTE — PROGRESS NOTES
Is is has had   Julio Cruz DO   [address] [address] [facility] [address]  [place]  [address]      PATIENT NAME: Syed Kelley  : 1965  DATE: 23  MRN: [id number]      Billing Provider: Julio Cruz DO  Level of Service:   Patient PCP Information       Provider PCP Type    Julio Cruz DO General            Reason for Visit / Chief Complaint: Follow-up and Toe Injury (Follow up from ER for wound/ulser on left great toe as well as right great toe and second toe.//)       Update PCP  Update Chief Complaint         History of Present Illness / Problem Focused Workflow     Syed Kelley presents to the clinic with Follow-up and Toe Injury (Follow up from ER for wound/ulser on left great toe as well as right great toe and second toe.//)     Patient is in for follow-up a debridement of his toes his large does both feet and his 2nd toe on his left foot.  He has been dressing the wounds daily.  Culture was obtained it does show 3 different bacteria all sensitive to Bactrim DS.  Patient currently not take the body.  He denies any pain in feet fever or chills.    Follow-up  Pertinent negatives include no abdominal pain, arthralgias, change in bowel habit, chest pain, chills, congestion, coughing, fatigue, fever, headaches, myalgias, nausea, neck pain, numbness, rash, sore throat, vertigo, vomiting or weakness.   Toe Injury  Pertinent negatives include no abdominal pain, arthralgias, change in bowel habit, chest pain, chills, congestion, coughing, fatigue, fever, headaches, myalgias, nausea, neck pain, numbness, rash, sore throat, vertigo, vomiting or weakness.     Review of Systems     Review of Systems   Constitutional:  Negative for activity change, appetite change, chills, fatigue and fever.   HENT:  Negative for nasal congestion, ear discharge, ear pain, mouth dryness, mouth sores, postnasal drip, sinus pressure/congestion, sore throat and voice change.    Eyes:  Negative for pain, discharge,  redness, itching and visual disturbance.   Respiratory:  Negative for apnea, cough, chest tightness, shortness of breath and wheezing.    Cardiovascular:  Negative for chest pain, palpitations and leg swelling.   Gastrointestinal:  Negative for abdominal distention, abdominal pain, anal bleeding, blood in stool, change in bowel habit, constipation, diarrhea, nausea, vomiting, reflux and change in bowel habit.   Endocrine: Negative for cold intolerance, heat intolerance, polydipsia, polyphagia and polyuria.   Genitourinary:  Negative for difficulty urinating, enuresis, erectile dysfunction, frequency, genital sores, hematuria and urgency.   Musculoskeletal:  Negative for arthralgias, back pain, gait problem, leg pain, myalgias and neck pain.   Integumentary:  Positive for wound. Negative for rash, mole/lesion, breast mass and breast discharge.   Allergic/Immunologic: Negative for environmental allergies and food allergies.   Neurological:  Negative for dizziness, vertigo, tremors, seizures, syncope, facial asymmetry, speech difficulty, weakness, light-headedness, numbness, headaches, coordination difficulties, memory loss and coordination difficulties.   Hematological:  Negative for adenopathy. Does not bruise/bleed easily.   Psychiatric/Behavioral:  Negative for agitation, behavioral problems, confusion, decreased concentration, dysphoric mood, hallucinations, self-injury, sleep disturbance and suicidal ideas. The patient is not nervous/anxious and is not hyperactive.    Breast: Negative for mass    Medical / Social / Family History     Past Medical History:   Diagnosis Date    Adult attention deficit disorder 3/22/2021    Controlled type 2 diabetes mellitus with hyperglycemia, without long-term current use of insulin 3/22/2021    Disorder of kidney and ureter     Essential hypertension 3/22/2021    Hyperlipidemia        Past Surgical History:   Procedure Laterality Date    APPENDECTOMY      CARDIAC CATHETERIZATION       DEBRIDEMENT OF LOWER EXTREMITY Bilateral 2/18/2023    Procedure: DEBRIDEMENT, LOWER EXTREMITY;  Surgeon: Yael Cohen MD;  Location: [facility] OR;  Service: General;  Laterality: Bilateral;  debride toes    LEFT HEART CATHETERIZATION N/A 5/7/2021    Procedure: Left heart cath with possible intervention;  Surgeon: Federico James DO;  Location: [facility] CATH LAB;  Service: Cardiology;  Laterality: N/A;       Social History    reports that he quit smoking about 38 years ago. His smoking use included cigarettes. He started smoking about 42 years ago. He has a 20.00 pack-year smoking history. His smokeless tobacco use includes chew and snuff. He reports current alcohol use. He reports that he does not use drugs.    Family History  's family history includes Cancer in his sister; Hypertension in his father; Stroke in his father.    Medications and Allergies     Medications  Outpatient Medications Marked as Taking for the 2/22/23 encounter (Office Visit) with Julio Cruz DO   Medication Sig Dispense Refill    albuterol (PROVENTIL/VENTOLIN HFA) 90 mcg/actuation inhaler Inhale 2 puffs into the lungs every 4 (four) hours as needed for Shortness of Breath.      aspirin (ECOTRIN) 81 MG EC tablet Take 1 tablet (81 mg total) by mouth once daily. 90 tablet 3    atorvastatin (LIPITOR) 80 MG tablet Take 1 tablet (80 mg total) by mouth once daily. 90 tablet 3    cholecalciferol, vitamin D3, (VITAMIN D3) 50 mcg (2,000 unit) Cap Take 1 capsule by mouth once daily.      citalopram (CELEXA) 20 MG tablet Take 1 tablet (20 mg total) by mouth once daily. 90 tablet 1    dextroamphetamine-amphetamine (ADDERALL XR) 20 MG 24 hr capsule Take 1 capsule (20 mg total) by mouth 2 (two) times a day. 60 capsule 0    docosahexaenoic acid/epa (FISH OIL ORAL) Take 1 capsule by mouth once daily.      glyBURIDE (DIABETA) 5 MG tablet Take 1 tablet (5 mg total) by mouth daily with breakfast. 90 tablet 3    HYDROcodone-acetaminophen  "(NORCO) 7.5-325 mg per tablet Take 1 tablet by mouth every 6 (six) hours as needed for Pain. 20 tablet 0    insulin degludec (TRESIBA FLEXTOUCH U-200) 200 unit/mL (3 mL) insulin pen Inject 26 Units into the skin once daily. 3 pen 3    insulin syringe-needle U-100 0.3 mL 31 gauge x 5/16" Syrg Use once daily for insulin injection      lisinopriL-hydrochlorothiazide (PRINZIDE,ZESTORETIC) 20-25 mg Tab Take 1 tablet by mouth once daily. 90 tablet 1    metFORMIN (GLUCOPHAGE) 1000 MG tablet Take 1 tablet (1,000 mg total) by mouth 2 (two) times daily with meals. 180 tablet 1    metoprolol tartrate (LOPRESSOR) 50 MG tablet Take 1 tablet (50 mg total) by mouth 2 (two) times daily. 180 tablet 1    pen needle, diabetic 31 gauge x 5/16" Ndle Use to administer insulin twice daily         Allergies  Review of patient's allergies indicates:   Allergen Reactions    Azithromycin     M-mycin        Physical Examination     Vitals:    02/22/23 1617   BP: 122/78   Pulse: 75   Resp: 17   Temp: 98.1 °F (36.7 °C)     Physical Exam  Constitutional:       Appearance: Normal appearance. He is obese.   Musculoskeletal:         General: Normal range of motion.   Skin:     General: Skin is warm.      Findings: Erythema and lesion present.      Comments: Second toe left foot distally and there is granulation tissue in the some erythema but no drainage no redness to itself.  The large does also got her erosion down to soft tissue it i.e. fatty layer with good granulation noted in no drainage noted.  The right large toe is good granulation distally.   Neurological:      General: No focal deficit present.      Mental Status: He is alert and oriented to person, place, and time. Mental status is at baseline.             Lab Results   Component Value Date    WBC 6.55 02/18/2023    HGB 12.8 (L) 02/18/2023    HCT 37.0 (L) 02/18/2023    MCV 88.3 02/18/2023     02/18/2023          Sodium   Date Value Ref Range Status   02/18/2023 141 136 - 145 " mmol/L Final     Potassium   Date Value Ref Range Status   02/18/2023 3.8 3.5 - 5.1 mmol/L Final     Chloride   Date Value Ref Range Status   02/18/2023 103 98 - 107 mmol/L Final     CO2   Date Value Ref Range Status   02/18/2023 30 21 - 32 mmol/L Final     Glucose   Date Value Ref Range Status   02/18/2023 100 74 - 106 mg/dL Final     BUN   Date Value Ref Range Status   02/18/2023 15 7 - 18 mg/dL Final     Creatinine   Date Value Ref Range Status   02/18/2023 1.03 0.70 - 1.30 mg/dL Final     Calcium   Date Value Ref Range Status   02/18/2023 8.5 8.5 - 10.1 mg/dL Final     Total Protein   Date Value Ref Range Status   02/17/2023 8.0 6.4 - 8.2 g/dL Final     Albumin   Date Value Ref Range Status   02/17/2023 3.6 3.5 - 5.0 g/dL Final     Bilirubin, Total   Date Value Ref Range Status   02/17/2023 0.6 >0.0 - 1.2 mg/dL Final     Alk Phos   Date Value Ref Range Status   02/17/2023 86 45 - 115 U/L Final     AST   Date Value Ref Range Status   02/17/2023 18 15 - 37 U/L Final     ALT   Date Value Ref Range Status   02/17/2023 23 16 - 61 U/L Final     Anion Gap   Date Value Ref Range Status   02/18/2023 12 7 - 16 mmol/L Final     eGFR    Date Value Ref Range Status   05/06/2021 89       eGFR   Date Value Ref Range Status   06/08/2022 76 >=60 mL/min/1.73m² Final      US Abdominal Aorta  Narrative: EXAMINATION:  US ABDOMINAL AORTA    CLINICAL HISTORY:  eval thrombus aaa;Abdominal Pain    TECHNIQUE:  Grayscale and color Doppler imaging performed    COMPARISON:  None available    FINDINGS:  There is calcification and or bowel limiting majority of the evaluation of the abdominal aorta.  Some of the proximal aorta is imaged and appears within normal limits measures up to 2.6 cm..  Impression: Limited evaluation of the aorta especially distally with calcification and or bowel obscuring the aorta.    Ultrasound images stored and captured.    Electronically signed by: Mohsen  Wijessica  Date:    02/17/2023  Time:    17:07  US Lower Extrem Arteries Bilat with JEANNIE (xpd)  Narrative: EXAMINATION:  US ARTERIAL LOWER EXTREMITY BILAT WITH JEANNIE (XPD)    CLINICAL HISTORY:  pvd;  peripheral vascular disease.  Soft tissue wounds both lower extremities    TECHNIQUE:  Grayscale, pulsed-wave Doppler, and color flow Doppler images of both lower extremities were obtained. Real-time ultrasound images are captured and archived. Blood pressures were also obtained at the upper arms and lower extremities bilaterally.    COMPARISON:  No previous similar    FINDINGS:  Right brachial pressure: Not measured because of the presence of a venous line    Right low thigh JEANNIE: 1.43    Right posterior tibialis JEANNIE: 1.34    Right dorsalis pedis JEANNIE: 1.44    Right digit JEANNIE: Not compressible    Left brachial pressure: 139 mmHg    Left low thigh JEANNIE: 1.39    Left posterior tibialis JEANNIE: 1.19    Left dorsalis pedis JEANNIE: 1.37    Left digit JEANNIE: Not compressible    Peak systolic arterial velocities are as follows:    Right CFA: 124 cm/s triphasic with dampening    Right profundus: 44 cm/s triphasic with dampening    Right SFA proximal: 94 cm/s triphasic with dampening    Right SFA mid: 99 cm/s triphasic with dampening    Right SFA distal: 87 cm/s triphasic with dampening    Right popliteal proximal: 98 cm/s triphasic with dampening    Right popliteal mid: 94 cm/s triphasic with dampening    Right popliteal distal: 111 cm/s triphasic with dampening    Right PTA: 88 cm/s biphasic with dampening    Right DPA: 109 cm/s biphasic with dampening    Left CFA: 103 cm/s triphasic with dampening    Left profundus: 49 cm/s biphasic    Left SFA proximal: 99 cm/s triphasic with dampening    Left SFA mid: 118 cm/s triphasic with dampening    Left SFA distal: 110 cm/s triphasic with dampening    Left popliteal proximal: 111 cm/s triphasic with dampening    Left popliteal mid: 97 cm/s triphasic with dampening    Left popliteal distal: 105 cm/s  triphasic with dampening    Left PTA: 93 cm/s biphasic with dampening    Left DPA: 99 cm/s biphasic with dampening    No areas of occlusion are identified on the color Doppler images.  Impression: No areas of focal occlusion on the color Doppler images    Ankle brachial indices are mildly elevated    No doubling of peak systolic velocity between adjacent stations    JEANNIE index values    >1.0 = normal    >0.9-1.0= may be within normal limits    0.8 and-0.9 = mild arterial disease    0.5-0.8= claudication moderate disease    <0.5 = rest pain severe arterial disease    The Ultrasound images were captured and stored.    Place of service: [facility]    Electronically signed by: Kingsley Mcconnell  Date:    02/17/2023  Time:    17:02     Procedures   Lab Results   Component Value Date    CHOL 128 12/22/2022    CHOL 150 08/15/2022    CHOL 182 06/08/2022     Lab Results   Component Value Date    HDL 32 (L) 12/22/2022    HDL 36 (L) 08/15/2022    HDL 29 (L) 06/08/2022     Lab Results   Component Value Date    LDLCALC 53 12/22/2022    LDLCALC 43 08/15/2022    LDLCALC  06/08/2022      Comment:      Unable to calculate due to one of the following values:  Cholesterol <5  HDL Cholesterol <5  Triglycerides <10 or >400     Lab Results   Component Value Date    TRIG 215 (H) 12/22/2022    TRIG 355 (H) 08/15/2022    TRIG 884 (H) 06/08/2022     Lab Results   Component Value Date    CHOLHDL 4.0 12/22/2022    CHOLHDL 4.2 08/15/2022    CHOLHDL 6.3 06/08/2022      Lab Results   Component Value Date    HGBA1C 6.8 (H) 12/22/2022      Lab Results   Component Value Date    TSH 3.070 02/17/2023      Assessment and Plan (including Health Maintenance)      Problem List  Smart Sets  Document Outside HM   :    Plan:         Health Maintenance Due   Topic Date Due    Hepatitis C Screening  Never done    Pneumococcal Vaccines (Age 0-64) (1 - PCV) Never done    Eye Exam  Never done    HIV Screening  Never done    TETANUS VACCINE  Never done     Colorectal Cancer Screening  Never done    Influenza Vaccine (1) Never done       Problem List Items Addressed This Visit          Endocrine    Diabetic ulcer of right great toe    Current Assessment & Plan     Patient had debridement  General surgery of his right and left great toes for infection and 2nd toe left foot distally..  He has been treating at home with the wound care.  He has wound culture that shows Enterobacter Citrobacter and Staph aureus all sensitive to Bactrim DS.  We have started antibiotics  Bactrim DS 1 twice a day for 2 weeks.  Continue wound care daily on his too large does in his 2nd toe on his left foot.  Follow back here in 1 week.  Will continue dressing the wounds with Aquacel Silver         Relevant Medications    sulfamethoxazole-trimethoprim 800-160mg (BACTRIM DS) 800-160 mg Tab       Health Maintenance Topics with due status: Not Due       Topic Last Completion Date    Diabetes Urine Screening 12/22/2022    Foot Exam 12/22/2022    Lipid Panel 12/22/2022    Hemoglobin A1c 12/22/2022       Future Appointments   Date Time Provider Department Center   2/27/2023  1:30 PM KAIT Aguirre [facility]   3/2/2023  2:15 PM Jn Estrada MD [facility]   3/6/2023  2:45 PM Julio Cruz DO [facility] [place]   3/16/2023  1:30 PM Julio Cruz DO [facility] [place]   6/12/2023  9:00 AM Julio Cruz DO [facility] [place]        Follow up in about 1 week (around 3/1/2023).     Signature:  DO Nirali Boyce [address] [address]  [place]  [address]    Date of encounter: 2/22/23

## 2023-02-24 NOTE — ASSESSMENT & PLAN NOTE
Patient had debridement  General surgery of his right and left great toes for infection and 2nd toe left foot distally..  He has been treating at home with the wound care.  He has wound culture that shows Enterobacter Citrobacter and Staph aureus all sensitive to Bactrim DS.  We have started antibiotics  Bactrim DS 1 twice a day for 2 weeks.  Continue wound care daily on his too large does in his 2nd toe on his left foot.  Follow back here in 1 week.  Will continue dressing the wounds with Aquacel Silver

## 2023-02-27 ENCOUNTER — OFFICE VISIT (OUTPATIENT)
Dept: SURGERY | Facility: CLINIC | Age: 58
End: 2023-02-27
Payer: COMMERCIAL

## 2023-02-27 VITALS — WEIGHT: 206 LBS | HEIGHT: 63 IN | BODY MASS INDEX: 36.5 KG/M2

## 2023-02-27 DIAGNOSIS — E11.628 DIABETIC FOOT INFECTION: Primary | ICD-10-CM

## 2023-02-27 DIAGNOSIS — L08.9 DIABETIC FOOT INFECTION: Primary | ICD-10-CM

## 2023-02-27 PROCEDURE — 1159F MED LIST DOCD IN RCRD: CPT | Mod: CPTII,,, | Performed by: SURGERY

## 2023-02-27 PROCEDURE — 1160F PR REVIEW ALL MEDS BY PRESCRIBER/CLIN PHARMACIST DOCUMENTED: ICD-10-PCS | Mod: CPTII,,, | Performed by: SURGERY

## 2023-02-27 PROCEDURE — 99214 OFFICE O/P EST MOD 30 MIN: CPT | Mod: PBBFAC | Performed by: SURGERY

## 2023-02-27 PROCEDURE — 3008F PR BODY MASS INDEX (BMI) DOCUMENTED: ICD-10-PCS | Mod: CPTII,,, | Performed by: SURGERY

## 2023-02-27 PROCEDURE — 3008F BODY MASS INDEX DOCD: CPT | Mod: CPTII,,, | Performed by: SURGERY

## 2023-02-27 PROCEDURE — 99213 OFFICE O/P EST LOW 20 MIN: CPT | Mod: S$PBB,,, | Performed by: SURGERY

## 2023-02-27 PROCEDURE — 1159F PR MEDICATION LIST DOCUMENTED IN MEDICAL RECORD: ICD-10-PCS | Mod: CPTII,,, | Performed by: SURGERY

## 2023-02-27 PROCEDURE — 99213 PR OFFICE/OUTPT VISIT, EST, LEVL III, 20-29 MIN: ICD-10-PCS | Mod: S$PBB,,, | Performed by: SURGERY

## 2023-02-27 PROCEDURE — 1160F RVW MEDS BY RX/DR IN RCRD: CPT | Mod: CPTII,,, | Performed by: SURGERY

## 2023-02-27 NOTE — PROGRESS NOTES
General surgery clinic    Normal ABIs     Debridement tissue above toes    All wounds clean healthy without cellulitis drainage or abnormality    Follow up me p.r.n. of educated patient he sees Dr. Bo regularly     Call for problems

## 2023-02-28 ENCOUNTER — OFFICE VISIT (OUTPATIENT)
Dept: FAMILY MEDICINE | Facility: CLINIC | Age: 58
End: 2023-02-28
Payer: COMMERCIAL

## 2023-02-28 VITALS
TEMPERATURE: 98 F | WEIGHT: 206 LBS | HEART RATE: 74 BPM | DIASTOLIC BLOOD PRESSURE: 60 MMHG | RESPIRATION RATE: 18 BRPM | SYSTOLIC BLOOD PRESSURE: 110 MMHG | HEIGHT: 66 IN | BODY MASS INDEX: 33.11 KG/M2 | OXYGEN SATURATION: 98 %

## 2023-02-28 DIAGNOSIS — F98.8 ADULT ATTENTION DEFICIT DISORDER: Primary | ICD-10-CM

## 2023-02-28 PROCEDURE — 3008F BODY MASS INDEX DOCD: CPT | Mod: CPTII,,, | Performed by: FAMILY MEDICINE

## 2023-02-28 PROCEDURE — 3008F PR BODY MASS INDEX (BMI) DOCUMENTED: ICD-10-PCS | Mod: CPTII,,, | Performed by: FAMILY MEDICINE

## 2023-02-28 PROCEDURE — 99214 OFFICE O/P EST MOD 30 MIN: CPT | Mod: ,,, | Performed by: FAMILY MEDICINE

## 2023-02-28 PROCEDURE — 3074F PR MOST RECENT SYSTOLIC BLOOD PRESSURE < 130 MM HG: ICD-10-PCS | Mod: CPTII,,, | Performed by: FAMILY MEDICINE

## 2023-02-28 PROCEDURE — 1159F MED LIST DOCD IN RCRD: CPT | Mod: CPTII,,, | Performed by: FAMILY MEDICINE

## 2023-02-28 PROCEDURE — 3074F SYST BP LT 130 MM HG: CPT | Mod: CPTII,,, | Performed by: FAMILY MEDICINE

## 2023-02-28 PROCEDURE — 99214 PR OFFICE/OUTPT VISIT, EST, LEVL IV, 30-39 MIN: ICD-10-PCS | Mod: ,,, | Performed by: FAMILY MEDICINE

## 2023-02-28 PROCEDURE — 3078F DIAST BP <80 MM HG: CPT | Mod: CPTII,,, | Performed by: FAMILY MEDICINE

## 2023-02-28 PROCEDURE — 3078F PR MOST RECENT DIASTOLIC BLOOD PRESSURE < 80 MM HG: ICD-10-PCS | Mod: CPTII,,, | Performed by: FAMILY MEDICINE

## 2023-02-28 PROCEDURE — 1160F RVW MEDS BY RX/DR IN RCRD: CPT | Mod: CPTII,,, | Performed by: FAMILY MEDICINE

## 2023-02-28 PROCEDURE — 1160F PR REVIEW ALL MEDS BY PRESCRIBER/CLIN PHARMACIST DOCUMENTED: ICD-10-PCS | Mod: CPTII,,, | Performed by: FAMILY MEDICINE

## 2023-02-28 PROCEDURE — 1159F PR MEDICATION LIST DOCUMENTED IN MEDICAL RECORD: ICD-10-PCS | Mod: CPTII,,, | Performed by: FAMILY MEDICINE

## 2023-02-28 RX ORDER — DEXTROAMPHETAMINE SACCHARATE, AMPHETAMINE ASPARTATE, DEXTROAMPHETAMINE SULFATE AND AMPHETAMINE SULFATE 5; 5; 5; 5 MG/1; MG/1; MG/1; MG/1
TABLET ORAL
Qty: 30 TABLET | Refills: 0 | Status: SHIPPED | OUTPATIENT
Start: 2023-02-28 | End: 2023-04-04

## 2023-02-28 RX ORDER — DEXTROAMPHETAMINE SACCHARATE, AMPHETAMINE ASPARTATE, DEXTROAMPHETAMINE SULFATE AND AMPHETAMINE SULFATE 7.5; 7.5; 7.5; 7.5 MG/1; MG/1; MG/1; MG/1
TABLET ORAL
Qty: 30 TABLET | Refills: 0 | Status: SHIPPED | OUTPATIENT
Start: 2023-02-28 | End: 2023-04-04

## 2023-02-28 NOTE — PROGRESS NOTES
Subjective:       Patient ID: Syed Kelley is a 58 y.o. male.    Chief Complaint: Medication Refill (Adderall 20 but wants its changed to 30 and want pills not capsules )    Pt wants adjustment of adhd meds, was on adderall 20mg bid but wants to go up to 30mg for the morning dose, has been on this dose before.     Review of Systems   Constitutional:  Negative for activity change, appetite change, chills, diaphoresis, fatigue, fever and unexpected weight change.   HENT:  Negative for nasal congestion, dental problem, drooling, ear discharge, ear pain, facial swelling, hearing loss, mouth sores, nosebleeds, postnasal drip, rhinorrhea, sinus pressure/congestion, sneezing, sore throat, tinnitus, trouble swallowing, voice change and goiter.    Eyes:  Negative for photophobia, pain, discharge, redness, itching and visual disturbance.   Respiratory:  Negative for apnea, cough, choking, chest tightness, shortness of breath, wheezing and stridor.    Cardiovascular:  Negative for chest pain, palpitations, leg swelling and claudication.   Gastrointestinal:  Negative for abdominal distention, abdominal pain, anal bleeding, blood in stool, change in bowel habit, constipation, diarrhea, nausea, vomiting, reflux, fecal incontinence and change in bowel habit.   Endocrine: Negative for cold intolerance, heat intolerance, polydipsia, polyphagia and polyuria.   Genitourinary:  Negative for bladder incontinence, decreased urine volume, difficulty urinating, discharge, dysuria, enuresis, erectile dysfunction, flank pain, frequency, genital sores, hematuria, penile pain, testicular pain and urgency.   Musculoskeletal:  Negative for arthralgias, back pain, gait problem, joint swelling, leg pain, myalgias, neck pain, neck stiffness and joint deformity.   Integumentary:  Negative for pallor, rash, wound and mole/lesion.   Allergic/Immunologic: Negative for environmental allergies, food allergies and frequent infections.   Neurological:   Negative for dizziness, vertigo, tremors, seizures, syncope, facial asymmetry, speech difficulty, weakness, light-headedness, numbness, headaches, coordination difficulties, memory loss and coordination difficulties.   Hematological:  Negative for adenopathy. Does not bruise/bleed easily.   Psychiatric/Behavioral:  Negative for agitation, behavioral problems, confusion, decreased concentration, dysphoric mood, hallucinations, self-injury, sleep disturbance and suicidal ideas. The patient is not nervous/anxious and is not hyperactive.        Objective:      Physical Exam  Vitals reviewed.   Constitutional:       Appearance: Normal appearance. He is normal weight.   HENT:      Head: Normocephalic and atraumatic.      Right Ear: Tympanic membrane and ear canal normal.      Left Ear: Tympanic membrane, ear canal and external ear normal.      Nose: Nose normal.      Mouth/Throat:      Mouth: Mucous membranes are moist.      Pharynx: Oropharynx is clear.   Eyes:      Extraocular Movements: Extraocular movements intact.      Conjunctiva/sclera: Conjunctivae normal.      Pupils: Pupils are equal, round, and reactive to light.   Cardiovascular:      Rate and Rhythm: Normal rate and regular rhythm.      Pulses: Normal pulses.      Heart sounds: Normal heart sounds.   Pulmonary:      Effort: Pulmonary effort is normal.      Breath sounds: Normal breath sounds.   Abdominal:      General: Abdomen is flat. Bowel sounds are normal.      Palpations: Abdomen is soft.   Musculoskeletal:         General: Normal range of motion.      Cervical back: Normal range of motion and neck supple.   Skin:     General: Skin is warm and dry.   Neurological:      General: No focal deficit present.      Mental Status: He is alert and oriented to person, place, and time. Mental status is at baseline.   Psychiatric:         Mood and Affect: Mood normal.         Behavior: Behavior normal.         Thought Content: Thought content normal.          Judgment: Judgment normal.       Assessment:       1. Adult attention deficit disorder          Plan:     Adult attention deficit disorder  -     dextroamphetamine-amphetamine (ADDERALL) 20 mg tablet; Take 1 tablet by mouth each afternoon  Dispense: 30 tablet; Refill: 0  -     dextroamphetamine-amphetamine (ADDERALL) 30 mg Tab; Take 1 tablet by mouth each morning  Dispense: 30 tablet; Refill: 0       Will switch adderall am dose to 30mg, will continue 20mg afternoon dose, f/u in 1 month.

## 2023-03-06 ENCOUNTER — OFFICE VISIT (OUTPATIENT)
Dept: FAMILY MEDICINE | Facility: CLINIC | Age: 58
End: 2023-03-06
Payer: COMMERCIAL

## 2023-03-06 VITALS
DIASTOLIC BLOOD PRESSURE: 80 MMHG | WEIGHT: 205 LBS | OXYGEN SATURATION: 96 % | TEMPERATURE: 98 F | SYSTOLIC BLOOD PRESSURE: 130 MMHG | RESPIRATION RATE: 22 BRPM | HEIGHT: 66 IN | HEART RATE: 80 BPM | BODY MASS INDEX: 32.95 KG/M2

## 2023-03-06 DIAGNOSIS — L97.529 DIABETIC ULCER OF TOES OF BOTH FEET: ICD-10-CM

## 2023-03-06 DIAGNOSIS — E11.621 DIABETIC ULCER OF TOES OF BOTH FEET: ICD-10-CM

## 2023-03-06 DIAGNOSIS — M79.604 RIGHT LEG PAIN: Primary | ICD-10-CM

## 2023-03-06 DIAGNOSIS — L97.519 DIABETIC ULCER OF TOES OF BOTH FEET: ICD-10-CM

## 2023-03-06 PROCEDURE — 99212 OFFICE O/P EST SF 10 MIN: CPT | Mod: ,,, | Performed by: FAMILY MEDICINE

## 2023-03-06 PROCEDURE — 3079F PR MOST RECENT DIASTOLIC BLOOD PRESSURE 80-89 MM HG: ICD-10-PCS | Mod: CPTII,,, | Performed by: FAMILY MEDICINE

## 2023-03-06 PROCEDURE — 99212 PR OFFICE/OUTPT VISIT, EST, LEVL II, 10-19 MIN: ICD-10-PCS | Mod: ,,, | Performed by: FAMILY MEDICINE

## 2023-03-06 PROCEDURE — 1159F PR MEDICATION LIST DOCUMENTED IN MEDICAL RECORD: ICD-10-PCS | Mod: CPTII,,, | Performed by: FAMILY MEDICINE

## 2023-03-06 PROCEDURE — 1159F MED LIST DOCD IN RCRD: CPT | Mod: CPTII,,, | Performed by: FAMILY MEDICINE

## 2023-03-06 PROCEDURE — 3075F PR MOST RECENT SYSTOLIC BLOOD PRESS GE 130-139MM HG: ICD-10-PCS | Mod: CPTII,,, | Performed by: FAMILY MEDICINE

## 2023-03-06 PROCEDURE — 1160F RVW MEDS BY RX/DR IN RCRD: CPT | Mod: CPTII,,, | Performed by: FAMILY MEDICINE

## 2023-03-06 PROCEDURE — 3079F DIAST BP 80-89 MM HG: CPT | Mod: CPTII,,, | Performed by: FAMILY MEDICINE

## 2023-03-06 PROCEDURE — 1160F PR REVIEW ALL MEDS BY PRESCRIBER/CLIN PHARMACIST DOCUMENTED: ICD-10-PCS | Mod: CPTII,,, | Performed by: FAMILY MEDICINE

## 2023-03-06 PROCEDURE — 3008F BODY MASS INDEX DOCD: CPT | Mod: CPTII,,, | Performed by: FAMILY MEDICINE

## 2023-03-06 PROCEDURE — 3008F PR BODY MASS INDEX (BMI) DOCUMENTED: ICD-10-PCS | Mod: CPTII,,, | Performed by: FAMILY MEDICINE

## 2023-03-06 PROCEDURE — 3075F SYST BP GE 130 - 139MM HG: CPT | Mod: CPTII,,, | Performed by: FAMILY MEDICINE

## 2023-03-07 ENCOUNTER — HOSPITAL ENCOUNTER (OUTPATIENT)
Dept: RADIOLOGY | Facility: HOSPITAL | Age: 58
Discharge: HOME | End: 2023-03-07
Attending: FAMILY MEDICINE
Payer: COMMERCIAL

## 2023-03-07 DIAGNOSIS — M79.604 RIGHT LEG PAIN: ICD-10-CM

## 2023-03-07 PROBLEM — L97.519 DIABETIC ULCER OF TOES OF BOTH FEET: Status: ACTIVE | Noted: 2023-03-07

## 2023-03-07 PROBLEM — E11.621 DIABETIC ULCER OF TOES OF BOTH FEET: Status: ACTIVE | Noted: 2023-03-07

## 2023-03-07 PROBLEM — L97.529 DIABETIC ULCER OF TOES OF BOTH FEET: Status: ACTIVE | Noted: 2023-03-07

## 2023-03-07 PROCEDURE — 93971 EXTREMITY STUDY: CPT | Mod: TC,RT

## 2023-03-07 NOTE — ASSESSMENT & PLAN NOTE
-right foot swelling associated with calf soreness suspicious for DVT   -will get venous U/S RLE tomorrow AM  -will start eliquis 2.5mg x 2 tonight and tomorrow AM (given samples of  2.5mg 14 tablets to the patient in clinic

## 2023-03-07 NOTE — ASSESSMENT & PLAN NOTE
S/p debridement per Dr. Cohen   Wound culture positive for Enterobacter, citrobacter, and staph aureus sensitive to bactrim    -ulcers of great toes improving  -continue bactrim until 3/10/23  -A1C 6.8 ( 12/22/22) Will closely follow for diabetes management   -Will repeat A1C in 1 month

## 2023-03-07 NOTE — PROGRESS NOTES
Subjective:       Patient ID: Syed Kelley is a 58 y.o. male.    Chief Complaint: Wound Check (Patient is here for follow up of diabetic ulcers to bilateral great toes. Patient is still taking his antibiotics(bactrim DS). Has seen Dr. Cohen for follow up after debridement.Dr. Cohen released patient to PCP for follow up. )    A 59 yo male with a pmh of type 2 DM, HTN, HLD presents for follow up on diabetic ulcers to great toes (bilateral) , and 2nd toe (left). Pt had been following Dr. Cohen and had debridement, and saw Dr. Cohen for follow up visit last week (2/27/23). Pt was noted to have positive wound culture with Enterobacter, Citrobacter, and staph aureus sensitive to Bactrim. Pt is continuing bactrim at this time ( 2/24-3/10/23).     Pt complains of swelling on his right foot associated with right calf soreness x 1 week. On physical exam, right foot appears to be swollen compared to his left foot suspicious for DVT. Pt will get venous U/S right leg tomorrow morning. Will start eliquis 2.5mg x 2 tonight and tomorrow morning.         Current Outpatient Medications:     albuterol (PROVENTIL/VENTOLIN HFA) 90 mcg/actuation inhaler, Inhale 2 puffs into the lungs every 4 (four) hours as needed for Shortness of Breath., Disp: , Rfl:     aspirin (ECOTRIN) 81 MG EC tablet, Take 1 tablet (81 mg total) by mouth once daily., Disp: 90 tablet, Rfl: 3    atorvastatin (LIPITOR) 80 MG tablet, Take 1 tablet (80 mg total) by mouth once daily., Disp: 90 tablet, Rfl: 3    cholecalciferol, vitamin D3, (VITAMIN D3) 50 mcg (2,000 unit) Cap, Take 1 capsule by mouth once daily., Disp: , Rfl:     citalopram (CELEXA) 20 MG tablet, Take 1 tablet (20 mg total) by mouth once daily., Disp: 90 tablet, Rfl: 1    dextroamphetamine-amphetamine (ADDERALL) 20 mg tablet, Take 1 tablet by mouth each afternoon, Disp: 30 tablet, Rfl: 0    dextroamphetamine-amphetamine (ADDERALL) 30 mg Tab, Take 1 tablet by mouth each morning, Disp: 30 tablet, Rfl: 0     "docosahexaenoic acid/epa (FISH OIL ORAL), Take 1 capsule by mouth once daily., Disp: , Rfl:     glyBURIDE (DIABETA) 5 MG tablet, Take 1 tablet (5 mg total) by mouth daily with breakfast., Disp: 90 tablet, Rfl: 3    HYDROcodone-acetaminophen (NORCO) 7.5-325 mg per tablet, Take 1 tablet by mouth every 6 (six) hours as needed for Pain., Disp: 20 tablet, Rfl: 0    insulin degludec (TRESIBA FLEXTOUCH U-200) 200 unit/mL (3 mL) insulin pen, Inject 26 Units into the skin once daily., Disp: 3 pen, Rfl: 3    insulin syringe-needle U-100 0.3 mL 31 gauge x 5/16" Syrg, Use once daily for insulin injection, Disp: , Rfl:     lisinopriL-hydrochlorothiazide (PRINZIDE,ZESTORETIC) 20-25 mg Tab, Take 1 tablet by mouth once daily., Disp: 90 tablet, Rfl: 1    metFORMIN (GLUCOPHAGE) 1000 MG tablet, Take 1 tablet (1,000 mg total) by mouth 2 (two) times daily with meals., Disp: 180 tablet, Rfl: 1    metoprolol tartrate (LOPRESSOR) 50 MG tablet, Take 1 tablet (50 mg total) by mouth 2 (two) times daily., Disp: 180 tablet, Rfl: 1    pen needle, diabetic 31 gauge x 5/16" Ndle, Use to administer insulin twice daily, Disp: , Rfl:     sulfamethoxazole-trimethoprim 800-160mg (BACTRIM DS) 800-160 mg Tab, Take 1 tablet by mouth 2 (two) times daily. for 14 days, Disp: 28 tablet, Rfl: 0    dextroamphetamine-amphetamine (ADDERALL XR) 20 MG 24 hr capsule, Take 1 capsule (20 mg total) by mouth 2 (two) times a day. (Patient not taking: Reported on 3/6/2023), Disp: 60 capsule, Rfl: 0    dextroamphetamine-amphetamine (ADDERALL XR) 30 MG 24 hr capsule, Take 1 capsule by mouth every morning and 1 capsule by mouth at 1:00 pm. (Patient not taking: Reported on 2/22/2023), Disp: 60 capsule, Rfl: 0    Review of patient's allergies indicates:   Allergen Reactions    Azithromycin     M-mycin        Past Medical History:   Diagnosis Date    Adult attention deficit disorder 3/22/2021    Controlled type 2 diabetes mellitus with hyperglycemia, without long-term current " use of insulin 3/22/2021    Disorder of kidney and ureter     Essential hypertension 3/22/2021    Hyperlipidemia        Past Surgical History:   Procedure Laterality Date    APPENDECTOMY      CARDIAC CATHETERIZATION      DEBRIDEMENT OF LOWER EXTREMITY Bilateral 2023    Procedure: DEBRIDEMENT, LOWER EXTREMITY;  Surgeon: Yael Cohen MD;  Location: [facility] OR;  Service: General;  Laterality: Bilateral;  debride toes    LEFT HEART CATHETERIZATION N/A 2021    Procedure: Left heart cath with possible intervention;  Surgeon: Federico James DO;  Location: [facility] CATH LAB;  Service: Cardiology;  Laterality: N/A;       Family History   Problem Relation Age of Onset    Hypertension Father     Stroke Father     Cancer Sister        Social History     Tobacco Use    Smoking status: Former     Packs/day: 5.00     Years: 4.00     Pack years: 20.00     Types: Cigarettes     Start date:      Quit date:      Years since quittin.2    Smokeless tobacco: Current     Types: Chew, Snuff    Tobacco comments:     1 can/day   Substance Use Topics    Alcohol use: Yes     Comment: 2-3 drinks per/3 months    Drug use: Never       Review of Systems   Constitutional:  Negative for chills and fever.   Respiratory:  Negative for chest tightness and shortness of breath.    Cardiovascular:  Negative for chest pain.   Gastrointestinal:  Negative for abdominal pain, nausea and vomiting.   Genitourinary:  Negative for difficulty urinating and dysuria.   Musculoskeletal:  Positive for leg pain.   Integumentary:  Positive for wound.        Swelling of right foot and soreness of right calf    Neurological:  Negative for dizziness and headaches.       Current Medications:   Medication List with Changes/Refills   Current Medications    ALBUTEROL (PROVENTIL/VENTOLIN HFA) 90 MCG/ACTUATION INHALER    Inhale 2 puffs into the lungs every 4 (four) hours as needed for Shortness of Breath.       Start Date: 2022End Date: --     "ASPIRIN (ECOTRIN) 81 MG EC TABLET    Take 1 tablet (81 mg total) by mouth once daily.       Start Date: 5/8/2021  End Date: --    ATORVASTATIN (LIPITOR) 80 MG TABLET    Take 1 tablet (80 mg total) by mouth once daily.       Start Date: 2/17/2023 End Date: 2/17/2024    CHOLECALCIFEROL, VITAMIN D3, (VITAMIN D3) 50 MCG (2,000 UNIT) CAP    Take 1 capsule by mouth once daily.       Start Date: --        End Date: --    CITALOPRAM (CELEXA) 20 MG TABLET    Take 1 tablet (20 mg total) by mouth once daily.       Start Date: 12/22/2022End Date: --    DEXTROAMPHETAMINE-AMPHETAMINE (ADDERALL XR) 20 MG 24 HR CAPSULE    Take 1 capsule (20 mg total) by mouth 2 (two) times a day.       Start Date: 10/10/2022End Date: --    DEXTROAMPHETAMINE-AMPHETAMINE (ADDERALL XR) 30 MG 24 HR CAPSULE    Take 1 capsule by mouth every morning and 1 capsule by mouth at 1:00 pm.       Start Date: 8/29/2022 End Date: --    DEXTROAMPHETAMINE-AMPHETAMINE (ADDERALL) 20 MG TABLET    Take 1 tablet by mouth each afternoon       Start Date: 2/28/2023 End Date: --    DEXTROAMPHETAMINE-AMPHETAMINE (ADDERALL) 30 MG TAB    Take 1 tablet by mouth each morning       Start Date: 2/28/2023 End Date: --    DOCOSAHEXAENOIC ACID/EPA (FISH OIL ORAL)    Take 1 capsule by mouth once daily.       Start Date: --        End Date: --    GLYBURIDE (DIABETA) 5 MG TABLET    Take 1 tablet (5 mg total) by mouth daily with breakfast.       Start Date: 2/17/2023 End Date: --    HYDROCODONE-ACETAMINOPHEN (NORCO) 7.5-325 MG PER TABLET    Take 1 tablet by mouth every 6 (six) hours as needed for Pain.       Start Date: 2/18/2023 End Date: --    INSULIN DEGLUDEC (TRESIBA FLEXTOUCH U-200) 200 UNIT/ML (3 ML) INSULIN PEN    Inject 26 Units into the skin once daily.       Start Date: 12/22/2022End Date: 12/22/2023    INSULIN SYRINGE-NEEDLE U-100 0.3 ML 31 GAUGE X 5/16" SYRG    Use once daily for insulin injection       Start Date: 6/8/2022  End Date: --    LISINOPRIL-HYDROCHLOROTHIAZIDE " "(PRINZIDE,ZESTORETIC) 20-25 MG TAB    Take 1 tablet by mouth once daily.       Start Date: 12/22/2022End Date: --    METFORMIN (GLUCOPHAGE) 1000 MG TABLET    Take 1 tablet (1,000 mg total) by mouth 2 (two) times daily with meals.       Start Date: 12/22/2022End Date: --    METOPROLOL TARTRATE (LOPRESSOR) 50 MG TABLET    Take 1 tablet (50 mg total) by mouth 2 (two) times daily.       Start Date: 12/22/2022End Date: --    PEN NEEDLE, DIABETIC 31 GAUGE X 5/16" NDLE    Use to administer insulin twice daily       Start Date: 9/18/2022 End Date: --    SULFAMETHOXAZOLE-TRIMETHOPRIM 800-160MG (BACTRIM DS) 800-160 MG TAB    Take 1 tablet by mouth 2 (two) times daily. for 14 days       Start Date: 2/24/2023 End Date: 3/10/2023            Objective:        Vitals:    03/06/23 1557   BP: 130/80   BP Location: Right arm   Patient Position: Sitting   BP Method: Medium (Manual)   Pulse: 80   Resp: (!) 22   Temp: 97.7 °F (36.5 °C)   TempSrc: Oral   SpO2: 96%   Weight: 93 kg (205 lb)   Height: 5' 6" (1.676 m)       Physical Exam  Constitutional:       Appearance: He is not ill-appearing, toxic-appearing or diaphoretic.   HENT:      Head: Normocephalic and atraumatic.      Nose: Nose normal.      Mouth/Throat:      Pharynx: Oropharynx is clear.   Eyes:      Conjunctiva/sclera: Conjunctivae normal.   Cardiovascular:      Rate and Rhythm: Normal rate and regular rhythm.      Pulses: Normal pulses.      Heart sounds: Normal heart sounds.   Pulmonary:      Effort: Pulmonary effort is normal.      Breath sounds: Normal breath sounds.   Abdominal:      General: Bowel sounds are normal.      Palpations: Abdomen is soft.   Feet:      Comments: Improved Ulcers on great toes without purulent drainage or abscess. S/p debridement. Right foot edema compared to left foot. Tenderness to palpation on right calf.    Skin:     General: Skin is warm.   Neurological:      General: No focal deficit present.           Lab Results   Component Value Date    " WBC 6.55 02/18/2023    HGB 12.8 (L) 02/18/2023    HCT 37.0 (L) 02/18/2023     02/18/2023    CHOL 128 12/22/2022    TRIG 215 (H) 12/22/2022    HDL 32 (L) 12/22/2022    ALT 23 02/17/2023    AST 18 02/17/2023     02/18/2023    K 3.8 02/18/2023     02/18/2023    CREATININE 1.03 02/18/2023    BUN 15 02/18/2023    CO2 30 02/18/2023    TSH 3.070 02/17/2023    INR 0.95 02/17/2023    HGBA1C 6.8 (H) 12/22/2022    MICROALBUR 1.9 12/22/2022      Assessment:       1. Right leg pain    2. Diabetic ulcer of toes of both feet          Plan:         Problem List Items Addressed This Visit          Endocrine    Diabetic ulcer of toes of both feet     S/p debridement per Dr. Cohen   Wound culture positive for Enterobacter, citrobacter, and staph aureus sensitive to bactrim    -ulcers of great toes improving  -continue bactrim until 3/10/23  -A1C 6.8 ( 12/22/22) Will closely follow for diabetes management   -Will repeat A1C in 1 month             Orthopedic    Right leg pain - Primary     -right foot swelling associated with calf soreness suspicious for DVT   -will get venous U/S RLE tomorrow AM  -will start eliquis 2.5mg x 2 tonight and tomorrow AM (given samples of  2.5mg 14 tablets to the patient in clinic           Relevant Orders    US Lower Extremity Veins Right         No follow-ups on file.    Madeline Narayan DO     Instructed patient that if symptoms fail to improve or worsen patient should seek immediate medical attention or report to the nearest emergency department. Patient expressed verbal agreement and understanding to this plan of care.

## 2023-03-16 ENCOUNTER — OFFICE VISIT (OUTPATIENT)
Dept: FAMILY MEDICINE | Facility: CLINIC | Age: 58
End: 2023-03-16
Payer: COMMERCIAL

## 2023-03-16 VITALS
HEIGHT: 66 IN | DIASTOLIC BLOOD PRESSURE: 82 MMHG | BODY MASS INDEX: 33.24 KG/M2 | TEMPERATURE: 98 F | OXYGEN SATURATION: 97 % | RESPIRATION RATE: 22 BRPM | HEART RATE: 76 BPM | WEIGHT: 206.81 LBS | SYSTOLIC BLOOD PRESSURE: 138 MMHG

## 2023-03-16 DIAGNOSIS — E11.621 DIABETIC ULCER OF TOES OF BOTH FEET: Primary | ICD-10-CM

## 2023-03-16 DIAGNOSIS — L97.519 DIABETIC ULCER OF TOES OF BOTH FEET: Primary | ICD-10-CM

## 2023-03-16 DIAGNOSIS — L97.529 DIABETIC ULCER OF TOES OF BOTH FEET: Primary | ICD-10-CM

## 2023-03-16 PROCEDURE — 3079F PR MOST RECENT DIASTOLIC BLOOD PRESSURE 80-89 MM HG: ICD-10-PCS | Mod: CPTII,,, | Performed by: FAMILY MEDICINE

## 2023-03-16 PROCEDURE — 1159F MED LIST DOCD IN RCRD: CPT | Mod: CPTII,,, | Performed by: FAMILY MEDICINE

## 2023-03-16 PROCEDURE — 3075F PR MOST RECENT SYSTOLIC BLOOD PRESS GE 130-139MM HG: ICD-10-PCS | Mod: CPTII,,, | Performed by: FAMILY MEDICINE

## 2023-03-16 PROCEDURE — 1159F PR MEDICATION LIST DOCUMENTED IN MEDICAL RECORD: ICD-10-PCS | Mod: CPTII,,, | Performed by: FAMILY MEDICINE

## 2023-03-16 PROCEDURE — 3079F DIAST BP 80-89 MM HG: CPT | Mod: CPTII,,, | Performed by: FAMILY MEDICINE

## 2023-03-16 PROCEDURE — 3075F SYST BP GE 130 - 139MM HG: CPT | Mod: CPTII,,, | Performed by: FAMILY MEDICINE

## 2023-03-16 PROCEDURE — 3008F BODY MASS INDEX DOCD: CPT | Mod: CPTII,,, | Performed by: FAMILY MEDICINE

## 2023-03-16 PROCEDURE — 99213 PR OFFICE/OUTPT VISIT, EST, LEVL III, 20-29 MIN: ICD-10-PCS | Mod: ,,, | Performed by: FAMILY MEDICINE

## 2023-03-16 PROCEDURE — 3008F PR BODY MASS INDEX (BMI) DOCUMENTED: ICD-10-PCS | Mod: CPTII,,, | Performed by: FAMILY MEDICINE

## 2023-03-16 PROCEDURE — 99213 OFFICE O/P EST LOW 20 MIN: CPT | Mod: ,,, | Performed by: FAMILY MEDICINE

## 2023-03-16 RX ORDER — SULFAMETHOXAZOLE AND TRIMETHOPRIM 800; 160 MG/1; MG/1
1 TABLET ORAL 2 TIMES DAILY
Qty: 30 TABLET | Refills: 1 | Status: ON HOLD | OUTPATIENT
Start: 2023-03-16 | End: 2023-03-30 | Stop reason: HOSPADM

## 2023-03-17 NOTE — PROGRESS NOTES
Julio Cruz DO   [address] [place]  [address]      PATIENT NAME: Syed Kelley  : 1965  DATE: 3/16/23  MRN: [id number]      Billing Provider: Julio Cruz DO  Level of Service:   Patient PCP Information       Provider PCP Type    Julio Cruz DO General            Reason for Visit / Chief Complaint: Wound Check (Patient is here for wound check of bilateral great toes. Patient reports that his toe has been bleeding some with pain after being up for several hours. Completed Bactrim.)       Update PCP  Update Chief Complaint         History of Present Illness / Problem Focused Workflow     Syed Kelley presents to the clinic with Wound Check (Patient is here for wound check of bilateral great toes. Patient reports that his toe has been bleeding some with pain after being up for several hours. 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  He denies any fever chills or sweats.  He states the redness and drainage have decreased dramatically.  Patient changes his dressings on a daily basis.      Review of Systems     Review of Systems   Constitutional:  Negative for activity change, appetite change, chills, fatigue and fever.   HENT:  Negative for nasal congestion, ear discharge, ear pain, mouth dryness, mouth sores, postnasal drip, sinus pressure/congestion, sore throat and voice change.    Eyes:  Negative for pain, discharge, redness, itching and visual disturbance.   Respiratory:  Negative for apnea, cough, chest tightness, shortness of breath and wheezing.    Cardiovascular:  Negative for chest pain, palpitations and leg swelling.   Gastrointestinal:  Negative for abdominal distention, abdominal pain, anal bleeding, blood in stool, change in bowel habit, constipation, diarrhea, nausea, vomiting, reflux and change in bowel  habit.   Endocrine: Negative for cold intolerance, heat intolerance, polydipsia, polyphagia and polyuria.   Genitourinary:  Negative for difficulty urinating, enuresis, erectile dysfunction, frequency, genital sores, hematuria and urgency.   Musculoskeletal:  Negative for arthralgias, back pain, gait problem, leg pain, myalgias and neck pain.   Integumentary:  Positive for wound. Negative for rash, mole/lesion, breast mass and breast discharge.   Allergic/Immunologic: Negative for environmental allergies and food allergies.   Neurological:  Negative for dizziness, vertigo, tremors, seizures, syncope, facial asymmetry, speech difficulty, weakness, light-headedness, numbness, headaches, coordination difficulties, memory loss and coordination difficulties.   Hematological:  Negative for adenopathy. Does not bruise/bleed easily.   Psychiatric/Behavioral:  Negative for agitation, behavioral problems, confusion, decreased concentration, dysphoric mood, hallucinations, self-injury, sleep disturbance and suicidal ideas. The patient is not nervous/anxious and is not hyperactive.    Breast: Negative for mass    Medical / Social / Family History     Past Medical History:   Diagnosis Date    Adult attention deficit disorder 3/22/2021    Controlled type 2 diabetes mellitus with hyperglycemia, without long-term current use of insulin 3/22/2021    Disorder of kidney and ureter     Essential hypertension 3/22/2021    Hyperlipidemia        Past Surgical History:   Procedure Laterality Date    APPENDECTOMY      CARDIAC CATHETERIZATION      DEBRIDEMENT OF LOWER EXTREMITY Bilateral 2/18/2023    Procedure: DEBRIDEMENT, LOWER EXTREMITY;  Surgeon: Yael Cohen MD;  Location: [facility] OR;  Service: General;  Laterality: Bilateral;  debride toes    LEFT HEART CATHETERIZATION N/A 5/7/2021    Procedure: Left heart cath with possible intervention;  Surgeon: Federico James DO;  Location: [facility] CATH LAB;  Service: Cardiology;   "Laterality: N/A;       Social History    reports that he quit smoking about 38 years ago. His smoking use included cigarettes. He started smoking about 42 years ago. He has a 20.00 pack-year smoking history. His smokeless tobacco use includes chew and snuff. He reports current alcohol use. He reports that he does not use drugs.    Family History  's family history includes Cancer in his sister; Hypertension in his father; Stroke in his father.    Medications and Allergies     Medications  Outpatient Medications Marked as Taking for the 3/16/23 encounter (Office Visit) with Julio Cruz, DO   Medication Sig Dispense Refill    albuterol (PROVENTIL/VENTOLIN HFA) 90 mcg/actuation inhaler Inhale 2 puffs into the lungs every 4 (four) hours as needed for Shortness of Breath.      aspirin (ECOTRIN) 81 MG EC tablet Take 1 tablet (81 mg total) by mouth once daily. 90 tablet 3    atorvastatin (LIPITOR) 80 MG tablet Take 1 tablet (80 mg total) by mouth once daily. 90 tablet 3    cholecalciferol, vitamin D3, (VITAMIN D3) 50 mcg (2,000 unit) Cap Take 1 capsule by mouth once daily.      citalopram (CELEXA) 20 MG tablet Take 1 tablet (20 mg total) by mouth once daily. 90 tablet 1    dextroamphetamine-amphetamine (ADDERALL) 20 mg tablet Take 1 tablet by mouth each afternoon 30 tablet 0    dextroamphetamine-amphetamine (ADDERALL) 30 mg Tab Take 1 tablet by mouth each morning 30 tablet 0    docosahexaenoic acid/epa (FISH OIL ORAL) Take 1 capsule by mouth once daily.      glyBURIDE (DIABETA) 5 MG tablet Take 1 tablet (5 mg total) by mouth daily with breakfast. 90 tablet 3    HYDROcodone-acetaminophen (NORCO) 7.5-325 mg per tablet Take 1 tablet by mouth every 6 (six) hours as needed for Pain. 20 tablet 0    insulin degludec (TRESIBA FLEXTOUCH U-200) 200 unit/mL (3 mL) insulin pen Inject 26 Units into the skin once daily. 3 pen 3    insulin syringe-needle U-100 0.3 mL 31 gauge x 5/16" Syrg Use once daily for insulin injection   " "   lisinopriL-hydrochlorothiazide (PRINZIDE,ZESTORETIC) 20-25 mg Tab Take 1 tablet by mouth once daily. 90 tablet 1    metFORMIN (GLUCOPHAGE) 1000 MG tablet Take 1 tablet (1,000 mg total) by mouth 2 (two) times daily with meals. 180 tablet 1    metoprolol tartrate (LOPRESSOR) 50 MG tablet Take 1 tablet (50 mg total) by mouth 2 (two) times daily. 180 tablet 1    pen needle, diabetic 31 gauge x 5/16" Ndle Use to administer insulin twice daily         Allergies  Review of patient's allergies indicates:   Allergen Reactions    Azithromycin     M-mycin        Physical Examination     Vitals:    03/16/23 1414   BP: 138/82   Pulse: 76   Resp: (!) 22   Temp: 97.8 °F (36.6 °C)     Physical Exam  Constitutional:       Appearance: Normal appearance.   HENT:      Head: Normocephalic.      Nose: Nose normal.      Mouth/Throat:      Mouth: Mucous membranes are moist.      Pharynx: Oropharynx is clear. No oropharyngeal exudate or posterior oropharyngeal erythema.   Eyes:      General: No scleral icterus.        Right eye: No discharge.         Left eye: No discharge.      Conjunctiva/sclera: Conjunctivae normal.      Pupils: Pupils are equal, round, and reactive to light.   Cardiovascular:      Rate and Rhythm: Normal rate and regular rhythm.      Pulses: Normal pulses.      Heart sounds: Normal heart sounds.   Pulmonary:      Effort: Pulmonary effort is normal.      Breath sounds: Normal breath sounds.   Abdominal:      General: Abdomen is flat. Bowel sounds are normal.   Musculoskeletal:         General: Normal range of motion.   Skin:     General: Skin is warm.      Capillary Refill: Capillary refill takes less than 2 seconds.      Findings: Lesion present.      Comments: Large toe right side there is superficial ulceration of the distal toe there is also some smaller ulceration on the distal 2nd toe.  Left foot there is also a smaller proximally 2 cm superficial ulceration to the large toe and smaller toe there was noted to be " some superficial ulcerations.  Wounds were cleaned and dressed with the Aquacel.  He is to follow-up in 1 week   Neurological:      General: No focal deficit present.      Mental Status: He is alert and oriented to person, place, and time.   Psychiatric:         Mood and Affect: Mood normal.         Behavior: Behavior normal.             Lab Results   Component Value Date    WBC 6.55 02/18/2023    HGB 12.8 (L) 02/18/2023    HCT 37.0 (L) 02/18/2023    MCV 88.3 02/18/2023     02/18/2023          Sodium   Date Value Ref Range Status   02/18/2023 141 136 - 145 mmol/L Final     Potassium   Date Value Ref Range Status   02/18/2023 3.8 3.5 - 5.1 mmol/L Final     Chloride   Date Value Ref Range Status   02/18/2023 103 98 - 107 mmol/L Final     CO2   Date Value Ref Range Status   02/18/2023 30 21 - 32 mmol/L Final     Glucose   Date Value Ref Range Status   02/18/2023 100 74 - 106 mg/dL Final     BUN   Date Value Ref Range Status   02/18/2023 15 7 - 18 mg/dL Final     Creatinine   Date Value Ref Range Status   02/18/2023 1.03 0.70 - 1.30 mg/dL Final     Calcium   Date Value Ref Range Status   02/18/2023 8.5 8.5 - 10.1 mg/dL Final     Total Protein   Date Value Ref Range Status   02/17/2023 8.0 6.4 - 8.2 g/dL Final     Albumin   Date Value Ref Range Status   02/17/2023 3.6 3.5 - 5.0 g/dL Final     Bilirubin, Total   Date Value Ref Range Status   02/17/2023 0.6 >0.0 - 1.2 mg/dL Final     Alk Phos   Date Value Ref Range Status   02/17/2023 86 45 - 115 U/L Final     AST   Date Value Ref Range Status   02/17/2023 18 15 - 37 U/L Final     ALT   Date Value Ref Range Status   02/17/2023 23 16 - 61 U/L Final     Anion Gap   Date Value Ref Range Status   02/18/2023 12 7 - 16 mmol/L Final     eGFR    Date Value Ref Range Status   05/06/2021 89       eGFR   Date Value Ref Range Status   06/08/2022 76 >=60 mL/min/1.73m² Final      US Lower Extremity Veins Right  Narrative: EXAMINATION:  US LOWER EXTREMITY VEINS  RIGHT    CLINICAL HISTORY:  Pain in right leg    TECHNIQUE:  Duplex and color flow Doppler and dynamic compression was performed of the veins was performed.    COMPARISON:  None.    FINDINGS:  No evidence of echogenic, non-compressible thrombus seen in the visualized veins of the extremities.  Color Doppler venous waveform pattern is within normal limits.  Impression: No evidence of deep venous thrombosis.    Ultrasound images stored and captured.    Electronically signed by: Mohsen Roberto  Date:    03/07/2023  Time:    10:04     Procedures   Lab Results   Component Value Date    CHOL 128 12/22/2022    CHOL 150 08/15/2022    CHOL 182 06/08/2022     Lab Results   Component Value Date    HDL 32 (L) 12/22/2022    HDL 36 (L) 08/15/2022    HDL 29 (L) 06/08/2022     Lab Results   Component Value Date    LDLCALC 53 12/22/2022    LDLCALC 43 08/15/2022    LDLCALC  06/08/2022      Comment:      Unable to calculate due to one of the following values:  Cholesterol <5  HDL Cholesterol <5  Triglycerides <10 or >400     Lab Results   Component Value Date    TRIG 215 (H) 12/22/2022    TRIG 355 (H) 08/15/2022    TRIG 884 (H) 06/08/2022     Lab Results   Component Value Date    CHOLHDL 4.0 12/22/2022    CHOLHDL 4.2 08/15/2022    CHOLHDL 6.3 06/08/2022      Lab Results   Component Value Date    HGBA1C 6.8 (H) 12/22/2022      Lab Results   Component Value Date    TSH 3.070 02/17/2023      Assessment and Plan (including Health Maintenance)      Problem List  Smart Sets  Document Outside HM   :    Plan:         Health Maintenance Due   Topic Date Due    Hepatitis C Screening  Never done    Pneumococcal Vaccines (Age 0-64) (1 - PCV) Never done    Eye Exam  Never done    HIV Screening  Never done    TETANUS VACCINE  Never done    Colorectal Cancer Screening  Never done    Influenza Vaccine (1) Never done    Hemoglobin A1c  03/22/2023       Problem List Items Addressed This Visit          Endocrine    Diabetic ulcer of toes of both feet -  Primary    Current Assessment & Plan     Like in today for wound care and shows that ulcers on the tips of his large toe both feet are healing with right worse than the left.  At smaller 2nd and 3rd toes there is some smaller ulcerations with some mild erythema.  Will continue him on Bactrim DS for at least another 10 days.  Follow him back here in 1 week.  Wound dressings with wound care daily using saline or tap water with the Hibiclens and applying silver lakhwinder dressing to the wounds and wrapping it.  Have recommended the patient stop driving for at least a week to see if we can get better healing but patient states he has to work at least 5 days per week.  Follow-up 1 week.         Relevant Medications    sulfamethoxazole-trimethoprim 800-160mg (BACTRIM DS) 800-160 mg Tab       Health Maintenance Topics with due status: Not Due       Topic Last Completion Date    Diabetes Urine Screening 12/22/2022    Foot Exam 12/22/2022    Lipid Panel 12/22/2022       Future Appointments   Date Time Provider Department Center   3/23/2023  3:00 PM Julio Cruz DO [facility]   6/12/2023  9:00 AM Julio Cruz DO [facility]        Follow up in about 1 week (around 3/23/2023), or if symptoms worsen or fail to improve.     Signature:  DO Nirali Boyce [facility]  [address] [address]  [place]  [address]    Date of encounter: 3/16/23

## 2023-03-17 NOTE — ASSESSMENT & PLAN NOTE
Like in today for wound care and shows that ulcers on the tips of his large toe both feet are healing with right worse than the left.  At smaller 2nd and 3rd toes there is some smaller ulcerations with some mild erythema.  Will continue him on Bactrim DS for at least another 10 days.  Follow him back here in 1 week.  Wound dressings with wound care daily using saline or tap water with the Hibiclens and applying silver lakhwinder dressing to the wounds and wrapping it.  Have recommended the patient stop driving for at least a week to see if we can get better healing but patient states he has to work at least 5 days per week.  Follow-up 1 week.

## 2023-03-24 ENCOUNTER — OFFICE VISIT (OUTPATIENT)
Dept: FAMILY MEDICINE | Facility: CLINIC | Age: 58
End: 2023-03-24
Payer: COMMERCIAL

## 2023-03-24 VITALS
HEIGHT: 66 IN | HEART RATE: 72 BPM | OXYGEN SATURATION: 99 % | DIASTOLIC BLOOD PRESSURE: 70 MMHG | BODY MASS INDEX: 33.43 KG/M2 | WEIGHT: 208 LBS | TEMPERATURE: 98 F | SYSTOLIC BLOOD PRESSURE: 126 MMHG | RESPIRATION RATE: 17 BRPM

## 2023-03-24 DIAGNOSIS — E11.621 DIABETIC ULCER OF RIGHT GREAT TOE: Primary | ICD-10-CM

## 2023-03-24 DIAGNOSIS — L97.519 DIABETIC ULCER OF RIGHT GREAT TOE: Primary | ICD-10-CM

## 2023-03-24 DIAGNOSIS — R07.9 CHEST PAIN, UNSPECIFIED TYPE: ICD-10-CM

## 2023-03-24 PROCEDURE — 3074F SYST BP LT 130 MM HG: CPT | Mod: CPTII,,, | Performed by: FAMILY MEDICINE

## 2023-03-24 PROCEDURE — 3078F DIAST BP <80 MM HG: CPT | Mod: CPTII,,, | Performed by: FAMILY MEDICINE

## 2023-03-24 PROCEDURE — 99213 OFFICE O/P EST LOW 20 MIN: CPT | Mod: 25,,, | Performed by: FAMILY MEDICINE

## 2023-03-24 PROCEDURE — 87186 CULTURE, WOUND: ICD-10-PCS | Mod: ,,, | Performed by: CLINICAL MEDICAL LABORATORY

## 2023-03-24 PROCEDURE — 3074F PR MOST RECENT SYSTOLIC BLOOD PRESSURE < 130 MM HG: ICD-10-PCS | Mod: CPTII,,, | Performed by: FAMILY MEDICINE

## 2023-03-24 PROCEDURE — 87186 SC STD MICRODIL/AGAR DIL: CPT | Mod: ,,, | Performed by: CLINICAL MEDICAL LABORATORY

## 2023-03-24 PROCEDURE — 99213 PR OFFICE/OUTPT VISIT, EST, LEVL III, 20-29 MIN: ICD-10-PCS | Mod: 25,,, | Performed by: FAMILY MEDICINE

## 2023-03-24 PROCEDURE — 87077 CULTURE AEROBIC IDENTIFY: CPT | Mod: ,,, | Performed by: CLINICAL MEDICAL LABORATORY

## 2023-03-24 PROCEDURE — 1159F PR MEDICATION LIST DOCUMENTED IN MEDICAL RECORD: ICD-10-PCS | Mod: CPTII,,, | Performed by: FAMILY MEDICINE

## 2023-03-24 PROCEDURE — 3078F PR MOST RECENT DIASTOLIC BLOOD PRESSURE < 80 MM HG: ICD-10-PCS | Mod: CPTII,,, | Performed by: FAMILY MEDICINE

## 2023-03-24 PROCEDURE — 1159F MED LIST DOCD IN RCRD: CPT | Mod: CPTII,,, | Performed by: FAMILY MEDICINE

## 2023-03-24 PROCEDURE — 87070 CULTURE, WOUND: ICD-10-PCS | Mod: ,,, | Performed by: CLINICAL MEDICAL LABORATORY

## 2023-03-24 PROCEDURE — 3008F BODY MASS INDEX DOCD: CPT | Mod: CPTII,,, | Performed by: FAMILY MEDICINE

## 2023-03-24 PROCEDURE — 96372 PR INJECTION,THERAP/PROPH/DIAG2ST, IM OR SUBCUT: ICD-10-PCS | Mod: ,,, | Performed by: FAMILY MEDICINE

## 2023-03-24 PROCEDURE — 87077 CULTURE, WOUND: ICD-10-PCS | Mod: ,,, | Performed by: CLINICAL MEDICAL LABORATORY

## 2023-03-24 PROCEDURE — 87070 CULTURE OTHR SPECIMN AEROBIC: CPT | Mod: ,,, | Performed by: CLINICAL MEDICAL LABORATORY

## 2023-03-24 PROCEDURE — 3008F PR BODY MASS INDEX (BMI) DOCUMENTED: ICD-10-PCS | Mod: CPTII,,, | Performed by: FAMILY MEDICINE

## 2023-03-24 PROCEDURE — 96372 THER/PROPH/DIAG INJ SC/IM: CPT | Mod: ,,, | Performed by: FAMILY MEDICINE

## 2023-03-24 RX ORDER — CEFTRIAXONE 1 G/1
1 INJECTION, POWDER, FOR SOLUTION INTRAMUSCULAR; INTRAVENOUS
OUTPATIENT
Start: 2023-03-25

## 2023-03-24 RX ORDER — CEFTRIAXONE 1 G/1
1 INJECTION, POWDER, FOR SOLUTION INTRAMUSCULAR; INTRAVENOUS
Status: CANCELLED | OUTPATIENT
Start: 2023-03-25

## 2023-03-24 RX ORDER — CEFTRIAXONE 1 G/1
1 INJECTION, POWDER, FOR SOLUTION INTRAMUSCULAR; INTRAVENOUS
Status: COMPLETED | OUTPATIENT
Start: 2023-03-24 | End: 2023-03-24

## 2023-03-24 RX ADMIN — CEFTRIAXONE 1 G: 1 INJECTION, POWDER, FOR SOLUTION INTRAMUSCULAR; INTRAVENOUS at 03:03

## 2023-03-24 NOTE — PROGRESS NOTES
Julio Cruz DO   [address] [place]  [address]      PATIENT NAME: Syed Kelley  : 1965  DATE: 3/24/23  MRN: [id number]      Billing Provider: Julio Cruz DO  Level of Service:   Patient PCP Information       Provider PCP Type    Julio Cruz DO General            Reason for Visit / Chief Complaint: Follow-up and diabetic ulcers       Update PCP  Update Chief Complaint         History of Present Illness / Problem Focused Workflow     Syed Kelley presents to the clinic with Follow-up and diabetic ulcers     Patient is back in for follow-up diabetic ulcers on his toes.  Once on his left foot are healing and the ones on his right foot large toe in particular has gotten red with drainage noted from it he is got a lot of swelling and redness to the right large toe.  Denies fever or chills.  Patient states his blood sugars have been controlled in the 150 or less range.  He denies any fever chills or sweats.      Review of Systems     Review of Systems   Constitutional:  Negative for activity change, appetite change, chills, fatigue and fever.   HENT:  Negative for nasal congestion, ear discharge, ear pain, mouth dryness, mouth sores, postnasal drip, sinus pressure/congestion, sore throat and voice change.    Eyes:  Negative for pain, discharge, redness, itching and visual disturbance.   Respiratory:  Negative for apnea, cough, chest tightness, shortness of breath and wheezing.    Cardiovascular:  Negative for chest pain, palpitations and leg swelling.   Gastrointestinal:  Negative for abdominal distention, abdominal pain, anal bleeding, blood in stool, change in bowel habit, constipation, diarrhea, nausea, vomiting, reflux and change in bowel habit.   Endocrine: Negative for cold intolerance, heat intolerance, polydipsia, polyphagia and polyuria.   Genitourinary:  Negative for difficulty urinating, enuresis, erectile dysfunction, frequency, genital sores, hematuria  and urgency.   Musculoskeletal:  Negative for arthralgias, back pain, gait problem, leg pain, myalgias and neck pain.   Integumentary:  Positive for color change and wound. Negative for rash, mole/lesion, breast mass and breast discharge.   Allergic/Immunologic: Negative for environmental allergies and food allergies.   Neurological:  Negative for dizziness, vertigo, tremors, seizures, syncope, facial asymmetry, speech difficulty, weakness, light-headedness, numbness, headaches, coordination difficulties, memory loss and coordination difficulties.   Hematological:  Negative for adenopathy. Does not bruise/bleed easily.   Psychiatric/Behavioral:  Negative for agitation, behavioral problems, confusion, decreased concentration, dysphoric mood, hallucinations, self-injury, sleep disturbance and suicidal ideas. The patient is not nervous/anxious and is not hyperactive.    Breast: Negative for mass    Medical / Social / Family History     Past Medical History:   Diagnosis Date    Adult attention deficit disorder 3/22/2021    Controlled type 2 diabetes mellitus with hyperglycemia, without long-term current use of insulin 3/22/2021    Disorder of kidney and ureter     Essential hypertension 3/22/2021    Hyperlipidemia        Past Surgical History:   Procedure Laterality Date    APPENDECTOMY      CARDIAC CATHETERIZATION      DEBRIDEMENT OF LOWER EXTREMITY Bilateral 2/18/2023    Procedure: DEBRIDEMENT, LOWER EXTREMITY;  Surgeon: Yael Cohen MD;  Location: [facility] OR;  Service: General;  Laterality: Bilateral;  debride toes    LEFT HEART CATHETERIZATION N/A 5/7/2021    Procedure: Left heart cath with possible intervention;  Surgeon: Federico James DO;  Location: [facility] CATH LAB;  Service: Cardiology;  Laterality: N/A;       Social History    reports that he quit smoking about 38 years ago. His smoking use included cigarettes. He started smoking about 42 years ago. He has a 20.00 pack-year smoking history. His  "smokeless tobacco use includes chew and snuff. He reports current alcohol use. He reports that he does not use drugs.    Family History  's family history includes Cancer in his sister; Hypertension in his father; Stroke in his father.    Medications and Allergies     Medications  Outpatient Medications Marked as Taking for the 3/24/23 encounter (Office Visit) with Julio Cruz, DO   Medication Sig Dispense Refill    albuterol (PROVENTIL/VENTOLIN HFA) 90 mcg/actuation inhaler Inhale 2 puffs into the lungs every 4 (four) hours as needed for Shortness of Breath.      aspirin (ECOTRIN) 81 MG EC tablet Take 1 tablet (81 mg total) by mouth once daily. 90 tablet 3    atorvastatin (LIPITOR) 80 MG tablet Take 1 tablet (80 mg total) by mouth once daily. 90 tablet 3    cholecalciferol, vitamin D3, (VITAMIN D3) 50 mcg (2,000 unit) Cap Take 1 capsule by mouth once daily.      citalopram (CELEXA) 20 MG tablet Take 1 tablet (20 mg total) by mouth once daily. 90 tablet 1    dextroamphetamine-amphetamine (ADDERALL) 20 mg tablet Take 1 tablet by mouth each afternoon 30 tablet 0    dextroamphetamine-amphetamine (ADDERALL) 30 mg Tab Take 1 tablet by mouth each morning 30 tablet 0    docosahexaenoic acid/epa (FISH OIL ORAL) Take 1 capsule by mouth once daily.      glyBURIDE (DIABETA) 5 MG tablet Take 1 tablet (5 mg total) by mouth daily with breakfast. 90 tablet 3    insulin degludec (TRESIBA FLEXTOUCH U-200) 200 unit/mL (3 mL) insulin pen Inject 26 Units into the skin once daily. 3 pen 3    insulin syringe-needle U-100 0.3 mL 31 gauge x 5/16" Syrg Use once daily for insulin injection      lisinopriL-hydrochlorothiazide (PRINZIDE,ZESTORETIC) 20-25 mg Tab Take 1 tablet by mouth once daily. 90 tablet 1    metFORMIN (GLUCOPHAGE) 1000 MG tablet Take 1 tablet (1,000 mg total) by mouth 2 (two) times daily with meals. 180 tablet 1    metoprolol tartrate (LOPRESSOR) 50 MG tablet Take 1 tablet (50 mg total) by mouth 2 (two) times daily. " "180 tablet 1    pen needle, diabetic 31 gauge x 5/16" Ndle Use to administer insulin twice daily      sulfamethoxazole-trimethoprim 800-160mg (BACTRIM DS) 800-160 mg Tab Take 1 tablet by mouth 2 (two) times daily. 30 tablet 1     Current Facility-Administered Medications for the 3/24/23 encounter (Office Visit) with Julio Cruz DO   Medication Dose Route Frequency Provider Last Rate Last Admin    cefTRIAXone injection 1 g  1 g Intramuscular 1 time in Clinic/HOD Julio Cruz DO           Allergies  Review of patient's allergies indicates:   Allergen Reactions    Azithromycin     M-mycin        Physical Examination     Vitals:    03/24/23 1327   BP: 126/70   Pulse: 72   Resp: 17   Temp: 97.6 °F (36.4 °C)     Physical Exam  Constitutional:       Appearance: Normal appearance.   HENT:      Head: Normocephalic.      Nose: Nose normal.      Mouth/Throat:      Mouth: Mucous membranes are moist.      Pharynx: Oropharynx is clear. No oropharyngeal exudate or posterior oropharyngeal erythema.   Eyes:      General: No scleral icterus.        Right eye: No discharge.         Left eye: No discharge.      Conjunctiva/sclera: Conjunctivae normal.      Pupils: Pupils are equal, round, and reactive to light.   Cardiovascular:      Rate and Rhythm: Normal rate and regular rhythm.      Pulses: Normal pulses.      Heart sounds: Normal heart sounds.   Pulmonary:      Effort: Pulmonary effort is normal.      Breath sounds: Normal breath sounds.   Abdominal:      General: Abdomen is flat. Bowel sounds are normal.   Musculoskeletal:         General: Normal range of motion.        Feet:    Feet:      Comments: Diabetic ulcer  Skin:     General: Skin is warm.      Capillary Refill: Capillary refill takes less than 2 seconds.      Findings: Erythema and lesion present.      Comments: See pictures however is large toe right foot there is a 3 cm x 2-1/2 cm ulcer on the distal large toe right foot with the ulcer down to the soft " tissues and fatty tissues.  There is erythema of the entire toe.  He is no pain or tenderness noted.  There is some drainage distally which culture was obtained.   Neurological:      General: No focal deficit present.      Mental Status: He is alert and oriented to person, place, and time.   Psychiatric:         Mood and Affect: Mood normal.         Behavior: Behavior normal.             Lab Results   Component Value Date    WBC 6.55 02/18/2023    HGB 12.8 (L) 02/18/2023    HCT 37.0 (L) 02/18/2023    MCV 88.3 02/18/2023     02/18/2023          Sodium   Date Value Ref Range Status   02/18/2023 141 136 - 145 mmol/L Final     Potassium   Date Value Ref Range Status   02/18/2023 3.8 3.5 - 5.1 mmol/L Final     Chloride   Date Value Ref Range Status   02/18/2023 103 98 - 107 mmol/L Final     CO2   Date Value Ref Range Status   02/18/2023 30 21 - 32 mmol/L Final     Glucose   Date Value Ref Range Status   02/18/2023 100 74 - 106 mg/dL Final     BUN   Date Value Ref Range Status   02/18/2023 15 7 - 18 mg/dL Final     Creatinine   Date Value Ref Range Status   02/18/2023 1.03 0.70 - 1.30 mg/dL Final     Calcium   Date Value Ref Range Status   02/18/2023 8.5 8.5 - 10.1 mg/dL Final     Total Protein   Date Value Ref Range Status   02/17/2023 8.0 6.4 - 8.2 g/dL Final     Albumin   Date Value Ref Range Status   02/17/2023 3.6 3.5 - 5.0 g/dL Final     Bilirubin, Total   Date Value Ref Range Status   02/17/2023 0.6 >0.0 - 1.2 mg/dL Final     Alk Phos   Date Value Ref Range Status   02/17/2023 86 45 - 115 U/L Final     AST   Date Value Ref Range Status   02/17/2023 18 15 - 37 U/L Final     ALT   Date Value Ref Range Status   02/17/2023 23 16 - 61 U/L Final     Anion Gap   Date Value Ref Range Status   02/18/2023 12 7 - 16 mmol/L Final     eGFR    Date Value Ref Range Status   05/06/2021 89       eGFR   Date Value Ref Range Status   06/08/2022 76 >=60 mL/min/1.73m² Final      US Lower Extremity Veins  Right  Narrative: EXAMINATION:  US LOWER EXTREMITY VEINS RIGHT    CLINICAL HISTORY:  Pain in right leg    TECHNIQUE:  Duplex and color flow Doppler and dynamic compression was performed of the veins was performed.    COMPARISON:  None.    FINDINGS:  No evidence of echogenic, non-compressible thrombus seen in the visualized veins of the extremities.  Color Doppler venous waveform pattern is within normal limits.  Impression: No evidence of deep venous thrombosis.    Ultrasound images stored and captured.    Electronically signed by: Mohsen Roberto  Date:    03/07/2023  Time:    10:04     Procedures   Lab Results   Component Value Date    CHOL 128 12/22/2022    CHOL 150 08/15/2022    CHOL 182 06/08/2022     Lab Results   Component Value Date    HDL 32 (L) 12/22/2022    HDL 36 (L) 08/15/2022    HDL 29 (L) 06/08/2022     Lab Results   Component Value Date    LDLCALC 53 12/22/2022    LDLCALC 43 08/15/2022    LDLCALC  06/08/2022      Comment:      Unable to calculate due to one of the following values:  Cholesterol <5  HDL Cholesterol <5  Triglycerides <10 or >400     Lab Results   Component Value Date    TRIG 215 (H) 12/22/2022    TRIG 355 (H) 08/15/2022    TRIG 884 (H) 06/08/2022     Lab Results   Component Value Date    CHOLHDL 4.0 12/22/2022    CHOLHDL 4.2 08/15/2022    CHOLHDL 6.3 06/08/2022      Lab Results   Component Value Date    HGBA1C 6.8 (H) 12/22/2022      Lab Results   Component Value Date    TSH 3.070 02/17/2023      Assessment and Plan (including Health Maintenance)      Problem List  Smart Sets  Document Outside HM   :    Plan:         Health Maintenance Due   Topic Date Due    Hepatitis C Screening  Never done    Pneumococcal Vaccines (Age 0-64) (1 - PCV) Never done    Eye Exam  Never done    HIV Screening  Never done    TETANUS VACCINE  Never done    Colorectal Cancer Screening  Never done    Influenza Vaccine (1) Never done    Hemoglobin A1c  03/22/2023       Problem List Items Addressed This Visit           Cardiac/Vascular    RESOLVED: Chest pain       Endocrine    Diabetic ulcer of right great toe - Primary    Overview                      Current Assessment & Plan     Patient with is large toe right foot with the significant worsening of the ulcer on the distal toe with erythema of the toe and some mild tenderness he has decreased sensation in that toe.  Wound was cleaned with Hibiclens and dressed with triple antibiotic and a gauze dressing.  1 g of Rocephin IM given today.  And he will receive 1 g daily for the next 2 days until we see him again in 3 days.  Continue the Bactrim DS 1 twice daily.  Culture was obtained.  Follow-up 3 days.         Relevant Medications    cefTRIAXone injection 1 g    Other Relevant Orders    Culture, Wound       Health Maintenance Topics with due status: Not Due       Topic Last Completion Date    Diabetes Urine Screening 12/22/2022    Foot Exam 12/22/2022    Lipid Panel 12/22/2022       Future Appointments   Date Time Provider Department Center   6/12/2023  9:00 AM Julio Cruz DO [facility]        Follow up in about 3 days (around 3/27/2023).     Signature:  DO [facility]  [address] [place] [address], [place]  [address]    Date of encounter: 3/24/23

## 2023-03-24 NOTE — ASSESSMENT & PLAN NOTE
Patient with is large toe right foot with the significant worsening of the ulcer on the distal toe with erythema of the toe and some mild tenderness he has decreased sensation in that toe.  Wound was cleaned with Hibiclens and dressed with triple antibiotic and a gauze dressing.  1 g of Rocephin IM given today.  And he will receive 1 g daily for the next 2 days until we see him again in 3 days.  Continue the Bactrim DS 1 twice daily.  Culture was obtained.  Follow-up 3 days.

## 2023-03-27 ENCOUNTER — OFFICE VISIT (OUTPATIENT)
Dept: FAMILY MEDICINE | Facility: CLINIC | Age: 58
End: 2023-03-27
Payer: COMMERCIAL

## 2023-03-27 VITALS
BODY MASS INDEX: 33.43 KG/M2 | TEMPERATURE: 98 F | WEIGHT: 208 LBS | HEART RATE: 72 BPM | OXYGEN SATURATION: 97 % | RESPIRATION RATE: 20 BRPM | SYSTOLIC BLOOD PRESSURE: 138 MMHG | DIASTOLIC BLOOD PRESSURE: 74 MMHG | HEIGHT: 66 IN

## 2023-03-27 DIAGNOSIS — L97.519 DIABETIC ULCER OF RIGHT GREAT TOE: Primary | ICD-10-CM

## 2023-03-27 DIAGNOSIS — E11.621 DIABETIC ULCER OF RIGHT GREAT TOE: Primary | ICD-10-CM

## 2023-03-27 PROCEDURE — 3078F DIAST BP <80 MM HG: CPT | Mod: CPTII,,, | Performed by: FAMILY MEDICINE

## 2023-03-27 PROCEDURE — 3044F HG A1C LEVEL LT 7.0%: CPT | Mod: CPTII,,, | Performed by: FAMILY MEDICINE

## 2023-03-27 PROCEDURE — 3075F SYST BP GE 130 - 139MM HG: CPT | Mod: CPTII,,, | Performed by: FAMILY MEDICINE

## 2023-03-27 PROCEDURE — 3008F PR BODY MASS INDEX (BMI) DOCUMENTED: ICD-10-PCS | Mod: CPTII,,, | Performed by: FAMILY MEDICINE

## 2023-03-27 PROCEDURE — 4010F PR ACE/ARB THEARPY RXD/TAKEN: ICD-10-PCS | Mod: CPTII,,, | Performed by: FAMILY MEDICINE

## 2023-03-27 PROCEDURE — 3044F PR MOST RECENT HEMOGLOBIN A1C LEVEL <7.0%: ICD-10-PCS | Mod: CPTII,,, | Performed by: FAMILY MEDICINE

## 2023-03-27 PROCEDURE — 4010F ACE/ARB THERAPY RXD/TAKEN: CPT | Mod: CPTII,,, | Performed by: FAMILY MEDICINE

## 2023-03-27 PROCEDURE — 99213 OFFICE O/P EST LOW 20 MIN: CPT | Mod: 25,,, | Performed by: FAMILY MEDICINE

## 2023-03-27 PROCEDURE — 3008F BODY MASS INDEX DOCD: CPT | Mod: CPTII,,, | Performed by: FAMILY MEDICINE

## 2023-03-27 PROCEDURE — 96372 THER/PROPH/DIAG INJ SC/IM: CPT | Mod: ,,, | Performed by: FAMILY MEDICINE

## 2023-03-27 PROCEDURE — 99213 PR OFFICE/OUTPT VISIT, EST, LEVL III, 20-29 MIN: ICD-10-PCS | Mod: 25,,, | Performed by: FAMILY MEDICINE

## 2023-03-27 PROCEDURE — 96372 PR INJECTION,THERAP/PROPH/DIAG2ST, IM OR SUBCUT: ICD-10-PCS | Mod: ,,, | Performed by: FAMILY MEDICINE

## 2023-03-27 PROCEDURE — 3078F PR MOST RECENT DIASTOLIC BLOOD PRESSURE < 80 MM HG: ICD-10-PCS | Mod: CPTII,,, | Performed by: FAMILY MEDICINE

## 2023-03-27 PROCEDURE — 3075F PR MOST RECENT SYSTOLIC BLOOD PRESS GE 130-139MM HG: ICD-10-PCS | Mod: CPTII,,, | Performed by: FAMILY MEDICINE

## 2023-03-27 RX ORDER — CEFTRIAXONE 1 G/1
1 INJECTION, POWDER, FOR SOLUTION INTRAMUSCULAR; INTRAVENOUS
Status: DISCONTINUED | OUTPATIENT
Start: 2023-03-27 | End: 2023-03-27 | Stop reason: HOSPADM

## 2023-03-27 RX ORDER — CEFTRIAXONE 1 G/1
2 INJECTION, POWDER, FOR SOLUTION INTRAMUSCULAR; INTRAVENOUS
Status: DISCONTINUED | OUTPATIENT
Start: 2023-03-27 | End: 2023-03-30 | Stop reason: HOSPADM

## 2023-03-27 RX ADMIN — CEFTRIAXONE 1 G: 1 INJECTION, POWDER, FOR SOLUTION INTRAMUSCULAR; INTRAVENOUS at 03:03

## 2023-03-27 NOTE — PROGRESS NOTES
Julio Cruz DO   [place] [address] [facility] [address]  [place]  [address]      PATIENT NAME: Syed Kelley  : 1965  DATE: 3/27/23  MRN: [id number]      Billing Provider: Julio Cruz DO  Level of Service:   Patient PCP Information       Provider PCP Type    Julio Cruz DO General            Reason for Visit / Chief Complaint: No chief complaint on file.       Update PCP  Update Chief Complaint         History of Present Illness / Problem Focused Workflow     Syed Kelley presents to the clinic with No chief complaint on file.     IM shot given in clinic by Julio Cruz DO at 1705 on 3-      Review of Systems     Review of Systems    Medical / Social / Family History     Past Medical History:   Diagnosis Date    Adult attention deficit disorder 3/22/2021    Controlled type 2 diabetes mellitus with hyperglycemia, without long-term current use of insulin 3/22/2021    Disorder of kidney and ureter     Essential hypertension 3/22/2021    Hyperlipidemia        Past Surgical History:   Procedure Laterality Date    APPENDECTOMY      CARDIAC CATHETERIZATION      DEBRIDEMENT OF LOWER EXTREMITY Bilateral 2023    Procedure: DEBRIDEMENT, LOWER EXTREMITY;  Surgeon: Yael Cohen MD;  Location: [facility] OR;  Service: General;  Laterality: Bilateral;  debride toes    LEFT HEART CATHETERIZATION N/A 2021    Procedure: Left heart cath with possible intervention;  Surgeon: Federico James DO;  Location: [facility] CATH LAB;  Service: Cardiology;  Laterality: N/A;       Social History    reports that he quit smoking about 38 years ago. His smoking use included cigarettes. He started smoking about 42 years ago. He has a 20.00 pack-year smoking history. His smokeless tobacco use includes chew and snuff. He reports current alcohol use. He reports that he does not use drugs.    Family History  's family history includes Cancer in his sister; Hypertension in his father; Stroke in his  father.    Medications and Allergies     Medications  No outpatient medications have been marked as taking for the 3/27/23 encounter (Orders Only) with Julio Cruz DO.     Current Facility-Administered Medications for the 3/27/23 encounter (Orders Only) with Julio Cruz DO   Medication Dose Route Frequency Provider Last Rate Last Admin    [COMPLETED] cefTRIAXone injection 1 g  1 g Intramuscular 1 time in Clinic/HOD Julio Cruz DO   1 g at 03/27/23 1530    cefTRIAXone injection 2 g  2 g Intramuscular 1 time in Clinic/HOD Julio Cruz DO           Allergies  Review of patient's allergies indicates:   Allergen Reactions    Azithromycin     M-mycin        Physical Examination   There were no vitals filed for this visit.  Physical Exam  Constitutional:       Appearance: Normal appearance.   HENT:      Head: Normocephalic.      Nose: Nose normal.      Mouth/Throat:      Mouth: Mucous membranes are moist.      Pharynx: Oropharynx is clear. No oropharyngeal exudate or posterior oropharyngeal erythema.   Eyes:      General: No scleral icterus.        Right eye: No discharge.         Left eye: No discharge.      Conjunctiva/sclera: Conjunctivae normal.      Pupils: Pupils are equal, round, and reactive to light.   Cardiovascular:      Rate and Rhythm: Normal rate and regular rhythm.      Pulses: Normal pulses.      Heart sounds: Normal heart sounds.   Pulmonary:      Effort: Pulmonary effort is normal.      Breath sounds: Normal breath sounds.   Abdominal:      General: Abdomen is flat. Bowel sounds are normal.   Musculoskeletal:         General: Normal range of motion.   Skin:     General: Skin is warm.      Capillary Refill: Capillary refill takes less than 2 seconds.      Comments: IM injections 2 cc of 1 gram rocephin given in each hip.  Waited 15 minutes.     Neurological:      General: No focal deficit present.      Mental Status: He is alert and oriented to person, place, and time.   Psychiatric:          Mood and Affect: Mood normal.         Behavior: Behavior normal.             Lab Results   Component Value Date    WBC 6.55 02/18/2023    HGB 12.8 (L) 02/18/2023    HCT 37.0 (L) 02/18/2023    MCV 88.3 02/18/2023     02/18/2023          Sodium   Date Value Ref Range Status   02/18/2023 141 136 - 145 mmol/L Final     Potassium   Date Value Ref Range Status   02/18/2023 3.8 3.5 - 5.1 mmol/L Final     Chloride   Date Value Ref Range Status   02/18/2023 103 98 - 107 mmol/L Final     CO2   Date Value Ref Range Status   02/18/2023 30 21 - 32 mmol/L Final     Glucose   Date Value Ref Range Status   02/18/2023 100 74 - 106 mg/dL Final     BUN   Date Value Ref Range Status   02/18/2023 15 7 - 18 mg/dL Final     Creatinine   Date Value Ref Range Status   02/18/2023 1.03 0.70 - 1.30 mg/dL Final     Calcium   Date Value Ref Range Status   02/18/2023 8.5 8.5 - 10.1 mg/dL Final     Total Protein   Date Value Ref Range Status   02/17/2023 8.0 6.4 - 8.2 g/dL Final     Albumin   Date Value Ref Range Status   02/17/2023 3.6 3.5 - 5.0 g/dL Final     Bilirubin, Total   Date Value Ref Range Status   02/17/2023 0.6 >0.0 - 1.2 mg/dL Final     Alk Phos   Date Value Ref Range Status   02/17/2023 86 45 - 115 U/L Final     AST   Date Value Ref Range Status   02/17/2023 18 15 - 37 U/L Final     ALT   Date Value Ref Range Status   02/17/2023 23 16 - 61 U/L Final     Anion Gap   Date Value Ref Range Status   02/18/2023 12 7 - 16 mmol/L Final     eGFR    Date Value Ref Range Status   05/06/2021 89       eGFR   Date Value Ref Range Status   06/08/2022 76 >=60 mL/min/1.73m² Final      US Lower Extremity Veins Right  Narrative: EXAMINATION:  US LOWER EXTREMITY VEINS RIGHT    CLINICAL HISTORY:  Pain in right leg    TECHNIQUE:  Duplex and color flow Doppler and dynamic compression was performed of the veins was performed.    COMPARISON:  None.    FINDINGS:  No evidence of echogenic, non-compressible thrombus seen in the  visualized veins of the extremities.  Color Doppler venous waveform pattern is within normal limits.  Impression: No evidence of deep venous thrombosis.    Ultrasound images stored and captured.    Electronically signed by: Mohsen Roberto  Date:    03/07/2023  Time:    10:04     Procedures   Lab Results   Component Value Date    CHOL 128 12/22/2022    CHOL 150 08/15/2022    CHOL 182 06/08/2022     Lab Results   Component Value Date    HDL 32 (L) 12/22/2022    HDL 36 (L) 08/15/2022    HDL 29 (L) 06/08/2022     Lab Results   Component Value Date    LDLCALC 53 12/22/2022    LDLCALC 43 08/15/2022    LDLCALC  06/08/2022      Comment:      Unable to calculate due to one of the following values:  Cholesterol <5  HDL Cholesterol <5  Triglycerides <10 or >400     Lab Results   Component Value Date    TRIG 215 (H) 12/22/2022    TRIG 355 (H) 08/15/2022    TRIG 884 (H) 06/08/2022     Lab Results   Component Value Date    CHOLHDL 4.0 12/22/2022    CHOLHDL 4.2 08/15/2022    CHOLHDL 6.3 06/08/2022      Lab Results   Component Value Date    HGBA1C 6.8 (H) 12/22/2022      Lab Results   Component Value Date    TSH 3.070 02/17/2023      Assessment and Plan (including Health Maintenance)      Problem List  Smart Sets  Document Outside HM   :    Plan:         Health Maintenance Due   Topic Date Due    Hepatitis C Screening  Never done    Pneumococcal Vaccines (Age 0-64) (1 - PCV) Never done    Eye Exam  Never done    HIV Screening  Never done    TETANUS VACCINE  Never done    Colorectal Cancer Screening  Never done    Influenza Vaccine (1) Never done    Hemoglobin A1c  03/22/2023       Problem List Items Addressed This Visit          Endocrine    Diabetic ulcer of right great toe - Primary    Overview                      Current Assessment & Plan     Injection given in office of 2 grams Rocephin on 3-         Relevant Medications    cefTRIAXone injection 2 g       Health Maintenance Topics with due status: Not Due       Topic  Last Completion Date    Diabetes Urine Screening 12/22/2022    Foot Exam 12/22/2022    Lipid Panel 12/22/2022       Future Appointments   Date Time Provider Department Center   6/12/2023  9:00 [facility]        No follow-ups on file.     Signature:  DO [facility] [address] [place]  [address]    Date of encounter: 3/27/23

## 2023-03-28 ENCOUNTER — OFFICE VISIT (OUTPATIENT)
Dept: SURGERY | Facility: CLINIC | Age: 58
End: 2023-03-28
Payer: COMMERCIAL

## 2023-03-28 ENCOUNTER — HOSPITAL ENCOUNTER (OUTPATIENT)
Facility: HOSPITAL | Age: 58
Discharge: HOME | End: 2023-03-30
Attending: SURGERY | Admitting: SURGERY
Payer: COMMERCIAL

## 2023-03-28 VITALS
TEMPERATURE: 98 F | HEIGHT: 66 IN | OXYGEN SATURATION: 97 % | BODY MASS INDEX: 33.11 KG/M2 | WEIGHT: 206 LBS | HEART RATE: 73 BPM | DIASTOLIC BLOOD PRESSURE: 90 MMHG | SYSTOLIC BLOOD PRESSURE: 120 MMHG

## 2023-03-28 DIAGNOSIS — S91.101S OPEN WOUND OF RIGHT GREAT TOE, SEQUELA: Primary | ICD-10-CM

## 2023-03-28 DIAGNOSIS — M86.9 OSTEOMYELITIS, UNSPECIFIED SITE, UNSPECIFIED TYPE: Primary | ICD-10-CM

## 2023-03-28 DIAGNOSIS — S91.109A: ICD-10-CM

## 2023-03-28 PROBLEM — S91.101A OPEN WOUND OF RIGHT GREAT TOE: Status: ACTIVE | Noted: 2023-03-28

## 2023-03-28 LAB
ANION GAP SERPL CALCULATED.3IONS-SCNC: 15 MMOL/L (ref 7–16)
BASOPHILS # BLD AUTO: 0.05 K/UL (ref 0–0.2)
BASOPHILS NFR BLD AUTO: 0.8 % (ref 0–1)
BUN SERPL-MCNC: 13 MG/DL (ref 7–18)
BUN/CREAT SERPL: 11 (ref 6–20)
CALCIUM SERPL-MCNC: 9.1 MG/DL (ref 8.5–10.1)
CHLORIDE SERPL-SCNC: 102 MMOL/L (ref 98–107)
CO2 SERPL-SCNC: 23 MMOL/L (ref 21–32)
CREAT SERPL-MCNC: 1.16 MG/DL (ref 0.7–1.3)
DIFFERENTIAL METHOD BLD: ABNORMAL
EGFR (NO RACE VARIABLE) (RUSH/TITUS): 73 ML/MIN/1.73M²
EOSINOPHIL # BLD AUTO: 0.15 K/UL (ref 0–0.5)
EOSINOPHIL NFR BLD AUTO: 2.3 % (ref 1–4)
ERYTHROCYTE [DISTWIDTH] IN BLOOD BY AUTOMATED COUNT: 13.1 % (ref 11.5–14.5)
EST. AVERAGE GLUCOSE BLD GHB EST-MCNC: 140 MG/DL
GLUCOSE SERPL-MCNC: 169 MG/DL (ref 74–106)
GLUCOSE SERPL-MCNC: 215 MG/DL (ref 70–105)
HBA1C MFR BLD HPLC: 6.8 % (ref 4.5–6.6)
HCT VFR BLD AUTO: 40.8 % (ref 40–54)
HGB BLD-MCNC: 13.3 G/DL (ref 13.5–18)
IMM GRANULOCYTES # BLD AUTO: 0.04 K/UL (ref 0–0.04)
IMM GRANULOCYTES NFR BLD: 0.6 % (ref 0–0.4)
LYMPHOCYTES # BLD AUTO: 1.73 K/UL (ref 1–4.8)
LYMPHOCYTES NFR BLD AUTO: 26.4 % (ref 27–41)
MCH RBC QN AUTO: 29.7 PG (ref 27–31)
MCHC RBC AUTO-ENTMCNC: 32.6 G/DL (ref 32–36)
MCV RBC AUTO: 91.1 FL (ref 80–96)
MICROORGANISM SPEC CULT: ABNORMAL
MONOCYTES # BLD AUTO: 0.55 K/UL (ref 0–0.8)
MONOCYTES NFR BLD AUTO: 8.4 % (ref 2–6)
MPC BLD CALC-MCNC: 8.6 FL (ref 9.4–12.4)
NEUTROPHILS # BLD AUTO: 4.03 K/UL (ref 1.8–7.7)
NEUTROPHILS NFR BLD AUTO: 61.5 % (ref 53–65)
NRBC # BLD AUTO: 0 X10E3/UL
NRBC, AUTO (.00): 0 %
PLATELET # BLD AUTO: 332 K/UL (ref 150–400)
POTASSIUM SERPL-SCNC: 4.2 MMOL/L (ref 3.5–5.1)
RBC # BLD AUTO: 4.48 M/UL (ref 4.6–6.2)
SODIUM SERPL-SCNC: 136 MMOL/L (ref 136–145)
WBC # BLD AUTO: 6.55 K/UL (ref 4.5–11)

## 2023-03-28 PROCEDURE — 1159F PR MEDICATION LIST DOCUMENTED IN MEDICAL RECORD: ICD-10-PCS | Mod: CPTII,,, | Performed by: NURSE PRACTITIONER

## 2023-03-28 PROCEDURE — 99285 PR EMERGENCY DEPT VISIT,LEVEL V: ICD-10-PCS | Mod: ,,, | Performed by: NURSE PRACTITIONER

## 2023-03-28 PROCEDURE — 3008F BODY MASS INDEX DOCD: CPT | Mod: CPTII,,, | Performed by: NURSE PRACTITIONER

## 2023-03-28 PROCEDURE — 4010F ACE/ARB THERAPY RXD/TAKEN: CPT | Mod: CPTII,,, | Performed by: NURSE PRACTITIONER

## 2023-03-28 PROCEDURE — 82962 GLUCOSE BLOOD TEST: CPT

## 2023-03-28 PROCEDURE — 3008F PR BODY MASS INDEX (BMI) DOCUMENTED: ICD-10-PCS | Mod: CPTII,,, | Performed by: NURSE PRACTITIONER

## 2023-03-28 PROCEDURE — 99499 UNLISTED E&M SERVICE: CPT | Mod: ,,, | Performed by: NURSE PRACTITIONER

## 2023-03-28 PROCEDURE — 96366 THER/PROPH/DIAG IV INF ADDON: CPT | Mod: 59

## 2023-03-28 PROCEDURE — 96365 THER/PROPH/DIAG IV INF INIT: CPT

## 2023-03-28 PROCEDURE — 99285 EMERGENCY DEPT VISIT HI MDM: CPT | Mod: 25

## 2023-03-28 PROCEDURE — 3080F PR MOST RECENT DIASTOLIC BLOOD PRESSURE >= 90 MM HG: ICD-10-PCS | Mod: CPTII,,, | Performed by: NURSE PRACTITIONER

## 2023-03-28 PROCEDURE — G0378 HOSPITAL OBSERVATION PER HR: HCPCS

## 2023-03-28 PROCEDURE — 99215 OFFICE O/P EST HI 40 MIN: CPT | Mod: PBBFAC | Performed by: NURSE PRACTITIONER

## 2023-03-28 PROCEDURE — 3074F SYST BP LT 130 MM HG: CPT | Mod: CPTII,,, | Performed by: NURSE PRACTITIONER

## 2023-03-28 PROCEDURE — 99214 OFFICE O/P EST MOD 30 MIN: CPT | Mod: S$PBB,,, | Performed by: NURSE PRACTITIONER

## 2023-03-28 PROCEDURE — 80048 BASIC METABOLIC PNL TOTAL CA: CPT | Performed by: NURSE PRACTITIONER

## 2023-03-28 PROCEDURE — 63600175 PHARM REV CODE 636 W HCPCS: Performed by: NURSE PRACTITIONER

## 2023-03-28 PROCEDURE — 85025 COMPLETE CBC W/AUTO DIFF WBC: CPT | Performed by: NURSE PRACTITIONER

## 2023-03-28 PROCEDURE — 3074F PR MOST RECENT SYSTOLIC BLOOD PRESSURE < 130 MM HG: ICD-10-PCS | Mod: CPTII,,, | Performed by: NURSE PRACTITIONER

## 2023-03-28 PROCEDURE — 1159F MED LIST DOCD IN RCRD: CPT | Mod: CPTII,,, | Performed by: NURSE PRACTITIONER

## 2023-03-28 PROCEDURE — 99214 PR OFFICE/OUTPT VISIT, EST, LEVL IV, 30-39 MIN: ICD-10-PCS | Mod: S$PBB,,, | Performed by: NURSE PRACTITIONER

## 2023-03-28 PROCEDURE — 63600175 PHARM REV CODE 636 W HCPCS: Performed by: SURGERY

## 2023-03-28 PROCEDURE — 96361 HYDRATE IV INFUSION ADD-ON: CPT

## 2023-03-28 PROCEDURE — 99285 EMERGENCY DEPT VISIT HI MDM: CPT | Mod: ,,, | Performed by: NURSE PRACTITIONER

## 2023-03-28 PROCEDURE — 3080F DIAST BP >= 90 MM HG: CPT | Mod: CPTII,,, | Performed by: NURSE PRACTITIONER

## 2023-03-28 PROCEDURE — 83036 HEMOGLOBIN GLYCOSYLATED A1C: CPT | Performed by: NURSE PRACTITIONER

## 2023-03-28 PROCEDURE — 96367 TX/PROPH/DG ADDL SEQ IV INF: CPT

## 2023-03-28 PROCEDURE — 1160F PR REVIEW ALL MEDS BY PRESCRIBER/CLIN PHARMACIST DOCUMENTED: ICD-10-PCS | Mod: CPTII,,, | Performed by: NURSE PRACTITIONER

## 2023-03-28 PROCEDURE — 4010F PR ACE/ARB THEARPY RXD/TAKEN: ICD-10-PCS | Mod: CPTII,,, | Performed by: NURSE PRACTITIONER

## 2023-03-28 PROCEDURE — 25000003 PHARM REV CODE 250: Performed by: NURSE PRACTITIONER

## 2023-03-28 PROCEDURE — 96361 HYDRATE IV INFUSION ADD-ON: CPT | Mod: 59

## 2023-03-28 PROCEDURE — 99499 NO LOS: ICD-10-PCS | Mod: ,,, | Performed by: NURSE PRACTITIONER

## 2023-03-28 PROCEDURE — 1160F RVW MEDS BY RX/DR IN RCRD: CPT | Mod: CPTII,,, | Performed by: NURSE PRACTITIONER

## 2023-03-28 PROCEDURE — 25000003 PHARM REV CODE 250: Performed by: SURGERY

## 2023-03-28 RX ORDER — LISINOPRIL AND HYDROCHLOROTHIAZIDE 20; 25 MG/1; MG/1
1 TABLET ORAL DAILY
Status: DISCONTINUED | OUTPATIENT
Start: 2023-03-29 | End: 2023-03-28

## 2023-03-28 RX ORDER — TALC
6 POWDER (GRAM) TOPICAL NIGHTLY PRN
Status: DISCONTINUED | OUTPATIENT
Start: 2023-03-28 | End: 2023-03-30 | Stop reason: HOSPADM

## 2023-03-28 RX ORDER — ALBUTEROL SULFATE 0.83 MG/ML
2.5 SOLUTION RESPIRATORY (INHALATION) EVERY 4 HOURS PRN
Status: DISCONTINUED | OUTPATIENT
Start: 2023-03-28 | End: 2023-03-30 | Stop reason: HOSPADM

## 2023-03-28 RX ORDER — LISINOPRIL 20 MG/1
20 TABLET ORAL DAILY
Status: DISCONTINUED | OUTPATIENT
Start: 2023-03-29 | End: 2023-03-30 | Stop reason: HOSPADM

## 2023-03-28 RX ORDER — METOPROLOL TARTRATE 25 MG/1
50 TABLET, FILM COATED ORAL 2 TIMES DAILY
Status: DISCONTINUED | OUTPATIENT
Start: 2023-03-28 | End: 2023-03-30 | Stop reason: HOSPADM

## 2023-03-28 RX ORDER — KETOROLAC TROMETHAMINE 30 MG/ML
15 INJECTION, SOLUTION INTRAMUSCULAR; INTRAVENOUS EVERY 6 HOURS PRN
Status: DISCONTINUED | OUTPATIENT
Start: 2023-03-28 | End: 2023-03-30 | Stop reason: HOSPADM

## 2023-03-28 RX ORDER — ALBUTEROL SULFATE 90 UG/1
2 AEROSOL, METERED RESPIRATORY (INHALATION) EVERY 4 HOURS PRN
Status: DISCONTINUED | OUTPATIENT
Start: 2023-03-28 | End: 2023-03-28

## 2023-03-28 RX ORDER — DEXTROSE 40 %
15 GEL (GRAM) ORAL
Status: DISCONTINUED | OUTPATIENT
Start: 2023-03-28 | End: 2023-03-30 | Stop reason: HOSPADM

## 2023-03-28 RX ORDER — ACETAMINOPHEN 325 MG/1
650 TABLET ORAL EVERY 4 HOURS PRN
Status: DISCONTINUED | OUTPATIENT
Start: 2023-03-28 | End: 2023-03-30 | Stop reason: HOSPADM

## 2023-03-28 RX ORDER — CITALOPRAM 20 MG/1
20 TABLET, FILM COATED ORAL DAILY
Status: DISCONTINUED | OUTPATIENT
Start: 2023-03-29 | End: 2023-03-30 | Stop reason: HOSPADM

## 2023-03-28 RX ORDER — ACETAMINOPHEN 325 MG/1
650 TABLET ORAL EVERY 8 HOURS PRN
Status: DISCONTINUED | OUTPATIENT
Start: 2023-03-28 | End: 2023-03-30 | Stop reason: HOSPADM

## 2023-03-28 RX ORDER — ONDANSETRON 2 MG/ML
4 INJECTION INTRAMUSCULAR; INTRAVENOUS EVERY 12 HOURS PRN
Status: DISCONTINUED | OUTPATIENT
Start: 2023-03-28 | End: 2023-03-30 | Stop reason: HOSPADM

## 2023-03-28 RX ORDER — GLUCAGON 1 MG
1 KIT INJECTION
Status: DISCONTINUED | OUTPATIENT
Start: 2023-03-28 | End: 2023-03-30 | Stop reason: HOSPADM

## 2023-03-28 RX ORDER — ATORVASTATIN CALCIUM 80 MG/1
80 TABLET, FILM COATED ORAL DAILY
Status: DISCONTINUED | OUTPATIENT
Start: 2023-03-29 | End: 2023-03-30 | Stop reason: HOSPADM

## 2023-03-28 RX ORDER — MORPHINE SULFATE 4 MG/ML
4 INJECTION, SOLUTION INTRAMUSCULAR; INTRAVENOUS EVERY 4 HOURS PRN
Status: DISCONTINUED | OUTPATIENT
Start: 2023-03-28 | End: 2023-03-30 | Stop reason: HOSPADM

## 2023-03-28 RX ORDER — HYDROCODONE BITARTRATE AND ACETAMINOPHEN 7.5; 325 MG/1; MG/1
1 TABLET ORAL EVERY 6 HOURS PRN
Status: DISCONTINUED | OUTPATIENT
Start: 2023-03-28 | End: 2023-03-30 | Stop reason: HOSPADM

## 2023-03-28 RX ORDER — ASPIRIN 81 MG/1
81 TABLET ORAL DAILY
Status: DISCONTINUED | OUTPATIENT
Start: 2023-03-29 | End: 2023-03-30 | Stop reason: HOSPADM

## 2023-03-28 RX ORDER — HYDROCHLOROTHIAZIDE 25 MG/1
25 TABLET ORAL DAILY
Status: DISCONTINUED | OUTPATIENT
Start: 2023-03-29 | End: 2023-03-30 | Stop reason: HOSPADM

## 2023-03-28 RX ORDER — METOCLOPRAMIDE HYDROCHLORIDE 5 MG/ML
5 INJECTION INTRAMUSCULAR; INTRAVENOUS EVERY 6 HOURS PRN
Status: DISCONTINUED | OUTPATIENT
Start: 2023-03-28 | End: 2023-03-30 | Stop reason: HOSPADM

## 2023-03-28 RX ORDER — SODIUM CHLORIDE, SODIUM LACTATE, POTASSIUM CHLORIDE, CALCIUM CHLORIDE 600; 310; 30; 20 MG/100ML; MG/100ML; MG/100ML; MG/100ML
INJECTION, SOLUTION INTRAVENOUS CONTINUOUS
Status: DISCONTINUED | OUTPATIENT
Start: 2023-03-28 | End: 2023-03-30 | Stop reason: HOSPADM

## 2023-03-28 RX ORDER — DEXTROSE 40 %
30 GEL (GRAM) ORAL
Status: DISCONTINUED | OUTPATIENT
Start: 2023-03-28 | End: 2023-03-30 | Stop reason: HOSPADM

## 2023-03-28 RX ORDER — GLYBURIDE 2.5 MG/1
5 TABLET ORAL
Status: DISCONTINUED | OUTPATIENT
Start: 2023-03-29 | End: 2023-03-30 | Stop reason: HOSPADM

## 2023-03-28 RX ADMIN — SODIUM CHLORIDE, POTASSIUM CHLORIDE, SODIUM LACTATE AND CALCIUM CHLORIDE: 600; 310; 30; 20 INJECTION, SOLUTION INTRAVENOUS at 04:03

## 2023-03-28 RX ADMIN — AMPICILLIN SODIUM AND SULBACTAM SODIUM 3 G: 2; 1 INJECTION, POWDER, FOR SOLUTION INTRAMUSCULAR; INTRAVENOUS at 04:03

## 2023-03-28 RX ADMIN — AMPICILLIN SODIUM AND SULBACTAM SODIUM 3 G: 2; 1 INJECTION, POWDER, FOR SOLUTION INTRAMUSCULAR; INTRAVENOUS at 10:03

## 2023-03-28 RX ADMIN — METOPROLOL TARTRATE 50 MG: 25 TABLET, FILM COATED ORAL at 08:03

## 2023-03-28 RX ADMIN — VANCOMYCIN HYDROCHLORIDE 1750 MG: 1 INJECTION, POWDER, LYOPHILIZED, FOR SOLUTION INTRAVENOUS at 05:03

## 2023-03-28 NOTE — PROGRESS NOTES
Pharmacy consulted to assist in vancomycin dosing in this 58 year-old male with suspected skin and soft tissue infection. Will begin vancomycin 1750 mg IV every 24 hours to begin today at 1800. Goal trough of 10-15. Trough ordered for 3/31 at 1730. Pharmacy will continue to follow daily and make necessary adjustments.    Sheryl Mcdonald, PharmD  Ext. [phone]

## 2023-03-28 NOTE — H&P
[facility] - Emergency Department  General Surgery  History & Physical    Patient Name: Syed Kelley  MRN: [id number]  Admission Date: 3/28/2023  Attending Physician: Yael Cohen MD   Primary Care Provider: Julio Cruz DO    Patient information was obtained from ER records.     Subjective:     Chief Complaint/Reason for Admission: nonhelaing wound right great toe, OM    History of Present Illness: 59 y/o male with nonhealing wound right great toe  On bactrim/rocephin  Previous debridements of tips of toes February per dr cohen  Xray osteomyelitis right great toe        Current Facility-Administered Medications on File Prior to Encounter   Medication    cefTRIAXone injection 2 g     Current Outpatient Medications on File Prior to Encounter   Medication Sig    albuterol (PROVENTIL/VENTOLIN HFA) 90 mcg/actuation inhaler Inhale 2 puffs into the lungs every 4 (four) hours as needed for Shortness of Breath.    aspirin (ECOTRIN) 81 MG EC tablet Take 1 tablet (81 mg total) by mouth once daily.    atorvastatin (LIPITOR) 80 MG tablet Take 1 tablet (80 mg total) by mouth once daily.    cholecalciferol, vitamin D3, (VITAMIN D3) 50 mcg (2,000 unit) Cap Take 1 capsule by mouth once daily.    citalopram (CELEXA) 20 MG tablet Take 1 tablet (20 mg total) by mouth once daily.    dextroamphetamine-amphetamine (ADDERALL XR) 20 MG 24 hr capsule Take 1 capsule (20 mg total) by mouth 2 (two) times a day.    dextroamphetamine-amphetamine (ADDERALL XR) 30 MG 24 hr capsule Take 1 capsule by mouth every morning and 1 capsule by mouth at 1:00 pm.    dextroamphetamine-amphetamine (ADDERALL) 20 mg tablet Take 1 tablet by mouth each afternoon    dextroamphetamine-amphetamine (ADDERALL) 30 mg Tab Take 1 tablet by mouth each morning    docosahexaenoic acid/epa (FISH OIL ORAL) Take 1 capsule by mouth once daily.    glyBURIDE (DIABETA) 5 MG tablet Take 1 tablet (5 mg total) by mouth daily with breakfast.     "HYDROcodone-acetaminophen (NORCO) 7.5-325 mg per tablet Take 1 tablet by mouth every 6 (six) hours as needed for Pain. (Patient not taking: Reported on 3/27/2023)    insulin degludec (TRESIBA FLEXTOUCH U-200) 200 unit/mL (3 mL) insulin pen Inject 26 Units into the skin once daily.    insulin syringe-needle U-100 0.3 mL 31 gauge x 5/16" Syrg Use once daily for insulin injection    lisinopriL-hydrochlorothiazide (PRINZIDE,ZESTORETIC) 20-25 mg Tab Take 1 tablet by mouth once daily.    metFORMIN (GLUCOPHAGE) 1000 MG tablet Take 1 tablet (1,000 mg total) by mouth 2 (two) times daily with meals.    metoprolol tartrate (LOPRESSOR) 50 MG tablet Take 1 tablet (50 mg total) by mouth 2 (two) times daily.    pen needle, diabetic 31 gauge x 5/16" Ndle Use to administer insulin twice daily    sulfamethoxazole-trimethoprim 800-160mg (BACTRIM DS) 800-160 mg Tab Take 1 tablet by mouth 2 (two) times daily.       Review of patient's allergies indicates:   Allergen Reactions    Azithromycin     M-mycin        Past Medical History:   Diagnosis Date    Adult attention deficit disorder 3/22/2021    Controlled type 2 diabetes mellitus with hyperglycemia, without long-term current use of insulin 3/22/2021    Disorder of kidney and ureter     Essential hypertension 3/22/2021    Hyperlipidemia      Past Surgical History:   Procedure Laterality Date    APPENDECTOMY      CARDIAC CATHETERIZATION      DEBRIDEMENT OF LOWER EXTREMITY Bilateral 2/18/2023    Procedure: DEBRIDEMENT, LOWER EXTREMITY;  Surgeon: Yael Cohen MD;  Location: [facility] OR;  Service: General;  Laterality: Bilateral;  debride toes    LEFT HEART CATHETERIZATION N/A 5/7/2021    Procedure: Left heart cath with possible intervention;  Surgeon: Federico James DO;  Location: [facility] CATH LAB;  Service: Cardiology;  Laterality: N/A;     Family History       Problem Relation (Age of Onset)    Cancer Sister    Hypertension Father    Stroke Father      "     Tobacco Use    Smoking status: Former     Packs/day: 5.00     Years: 4.00     Pack years: 20.00     Types: Cigarettes     Start date:      Quit date:      Years since quittin.2    Smokeless tobacco: Current     Types: Chew, Snuff    Tobacco comments:     1 can/day   Substance and Sexual Activity    Alcohol use: Yes     Comment: 2-3 drinks per/3 months    Drug use: Never    Sexual activity: Yes     Partners: Female     Review of Systems   Skin:  Positive for color change and wound.   Objective:     Vital Signs (Most Recent):  Temp: 98.4 °F (36.9 °C) (23 1502)  Pulse: 76 (23 1459)  Resp: 18 (23 1459)  BP: (!) 167/92 (23 1459)  SpO2: 98 % (23 1459)   Vital Signs (24h Range):  Temp:  [98.2 °F (36.8 °C)-98.4 °F (36.9 °C)] 98.4 °F (36.9 °C)  Pulse:  [73-76] 76  Resp:  [18] 18  SpO2:  [97 %-98 %] 98 %  BP: (120-167)/(90-92) 167/92     Weight: 93.4 kg (206 lb)  Body mass index is 33.25 kg/m².    Physical Exam  Vitals and nursing note reviewed.   HENT:      Head: Normocephalic.   Pulmonary:      Effort: Pulmonary effort is normal.   Abdominal:      Palpations: Abdomen is soft.   Skin:     General: Skin is warm and dry.      Findings: Lesion present.      Comments: Right great toe wound   Neurological:      Mental Status: He is alert and oriented to person, place, and time.       Significant Labs:  I have reviewed all pertinent lab results within the past 24 hours.  CBC:   Recent Labs   Lab 23  1517   WBC 6.55   RBC 4.48*   HGB 13.3*   HCT 40.8      MCV 91.1   MCH 29.7   MCHC 32.6     BMP:   Recent Labs   Lab 23  1517   *      K 4.2      CO2 23   BUN 13   CREATININE 1.16   CALCIUM 9.1     CMP:   Recent Labs   Lab 23  1517   *   CALCIUM 9.1      K 4.2   CO2 23      BUN 13   CREATININE 1.16     LFTs: No results for input(s): ALT, AST, ALKPHOS, BILITOT, PROT, ALBUMIN in the last 168 hours.    Significant  Diagnostics:  I have reviewed all pertinent imaging results/findings within the past 24 hours.      Assessment/Plan:     Open wound of right great toe  03/28 iv vancomycin/unasyn  Npo after mn for RIGHT great toe amp dr farmer tomorrow  [facility]      VTE Risk Mitigation (From admission, onward)         Ordered     IP VTE HIGH RISK PATIENT  Once         03/28/23 1544     Place sequential compression device  Until discontinued         03/28/23 1544                Bree Mayes, KANUP  General Surgery  [facility] - Emergency Department

## 2023-03-28 NOTE — SUBJECTIVE & OBJECTIVE
"Current Facility-Administered Medications on File Prior to Encounter   Medication    cefTRIAXone injection 2 g     Current Outpatient Medications on File Prior to Encounter   Medication Sig    albuterol (PROVENTIL/VENTOLIN HFA) 90 mcg/actuation inhaler Inhale 2 puffs into the lungs every 4 (four) hours as needed for Shortness of Breath.    aspirin (ECOTRIN) 81 MG EC tablet Take 1 tablet (81 mg total) by mouth once daily.    atorvastatin (LIPITOR) 80 MG tablet Take 1 tablet (80 mg total) by mouth once daily.    cholecalciferol, vitamin D3, (VITAMIN D3) 50 mcg (2,000 unit) Cap Take 1 capsule by mouth once daily.    citalopram (CELEXA) 20 MG tablet Take 1 tablet (20 mg total) by mouth once daily.    dextroamphetamine-amphetamine (ADDERALL XR) 20 MG 24 hr capsule Take 1 capsule (20 mg total) by mouth 2 (two) times a day.    dextroamphetamine-amphetamine (ADDERALL XR) 30 MG 24 hr capsule Take 1 capsule by mouth every morning and 1 capsule by mouth at 1:00 pm.    dextroamphetamine-amphetamine (ADDERALL) 20 mg tablet Take 1 tablet by mouth each afternoon    dextroamphetamine-amphetamine (ADDERALL) 30 mg Tab Take 1 tablet by mouth each morning    docosahexaenoic acid/epa (FISH OIL ORAL) Take 1 capsule by mouth once daily.    glyBURIDE (DIABETA) 5 MG tablet Take 1 tablet (5 mg total) by mouth daily with breakfast.    HYDROcodone-acetaminophen (NORCO) 7.5-325 mg per tablet Take 1 tablet by mouth every 6 (six) hours as needed for Pain. (Patient not taking: Reported on 3/27/2023)    insulin degludec (TRESIBA FLEXTOUCH U-200) 200 unit/mL (3 mL) insulin pen Inject 26 Units into the skin once daily.    insulin syringe-needle U-100 0.3 mL 31 gauge x 5/16" Syrg Use once daily for insulin injection    lisinopriL-hydrochlorothiazide (PRINZIDE,ZESTORETIC) 20-25 mg Tab Take 1 tablet by mouth once daily.    metFORMIN (GLUCOPHAGE) 1000 MG tablet Take 1 tablet (1,000 mg total) by mouth 2 (two) times daily with meals.    metoprolol tartrate " "(LOPRESSOR) 50 MG tablet Take 1 tablet (50 mg total) by mouth 2 (two) times daily.    pen needle, diabetic 31 gauge x " Ndle Use to administer insulin twice daily    sulfamethoxazole-trimethoprim 800-160mg (BACTRIM DS) 800-160 mg Tab Take 1 tablet by mouth 2 (two) times daily.       Review of patient's allergies indicates:   Allergen Reactions    Azithromycin     M-mycin        Past Medical History:   Diagnosis Date    Adult attention deficit disorder 3/22/2021    Controlled type 2 diabetes mellitus with hyperglycemia, without long-term current use of insulin 3/22/2021    Disorder of kidney and ureter     Essential hypertension 3/22/2021    Hyperlipidemia      Past Surgical History:   Procedure Laterality Date    APPENDECTOMY      CARDIAC CATHETERIZATION      DEBRIDEMENT OF LOWER EXTREMITY Bilateral 2023    Procedure: DEBRIDEMENT, LOWER EXTREMITY;  Surgeon: Yael Cohen MD;  Location: [facility] OR;  Service: General;  Laterality: Bilateral;  debride toes    LEFT HEART CATHETERIZATION N/A 2021    Procedure: Left heart cath with possible intervention;  Surgeon: Federico James DO;  Location: [facility] CATH LAB;  Service: Cardiology;  Laterality: N/A;     Family History       Problem Relation (Age of Onset)    Cancer Sister    Hypertension Father    Stroke Father          Tobacco Use    Smoking status: Former     Packs/day: 5.00     Years: 4.00     Pack years: 20.00     Types: Cigarettes     Start date:      Quit date:      Years since quittin.2    Smokeless tobacco: Current     Types: Chew, Snuff    Tobacco comments:     1 can/day   Substance and Sexual Activity    Alcohol use: Yes     Comment: 2-3 drinks per/3 months    Drug use: Never    Sexual activity: Yes     Partners: Female     Review of Systems   Skin:  Positive for color change and wound.   Objective:     Vital Signs (Most Recent):  Temp: 98.4 °F (36.9 °C) (23 1502)  Pulse: 76 (23 1459)  Resp: 18 (23 1459)  BP: " (!) 167/92 (03/28/23 1459)  SpO2: 98 % (03/28/23 1459)   Vital Signs (24h Range):  Temp:  [98.2 °F (36.8 °C)-98.4 °F (36.9 °C)] 98.4 °F (36.9 °C)  Pulse:  [73-76] 76  Resp:  [18] 18  SpO2:  [97 %-98 %] 98 %  BP: (120-167)/(90-92) 167/92     Weight: 93.4 kg (206 lb)  Body mass index is 33.25 kg/m².    Physical Exam  Vitals and nursing note reviewed.   HENT:      Head: Normocephalic.   Pulmonary:      Effort: Pulmonary effort is normal.   Abdominal:      Palpations: Abdomen is soft.   Skin:     General: Skin is warm and dry.      Findings: Lesion present.      Comments: Right great toe wound   Neurological:      Mental Status: He is alert and oriented to person, place, and time.       Significant Labs:  I have reviewed all pertinent lab results within the past 24 hours.  CBC:   Recent Labs   Lab 03/28/23  1517   WBC 6.55   RBC 4.48*   HGB 13.3*   HCT 40.8      MCV 91.1   MCH 29.7   MCHC 32.6     BMP:   Recent Labs   Lab 03/28/23  1517   *      K 4.2      CO2 23   BUN 13   CREATININE 1.16   CALCIUM 9.1     CMP:   Recent Labs   Lab 03/28/23  1517   *   CALCIUM 9.1      K 4.2   CO2 23      BUN 13   CREATININE 1.16     LFTs: No results for input(s): ALT, AST, ALKPHOS, BILITOT, PROT, ALBUMIN in the last 168 hours.    Significant Diagnostics:  I have reviewed all pertinent imaging results/findings within the past 24 hours.

## 2023-03-28 NOTE — ASSESSMENT & PLAN NOTE
03/28 iv vancomycin/unasyn  Npo after mn for RIGHT great toe amp dr farmer tomorrow  Reconcile home medicine, adult sliding scale accu checks

## 2023-03-28 NOTE — HPI
59 y/o male with nonhealing wound right great toe  On bactrim/rocephin  Previous debridements of tips of toes February per dr farmer  Xray osteomyelitis right great toe

## 2023-03-28 NOTE — ED PROVIDER NOTES
Encounter Date: 3/28/2023       History     Chief Complaint   Patient presents with    Wound Check     58-year-old male presents to the emergency department to be evaluated for a wound to his right great toe.  The wound has been there for about 3 months.  He was evaluated in clinic earlier today in clinic by Kimberly Mayes and sent to the emergency department for xray and IV antibiotics.     The history is provided by the patient.   Wound Check     Review of patient's allergies indicates:   Allergen Reactions    Azithromycin     M-mycin      Past Medical History:   Diagnosis Date    Adult attention deficit disorder 3/22/2021    Controlled type 2 diabetes mellitus with hyperglycemia, without long-term current use of insulin 3/22/2021    Disorder of kidney and ureter     Essential hypertension 3/22/2021    Hyperlipidemia      Past Surgical History:   Procedure Laterality Date    APPENDECTOMY      CARDIAC CATHETERIZATION      DEBRIDEMENT OF LOWER EXTREMITY Bilateral 2023    Procedure: DEBRIDEMENT, LOWER EXTREMITY;  Surgeon: Yael Cohen MD;  Location: [facility] OR;  Service: General;  Laterality: Bilateral;  debride toes    LEFT HEART CATHETERIZATION N/A 2021    Procedure: Left heart cath with possible intervention;  Surgeon: Federico James DO;  Location: [facility] CATH LAB;  Service: Cardiology;  Laterality: N/A;     Family History   Problem Relation Age of Onset    Hypertension Father     Stroke Father     Cancer Sister      Social History     Tobacco Use    Smoking status: Former     Packs/day: 5.00     Years: 4.00     Pack years: 20.00     Types: Cigarettes     Start date:      Quit date:      Years since quittin.2    Smokeless tobacco: Current     Types: Chew, Snuff    Tobacco comments:     1 can/day   Substance Use Topics    Alcohol use: Yes     Comment: 2-3 drinks per/3 months    Drug use: Never     Review of Systems   Constitutional:  Negative for chills and fever.   Respiratory:   Negative for shortness of breath.    Cardiovascular:  Negative for chest pain and leg swelling.   All other systems reviewed and are negative.    Physical Exam     Initial Vitals   BP Pulse Resp Temp SpO2   03/28/23 1459 03/28/23 1459 03/28/23 1459 03/28/23 1502 03/28/23 1459   (!) 167/92 76 18 98.4 °F (36.9 °C) 98 %      MAP       --                Physical Exam    Vitals reviewed.  Constitutional: He appears well-developed and well-nourished.   Neck: Neck supple.   Cardiovascular:  Normal rate and regular rhythm.           Pulmonary/Chest: Breath sounds normal.   Abdominal: Abdomen is soft. Bowel sounds are normal. He exhibits no distension and no mass. There is no abdominal tenderness. There is no rebound and no guarding.   Musculoskeletal:         General: Normal range of motion.      Cervical back: Neck supple.     Neurological: He is alert and oriented to person, place, and time. He has normal strength. GCS score is 15. GCS eye subscore is 4. GCS verbal subscore is 5. GCS motor subscore is 6.   Skin: Skin is warm and dry. Capillary refill takes less than 2 seconds.   Wounds to the right and left great toes; see pictures   Psychiatric: He has a normal mood and affect.             Medical Screening Exam   See Full Note    ED Course   Procedures  Labs Reviewed   BASIC METABOLIC PANEL - Abnormal; Notable for the following components:       Result Value    Glucose 169 (*)     All other components within normal limits   CBC WITH DIFFERENTIAL - Abnormal; Notable for the following components:    RBC 4.48 (*)     Hemoglobin 13.3 (*)     MPV 8.6 (*)     Lymphocytes % 26.4 (*)     Monocytes % 8.4 (*)     Immature Granulocytes % 0.6 (*)     All other components within normal limits   CBC W/ AUTO DIFFERENTIAL    Narrative:     The following orders were created for panel order CBC auto differential.  Procedure                               Abnormality         Status                     ---------                                -----------         ------                     CBC with Differential[[id number]]        Abnormal            Final result                 Please view results for these tests on the individual orders.   HEMOGLOBIN A1C   POCT GLUCOSE MONITORING CONTINUOUS          Imaging Results              X-Ray Foot Complete Right (Final result)  Result time 03/28/23 15:27:33      Final result by Mohsen Roberto II, MD (03/28/23 15:27:33)                   Impression:      Erosion 1st digit distal phalanx likely osteomyelitis.      Electronically signed by: Mohsen Roberto  Date:    03/28/2023  Time:    15:27               Narrative:    EXAMINATION:  XR FOOT COMPLETE 3 VIEW RIGHT    CLINICAL HISTORY:  Unspecified open wound of unspecified toe(s) without damage to nail, initial encounter    COMPARISON:  None available    TECHNIQUE:  XR FOOT COMPLETE 3 VIEW RIGHT    FINDINGS:  There is erosion of the distal phalanx 1st digit with overlying soft tissue irregularity.  No acute fracture seen.  The alignment of the joints appears normal.  No degenerative change is present.  No soft tissue abnormality is seen.                                       Medications   aspirin EC tablet 81 mg (has no administration in time range)   citalopram tablet 20 mg (has no administration in time range)   metoprolol tartrate (LOPRESSOR) tablet 50 mg (has no administration in time range)   lisinopriL-hydrochlorothiazide 20-25 mg per tablet 1 tablet (has no administration in time range)   glyBURIDE tablet 5 mg (has no administration in time range)   atorvastatin tablet 80 mg (has no administration in time range)   HYDROcodone-acetaminophen 7.5-325 mg per tablet 1 tablet (has no administration in time range)   lactated ringers infusion (has no administration in time range)   ondansetron injection 4 mg (has no administration in time range)   metoclopramide HCl injection 5 mg (has no administration in time range)   melatonin tablet 6 mg (has no administration  in time range)   acetaminophen tablet 650 mg (has no administration in time range)   ketorolac injection 15 mg (has no administration in time range)   morphine injection 4 mg (has no administration in time range)   ampicillin-sulbactam (UNASYN) 3 g in sodium chloride 0.9 % 100 mL IVPB (MB+) (has no administration in time range)   acetaminophen tablet 650 mg (has no administration in time range)   glucagon (human recombinant) injection 1 mg (has no administration in time range)   dextrose 10% bolus 125 mL 125 mL (has no administration in time range)   dextrose 10% bolus 250 mL 250 mL (has no administration in time range)   dextrose 40 % gel 15,000 mg (has no administration in time range)   dextrose 40 % gel 30,000 mg (has no administration in time range)   albuterol nebulizer solution 2.5 mg (has no administration in time range)     Medical Decision Makin-year-old male presents to the emergency department to be evaluated for a wound to his right great toe.  The wound has been there for about 3 months.  He was evaluated in clinic earlier today in clinic by Kimberly Mayes and sent to the emergency department for xray and IV antibiotics.   I ordered X-rays and personally reviewed them and reviewed the radiologist interpretation.  Xray significant for Erosion 1st digit distal phalanx likely osteomyelitis.    Diagnosis:  Osteomyelitis  Patient required emergent consultation to Dr. Cohen (admitting physician) for admission.                   Clinical Impression:   Final diagnoses:  [S91.109A] Wound, open, toe  [M86.9] Osteomyelitis, unspecified site, unspecified type (Primary)        ED Disposition Condition    Observation Stable                Loretta Crain, [facility]  [phone]

## 2023-03-28 NOTE — PROGRESS NOTES
"Subjective:       Patient ID: Syed Kelley is a 58 y.o. male.    Chief Complaint: Follow-up (Check right foot)    Previous debridement of tips of dry gangrene 3 toes last month per Dr. Cohen  Followed by Dr. Cruz has been on Rocephin Bactrim referred to clinic today  Concerning for right great toe wound previous ABIs normal  family history includes Cancer in his sister; Hypertension in his father; Stroke in his father.  Past Medical History:   Diagnosis Date    Adult attention deficit disorder 3/22/2021    Controlled type 2 diabetes mellitus with hyperglycemia, without long-term current use of insulin 3/22/2021    Disorder of kidney and ureter     Essential hypertension 3/22/2021    Hyperlipidemia       Past Surgical History:   Procedure Laterality Date    APPENDECTOMY      CARDIAC CATHETERIZATION      DEBRIDEMENT OF LOWER EXTREMITY Bilateral 2/18/2023    Procedure: DEBRIDEMENT, LOWER EXTREMITY;  Surgeon: Yael Cohen MD;  Location: [facility] OR;  Service: General;  Laterality: Bilateral;  debride toes    LEFT HEART CATHETERIZATION N/A 5/7/2021    Procedure: Left heart cath with possible intervention;  Surgeon: Federico James DO;  Location: [facility] CATH LAB;  Service: Cardiology;  Laterality: N/A;       reports that he quit smoking about 38 years ago. His smoking use included cigarettes. He started smoking about 42 years ago. He has a 20.00 pack-year smoking history. His smokeless tobacco use includes chew and snuff. He reports current alcohol use. He reports that he does not use drugs.   Follow-up  Review of Systems      Objective:      BP (!) 120/90 (BP Location: Left arm, Patient Position: Sitting)   Pulse 73   Temp 98.2 °F (36.8 °C) (Oral)   Ht 5' 6" (1.676 m)   Wt 93.4 kg (206 lb)   SpO2 97%   BMI 33.25 kg/m²    Physical Exam  HENT:      Head: Normocephalic.   Cardiovascular:      Rate and Rhythm: Normal rate.      Comments: Palpable DP pulses  Pulmonary:      Effort: Pulmonary effort is normal. "   Abdominal:      General: Abdomen is flat.      Palpations: Abdomen is soft.   Skin:     General: Skin is warm and dry.      Findings: Lesion present.      Comments: Hypergranulation tip of right great toe no pus no wet gangrene   Neurological:      Mental Status: He is alert and oriented to person, place, and time.   Psychiatric:         Mood and Affect: Mood normal.         Behavior: Behavior normal.       Assessment:       1. Open wound of right great toe, sequela      Hypergranulation tissue no wet gangrene  Plan:          Silvernitrate applied to hypergranulation tissue  No beds for direct admit  Educated to go to ER for xray and iv abx

## 2023-03-29 ENCOUNTER — ANESTHESIA (OUTPATIENT)
Dept: SURGERY | Facility: HOSPITAL | Age: 58
End: 2023-03-29
Payer: COMMERCIAL

## 2023-03-29 ENCOUNTER — ANESTHESIA EVENT (OUTPATIENT)
Dept: SURGERY | Facility: HOSPITAL | Age: 58
End: 2023-03-29
Payer: COMMERCIAL

## 2023-03-29 PROBLEM — M86.9 OSTEOMYELITIS: Status: ACTIVE | Noted: 2023-03-29

## 2023-03-29 LAB
ANION GAP SERPL CALCULATED.3IONS-SCNC: 15 MMOL/L (ref 7–16)
BASOPHILS # BLD AUTO: 0.05 K/UL (ref 0–0.2)
BASOPHILS NFR BLD AUTO: 0.9 % (ref 0–1)
BUN SERPL-MCNC: 15 MG/DL (ref 7–18)
BUN/CREAT SERPL: 13 (ref 6–20)
CALCIUM SERPL-MCNC: 8.6 MG/DL (ref 8.5–10.1)
CHLORIDE SERPL-SCNC: 101 MMOL/L (ref 98–107)
CO2 SERPL-SCNC: 24 MMOL/L (ref 21–32)
CREAT SERPL-MCNC: 1.15 MG/DL (ref 0.7–1.3)
DIFFERENTIAL METHOD BLD: ABNORMAL
EGFR (NO RACE VARIABLE) (RUSH/TITUS): 74 ML/MIN/1.73M²
EOSINOPHIL # BLD AUTO: 0.17 K/UL (ref 0–0.5)
EOSINOPHIL NFR BLD AUTO: 2.9 % (ref 1–4)
ERYTHROCYTE [DISTWIDTH] IN BLOOD BY AUTOMATED COUNT: 13.1 % (ref 11.5–14.5)
GLUCOSE SERPL-MCNC: 111 MG/DL (ref 70–105)
GLUCOSE SERPL-MCNC: 138 MG/DL (ref 70–105)
GLUCOSE SERPL-MCNC: 141 MG/DL (ref 70–105)
GLUCOSE SERPL-MCNC: 142 MG/DL (ref 70–105)
GLUCOSE SERPL-MCNC: 186 MG/DL (ref 70–105)
GLUCOSE SERPL-MCNC: 221 MG/DL (ref 74–106)
HCT VFR BLD AUTO: 38.1 % (ref 40–54)
HGB BLD-MCNC: 12.9 G/DL (ref 13.5–18)
IMM GRANULOCYTES # BLD AUTO: 0.04 K/UL (ref 0–0.04)
IMM GRANULOCYTES NFR BLD: 0.7 % (ref 0–0.4)
LYMPHOCYTES # BLD AUTO: 2.09 K/UL (ref 1–4.8)
LYMPHOCYTES NFR BLD AUTO: 35.6 % (ref 27–41)
MCH RBC QN AUTO: 30.9 PG (ref 27–31)
MCHC RBC AUTO-ENTMCNC: 33.9 G/DL (ref 32–36)
MCV RBC AUTO: 91.4 FL (ref 80–96)
MONOCYTES # BLD AUTO: 0.6 K/UL (ref 0–0.8)
MONOCYTES NFR BLD AUTO: 10.2 % (ref 2–6)
MPC BLD CALC-MCNC: 9.2 FL (ref 9.4–12.4)
NEUTROPHILS # BLD AUTO: 2.92 K/UL (ref 1.8–7.7)
NEUTROPHILS NFR BLD AUTO: 49.7 % (ref 53–65)
NRBC # BLD AUTO: 0 X10E3/UL
NRBC, AUTO (.00): 0 %
PLATELET # BLD AUTO: 309 K/UL (ref 150–400)
POTASSIUM SERPL-SCNC: 4.2 MMOL/L (ref 3.5–5.1)
RBC # BLD AUTO: 4.17 M/UL (ref 4.6–6.2)
SODIUM SERPL-SCNC: 136 MMOL/L (ref 136–145)
WBC # BLD AUTO: 5.87 K/UL (ref 4.5–11)

## 2023-03-29 PROCEDURE — 96366 THER/PROPH/DIAG IV INF ADDON: CPT | Mod: 59

## 2023-03-29 PROCEDURE — 63600175 PHARM REV CODE 636 W HCPCS: Performed by: NURSE PRACTITIONER

## 2023-03-29 PROCEDURE — D9220A PRA ANESTHESIA: Mod: CRNA,,, | Performed by: NURSE ANESTHETIST, CERTIFIED REGISTERED

## 2023-03-29 PROCEDURE — 37000009 HC ANESTHESIA EA ADD 15 MINS: Performed by: SURGERY

## 2023-03-29 PROCEDURE — 82962 GLUCOSE BLOOD TEST: CPT

## 2023-03-29 PROCEDURE — 25000003 PHARM REV CODE 250: Performed by: NURSE PRACTITIONER

## 2023-03-29 PROCEDURE — 28825 PARTIAL AMPUTATION OF TOE: CPT | Mod: T5,,, | Performed by: SURGERY

## 2023-03-29 PROCEDURE — 37000008 HC ANESTHESIA 1ST 15 MINUTES: Performed by: SURGERY

## 2023-03-29 PROCEDURE — 27000284 HC CANNULA NASAL: Performed by: ANESTHESIOLOGY

## 2023-03-29 PROCEDURE — 99900035 HC TECH TIME PER 15 MIN (STAT)

## 2023-03-29 PROCEDURE — 94761 N-INVAS EAR/PLS OXIMETRY MLT: CPT

## 2023-03-29 PROCEDURE — 96361 HYDRATE IV INFUSION ADD-ON: CPT | Mod: 59

## 2023-03-29 PROCEDURE — 28825 PR AMPUTATION TOE,I-P JT: ICD-10-PCS | Mod: T5,,, | Performed by: SURGERY

## 2023-03-29 PROCEDURE — 27000655: Performed by: ANESTHESIOLOGY

## 2023-03-29 PROCEDURE — 25000003 PHARM REV CODE 250: Performed by: NURSE ANESTHETIST, CERTIFIED REGISTERED

## 2023-03-29 PROCEDURE — 99024 PR POST-OP FOLLOW-UP VISIT: ICD-10-PCS | Mod: ,,, | Performed by: NURSE PRACTITIONER

## 2023-03-29 PROCEDURE — 36000706: Performed by: SURGERY

## 2023-03-29 PROCEDURE — 71000033 HC RECOVERY, INTIAL HOUR: Performed by: SURGERY

## 2023-03-29 PROCEDURE — 27000716 HC OXISENSOR PROBE, ANY SIZE: Performed by: ANESTHESIOLOGY

## 2023-03-29 PROCEDURE — 36000707: Performed by: SURGERY

## 2023-03-29 PROCEDURE — 25000003 PHARM REV CODE 250: Performed by: SURGERY

## 2023-03-29 PROCEDURE — 88311 DECALCIFY TISSUE: CPT | Mod: 26,,, | Performed by: PATHOLOGY

## 2023-03-29 PROCEDURE — D9220A PRA ANESTHESIA: Mod: ANES,,, | Performed by: ANESTHESIOLOGY

## 2023-03-29 PROCEDURE — G0378 HOSPITAL OBSERVATION PER HR: HCPCS

## 2023-03-29 PROCEDURE — D9220A PRA ANESTHESIA: ICD-10-PCS | Mod: CRNA,,, | Performed by: NURSE ANESTHETIST, CERTIFIED REGISTERED

## 2023-03-29 PROCEDURE — 85025 COMPLETE CBC W/AUTO DIFF WBC: CPT | Performed by: NURSE PRACTITIONER

## 2023-03-29 PROCEDURE — 88305 TISSUE EXAM BY PATHOLOGIST: CPT | Mod: TC,SUR | Performed by: SURGERY

## 2023-03-29 PROCEDURE — 88311 SURGICAL PATHOLOGY: ICD-10-PCS | Mod: 26,,, | Performed by: PATHOLOGY

## 2023-03-29 PROCEDURE — 99024 POSTOP FOLLOW-UP VISIT: CPT | Mod: ,,, | Performed by: NURSE PRACTITIONER

## 2023-03-29 PROCEDURE — 88305 TISSUE EXAM BY PATHOLOGIST: CPT | Mod: 26,,, | Performed by: PATHOLOGY

## 2023-03-29 PROCEDURE — 88305 SURGICAL PATHOLOGY: ICD-10-PCS | Mod: 26,,, | Performed by: PATHOLOGY

## 2023-03-29 PROCEDURE — 80048 BASIC METABOLIC PNL TOTAL CA: CPT | Performed by: NURSE PRACTITIONER

## 2023-03-29 PROCEDURE — 63600175 PHARM REV CODE 636 W HCPCS: Performed by: NURSE ANESTHETIST, CERTIFIED REGISTERED

## 2023-03-29 PROCEDURE — 63600175 PHARM REV CODE 636 W HCPCS: Performed by: SURGERY

## 2023-03-29 PROCEDURE — D9220A PRA ANESTHESIA: ICD-10-PCS | Mod: ANES,,, | Performed by: ANESTHESIOLOGY

## 2023-03-29 PROCEDURE — 27000177 HC AIRWAY, LARYNGEAL MASK: Performed by: ANESTHESIOLOGY

## 2023-03-29 RX ORDER — ONDANSETRON 2 MG/ML
4 INJECTION INTRAMUSCULAR; INTRAVENOUS DAILY PRN
Status: DISCONTINUED | OUTPATIENT
Start: 2023-03-29 | End: 2023-03-30 | Stop reason: HOSPADM

## 2023-03-29 RX ORDER — SODIUM CHLORIDE, SODIUM LACTATE, POTASSIUM CHLORIDE, CALCIUM CHLORIDE 600; 310; 30; 20 MG/100ML; MG/100ML; MG/100ML; MG/100ML
125 INJECTION, SOLUTION INTRAVENOUS CONTINUOUS
Status: DISCONTINUED | OUTPATIENT
Start: 2023-03-29 | End: 2023-03-30 | Stop reason: HOSPADM

## 2023-03-29 RX ORDER — PHENYLEPHRINE HYDROCHLORIDE 10 MG/ML
INJECTION INTRAVENOUS
Status: DISCONTINUED | OUTPATIENT
Start: 2023-03-29 | End: 2023-03-29

## 2023-03-29 RX ORDER — HYDROMORPHONE HYDROCHLORIDE 2 MG/ML
0.5 INJECTION, SOLUTION INTRAMUSCULAR; INTRAVENOUS; SUBCUTANEOUS EVERY 5 MIN PRN
Status: DISCONTINUED | OUTPATIENT
Start: 2023-03-29 | End: 2023-03-30 | Stop reason: HOSPADM

## 2023-03-29 RX ORDER — ONDANSETRON 2 MG/ML
INJECTION INTRAMUSCULAR; INTRAVENOUS
Status: DISCONTINUED | OUTPATIENT
Start: 2023-03-29 | End: 2023-03-29

## 2023-03-29 RX ORDER — MORPHINE SULFATE 10 MG/ML
4 INJECTION INTRAMUSCULAR; INTRAVENOUS; SUBCUTANEOUS EVERY 5 MIN PRN
Status: DISCONTINUED | OUTPATIENT
Start: 2023-03-29 | End: 2023-03-30 | Stop reason: HOSPADM

## 2023-03-29 RX ORDER — MEPERIDINE HYDROCHLORIDE 25 MG/ML
25 INJECTION INTRAMUSCULAR; INTRAVENOUS; SUBCUTANEOUS EVERY 10 MIN PRN
Status: ACTIVE | OUTPATIENT
Start: 2023-03-29 | End: 2023-03-29

## 2023-03-29 RX ORDER — DIPHENHYDRAMINE HYDROCHLORIDE 50 MG/ML
25 INJECTION INTRAMUSCULAR; INTRAVENOUS EVERY 6 HOURS PRN
Status: DISCONTINUED | OUTPATIENT
Start: 2023-03-29 | End: 2023-03-30 | Stop reason: HOSPADM

## 2023-03-29 RX ORDER — GLYCOPYRROLATE 0.2 MG/ML
INJECTION INTRAMUSCULAR; INTRAVENOUS
Status: DISCONTINUED | OUTPATIENT
Start: 2023-03-29 | End: 2023-03-29

## 2023-03-29 RX ORDER — PROPOFOL 10 MG/ML
INJECTION, EMULSION INTRAVENOUS
Status: DISCONTINUED | OUTPATIENT
Start: 2023-03-29 | End: 2023-03-29

## 2023-03-29 RX ORDER — LIDOCAINE HYDROCHLORIDE 20 MG/ML
INJECTION, SOLUTION EPIDURAL; INFILTRATION; INTRACAUDAL; PERINEURAL
Status: DISCONTINUED | OUTPATIENT
Start: 2023-03-29 | End: 2023-03-29

## 2023-03-29 RX ORDER — MIDAZOLAM HYDROCHLORIDE 1 MG/ML
INJECTION INTRAMUSCULAR; INTRAVENOUS
Status: DISCONTINUED | OUTPATIENT
Start: 2023-03-29 | End: 2023-03-29

## 2023-03-29 RX ORDER — OXYCODONE HYDROCHLORIDE 5 MG/1
5 TABLET ORAL
Status: DISCONTINUED | OUTPATIENT
Start: 2023-03-29 | End: 2023-03-30 | Stop reason: HOSPADM

## 2023-03-29 RX ADMIN — MIDAZOLAM HYDROCHLORIDE 2 MG: 1 INJECTION, SOLUTION INTRAMUSCULAR; INTRAVENOUS at 05:03

## 2023-03-29 RX ADMIN — ONDANSETRON 4 MG: 2 INJECTION INTRAMUSCULAR; INTRAVENOUS at 05:03

## 2023-03-29 RX ADMIN — AMPICILLIN SODIUM AND SULBACTAM SODIUM 3 G: 2; 1 INJECTION, POWDER, FOR SOLUTION INTRAMUSCULAR; INTRAVENOUS at 04:03

## 2023-03-29 RX ADMIN — METOPROLOL TARTRATE 50 MG: 25 TABLET, FILM COATED ORAL at 08:03

## 2023-03-29 RX ADMIN — ATORVASTATIN CALCIUM 80 MG: 80 TABLET, FILM COATED ORAL at 09:03

## 2023-03-29 RX ADMIN — AMPICILLIN SODIUM AND SULBACTAM SODIUM 3 G: 2; 1 INJECTION, POWDER, FOR SOLUTION INTRAMUSCULAR; INTRAVENOUS at 10:03

## 2023-03-29 RX ADMIN — VANCOMYCIN HYDROCHLORIDE 1750 MG: 1 INJECTION, POWDER, LYOPHILIZED, FOR SOLUTION INTRAVENOUS at 07:03

## 2023-03-29 RX ADMIN — METOPROLOL TARTRATE 50 MG: 25 TABLET, FILM COATED ORAL at 09:03

## 2023-03-29 RX ADMIN — PHENYLEPHRINE HYDROCHLORIDE 100 MCG: 10 INJECTION INTRAVENOUS at 05:03

## 2023-03-29 RX ADMIN — SODIUM CHLORIDE, POTASSIUM CHLORIDE, SODIUM LACTATE AND CALCIUM CHLORIDE: 600; 310; 30; 20 INJECTION, SOLUTION INTRAVENOUS at 04:03

## 2023-03-29 RX ADMIN — FENTANYL CITRATE 50 MCG: 50 INJECTION INTRAMUSCULAR; INTRAVENOUS at 05:03

## 2023-03-29 RX ADMIN — LIDOCAINE HYDROCHLORIDE 80 MG: 20 INJECTION, SOLUTION INTRAVENOUS at 05:03

## 2023-03-29 RX ADMIN — SODIUM CHLORIDE, POTASSIUM CHLORIDE, SODIUM LACTATE AND CALCIUM CHLORIDE: 600; 310; 30; 20 INJECTION, SOLUTION INTRAVENOUS at 05:03

## 2023-03-29 RX ADMIN — GLYCOPYRROLATE 0.2 MG: 0.2 INJECTION INTRAMUSCULAR; INTRAVENOUS at 05:03

## 2023-03-29 RX ADMIN — CITALOPRAM HYDROBROMIDE 20 MG: 20 TABLET ORAL at 09:03

## 2023-03-29 RX ADMIN — AMPICILLIN SODIUM AND SULBACTAM SODIUM 3 G: 2; 1 INJECTION, POWDER, FOR SOLUTION INTRAMUSCULAR; INTRAVENOUS at 11:03

## 2023-03-29 RX ADMIN — LISINOPRIL 20 MG: 20 TABLET ORAL at 09:03

## 2023-03-29 RX ADMIN — PROPOFOL 200 MG: 10 INJECTION, EMULSION INTRAVENOUS at 05:03

## 2023-03-29 NOTE — ASSESSMENT & PLAN NOTE
Diabetic ulcer on his toe that is progressing.  He is received  IM antibiotics as well as oral antibiotics.  Patient has culture results are pending.  Referral to General surgery for possible amputation and or debridement.  Discussed with Dr. Cohen.  He is to follow-up in the a.m..

## 2023-03-29 NOTE — ED NOTES
Patient transferred to [facility] with Room # change from #430 to Room # 424 now via stretcher and wife at pt side. Nurse JIMMY Bates receiving care of pt with updates reported. Pt ambulatory without difficulty to hospital bed with bed low, side rails x2 Up, Call bell in reach , and HOB Up, and nurse staff x2 at bedside for pt acceptance.

## 2023-03-29 NOTE — PLAN OF CARE
SS was consulted on pt for a post op offloading shoe. SS spoke with family and ok to order through [facility]. SS will fax order.

## 2023-03-29 NOTE — ANESTHESIA PROCEDURE NOTES
Intubation    Date/Time: 3/29/2023 5:06 PM  Performed by: Julio Mcdaniels Jr., CRNA  Authorized by: Tony Campbell MD     Intubation:     Induction:  Intravenous    Intubated:  Postinduction    Mask Ventilation:  Easy mask    Attempts:  1    Attempted By:  CRNA    Method of Intubation:  Direct    Difficult Airway Encountered?: No      Complications:  None    Airway Device:  Supraglottic airway/LMA    Airway Device Size:  4.0    Style/Cuff Inflation:  Cuffed    Secured at:  The lips    Placement Verified By:  Capnometry    Complicating Factors:  None    Findings Post-Intubation:  Atraumatic/condition of teeth unchanged

## 2023-03-29 NOTE — PLAN OF CARE
[facility] - Short Stay Unit  Initial Discharge Assessment       Primary Care Provider: Julio Cruz DO    Admission Diagnosis: Wound, open, toe [S91.109A]  Osteomyelitis, unspecified site, unspecified type [M86.9]    Admission Date: 3/28/2023  Expected Discharge Date:     Discharge Barriers Identified: None    Payor: [employer] / Plan: [employer] [facility] [employer] [place] / Product Type: Commercial /     Extended Emergency Contact Information  Primary Emergency Contact: Tamela Kelley  Mobile Phone: [phone]  Relation: Spouse  Preferred language: English   needed? No    Discharge Plan A: Home with family  Discharge Plan B: Home with family      [facility] - [place], [place] - [address] [address]  [address]  [place] [address]  Phone: [phone] Fax: [phone]    [facility] [place], [place] - [address]  [address] [place] [address]  Phone: [phone] Fax: [phone]      Initial Assessment (most recent)       Adult Discharge Assessment - 03/29/23 0934          Discharge Assessment    Assessment Type Discharge Planning Assessment     Source of Information patient     Communicated MILANA with patient/caregiver Date not available/Unable to determine     People in Home spouse     Do you expect to return to your current living situation? Yes     Do you have help at home or someone to help you manage your care at home? No     Prior to hospitilization cognitive status: Alert/Oriented     Current cognitive status: Alert/Oriented     Home Layout Able to live on 1st floor     Equipment Currently Used at Home none     Patient currently being followed by outpatient case management? No     Do you currently have service(s) that help you manage your care at home? No     Do you take prescription medications? Yes     Do you have prescription coverage? Yes     Do you have any problems affording any of your prescribed medications? No     Is the patient taking  medications as prescribed? yes     Who is going to help you get home at discharge? wife     Are you on dialysis? No     Do you take coumadin? No     Discharge Plan A Home with family     Discharge Plan B Home with family     DME Needed Upon Discharge  none     Discharge Plan discussed with: Patient     Discharge Barriers Identified None        Physical Activity    On average, how many days per week do you engage in moderate to strenuous exercise (like a brisk walk)? 0 days     On average, how many minutes do you engage in exercise at this level? 0 min        Financial Resource Strain    How hard is it for you to pay for the very basics like food, housing, medical care, and heating? Not very hard        Housing Stability    In the last 12 months, was there a time when you were not able to pay the mortgage or rent on time? No     In the last 12 months, how many places have you lived? 1     In the last 12 months, was there a time when you did not have a steady place to sleep or slept in a shelter (including now)? No        Transportation Needs    In the past 12 months, has lack of transportation kept you from medical appointments or from getting medications? No     In the past 12 months, has lack of transportation kept you from meetings, work, or from getting things needed for daily living? No        Food Insecurity    Within the past 12 months, you worried that your food would run out before you got the money to buy more. Never true     Within the past 12 months, the food you bought just didn't last and you didn't have money to get more. Never true        Stress    Do you feel stress - tense, restless, nervous, or anxious, or unable to sleep at night because your mind is troubled all the time - these days? Not at all        Social Connections    In a typical week, how many times do you talk on the phone with family, friends, or neighbors? More than three times a week     How often do you get together with friends or  relatives? More than three times a week     How often do you attend Confucianism or Sikh services? More than 4 times per year     Do you belong to any clubs or organizations such as Confucianism groups, unions, fraternal or athletic groups, or school groups? No     How often do you attend meetings of the clubs or organizations you belong to? Never     Are you , , , , never , or living with a partner?         Alcohol Use    Q1: How often do you have a drink containing alcohol? Monthly or less     Q2: How many drinks containing alcohol do you have on a typical day when you are drinking? 1 or 2     Q3: How often do you have six or more drinks on one occasion? Never                      Pt admitted as an observation. Pt lives at home with wife. Pt works as a . No hh or dme pta. Discharge plan is to return home with no anticipated needs at this time. SS following.

## 2023-03-29 NOTE — ED NOTES
Report given via phone to JIMMY Jose on [facility] for pt transfer to Room # 430. Will transfer pt soon as this nurse is available to do so.

## 2023-03-29 NOTE — ANESTHESIA PREPROCEDURE EVALUATION
03/29/2023  Syed Kelley is a 58 y.o., male.      Pre-op Assessment    I have reviewed the Patient Summary Reports.     I have reviewed the Nursing Notes. I have reviewed the NPO Status.   I have reviewed the Medications.     Review of Systems  Anesthesia Hx:  No problems with previous Anesthesia    Social:  Non-Smoker, No Alcohol Use    Hematology/Oncology:  Hematology Normal   Oncology Normal     EENT/Dental:EENT/Dental Normal   Cardiovascular:   Hypertension PVD hyperlipidemia    Pulmonary:  Pulmonary Normal    Renal/:  Renal/ Normal     Hepatic/GI:  Hepatic/GI Normal    Musculoskeletal:  Musculoskeletal Normal    Neurological:  Neurology Normal    Endocrine:   Diabetes, poorly controlled, type 2  Obesity / BMI > 30  Dermatological:  Skin Normal    Psych:   Psychiatric History ADD         Physical Exam  General: Well nourished    Airway:  Mallampati: III / III  Mouth Opening: Normal  TM Distance: > 6 cm  Tongue: Normal  Neck ROM: Normal ROM    Chest/Lungs:  Clear to auscultation, Normal Respiratory Rate    Heart:  Rate: Normal  Rhythm: Regular Rhythm        Anesthesia Plan  Type of Anesthesia, risks & benefits discussed:    Anesthesia Type: Gen Supraglottic Airway  Intra-op Monitoring Plan: Standard ASA Monitors  Post Op Pain Control Plan: multimodal analgesia  Induction:  IV  Informed Consent: Informed consent signed with the Patient and all parties understand the risks and agree with anesthesia plan.  All questions answered.   ASA Score: 3  Day of Surgery Review of History & Physical: H&P Update referred to the surgeon/provider.I have interviewed and examined the patient. I have reviewed the patient's H&P dated: There are no significant changes. H&P completed by Anesthesiologist.    Ready For Surgery From Anesthesia Perspective.     .

## 2023-03-29 NOTE — INTERVAL H&P NOTE
The patient has been examined and the H&P has been reviewed:    I concur with the findings and no changes have occurred since H&P was written.    Anesthesia risks, benefits and alternative options discussed and understood by patient/family.      Toe amp for chronic osteomyelitis    Active Hospital Problems    Diagnosis  POA    Open wound of right great toe [S91.101A]  Yes      Resolved Hospital Problems   No resolved problems to display.

## 2023-03-29 NOTE — PROGRESS NOTES
Julio Cruz DO   [facility]  [address] [address]  [place]  [address]      PATIENT NAME: Syed Kelley  : 1965  DATE: 3/27/23  MRN: [id number]      Billing Provider: Julio Cruz DO  Level of Service:   Patient PCP Information       Provider PCP Type    Julio Cruz DO General            Reason for Visit / Chief Complaint: Wound Check (Patient is here for follow up of wound to bilateral great toes. Patient currently taking BactrimDS. He did not get to start antibiotic until 23 per patient's wife. Blood sugar 231 this morning, but he has ran out of one of his medications. He will pick refill up today at pharmacy.Patient has also not taken b/p medicine in 1 week.)       Update PCP  Update Chief Complaint         History of Present Illness / Problem Focused Workflow     Syed Kelley presents to the clinic with Wound Check (Patient is here for follow up of wound to bilateral great toes. Patient currently taking BactrimDS. He did not get to start antibiotic until 23 per patient's wife. Blood sugar 231 this morning, but he has ran out of one of his medications. He will pick refill up today at pharmacy.Patient has also not taken b/p medicine in 1 week.)     Today for wound check after being given IM Rocephin on Saturday 2 g.  He did not receive any antibiotics on  but was on oral Bactrim DS.  He states that it does now have an odor to toe.      Review of Systems     Review of Systems   Constitutional:  Negative for activity change, appetite change, chills, fatigue and fever.   HENT:  Negative for nasal congestion, ear discharge, ear pain, mouth dryness, mouth sores, postnasal drip, sinus pressure/congestion, sore throat and voice change.    Eyes:  Negative for pain, discharge, redness, itching and visual disturbance.   Respiratory:  Negative for apnea, cough, chest tightness, shortness of breath and wheezing.    Cardiovascular:  Negative for chest pain, palpitations and leg  swelling.   Gastrointestinal:  Negative for abdominal distention, abdominal pain, anal bleeding, blood in stool, change in bowel habit, constipation, diarrhea, nausea, vomiting, reflux and change in bowel habit.   Endocrine: Negative for cold intolerance, heat intolerance, polydipsia, polyphagia and polyuria.   Genitourinary:  Negative for difficulty urinating, enuresis, erectile dysfunction, frequency, genital sores, hematuria and urgency.   Musculoskeletal:  Negative for arthralgias, back pain, gait problem, leg pain, myalgias and neck pain.   Integumentary:  Positive for wound. Negative for rash, mole/lesion, breast mass and breast discharge.   Allergic/Immunologic: Negative for environmental allergies and food allergies.   Neurological:  Negative for dizziness, vertigo, tremors, seizures, syncope, facial asymmetry, speech difficulty, weakness, light-headedness, numbness, headaches, coordination difficulties, memory loss and coordination difficulties.   Hematological:  Negative for adenopathy. Does not bruise/bleed easily.   Psychiatric/Behavioral:  Negative for agitation, behavioral problems, confusion, decreased concentration, dysphoric mood, hallucinations, self-injury, sleep disturbance and suicidal ideas. The patient is not nervous/anxious and is not hyperactive.    Breast: Negative for mass    Medical / Social / Family History     Past Medical History:   Diagnosis Date    Adult attention deficit disorder 3/22/2021    Controlled type 2 diabetes mellitus with hyperglycemia, without long-term current use of insulin 3/22/2021    Disorder of kidney and ureter     Essential hypertension 3/22/2021    Hyperlipidemia        Past Surgical History:   Procedure Laterality Date    APPENDECTOMY      CARDIAC CATHETERIZATION      DEBRIDEMENT OF LOWER EXTREMITY Bilateral 2/18/2023    Procedure: DEBRIDEMENT, LOWER EXTREMITY;  Surgeon: Yael Cohen MD;  Location: [facility];  Service: General;  Laterality: Bilateral;   debride toes    LEFT HEART CATHETERIZATION N/A 5/7/2021    Procedure: Left heart cath with possible intervention;  Surgeon: Federico James DO;  Location: [facility] CATH LAB;  Service: Cardiology;  Laterality: N/A;       Social History    reports that he quit smoking about 38 years ago. His smoking use included cigarettes. He started smoking about 42 years ago. He has a 20.00 pack-year smoking history. His smokeless tobacco use includes chew and snuff. He reports current alcohol use. He reports that he does not use drugs.    Family History  's family history includes Cancer in his sister; Hypertension in his father; Stroke in his father.    Medications and Allergies     Medications  Current Facility-Administered Medications for the 3/27/23 encounter (Office Visit) with Julio Cruz DO   Medication Dose Route Frequency Provider Last Rate Last Admin    cefTRIAXone injection 2 g  2 g Intramuscular 1 time in Clinic/HOD Julio Cruz DO         Outpatient Medications Marked as Taking for the 3/27/23 encounter (Office Visit) with Julio Cruz DO   Medication Sig Dispense Refill    albuterol (PROVENTIL/VENTOLIN HFA) 90 mcg/actuation inhaler Inhale 2 puffs into the lungs every 4 (four) hours as needed for Shortness of Breath.      aspirin (ECOTRIN) 81 MG EC tablet Take 1 tablet (81 mg total) by mouth once daily. 90 tablet 3    atorvastatin (LIPITOR) 80 MG tablet Take 1 tablet (80 mg total) by mouth once daily. 90 tablet 3    cholecalciferol, vitamin D3, (VITAMIN D3) 50 mcg (2,000 unit) Cap Take 1 capsule by mouth once daily.      citalopram (CELEXA) 20 MG tablet Take 1 tablet (20 mg total) by mouth once daily. 90 tablet 1    dextroamphetamine-amphetamine (ADDERALL) 20 mg tablet Take 1 tablet by mouth each afternoon 30 tablet 0    dextroamphetamine-amphetamine (ADDERALL) 30 mg Tab Take 1 tablet by mouth each morning 30 tablet 0    docosahexaenoic acid/epa (FISH OIL ORAL) Take 1 capsule by mouth once  "daily.      glyBURIDE (DIABETA) 5 MG tablet Take 1 tablet (5 mg total) by mouth daily with breakfast. 90 tablet 3    insulin degludec (TRESIBA FLEXTOUCH U-200) 200 unit/mL (3 mL) insulin pen Inject 26 Units into the skin once daily. 3 pen 3    insulin syringe-needle U-100 0.3 mL 31 gauge x 5/16" Syrg Use once daily for insulin injection      lisinopriL-hydrochlorothiazide (PRINZIDE,ZESTORETIC) 20-25 mg Tab Take 1 tablet by mouth once daily. 90 tablet 1    metFORMIN (GLUCOPHAGE) 1000 MG tablet Take 1 tablet (1,000 mg total) by mouth 2 (two) times daily with meals. 180 tablet 1    metoprolol tartrate (LOPRESSOR) 50 MG tablet Take 1 tablet (50 mg total) by mouth 2 (two) times daily. 180 tablet 1    pen needle, diabetic 31 gauge x 5/16" Ndle Use to administer insulin twice daily      sulfamethoxazole-trimethoprim 800-160mg (BACTRIM DS) 800-160 mg Tab Take 1 tablet by mouth 2 (two) times daily. 30 tablet 1       Allergies  Review of patient's allergies indicates:   Allergen Reactions    Azithromycin     M-mycin        Physical Examination     Vitals:    03/27/23 1403   BP: 138/74   Pulse: 72   Resp: 20   Temp: 98.2 °F (36.8 °C)     Physical Exam  Constitutional:       Appearance: Normal appearance.   HENT:      Head: Normocephalic.      Nose: Nose normal.      Mouth/Throat:      Mouth: Mucous membranes are moist.      Pharynx: Oropharynx is clear. No oropharyngeal exudate or posterior oropharyngeal erythema.   Eyes:      General: No scleral icterus.        Right eye: No discharge.         Left eye: No discharge.      Conjunctiva/sclera: Conjunctivae normal.      Pupils: Pupils are equal, round, and reactive to light.   Cardiovascular:      Rate and Rhythm: Normal rate and regular rhythm.      Pulses: Normal pulses.      Heart sounds: Normal heart sounds.   Pulmonary:      Effort: Pulmonary effort is normal.      Breath sounds: Normal breath sounds.   Abdominal:      General: Abdomen is flat. Bowel sounds are normal. "   Musculoskeletal:         General: Normal range of motion.   Skin:     General: Skin is warm.      Capillary Refill: Capillary refill takes less than 2 seconds.      Findings: Lesion present.      Comments: Right large toe hypergranulation the distal tip with erythema of the entire toe and tenderness.  He has decreased sensation on the bottom of his foot as well as on the ventral aspect of his foot.   Neurological:      General: No focal deficit present.      Mental Status: He is alert and oriented to person, place, and time.   Psychiatric:         Mood and Affect: Mood normal.         Behavior: Behavior normal.             Lab Results   Component Value Date    WBC 5.87 03/29/2023    HGB 12.9 (L) 03/29/2023    HCT 38.1 (L) 03/29/2023    MCV 91.4 03/29/2023     03/29/2023          Sodium   Date Value Ref Range Status   03/29/2023 136 136 - 145 mmol/L Final     Potassium   Date Value Ref Range Status   03/29/2023 4.2 3.5 - 5.1 mmol/L Final     Chloride   Date Value Ref Range Status   03/29/2023 101 98 - 107 mmol/L Final     CO2   Date Value Ref Range Status   03/29/2023 24 21 - 32 mmol/L Final     Glucose   Date Value Ref Range Status   03/29/2023 221 (H) 74 - 106 mg/dL Final     BUN   Date Value Ref Range Status   03/29/2023 15 7 - 18 mg/dL Final     Creatinine   Date Value Ref Range Status   03/29/2023 1.15 0.70 - 1.30 mg/dL Final     Calcium   Date Value Ref Range Status   03/29/2023 8.6 8.5 - 10.1 mg/dL Final     Total Protein   Date Value Ref Range Status   02/17/2023 8.0 6.4 - 8.2 g/dL Final     Albumin   Date Value Ref Range Status   02/17/2023 3.6 3.5 - 5.0 g/dL Final     Bilirubin, Total   Date Value Ref Range Status   02/17/2023 0.6 >0.0 - 1.2 mg/dL Final     Alk Phos   Date Value Ref Range Status   02/17/2023 86 45 - 115 U/L Final     AST   Date Value Ref Range Status   02/17/2023 18 15 - 37 U/L Final     ALT   Date Value Ref Range Status   02/17/2023 23 16 - 61 U/L Final     Anion Gap   Date Value  Ref Range Status   03/29/2023 15 7 - 16 mmol/L Final     eGFR    Date Value Ref Range Status   05/06/2021 89       eGFR   Date Value Ref Range Status   06/08/2022 76 >=60 mL/min/1.73m² Final      X-Ray Foot Complete Right  Narrative: EXAMINATION:  XR FOOT COMPLETE 3 VIEW RIGHT    CLINICAL HISTORY:  Unspecified open wound of unspecified toe(s) without damage to nail, initial encounter    COMPARISON:  None available    TECHNIQUE:  XR FOOT COMPLETE 3 VIEW RIGHT    FINDINGS:  There is erosion of the distal phalanx 1st digit with overlying soft tissue irregularity.  No acute fracture seen.  The alignment of the joints appears normal.  No degenerative change is present.  No soft tissue abnormality is seen.  Impression: Erosion 1st digit distal phalanx likely osteomyelitis.    Electronically signed by: Mohsen Roberto  Date:    03/28/2023  Time:    15:27     Procedures   Lab Results   Component Value Date    CHOL 128 12/22/2022    CHOL 150 08/15/2022    CHOL 182 06/08/2022     Lab Results   Component Value Date    HDL 32 (L) 12/22/2022    HDL 36 (L) 08/15/2022    HDL 29 (L) 06/08/2022     Lab Results   Component Value Date    LDLCALC 53 12/22/2022    LDLCALC 43 08/15/2022    LDLCALC  06/08/2022      Comment:      Unable to calculate due to one of the following values:  Cholesterol <5  HDL Cholesterol <5  Triglycerides <10 or >400     Lab Results   Component Value Date    TRIG 215 (H) 12/22/2022    TRIG 355 (H) 08/15/2022    TRIG 884 (H) 06/08/2022     Lab Results   Component Value Date    CHOLHDL 4.0 12/22/2022    CHOLHDL 4.2 08/15/2022    CHOLHDL 6.3 06/08/2022      Lab Results   Component Value Date    HGBA1C 6.8 (H) 03/28/2023      Lab Results   Component Value Date    TSH 3.070 02/17/2023      Assessment and Plan (including Health Maintenance)      Problem List  Smart Sets  Document Outside HM   :    Plan:         Health Maintenance Due   Topic Date Due    Hepatitis C Screening  Never done     Pneumococcal Vaccines (Age 0-64) (1 - PCV) Never done    Eye Exam  Never done    HIV Screening  Never done    TETANUS VACCINE  Never done    Colorectal Cancer Screening  Never done    Influenza Vaccine (1) Never done       Problem List Items Addressed This Visit          Endocrine    Diabetic ulcer of right great toe - Primary    Overview                      Current Assessment & Plan     Diabetic ulcer on his toe that is progressing.  He is received  IM antibiotics as well as oral antibiotics.  Patient has culture results are pending.  Referral to General surgery for possible amputation and or debridement.  Discussed with Dr. Cohen.  He is to follow-up in the a.m..         Relevant Orders    CBC Auto Differential    Comprehensive Metabolic Panel    Hemoglobin A1C       Health Maintenance Topics with due status: Not Due       Topic Last Completion Date    Diabetes Urine Screening 12/22/2022    Foot Exam 12/22/2022    Lipid Panel 12/22/2022    Hemoglobin A1c 03/28/2023       Future Appointments   Date Time Provider Department Center   6/12/2023  9:00 AM Julio Cruz DO [facility]        Follow up in about 4 weeks (around 4/24/2023).     Signature:  DO [facility]  [address] [place] [address], [place]  [address]    Date of encounter: 3/27/23

## 2023-03-29 NOTE — PROGRESS NOTES
General Surgery    Transferred out of ER overnight    Vss  No acute events overnight    Dressing c/d/I     Npo for Toe amp today dr farmer    Consent obtained per wife    Consult PT/ SS for offloading boot

## 2023-03-29 NOTE — TRANSFER OF CARE
"Anesthesia Transfer of Care Note    Patient: Syed Kelley    Procedure(s) Performed: Procedure(s) (LRB):  AMPUTATION, TOE (Right)    Patient location: PACU    Anesthesia Type: general    Transport from OR: Transported from OR on 2-3 L/min O2 by NC with adequate spontaneous ventilation    Post pain: adequate analgesia    Post assessment: no apparent anesthetic complications and tolerated procedure well    Post vital signs: stable    Level of consciousness: alert and responds to stimulation    Nausea/Vomiting: no nausea/vomiting    Complications: none    Transfer of care protocol was followed      Last vitals:   Visit Vitals  BP (!) 86/53   Pulse 69   Temp 36.7 °C (98.1 °F)   Resp 11   Ht 5' 6" (1.676 m)   Wt 93.4 kg (205 lb 14.6 oz)   SpO2 95%   BMI 33.23 kg/m²     "

## 2023-03-29 NOTE — NURSING
Pt received to room 424.  Alert and oriented.  Denies pain/discomfort.  No acute distress noted.  Wife at bedside.  Will continue to observe for changes.

## 2023-03-29 NOTE — ED NOTES
Patient awaiting on floor bed but is to stay on stretcher at this time until floor bed is assigned and will remain in Exam Room # 5.

## 2023-03-29 NOTE — PLAN OF CARE
Problem: Adult Inpatient Plan of Care  Goal: Plan of Care Review  3/28/2023 2019 by Jane Azar RN  Outcome: Ongoing, Progressing  3/28/2023 2018 by Jane Azar RN  Flowsheets (Taken 3/28/2023 2018)  Plan of Care Reviewed With: patient  Goal: Patient-Specific Goal (Individualized)  Outcome: Ongoing, Progressing  Goal: Absence of Hospital-Acquired Illness or Injury  Outcome: Ongoing, Progressing  Goal: Optimal Comfort and Wellbeing  Outcome: Ongoing, Progressing  Goal: Readiness for Transition of Care  Outcome: Ongoing, Progressing     Problem: Skin Injury Risk Increased  Goal: Skin Health and Integrity  Outcome: Ongoing, Progressing     Problem: Diabetes Comorbidity  Goal: Blood Glucose Level Within Targeted Range  Outcome: Ongoing, Progressing

## 2023-03-29 NOTE — OP NOTE
[facility] - Short Stay Unit  Surgery Department  Operative Note    SUMMARY     Date of Procedure: 3/29/2023     Procedure: Procedure(s) (LRB):  AMPUTATION, TOE (Right)     Surgeon(s) and Role:     * Yael Cohen MD - Primary    Assisting Surgeon: None    Pre-Operative Diagnosis: Osteomyelitis, unspecified site, unspecified type [M86.9]    Post-Operative Diagnosis: Post-Op Diagnosis Codes:     * Osteomyelitis, unspecified site, unspecified type [M86.9]    Anesthesia: General/MAC    Operative Findings (including complications, if any):  Osteomyelitis distal phalanx great toe    Description of Technical Procedures:  Taken operative suite patient's right foot prepped draped.  [place] incision made midportion of the great toe on the right.  Disarticulated the distal phalanx removed from the operative field rongeured back the phalangeal head copiously irrigated we assured hemostasis we approximated skin edges with interrupted 3-0 nylons.  Sterile dressing applied    Significant Surgical Tasks Conducted by the Assistant(s), if Applicable:  None    Estimated Blood Loss (EBL): * No values recorded between 3/29/2023  5:14 PM and 3/29/2023  5:30 PM *5cc           Implants: * No implants in log *    Specimens:   Specimen (24h ago, onward)       Start     Ordered    03/29/23 1724  Surgical Pathology  RELEASE UPON ORDERING         03/29/23 1724                            Condition: Good    Disposition: PACU - hemodynamically stable.    Attestation: I was present and scrubbed for the entire procedure.

## 2023-03-29 NOTE — PLAN OF CARE
1735 pt to pacu pt resp reg and non labored pt sleeping at present pt sao2 95% on 2l nbp pt dsg d/I to r lower ext pt pain level 0 at present will monitor     1740  pt vs stable pt accu check 138     1805  pt vs stable pt resting well pt voices no complaints orders to release to room per md    1815 pt released to a bunch rn b/p 136/82 pulse 78 resp 16 sao2 94% on ra temp 98.1 oral

## 2023-03-30 VITALS
HEART RATE: 61 BPM | RESPIRATION RATE: 17 BRPM | BODY MASS INDEX: 33.1 KG/M2 | WEIGHT: 205.94 LBS | HEIGHT: 66 IN | TEMPERATURE: 98 F | DIASTOLIC BLOOD PRESSURE: 71 MMHG | SYSTOLIC BLOOD PRESSURE: 146 MMHG | OXYGEN SATURATION: 96 %

## 2023-03-30 LAB
GLUCOSE SERPL-MCNC: 118 MG/DL (ref 70–105)
GLUCOSE SERPL-MCNC: 208 MG/DL (ref 70–105)

## 2023-03-30 PROCEDURE — 99238 PR HOSPITAL DISCHARGE DAY,<30 MIN: ICD-10-PCS | Mod: ,,, | Performed by: NURSE PRACTITIONER

## 2023-03-30 PROCEDURE — 99238 HOSP IP/OBS DSCHRG MGMT 30/<: CPT | Mod: ,,, | Performed by: NURSE PRACTITIONER

## 2023-03-30 PROCEDURE — 97161 PT EVAL LOW COMPLEX 20 MIN: CPT

## 2023-03-30 PROCEDURE — G0378 HOSPITAL OBSERVATION PER HR: HCPCS

## 2023-03-30 PROCEDURE — 82962 GLUCOSE BLOOD TEST: CPT

## 2023-03-30 PROCEDURE — 96366 THER/PROPH/DIAG IV INF ADDON: CPT | Mod: 59

## 2023-03-30 PROCEDURE — 25000003 PHARM REV CODE 250: Performed by: NURSE PRACTITIONER

## 2023-03-30 PROCEDURE — 63600175 PHARM REV CODE 636 W HCPCS: Performed by: NURSE PRACTITIONER

## 2023-03-30 PROCEDURE — 25000003 PHARM REV CODE 250: Performed by: SURGERY

## 2023-03-30 RX ORDER — HYDROCODONE BITARTRATE AND ACETAMINOPHEN 7.5; 325 MG/1; MG/1
1 TABLET ORAL EVERY 6 HOURS PRN
Qty: 15 TABLET | Refills: 0 | Status: SHIPPED | OUTPATIENT
Start: 2023-03-30 | End: 2023-10-19 | Stop reason: ALTCHOICE

## 2023-03-30 RX ORDER — LEVOFLOXACIN 500 MG/1
750 TABLET, FILM COATED ORAL DAILY
Qty: 5 TABLET | Refills: 0 | Status: SHIPPED | OUTPATIENT
Start: 2023-03-30 | End: 2023-08-23 | Stop reason: ALTCHOICE

## 2023-03-30 RX ORDER — FENTANYL CITRATE 50 UG/ML
INJECTION, SOLUTION INTRAMUSCULAR; INTRAVENOUS
Status: DISCONTINUED | OUTPATIENT
Start: 2023-03-29 | End: 2023-03-30

## 2023-03-30 RX ADMIN — METOPROLOL TARTRATE 50 MG: 25 TABLET, FILM COATED ORAL at 08:03

## 2023-03-30 RX ADMIN — GLYBURIDE 5 MG: 2.5 TABLET ORAL at 08:03

## 2023-03-30 RX ADMIN — ASPIRIN 81 MG: 81 TABLET, COATED ORAL at 08:03

## 2023-03-30 RX ADMIN — LISINOPRIL 20 MG: 20 TABLET ORAL at 08:03

## 2023-03-30 RX ADMIN — ATORVASTATIN CALCIUM 80 MG: 80 TABLET, FILM COATED ORAL at 08:03

## 2023-03-30 RX ADMIN — CITALOPRAM HYDROBROMIDE 20 MG: 20 TABLET ORAL at 08:03

## 2023-03-30 RX ADMIN — HYDROCODONE BITARTRATE AND ACETAMINOPHEN 1 TABLET: 7.5; 325 TABLET ORAL at 03:03

## 2023-03-30 RX ADMIN — AMPICILLIN SODIUM AND SULBACTAM SODIUM 3 G: 2; 1 INJECTION, POWDER, FOR SOLUTION INTRAMUSCULAR; INTRAVENOUS at 04:03

## 2023-03-30 RX ADMIN — HYDROCHLOROTHIAZIDE 25 MG: 25 TABLET ORAL at 08:03

## 2023-03-30 NOTE — DISCHARGE SUMMARY
[facility] - Short Stay Unit  General Surgery  Discharge Summary      Patient Name: Syed Kelley  MRN: [id number]  Admission Date: 3/28/2023  Hospital Length of Stay: 0 days  Discharge Date and Time:  03/30/2023 10:22 AM  Attending Physician: Yael Cohen MD   Discharging Provider: KAIT Aguirre  Primary Care Provider: Julio Cruz DO    HPI:   59 y/o male with nonhealing wound right great toe  On bactrim/rocephin  Previous debridements of tips of toes February per dr cohen  Xray osteomyelitis right great toe        Procedure(s) (LRB):  AMPUTATION, TOE (Right)      Indwelling Lines/Drains at time of discharge:   Lines/Drains/Airways     None               Hospital Course: No notes on file admit from clinic for iv vancomycin zosyn, osteomyelitis tip right great toe,  RIGHT great toe amp tip yesterday dr cohen closed with sutures, DC home POD 1 with levaquin for acintobacerter cx for 5days, norco, e scribed tto [facility], pharmacy [facility] with post op shoe, after PT evaluates patient educated patient/wife on wound care follow up 10 days for suture removal.    Goals of Care Treatment Preferences:  Code Status: Full Code      Consults:   Consults (From admission, onward)        Status Ordering Provider     Inpatient consult to Social Work  Once        Provider:  (Not yet assigned)    Completed KATINA PAL     Pharmacy to dose Vancomycin consult  Once        Provider:  (Not yet assigned)    Acknowledged KATINA PAL          Significant Diagnostic Studies: Microbiology:     Pending Diagnostic Studies:     Procedure Component Value Units Date/Time    Surgical Pathology [[id number]] Collected: 03/29/23 1723    Order Status: Sent Lab Status: In process Updated: 03/30/23 1021    Specimen: Tissue from Toe, Right Foot         Final Active Diagnoses:    Diagnosis Date Noted POA    PRINCIPAL PROBLEM:  Osteomyelitis [M86.9] 03/29/2023 Yes    Open wound of right great toe [S91.101A] 03/28/2023 Yes       Problems Resolved During this Admission:      Discharged Condition: good    Disposition: Home or Self Care    Follow Up:   Follow-up Information     KAIT Aguirre. Schedule an appointment as soon as possible for a visit in 11 day(s).    Specialty: Surgery  Why: post op f/u  Contact information:  [address] [place] [address] Professional Building  [place] [address]  [phone]                       Patient Instructions:      Diet diabetic     No driving until:   Order Comments: No driving for 1 week  Ok to return to work in 1 week     Notify your health care provider if you experience any of the following:  temperature >100.4     Notify your health care provider if you experience any of the following:  persistent nausea and vomiting or diarrhea     Notify your health care provider if you experience any of the following:  redness, tenderness, or signs of infection (pain, swelling, redness, odor or green/yellow discharge around incision site)     Remove dressing in 24 hours   Order Comments: Clean incision daily with vashe or antibacterial soap water, redress with gauze daily     Activity as tolerated     Shower on day dressing removed (No bath)     Medications:  Reconciled Home Medications:      Medication List      START taking these medications    * HYDROcodone-acetaminophen 7.5-325 mg per tablet  Commonly known as: NORCO  Take 1 tablet by mouth every 6 (six) hours as needed.     levoFLOXacin 500 MG tablet  Commonly known as: LEVAQUIN  Take 1.5 tablets (750 mg total) by mouth once daily.         * This list has 1 medication(s) that are the same as other medications prescribed for you. Read the directions carefully, and ask your doctor or other care provider to review them with you.            CONTINUE taking these medications    albuterol 90 mcg/actuation inhaler  Commonly known as: PROVENTIL/VENTOLIN HFA  Inhale 2 puffs into the lungs every 4 (four) hours as needed for Shortness of  "Breath.     aspirin 81 MG EC tablet  Commonly known as: ECOTRIN  Take 1 tablet (81 mg total) by mouth once daily.     atorvastatin 80 MG tablet  Commonly known as: LIPITOR  Take 1 tablet (80 mg total) by mouth once daily.     cholecalciferol (vitamin D3) 50 mcg (2,000 unit) Cap capsule  Commonly known as: VITAMIN D3  Take 1 capsule by mouth once daily.     citalopram 20 MG tablet  Commonly known as: CeleXA  Take 1 tablet (20 mg total) by mouth once daily.     * dextroamphetamine-amphetamine 30 MG 24 hr capsule  Commonly known as: ADDERALL XR  Take 1 capsule by mouth every morning and 1 capsule by mouth at 1:00 pm.     * dextroamphetamine-amphetamine 20 MG 24 hr capsule  Commonly known as: ADDERALL XR  Take 1 capsule (20 mg total) by mouth 2 (two) times a day.     * dextroamphetamine-amphetamine 20 mg tablet  Commonly known as: AdderalL  Take 1 tablet by mouth each afternoon     * dextroamphetamine-amphetamine 30 mg Tab  Commonly known as: AdderalL  Take 1 tablet by mouth each morning     FISH OIL ORAL  Take 1 capsule by mouth once daily.     glyBURIDE 5 MG tablet  Commonly known as: DIABETA  Take 1 tablet (5 mg total) by mouth daily with breakfast.     insulin degludec 200 unit/mL (3 mL) insulin pen  Commonly known as: TRESIBA FLEXTOUCH U-200  Inject 26 Units into the skin once daily.     insulin syringe-needle U-100 0.3 mL 31 gauge x 5/16" Syrg  Use once daily for insulin injection     lisinopriL-hydrochlorothiazide 20-25 mg Tab  Commonly known as: PRINZIDE,ZESTORETIC  Take 1 tablet by mouth once daily.     metFORMIN 1000 MG tablet  Commonly known as: GLUCOPHAGE  Take 1 tablet (1,000 mg total) by mouth 2 (two) times daily with meals.     metoprolol tartrate 50 MG tablet  Commonly known as: LOPRESSOR  Take 1 tablet (50 mg total) by mouth 2 (two) times daily.     pen needle, diabetic 31 gauge x 5/16" Ndle  Use to administer insulin twice daily         * This list has 4 medication(s) that are the same as other " medications prescribed for you. Read the directions carefully, and ask your doctor or other care provider to review them with you.            STOP taking these medications    sulfamethoxazole-trimethoprim 800-160mg 800-160 mg Tab  Commonly known as: BACTRIM DS        ASK your doctor about these medications    * HYDROcodone-acetaminophen 7.5-325 mg per tablet  Commonly known as: NORCO  Take 1 tablet by mouth every 6 (six) hours as needed for Pain.         * This list has 1 medication(s) that are the same as other medications prescribed for you. Read the directions carefully, and ask your doctor or other care provider to review them with you.              Time spent on the discharge of patient: 20 minutes    KAIT Aguirre  General Surgery  [facility] Short Stay Unit

## 2023-03-30 NOTE — DISCHARGE INSTRUCTIONS
Diet diabetic          No driving until:   Order Comments: No driving for 1 week  Ok to return to work in 1 week      Notify your health care provider if you experience any of the following:  temperature >100.4      Notify your health care provider if you experience any of the following:  persistent nausea and vomiting or diarrhea      Notify your health care provider if you experience any of the following:  redness, tenderness, or signs of infection (pain, swelling, redness, odor or green/yellow discharge around incision site)          Remove dressing in 24 hours   Order Comments: Clean incision daily with vashe or antibacterial soap water, redress with gauze daily      Activity as tolerated      Shower on day dressing removed (No bath)       No driving for 1 week   Ok to return to work in 1 week

## 2023-03-30 NOTE — PT/OT/SLP EVAL
Physical Therapy Evaluation    Patient Name:  Syed Kelley   MRN:  [id number]    Recommendations:     Discharge Recommendations: home   Discharge Equipment Recommendations: none   Barriers to discharge: None    Assessment:     Syed Kelley is a 58 y.o. male admitted with a medical diagnosis of Osteomyelitis.  He presents with the following impairments/functional limitations: impaired self care skills Patient has cane at home. Patient doesn't want to use crutches. Patient with good ability to ambulate with cane and post op shoe. Plan is for home today.    Rehab Prognosis: Good; patient would benefit from acute skilled PT services to address these deficits and reach maximum level of function.    Recent Surgery: Procedure(s) (LRB):  AMPUTATION, TOE (Right) 1 Day Post-Op    Plan:     During this hospitalization, patient to be seen 1 x/week to address the identified rehab impairments via gait training and progress toward the following goals:    Plan of Care Expires:  03/30/23    Subjective     Chief Complaint: post op pain  Patient/Family Comments/goals: Patient only wants to use his cane for ambulation  Pain/Comfort:  Pain Rating 1: 4/10  Location - Side 1: Right  Location 1: foot    Patients cultural, spiritual, Spiritism conflicts given the current situation: no    Living Environment:  Lives with spouse  Prior to admission, patients level of function was independent.  Equipment used at home: cane, straight.  DME owned (not currently used): single point cane.  Upon discharge, patient will have assistance from spouse.    Objective:     Communicated with nurse prior to session.  Patient found supine with    upon PT entry to room.    General Precautions: Standard,    Orthopedic Precautions:RLE weight bearing as tolerated   Braces:  (post op shoe)  Respiratory Status: Room air    Exams:  na    Functional Mobility:  Bed Mobility:     Supine to Sit: contact guard assistance  Sit to Supine: contact guard assistance  Transfers:      Sit to Stand:  contact guard assistance with no AD  Gait: ambulated 20 feet with post op shoe      AM-PAC 6 CLICK MOBILITY  Total Score:        Treatment & Education:  Instructed to use cane and limit walking until cleared by physician    Patient left up in chair with all lines intact.    GOALS:   Multidisciplinary Problems       Physical Therapy Goals       Not on file                    History:     Past Medical History:   Diagnosis Date    Adult attention deficit disorder 3/22/2021    Controlled type 2 diabetes mellitus with hyperglycemia, without long-term current use of insulin 3/22/2021    Disorder of kidney and ureter     Essential hypertension 3/22/2021    Hyperlipidemia        Past Surgical History:   Procedure Laterality Date    APPENDECTOMY      CARDIAC CATHETERIZATION      DEBRIDEMENT OF LOWER EXTREMITY Bilateral 2/18/2023    Procedure: DEBRIDEMENT, LOWER EXTREMITY;  Surgeon: Yael Cohen MD;  Location: [facility] OR;  Service: General;  Laterality: Bilateral;  debride toes    LEFT HEART CATHETERIZATION N/A 5/7/2021    Procedure: Left heart cath with possible intervention;  Surgeon: Federico James DO;  Location: [facility] CATH LAB;  Service: Cardiology;  Laterality: N/A;    TOE AMPUTATION Right 3/29/2023    Procedure: AMPUTATION, TOE;  Surgeon: Yael Cohen MD;  Location: [facility] OR;  Service: General;  Laterality: Right;  Right great toe amp dr cohen wednesday       Time Tracking:     PT Received On: 03/30/23  PT Start Time: 0845     PT Stop Time: 0905  PT Total Time (min): 20 min     Billable Minutes: Evaluation 20 03/30/2023

## 2023-03-30 NOTE — NURSING
Called pharmacy again requesting for vancomycin, leslie reports he will send up. JIMMY Mcknight on coming shift aware of patient needing vancomycin and will administer when comes to floor.

## 2023-03-30 NOTE — NURSING
Pt being discharged via wheelchair to private vehicle. NADN. Post op shoe noted to left foot. Wife present.

## 2023-03-30 NOTE — ANESTHESIA POSTPROCEDURE EVALUATION
Anesthesia Post Evaluation    Patient: Syed Kelley    Procedure(s) Performed: Procedure(s) (LRB):  AMPUTATION, TOE (Right)    Final Anesthesia Type: general      Patient location during evaluation: PACU  Patient participation: Yes- Able to Participate  Level of consciousness: awake and sedated  Post-procedure vital signs: reviewed and stable  Pain management: adequate  Airway patency: patent    PONV status at discharge: No PONV  Anesthetic complications: no      Cardiovascular status: blood pressure returned to baseline  Respiratory status: unassisted  Hydration status: euvolemic  Follow-up not needed.          Vitals Value Taken Time   /76 03/29/23 1823   Temp 36.7 °C (98.1 °F) 03/29/23 1812   Pulse 80 03/29/23 1823   Resp 16 03/29/23 1823   SpO2 94 % 03/29/23 1823         Event Time   Out of Recovery 03/29/2023 18:05:00         Pain/Basilio Score: Pain Rating Prior to Med Admin: 3 (3/28/2023  7:15 PM)  Basilio Score: 10 (3/29/2023  6:00 PM)

## 2023-03-30 NOTE — NURSING
Discharge instructions reviewed with patient and spouse; and copy given to patient. Patient and spouse voiced understanding regarding:meds, appt., signs and symptoms to report to physician.  ) No driving until follow up appointment.  2) May shower on day that dressing removed. (No tub [place]).  3) Notify your healthcare provider if you experience any of the following: temperature >100.4  4) Notify your healthcare provider if you experience any of the following: redness, tenderness, or      Or signs of infection (pain, swelling, redness, odor or green/ yellow discharge around incision site.)  5) Notify your healthcare provider if you experience any of the following: difficulty breathing or     increased cough.  6)Clean incision daily with antibacterial soap/water, then pat dry.   7)Notify your physician if you experience any persistent nausea, vomiting, or diarrhea or headache  8)Notify your physician if you experience any of the following:severe uncontrolled pain;worsening rash, increased confusion or weakness; dizziness, lightheadedness or visual disturbances

## 2023-03-31 ENCOUNTER — TELEPHONE (OUTPATIENT)
Dept: ORTHOPEDICS | Facility: HOSPITAL | Age: 58
End: 2023-03-31
Payer: COMMERCIAL

## 2023-03-31 NOTE — ADDENDUM NOTE
Addendum  created 03/30/23 1944 by Julio Mcdaniels Jr. CRNA    Intraprocedure Meds edited, Orders acknowledged in Narrator

## 2023-03-31 NOTE — PLAN OF CARE
[facility]  Discharge Final Note    Primary Care Provider: Julio Cruz DO    Expected Discharge Date: 3/30/2023    Final Discharge Note (most recent)       Final Note - 03/31/23 0828          Final Note    Assessment Type Final Discharge Note     Anticipated Discharge Disposition Home or Self Care                     Important Message from Medicare             Contact Info       KAIT Aguirre   Specialty: Surgery    [address] [place] [address] [facility] [address]   Phone: [phone]       Next Steps: Schedule an appointment as soon as possible for a visit in 11 day(s)    Instructions: post op f/u          Pt discharged home with no needs

## 2023-04-03 ENCOUNTER — TELEPHONE (OUTPATIENT)
Dept: ORTHOPEDICS | Facility: HOSPITAL | Age: 58
End: 2023-04-03
Payer: COMMERCIAL

## 2023-04-04 ENCOUNTER — OFFICE VISIT (OUTPATIENT)
Dept: FAMILY MEDICINE | Facility: CLINIC | Age: 58
End: 2023-04-04
Payer: COMMERCIAL

## 2023-04-04 VITALS
OXYGEN SATURATION: 97 % | WEIGHT: 199 LBS | TEMPERATURE: 98 F | HEIGHT: 66 IN | HEART RATE: 72 BPM | DIASTOLIC BLOOD PRESSURE: 76 MMHG | BODY MASS INDEX: 31.98 KG/M2 | SYSTOLIC BLOOD PRESSURE: 100 MMHG | RESPIRATION RATE: 16 BRPM

## 2023-04-04 DIAGNOSIS — F98.8 ADULT ATTENTION DEFICIT DISORDER: ICD-10-CM

## 2023-04-04 PROCEDURE — 3074F SYST BP LT 130 MM HG: CPT | Mod: CPTII,,, | Performed by: FAMILY MEDICINE

## 2023-04-04 PROCEDURE — 3078F DIAST BP <80 MM HG: CPT | Mod: CPTII,,, | Performed by: FAMILY MEDICINE

## 2023-04-04 PROCEDURE — 99213 OFFICE O/P EST LOW 20 MIN: CPT | Mod: ,,, | Performed by: FAMILY MEDICINE

## 2023-04-04 PROCEDURE — 4010F ACE/ARB THERAPY RXD/TAKEN: CPT | Mod: CPTII,,, | Performed by: FAMILY MEDICINE

## 2023-04-04 PROCEDURE — 3008F BODY MASS INDEX DOCD: CPT | Mod: CPTII,,, | Performed by: FAMILY MEDICINE

## 2023-04-04 PROCEDURE — 3078F PR MOST RECENT DIASTOLIC BLOOD PRESSURE < 80 MM HG: ICD-10-PCS | Mod: CPTII,,, | Performed by: FAMILY MEDICINE

## 2023-04-04 PROCEDURE — 3074F PR MOST RECENT SYSTOLIC BLOOD PRESSURE < 130 MM HG: ICD-10-PCS | Mod: CPTII,,, | Performed by: FAMILY MEDICINE

## 2023-04-04 PROCEDURE — 99213 PR OFFICE/OUTPT VISIT, EST, LEVL III, 20-29 MIN: ICD-10-PCS | Mod: ,,, | Performed by: FAMILY MEDICINE

## 2023-04-04 PROCEDURE — 4010F PR ACE/ARB THEARPY RXD/TAKEN: ICD-10-PCS | Mod: CPTII,,, | Performed by: FAMILY MEDICINE

## 2023-04-04 PROCEDURE — 1159F MED LIST DOCD IN RCRD: CPT | Mod: CPTII,,, | Performed by: FAMILY MEDICINE

## 2023-04-04 PROCEDURE — 1159F PR MEDICATION LIST DOCUMENTED IN MEDICAL RECORD: ICD-10-PCS | Mod: CPTII,,, | Performed by: FAMILY MEDICINE

## 2023-04-04 PROCEDURE — 3044F PR MOST RECENT HEMOGLOBIN A1C LEVEL <7.0%: ICD-10-PCS | Mod: CPTII,,, | Performed by: FAMILY MEDICINE

## 2023-04-04 PROCEDURE — 3008F PR BODY MASS INDEX (BMI) DOCUMENTED: ICD-10-PCS | Mod: CPTII,,, | Performed by: FAMILY MEDICINE

## 2023-04-04 PROCEDURE — 3044F HG A1C LEVEL LT 7.0%: CPT | Mod: CPTII,,, | Performed by: FAMILY MEDICINE

## 2023-04-04 RX ORDER — DEXTROAMPHETAMINE SACCHARATE, AMPHETAMINE ASPARTATE, DEXTROAMPHETAMINE SULFATE AND AMPHETAMINE SULFATE 7.5; 7.5; 7.5; 7.5 MG/1; MG/1; MG/1; MG/1
TABLET ORAL
Qty: 30 TABLET | Refills: 0 | Status: SHIPPED | OUTPATIENT
Start: 2023-04-04 | End: 2023-04-04 | Stop reason: SDUPTHER

## 2023-04-04 RX ORDER — DEXTROAMPHETAMINE SACCHARATE, AMPHETAMINE ASPARTATE, DEXTROAMPHETAMINE SULFATE AND AMPHETAMINE SULFATE 5; 5; 5; 5 MG/1; MG/1; MG/1; MG/1
TABLET ORAL
Qty: 30 TABLET | Refills: 0 | Status: SHIPPED | OUTPATIENT
Start: 2023-04-04 | End: 2023-04-04 | Stop reason: SDUPTHER

## 2023-04-04 RX ORDER — DEXTROAMPHETAMINE SACCHARATE, AMPHETAMINE ASPARTATE, DEXTROAMPHETAMINE SULFATE AND AMPHETAMINE SULFATE 7.5; 7.5; 7.5; 7.5 MG/1; MG/1; MG/1; MG/1
TABLET ORAL
Qty: 30 TABLET | Refills: 0 | Status: SHIPPED | OUTPATIENT
Start: 2023-04-04 | End: 2023-10-27 | Stop reason: SDUPTHER

## 2023-04-04 RX ORDER — DEXTROAMPHETAMINE SACCHARATE, AMPHETAMINE ASPARTATE, DEXTROAMPHETAMINE SULFATE AND AMPHETAMINE SULFATE 5; 5; 5; 5 MG/1; MG/1; MG/1; MG/1
TABLET ORAL
Qty: 30 TABLET | Refills: 0 | Status: SHIPPED | OUTPATIENT
Start: 2023-04-04 | End: 2023-10-27 | Stop reason: SDUPTHER

## 2023-04-04 NOTE — PROGRESS NOTES
Subjective     Patient ID: Syed Kelley is a 58 y.o. male.    Chief Complaint: Medication Refill (Adderall refill)    In for refill of Adderall.  He is taking this without problems for many years.  Blood pressure is under good control.  No chest pain or palpitations    Medication Refill    Review of Systems       Objective     Physical Exam  Constitutional:       Appearance: Normal appearance.   Cardiovascular:      Rate and Rhythm: Normal rate and regular rhythm.   Pulmonary:      Effort: Pulmonary effort is normal.      Breath sounds: Normal breath sounds.   Musculoskeletal:      Right lower leg: No edema.      Left lower leg: No edema.   Neurological:      Mental Status: He is alert.   Psychiatric:         Mood and Affect: Mood normal.         Behavior: Behavior normal.         Thought Content: Thought content normal.          Assessment and Plan     Problem List Items Addressed This Visit          Psychiatric    Adult attention deficit disorder    Relevant Medications    dextroamphetamine-amphetamine (ADDERALL) 30 mg Tab    dextroamphetamine-amphetamine (ADDERALL) 20 mg tablet       Medication refilled for 3 months.  30 mg in the morning, 20 mg in the afternoon

## 2023-04-05 LAB
ESTROGEN SERPL-MCNC: NORMAL PG/ML
INSULIN SERPL-ACNC: NORMAL U[IU]/ML
LAB AP GROSS DESCRIPTION: NORMAL
LAB AP LABORATORY NOTES: NORMAL
T3RU NFR SERPL: NORMAL %

## 2023-04-10 ENCOUNTER — OFFICE VISIT (OUTPATIENT)
Dept: SURGERY | Facility: CLINIC | Age: 58
End: 2023-04-10
Payer: COMMERCIAL

## 2023-04-10 VITALS
WEIGHT: 199 LBS | OXYGEN SATURATION: 95 % | SYSTOLIC BLOOD PRESSURE: 122 MMHG | RESPIRATION RATE: 20 BRPM | BODY MASS INDEX: 31.98 KG/M2 | DIASTOLIC BLOOD PRESSURE: 80 MMHG | HEIGHT: 66 IN | TEMPERATURE: 98 F | HEART RATE: 79 BPM

## 2023-04-10 DIAGNOSIS — Z09 POSTOP CHECK: Primary | ICD-10-CM

## 2023-04-10 PROCEDURE — 3079F DIAST BP 80-89 MM HG: CPT | Mod: CPTII,,, | Performed by: NURSE PRACTITIONER

## 2023-04-10 PROCEDURE — 3074F SYST BP LT 130 MM HG: CPT | Mod: CPTII,,, | Performed by: NURSE PRACTITIONER

## 2023-04-10 PROCEDURE — 1160F RVW MEDS BY RX/DR IN RCRD: CPT | Mod: CPTII,,, | Performed by: NURSE PRACTITIONER

## 2023-04-10 PROCEDURE — 99213 OFFICE O/P EST LOW 20 MIN: CPT | Mod: PBBFAC | Performed by: NURSE PRACTITIONER

## 2023-04-10 PROCEDURE — 4010F PR ACE/ARB THEARPY RXD/TAKEN: ICD-10-PCS | Mod: CPTII,,, | Performed by: NURSE PRACTITIONER

## 2023-04-10 PROCEDURE — 3044F PR MOST RECENT HEMOGLOBIN A1C LEVEL <7.0%: ICD-10-PCS | Mod: CPTII,,, | Performed by: NURSE PRACTITIONER

## 2023-04-10 PROCEDURE — 1159F MED LIST DOCD IN RCRD: CPT | Mod: CPTII,,, | Performed by: NURSE PRACTITIONER

## 2023-04-10 PROCEDURE — 99214 PR OFFICE/OUTPT VISIT, EST, LEVL IV, 30-39 MIN: ICD-10-PCS | Mod: S$PBB,,, | Performed by: NURSE PRACTITIONER

## 2023-04-10 PROCEDURE — 99214 OFFICE O/P EST MOD 30 MIN: CPT | Mod: S$PBB,,, | Performed by: NURSE PRACTITIONER

## 2023-04-10 PROCEDURE — 1159F PR MEDICATION LIST DOCUMENTED IN MEDICAL RECORD: ICD-10-PCS | Mod: CPTII,,, | Performed by: NURSE PRACTITIONER

## 2023-04-10 PROCEDURE — 3079F PR MOST RECENT DIASTOLIC BLOOD PRESSURE 80-89 MM HG: ICD-10-PCS | Mod: CPTII,,, | Performed by: NURSE PRACTITIONER

## 2023-04-10 PROCEDURE — 3008F BODY MASS INDEX DOCD: CPT | Mod: CPTII,,, | Performed by: NURSE PRACTITIONER

## 2023-04-10 PROCEDURE — 3044F HG A1C LEVEL LT 7.0%: CPT | Mod: CPTII,,, | Performed by: NURSE PRACTITIONER

## 2023-04-10 PROCEDURE — 1160F PR REVIEW ALL MEDS BY PRESCRIBER/CLIN PHARMACIST DOCUMENTED: ICD-10-PCS | Mod: CPTII,,, | Performed by: NURSE PRACTITIONER

## 2023-04-10 PROCEDURE — 3074F PR MOST RECENT SYSTOLIC BLOOD PRESSURE < 130 MM HG: ICD-10-PCS | Mod: CPTII,,, | Performed by: NURSE PRACTITIONER

## 2023-04-10 PROCEDURE — 3008F PR BODY MASS INDEX (BMI) DOCUMENTED: ICD-10-PCS | Mod: CPTII,,, | Performed by: NURSE PRACTITIONER

## 2023-04-10 PROCEDURE — 4010F ACE/ARB THERAPY RXD/TAKEN: CPT | Mod: CPTII,,, | Performed by: NURSE PRACTITIONER

## 2023-04-10 NOTE — PROGRESS NOTES
Subjective:       Patient ID: Syed Kelley is a 58 y.o. male.    Chief Complaint: Follow-up (Here for post op hospital follow up status post right great toe tip--has sutures intact--states it does bleed a little bit every now and then, but denies any pus from the site--denies any Fever or chills--denies any NV--BM good--appetite is getting better)      Two week follow-up right great tip of  toe amp for osteomyelitis distal phalanx    family history includes Cancer in his sister; Hypertension in his father; Stroke in his father.  Past Medical History:   Diagnosis Date    Adult attention deficit disorder 3/22/2021    Controlled type 2 diabetes mellitus with hyperglycemia, without long-term current use of insulin 3/22/2021    Disorder of kidney and ureter     Essential hypertension 3/22/2021    Hyperlipidemia       Past Surgical History:   Procedure Laterality Date    APPENDECTOMY      CARDIAC CATHETERIZATION      DEBRIDEMENT OF LOWER EXTREMITY Bilateral 2/18/2023    Procedure: DEBRIDEMENT, LOWER EXTREMITY;  Surgeon: Yael Cohen MD;  Location: [facility] OR;  Service: General;  Laterality: Bilateral;  debride toes    LEFT HEART CATHETERIZATION N/A 5/7/2021    Procedure: Left heart cath with possible intervention;  Surgeon: Federico James DO;  Location: [facility] CATH LAB;  Service: Cardiology;  Laterality: N/A;    TOE AMPUTATION Right 3/29/2023    Procedure: AMPUTATION, TOE;  Surgeon: Yael Cohen MD;  Location: [facility] OR;  Service: General;  Laterality: Right;  Right great toe amp dr cohen wednesday       reports that he quit smoking about 38 years ago. His smoking use included cigarettes. He started smoking about 42 years ago. He has a 20.00 pack-year smoking history. His smokeless tobacco use includes chew and snuff. He reports current alcohol use. He reports that he does not use drugs.   HPI  Review of Systems      Objective:      /80 (BP Location: Right arm, Patient Position: Sitting)   Pulse 79    "Temp 97.7 °F (36.5 °C) (Oral)   Resp 20   Ht 5' 6" (1.676 m)   Wt 90.3 kg (199 lb)   SpO2 95%   BMI 32.12 kg/m²    Physical Exam      Assessment:       1. Postop check      Well healed no signs of infection palpable DP pulse  Plan:       DC sutures  Shower antibacterial soap and water call or follow-up p.r.n. problems      "

## 2023-04-11 NOTE — PROGRESS NOTES
Julio Cruz DO   [facility]  [address] [address] [place]  [address]      PATIENT NAME: Syed Kelley  : 1965  DATE: 22  MRN: [id number]      Billing Provider: Julio Cruz DO  Level of Service:   Patient PCP Information     Provider PCP Type    Waqar Osorio DO General          Reason for Visit / Chief Complaint: Annual Exam ([facility]. Pt is not fasting this morning. Pt states that he has been struggling to get his blood sugars under control and wants to discuss treatment options. )       Update PCP  Update Chief Complaint         History of Present Illness / Problem Focused Workflow     Syed Kelley presents to the clinic with Annual Exam ([facility]. Pt is not fasting this morning. Pt states that he has been struggling to get his blood sugars under control and wants to discuss treatment options. )     Patient is in for annual physical examination and has previously had diabetes is not been very well controlled with his blood sugars were 180 most of the time his A1c BMP 9 3. 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  He can be on insulin so we started him on Levemir today.  Patient notice that he is to check his blood sugars 2-3 times daily and does that on a frequent basis.  Patient reports that he has a rash around base his penis in the been on Farxiga.  Patient denies any fever chills or sweats.  Patient does have a history of ADHD takes medicines same.      Review of Systems     Review of Systems   Constitutional: Negative for activity change, appetite change, chills, fatigue and fever.   HENT: Negative for nasal congestion, ear discharge, ear pain, mouth dryness, mouth sores, postnasal drip, sinus pressure/congestion, sore throat and voice change.    Eyes: Negative for pain, discharge, redness, itching and visual disturbance.   Respiratory: Negative for apnea, cough, chest  tightness, shortness of breath and wheezing.    Cardiovascular: Negative for chest pain, palpitations and leg swelling.   Gastrointestinal: Negative for abdominal distention, abdominal pain, anal bleeding, blood in stool, change in bowel habit, constipation, diarrhea, nausea, vomiting, reflux and change in bowel habit.   Endocrine: Positive for polydipsia and polyphagia. Negative for cold intolerance, heat intolerance and polyuria.   Genitourinary: Positive for difficulty urinating, discharge, erectile dysfunction and penile swelling. Negative for enuresis, frequency, genital sores, hematuria and urgency.   Musculoskeletal: Negative for arthralgias, back pain, gait problem, leg pain, myalgias and neck pain.   Integumentary:  Negative for rash, mole/lesion, breast mass and breast discharge.   Allergic/Immunologic: Negative for environmental allergies and food allergies.   Neurological: Negative for dizziness, vertigo, tremors, seizures, syncope, facial asymmetry, speech difficulty, weakness, light-headedness, numbness, headaches, disturbances in coordination, memory loss and coordination difficulties.   Hematological: Negative for adenopathy. Does not bruise/bleed easily.   Psychiatric/Behavioral: Negative for agitation, behavioral problems, confusion, decreased concentration, dysphoric mood, hallucinations, self-injury, sleep disturbance and suicidal ideas. The patient is not nervous/anxious and is not hyperactive.    Breast: Negative for mass      Medical / Social / Family History     Past Medical History:   Diagnosis Date    Adult attention deficit disorder 3/22/2021    Controlled type 2 diabetes mellitus with hyperglycemia, without long-term current use of insulin 3/22/2021    Disorder of kidney and ureter     Essential hypertension 3/22/2021    Hyperlipidemia        Past Surgical History:   Procedure Laterality Date    APPENDECTOMY      CARDIAC CATHETERIZATION      LEFT HEART CATHETERIZATION N/A 5/7/2021     Procedure: Left heart cath with possible intervention;  Surgeon: Federico James DO;  Location: [facility] CATH LAB;  Service: Cardiology;  Laterality: N/A;       Social History    reports that he quit smoking about 37 years ago. His smoking use included cigarettes. He started smoking about 41 years ago. He has a 20.00 pack-year smoking history. His smokeless tobacco use includes chew. He reports current alcohol use. He reports that he does not use drugs.    Family History  's family history includes Cancer in his sister; Hypertension in his father; Stroke in his father.    Medications and Allergies     Medications  Outpatient Medications Marked as Taking for the 6/8/22 encounter (Office Visit) with Julio Cruz DO   Medication Sig Dispense Refill    aspirin (ECOTRIN) 81 MG EC tablet Take 1 tablet (81 mg total) by mouth once daily. 90 tablet 3    atorvastatin (LIPITOR) 80 MG tablet Take 1 tablet (80 mg total) by mouth once daily. 90 tablet 3    cholecalciferol, vitamin D3, (VITAMIN D3) 50 mcg (2,000 unit) Cap Take 1 capsule by mouth once daily.      citalopram (CELEXA) 20 MG tablet Take 1 tablet (20 mg total) by mouth once daily. 90 tablet 1    dapagliflozin (FARXIGA) 10 mg tablet Take 1 tablet (10 mg total) by mouth once daily. 30 tablet 2    lisinopriL-hydrochlorothiazide (PRINZIDE,ZESTORETIC) 20-25 mg Tab Take 1 tablet by mouth once daily. 90 tablet 1    metFORMIN (GLUCOPHAGE) 1000 MG tablet Take 1 tablet (1,000 mg total) by mouth 2 (two) times daily with meals. 180 tablet 1    metoprolol tartrate (LOPRESSOR) 50 MG tablet Take 1 tablet (50 mg total) by mouth 2 (two) times daily. 180 tablet 1    silver sulfADIAZINE 1% (SILVADENE) 1 % cream Apply topically once daily. 25 g 0    [DISCONTINUED] dextroamphetamine-amphetamine (ADDERALL XR) 30 MG 24 hr capsule Take 1 capsule (30 mg total) by mouth every morning. And take one at 1 pm 60 capsule 0       Allergies  Review of patient's allergies  indicates:   Allergen Reactions    Azithromycin     M-mycin        Physical Examination     Vitals:    06/08/22 0957   BP: 122/84   Pulse: 71   Resp: 18   Temp: 97.2 °F (36.2 °C)     Physical Exam  Constitutional:       General: He is not in acute distress.     Appearance: Normal appearance. He is obese.   HENT:      Head: Normocephalic.      Nose: Nose normal.      Mouth/Throat:      Mouth: Mucous membranes are moist.      Pharynx: Oropharynx is clear. No oropharyngeal exudate or posterior oropharyngeal erythema.   Eyes:      General: No scleral icterus.        Right eye: No discharge.         Left eye: No discharge.      Conjunctiva/sclera: Conjunctivae normal.      Pupils: Pupils are equal, round, and reactive to light.   Cardiovascular:      Rate and Rhythm: Normal rate and regular rhythm.      Pulses: Normal pulses.      Heart sounds: Normal heart sounds. No murmur heard.  Pulmonary:      Effort: Pulmonary effort is normal.      Breath sounds: Normal breath sounds. No wheezing.   Abdominal:      General: Abdomen is flat. Bowel sounds are normal. There is no distension.      Tenderness: There is no abdominal tenderness.   Musculoskeletal:         General: Normal range of motion.      Right lower leg: No edema.      Left lower leg: No edema.   Skin:     General: Skin is warm.      Capillary Refill: Capillary refill takes less than 2 seconds.      Findings: Rash present.      Comments: Erythematous patches and white exudate around the base of the glans penis.   Neurological:      General: No focal deficit present.      Mental Status: He is alert and oriented to person, place, and time. Mental status is at baseline.      Cranial Nerves: No cranial nerve deficit.      Sensory: No sensory deficit.      Motor: No weakness.      Coordination: Coordination normal.      Gait: Gait normal.      Deep Tendon Reflexes: Reflexes normal.   Psychiatric:         Mood and Affect: Mood normal.         Behavior: Behavior normal.          Thought Content: Thought content normal.         Judgment: Judgment normal.               Lab Results   Component Value Date    WBC 8.39 04/24/2022    HGB 15.7 04/24/2022    HCT 54 (H) 04/24/2022    MCV 87.6 04/24/2022     04/24/2022          Sodium   Date Value Ref Range Status   04/24/2022 125 (L) 136 - 145 mmol/L Final     Potassium   Date Value Ref Range Status   04/24/2022 4.3 3.5 - 5.1 mmol/L Final     Chloride   Date Value Ref Range Status   04/24/2022 89 (L) 98 - 107 mmol/L Final     CO2   Date Value Ref Range Status   04/24/2022 25 21 - 32 mmol/L Final     Glucose   Date Value Ref Range Status   04/24/2022 522 (HH) 74 - 106 mg/dL Final     BUN   Date Value Ref Range Status   04/24/2022 27 (H) 7 - 18 mg/dL Final     Creatinine   Date Value Ref Range Status   04/24/2022 1.31 (H) 0.70 - 1.30 mg/dL Final     Calcium   Date Value Ref Range Status   04/24/2022 9.4 8.5 - 10.1 mg/dL Final     Total Protein   Date Value Ref Range Status   04/24/2022 7.3 6.4 - 8.2 g/dL Final     Albumin   Date Value Ref Range Status   04/24/2022 3.8 3.5 - 5.0 g/dL Final     Bilirubin, Total   Date Value Ref Range Status   04/24/2022 0.5 0.0 - 1.2 mg/dL Final     Alk Phos   Date Value Ref Range Status   04/24/2022 96 45 - 115 U/L Final     AST   Date Value Ref Range Status   04/24/2022 16 15 - 37 U/L Final     ALT   Date Value Ref Range Status   04/24/2022 33 16 - 61 U/L Final     Anion Gap   Date Value Ref Range Status   04/24/2022 15 7 - 16 mmol/L Final     eGFR    Date Value Ref Range Status   05/06/2021 89       eGFR   Date Value Ref Range Status   04/24/2022 60 >=60 mL/min/1.73m² Final      CT Head Without Contrast  Narrative: EXAMINATION:  CT head without contrast    CLINICAL HISTORY:  Neuro deficit, acute, stroke suspected;    TECHNIQUE:  Transaxial CT sections were obtained through the brain without contrast.    The CT examination was performed using one or more of the following dose reduction  techniques: Automated exposure control, adjustment of the mA and kV according to patient's size, use of acute or iterative reconstruction techniques.    COMPARISON:  No previous head CT available    FINDINGS:  The ventricles are midline in position without evidence of hydrocephalus. There is no mass or area of parenchymal hemorrhage. There is no gross CT evidence of acute cortical stroke. There is no extra-axial hematoma. The partially visualized sinuses are generally clear. There is no obvious skull fracture.  There is mild calcification in the distal internal carotid arteries bilaterally.  There is some moderate calcification of the distal aspect of the right vertebral artery.  Impression: No acute intracranial process    Electronically signed by: Kingsley Mcconnell  Date:    10/18/2021  Time:    11:17     Procedures   Assessment and Plan (including Health Maintenance)      Problem List  Smart Sets  Document Outside HM   :    Plan:         Health Maintenance Due   Topic Date Due    Hepatitis C Screening  Never done    Eye Exam  Never done    HIV Screening  Never done    TETANUS VACCINE  Never done    Colorectal Cancer Screening  Never done    Hemoglobin A1c  05/02/2022       Problem List Items Addressed This Visit        Psychiatric    Adult attention deficit disorder    Current Assessment & Plan     History of attention deficit disorder on medication same.  Patient is on higher doses of this and may turn out to be an issue with 's license bureau have advised him that he needs to let the node stay is more than once daily.  Follow-up here at least every 3 months.   is been pulled we did give him 3 months of medication.           Relevant Medications    dextroamphetamine-amphetamine (ADDERALL XR) 30 MG 24 hr capsule    Other Relevant Orders    POCT Urine Drug Screen Presump (Completed)       Derm    Yeast dermatitis of penis    Current Assessment & Plan     Patient has what appears to be a yeast  "dermatitis on his penis.  Will place him on Diflucan g daily 5 days.  If this recurs a may have to take him off his Farxiga.           Relevant Medications    fluconazole (DIFLUCAN) 100 MG tablet    Other Relevant Orders    POCT URINALYSIS W/O SCOPE (Completed)    Urine culture       Cardiac/Vascular    Essential hypertension - Primary    Current Assessment & Plan     Blood pressure is well controlled at this time.  No change in medicines.  Follow-up every 3 months.              Oncology    Adenomatous polyps    Current Assessment & Plan     Patient has history of adenomatous polyps and the lead follow-up in 5 years for repeat colonoscopy           Relevant Orders    Ambulatory referral/consult to Gastroenterology       Endocrine    Type 2 diabetes mellitus without complication, without long-term current use of insulin    Current Assessment & Plan     Diabetes mellitus not controlled with the dapagliflozin and metformin.  Will add insulin 5 units of Levemir daily to begin and he checks blood sugars at least twice daily but preferably AC and HS.  Follow-up in 1 week or p.r.n. discussed the 1800 calorie diet.  Did recommend that he use boost twice daily instead of snacks.  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           Relevant Medications    insulin detemir U-100 (LEVEMIR U-100 INSULIN) 100 unit/mL injection    insulin syringe-needle U-100 0.3 mL 29 gauge x 1/2" Syrg    fluconazole (DIFLUCAN) 100 MG tablet    Other Relevant Orders    Ambulatory referral/consult to Ophthalmology    POCT URINALYSIS W/O SCOPE (Completed)    Urine culture    Basic Metabolic Panel    Hemoglobin A1C    Lipid Panel      Other Visit Diagnoses     Attention deficit hyperactivity disorder (ADHD), unspecified ADHD type        Relevant Medications    dextroamphetamine-amphetamine (ADDERALL XR) 30 MG 24 hr capsule    Other Relevant Orders    POCT Urine Drug Screen Presump " (Completed)    Long-term use of high-risk medication        Relevant Orders    POCT Urine Drug Screen Presump (Completed)          Health Maintenance Topics with due status: Not Due       Topic Last Completion Date    Diabetes Urine Screening 10/28/2021    Lipid Panel 02/02/2022    Foot Exam 04/13/2022    Influenza Vaccine Not Due       Future Appointments   Date Time Provider Department Center   7/8/2022  9:00 AM Julio Cruz DO [facility]   12/6/2022  2:15 PM Nicolás Montes MD [facility]   6/12/2023  9:00 AM Julio rCuz DO [facility]        Follow up in about 2 weeks (around 6/22/2022).     Signature:  DO [facility]  [address] [address] [place]  [address]    Date of encounter: 6/8/22   Meatoplasty, lysis of coronal adhesions. Please see operative note for full details of the case.

## 2023-04-28 ENCOUNTER — OFFICE VISIT (OUTPATIENT)
Dept: FAMILY MEDICINE | Facility: CLINIC | Age: 58
End: 2023-04-28
Payer: COMMERCIAL

## 2023-04-28 VITALS
OXYGEN SATURATION: 99 % | SYSTOLIC BLOOD PRESSURE: 128 MMHG | BODY MASS INDEX: 32.78 KG/M2 | DIASTOLIC BLOOD PRESSURE: 84 MMHG | TEMPERATURE: 98 F | HEART RATE: 69 BPM | HEIGHT: 66 IN | WEIGHT: 204 LBS | RESPIRATION RATE: 18 BRPM

## 2023-04-28 DIAGNOSIS — S91.101S OPEN WOUND OF RIGHT GREAT TOE, SEQUELA: Primary | ICD-10-CM

## 2023-04-28 DIAGNOSIS — E11.9 TYPE 2 DIABETES MELLITUS WITHOUT COMPLICATION, WITHOUT LONG-TERM CURRENT USE OF INSULIN: ICD-10-CM

## 2023-04-28 DIAGNOSIS — S50.11XD CONTUSION OF RIGHT FOREARM, SUBSEQUENT ENCOUNTER: ICD-10-CM

## 2023-04-28 PROBLEM — S50.11XA CONTUSION OF RIGHT FOREARM: Status: ACTIVE | Noted: 2023-04-28

## 2023-04-28 PROCEDURE — 3079F PR MOST RECENT DIASTOLIC BLOOD PRESSURE 80-89 MM HG: ICD-10-PCS | Mod: CPTII,,, | Performed by: FAMILY MEDICINE

## 2023-04-28 PROCEDURE — 3044F HG A1C LEVEL LT 7.0%: CPT | Mod: CPTII,,, | Performed by: FAMILY MEDICINE

## 2023-04-28 PROCEDURE — 99213 OFFICE O/P EST LOW 20 MIN: CPT | Mod: ,,, | Performed by: FAMILY MEDICINE

## 2023-04-28 PROCEDURE — 4010F PR ACE/ARB THEARPY RXD/TAKEN: ICD-10-PCS | Mod: CPTII,,, | Performed by: FAMILY MEDICINE

## 2023-04-28 PROCEDURE — 1159F PR MEDICATION LIST DOCUMENTED IN MEDICAL RECORD: ICD-10-PCS | Mod: CPTII,,, | Performed by: FAMILY MEDICINE

## 2023-04-28 PROCEDURE — 3008F BODY MASS INDEX DOCD: CPT | Mod: CPTII,,, | Performed by: FAMILY MEDICINE

## 2023-04-28 PROCEDURE — 3008F PR BODY MASS INDEX (BMI) DOCUMENTED: ICD-10-PCS | Mod: CPTII,,, | Performed by: FAMILY MEDICINE

## 2023-04-28 PROCEDURE — 3074F PR MOST RECENT SYSTOLIC BLOOD PRESSURE < 130 MM HG: ICD-10-PCS | Mod: CPTII,,, | Performed by: FAMILY MEDICINE

## 2023-04-28 PROCEDURE — 3079F DIAST BP 80-89 MM HG: CPT | Mod: CPTII,,, | Performed by: FAMILY MEDICINE

## 2023-04-28 PROCEDURE — 3044F PR MOST RECENT HEMOGLOBIN A1C LEVEL <7.0%: ICD-10-PCS | Mod: CPTII,,, | Performed by: FAMILY MEDICINE

## 2023-04-28 PROCEDURE — 4010F ACE/ARB THERAPY RXD/TAKEN: CPT | Mod: CPTII,,, | Performed by: FAMILY MEDICINE

## 2023-04-28 PROCEDURE — 1159F MED LIST DOCD IN RCRD: CPT | Mod: CPTII,,, | Performed by: FAMILY MEDICINE

## 2023-04-28 PROCEDURE — 99213 PR OFFICE/OUTPT VISIT, EST, LEVL III, 20-29 MIN: ICD-10-PCS | Mod: ,,, | Performed by: FAMILY MEDICINE

## 2023-04-28 PROCEDURE — 3074F SYST BP LT 130 MM HG: CPT | Mod: CPTII,,, | Performed by: FAMILY MEDICINE
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
## 2023-04-28 NOTE — ASSESSMENT & PLAN NOTE
Give a sample of aspartate insulin short-acting with the sliding scale starting at 203 units 250  6 units 300  9 units and 350 12 units.  Patient has lost his job and so will try to get him samples of long-acting insulin i.e. Tresiba which she is been on.

## 2023-04-28 NOTE — ASSESSMENT & PLAN NOTE
Patient is status post amputation of the end of his large toe right foot.  He is doing well but does have a stitch that is hanging out that was not removed.  Having no pain.

## 2023-05-30 ENCOUNTER — HOSPITAL ENCOUNTER (EMERGENCY)
Facility: HOSPITAL | Age: 58
Discharge: HOME | End: 2023-05-30
Payer: COMMERCIAL

## 2023-05-30 VITALS
DIASTOLIC BLOOD PRESSURE: 78 MMHG | OXYGEN SATURATION: 99 % | WEIGHT: 203 LBS | RESPIRATION RATE: 17 BRPM | TEMPERATURE: 99 F | HEART RATE: 69 BPM | BODY MASS INDEX: 32.77 KG/M2 | SYSTOLIC BLOOD PRESSURE: 135 MMHG

## 2023-05-30 DIAGNOSIS — I10 HYPERTENSION: ICD-10-CM

## 2023-05-30 DIAGNOSIS — Z91.148 NONCOMPLIANCE WITH MEDICATION REGIMEN: ICD-10-CM

## 2023-05-30 DIAGNOSIS — I10 HYPERTENSION, UNSPECIFIED TYPE: Primary | ICD-10-CM

## 2023-05-30 LAB
ALBUMIN SERPL BCP-MCNC: 3.5 G/DL (ref 3.5–5)
ALBUMIN/GLOB SERPL: 0.9 {RATIO}
ALP SERPL-CCNC: 93 U/L (ref 45–115)
ALT SERPL W P-5'-P-CCNC: 26 U/L (ref 16–61)
AMPHET UR QL SCN: NEGATIVE
ANION GAP SERPL CALCULATED.3IONS-SCNC: 9 MMOL/L (ref 7–16)
AST SERPL W P-5'-P-CCNC: 20 U/L (ref 15–37)
BACTERIA #/AREA URNS HPF: ABNORMAL /HPF
BARBITURATES UR QL SCN: NEGATIVE
BASOPHILS # BLD AUTO: 0.01 K/UL (ref 0–0.2)
BASOPHILS NFR BLD AUTO: 0.2 % (ref 0–1)
BENZODIAZ METAB UR QL SCN: NEGATIVE
BILIRUB SERPL-MCNC: 0.3 MG/DL (ref ?–1.2)
BILIRUB UR QL STRIP: NEGATIVE
BUN SERPL-MCNC: 10 MG/DL (ref 7–18)
BUN/CREAT SERPL: 10 (ref 6–20)
CALCIUM SERPL-MCNC: 8.3 MG/DL (ref 8.5–10.1)
CANNABINOIDS UR QL SCN: NEGATIVE
CAOX CRY #/AREA URNS LPF: ABNORMAL /LPF
CHLORIDE SERPL-SCNC: 101 MMOL/L (ref 98–107)
CLARITY UR: CLEAR
CO2 SERPL-SCNC: 32 MMOL/L (ref 21–32)
COCAINE UR QL SCN: NEGATIVE
COLOR UR: YELLOW
CREAT SERPL-MCNC: 0.97 MG/DL (ref 0.7–1.3)
DIFFERENTIAL METHOD BLD: ABNORMAL
EGFR (NO RACE VARIABLE) (RUSH/TITUS): 90 ML/MIN/1.73M2
EOSINOPHIL # BLD AUTO: 0.1 K/UL (ref 0–0.5)
EOSINOPHIL NFR BLD AUTO: 1.8 % (ref 1–4)
ERYTHROCYTE [DISTWIDTH] IN BLOOD BY AUTOMATED COUNT: 13.4 % (ref 11.5–14.5)
GLOBULIN SER-MCNC: 4.1 G/DL (ref 2–4)
GLUCOSE SERPL-MCNC: 86 MG/DL (ref 74–106)
GLUCOSE UR STRIP-MCNC: 100 MG/DL
HCT VFR BLD AUTO: 37.6 % (ref 40–54)
HGB BLD-MCNC: 12.7 G/DL (ref 13.5–18)
KETONES UR STRIP-SCNC: NEGATIVE MG/DL
LEUKOCYTE ESTERASE UR QL STRIP: NEGATIVE
LYMPHOCYTES # BLD AUTO: 1.98 K/UL (ref 1–4.8)
LYMPHOCYTES NFR BLD AUTO: 35.7 % (ref 27–41)
MCH RBC QN AUTO: 31.5 PG (ref 27–31)
MCHC RBC AUTO-ENTMCNC: 33.8 G/DL (ref 32–36)
MCV RBC AUTO: 93.3 FL (ref 80–96)
MONOCYTES # BLD AUTO: 0.5 K/UL (ref 0–0.8)
MONOCYTES NFR BLD AUTO: 9 % (ref 2–6)
MPC BLD CALC-MCNC: 8.9 FL (ref 9.4–12.4)
NEUTROPHILS # BLD AUTO: 2.95 K/UL (ref 1.8–7.7)
NEUTROPHILS NFR BLD AUTO: 53.3 % (ref 53–65)
NITRITE UR QL STRIP: NEGATIVE
NT-PROBNP SERPL-MCNC: 71 PG/ML (ref 1–125)
OPIATES UR QL SCN: NEGATIVE
PCP UR QL SCN: NEGATIVE
PH UR STRIP: 6 PH UNITS
PLATELET # BLD AUTO: 267 K/UL (ref 150–400)
POTASSIUM SERPL-SCNC: 3.3 MMOL/L (ref 3.5–5.1)
PROT SERPL-MCNC: 7.6 G/DL (ref 6.4–8.2)
PROT UR QL STRIP: NEGATIVE
RBC # BLD AUTO: 4.03 M/UL (ref 4.6–6.2)
RBC # UR STRIP: ABNORMAL /UL
RBC #/AREA URNS HPF: ABNORMAL /HPF
SODIUM SERPL-SCNC: 139 MMOL/L (ref 136–145)
SP GR UR STRIP: 1.01
SQUAMOUS #/AREA URNS LPF: ABNORMAL /LPF
UROBILINOGEN UR STRIP-ACNC: 0.2 MG/DL
WBC # BLD AUTO: 5.54 K/UL (ref 4.5–11)
WBC #/AREA URNS HPF: ABNORMAL /HPF

## 2023-05-30 PROCEDURE — 85025 COMPLETE CBC W/AUTO DIFF WBC: CPT | Performed by: REGISTERED NURSE

## 2023-05-30 PROCEDURE — 80307 DRUG TEST PRSMV CHEM ANLYZR: CPT | Performed by: REGISTERED NURSE

## 2023-05-30 PROCEDURE — 93010 ELECTROCARDIOGRAM REPORT: CPT | Mod: ,,, | Performed by: INTERNAL MEDICINE

## 2023-05-30 PROCEDURE — 83880 ASSAY OF NATRIURETIC PEPTIDE: CPT | Performed by: REGISTERED NURSE

## 2023-05-30 PROCEDURE — 99285 EMERGENCY DEPT VISIT HI MDM: CPT | Mod: 25

## 2023-05-30 PROCEDURE — 99285 PR EMERGENCY DEPT VISIT,LEVEL V: ICD-10-PCS | Mod: ,,, | Performed by: REGISTERED NURSE

## 2023-05-30 PROCEDURE — 99285 EMERGENCY DEPT VISIT HI MDM: CPT | Mod: ,,, | Performed by: REGISTERED NURSE

## 2023-05-30 PROCEDURE — 80053 COMPREHEN METABOLIC PANEL: CPT | Performed by: REGISTERED NURSE

## 2023-05-30 PROCEDURE — 93005 ELECTROCARDIOGRAM TRACING: CPT

## 2023-05-30 PROCEDURE — 81001 URINALYSIS AUTO W/SCOPE: CPT | Performed by: REGISTERED NURSE

## 2023-05-30 PROCEDURE — 96374 THER/PROPH/DIAG INJ IV PUSH: CPT

## 2023-05-30 PROCEDURE — 93010 EKG 12-LEAD: ICD-10-PCS | Mod: ,,, | Performed by: INTERNAL MEDICINE

## 2023-05-30 PROCEDURE — 63600175 PHARM REV CODE 636 W HCPCS: Performed by: REGISTERED NURSE

## 2023-05-30 RX ORDER — HYDRALAZINE HYDROCHLORIDE 20 MG/ML
10 INJECTION INTRAMUSCULAR; INTRAVENOUS
Status: COMPLETED | OUTPATIENT
Start: 2023-05-30 | End: 2023-05-30

## 2023-05-30 RX ORDER — AMOXICILLIN 500 MG
CAPSULE ORAL DAILY
COMMUNITY

## 2023-05-30 RX ORDER — MULTIVITAMIN
1 TABLET ORAL DAILY
COMMUNITY

## 2023-05-30 RX ADMIN — HYDRALAZINE HYDROCHLORIDE 10 MG: 20 INJECTION INTRAMUSCULAR; INTRAVENOUS at 08:05

## 2023-05-31 ENCOUNTER — TELEPHONE (OUTPATIENT)
Dept: EMERGENCY MEDICINE | Facility: HOSPITAL | Age: 58
End: 2023-05-31
Payer: COMMERCIAL

## 2023-05-31 NOTE — ED PROVIDER NOTES
"Encounter Date: 5/30/2023       History     Chief Complaint   Patient presents with    Hypertension     Elevated blood pressure all day.  Patient has been out of his blood pressure medicine x 1 week     Syed Kelley is a 57 yo WM that presents with c/o elevated blood pressure since earlier today.  Pt states he has been out of the folowing medications for 1 week:  atorvastatin 80mg, lisinopril-HCTZ 20/25mg, glyburide 5 mg, celexa 20 mg, metformin 1000 mg BID, and metoprolol 50mg.  He states he has refills but "don't have the money to get them filed".  States he is lightheaded, feels "tingly", has a generalized headache, and his mouth "is wet but feels dry".      The history is provided by the patient.   Review of patient's allergies indicates:   Allergen Reactions    Azithromycin     M-mycin      Past Medical History:   Diagnosis Date    Adult attention deficit disorder 3/22/2021    Controlled type 2 diabetes mellitus with hyperglycemia, without long-term current use of insulin 3/22/2021    Disorder of kidney and ureter     Essential hypertension 3/22/2021    Hyperlipidemia      Past Surgical History:   Procedure Laterality Date    APPENDECTOMY      CARDIAC CATHETERIZATION      DEBRIDEMENT OF LOWER EXTREMITY Bilateral 2/18/2023    Procedure: DEBRIDEMENT, LOWER EXTREMITY;  Surgeon: Yael Cohen MD;  Location: [facility] OR;  Service: General;  Laterality: Bilateral;  debride toes    LEFT HEART CATHETERIZATION N/A 5/7/2021    Procedure: Left heart cath with possible intervention;  Surgeon: Federico James DO;  Location: [facility] CATH LAB;  Service: Cardiology;  Laterality: N/A;    TOE AMPUTATION Right 3/29/2023    Procedure: AMPUTATION, TOE;  Surgeon: Yael Cohen MD;  Location: [facility] OR;  Service: General;  Laterality: Right;  Right great toe amp dr cohen wednesday     Family History   Problem Relation Age of Onset    Hypertension Father     Stroke Father     Cancer Sister      Social History     Tobacco Use "    Smoking status: Former     Packs/day: 5.00     Years: 4.00     Pack years: 20.00     Types: Cigarettes     Start date:      Quit date:      Years since quittin.4    Smokeless tobacco: Current     Types: Chew, Snuff    Tobacco comments:     1 can/day   Substance Use Topics    Alcohol use: Yes     Comment: 2-3 drinks per/3 months    Drug use: Never     Review of Systems   Eyes:  Negative for visual disturbance.   Respiratory:  Negative for cough, chest tightness and shortness of breath.    Cardiovascular:  Negative for chest pain.   Gastrointestinal:  Negative for abdominal pain, diarrhea, nausea and vomiting.   Neurological:  Positive for light-headedness and headaches. Negative for dizziness and weakness.   All other systems reviewed and are negative.    Physical Exam     Initial Vitals [23]   BP Pulse Resp Temp SpO2   (!) 163/106 75 16 98.6 °F (37 °C) 99 %      MAP       --         Physical Exam    Constitutional: He appears well-developed and well-nourished. He is not diaphoretic. No distress.   HENT:   Head: Normocephalic and atraumatic.   Right Ear: External ear normal.   Left Ear: External ear normal.   Nose: Nose normal.   Mouth/Throat: Oropharynx is clear and moist. No oropharyngeal exudate.   Eyes: Conjunctivae and EOM are normal. Pupils are equal, round, and reactive to light.   Neck: Neck supple.   Normal range of motion.  Cardiovascular:  Normal rate, regular rhythm, normal heart sounds and intact distal pulses.           Pulmonary/Chest: Breath sounds normal. No respiratory distress.   Abdominal: Abdomen is soft. Bowel sounds are normal. He exhibits no distension. There is no abdominal tenderness. There is no guarding.   Musculoskeletal:         General: No edema. Normal range of motion.      Cervical back: Normal range of motion and neck supple.     Neurological: He is alert and oriented to person, place, and time. He has normal strength. GCS score is 15. GCS eye subscore is  "4. GCS verbal subscore is 5. GCS motor subscore is 6.   Reports "tingly feeling" and generalized headache   Skin: Skin is warm and dry. Capillary refill takes less than 2 seconds.   Psychiatric: He has a normal mood and affect. His behavior is normal. Judgment and thought content normal.       Medical Screening Exam   See Full Note    ED Course   Procedures  Labs Reviewed   COMPREHENSIVE METABOLIC PANEL - Abnormal; Notable for the following components:       Result Value    Potassium 3.3 (*)     Calcium 8.3 (*)     Globulin 4.1 (*)     All other components within normal limits   URINALYSIS, REFLEX TO URINE CULTURE - Abnormal; Notable for the following components:    Glucose,  (*)     Blood, UA Trace-Intact (*)     All other components within normal limits   CBC WITH DIFFERENTIAL - Abnormal; Notable for the following components:    RBC 4.03 (*)     Hemoglobin 12.7 (*)     Hematocrit 37.6 (*)     MCH 31.5 (*)     MPV 8.9 (*)     Monocytes % 9.0 (*)     All other components within normal limits   URINALYSIS, MICROSCOPIC - Abnormal; Notable for the following components:    Calcium Oxalate Crystals, UA Moderate (*)     All other components within normal limits   NT-PRO NATRIURETIC PEPTIDE - Normal   DRUG SCREEN, URINE (BEAKER) - Normal   CBC W/ AUTO DIFFERENTIAL    Narrative:     The following orders were created for panel order CBC auto differential.  Procedure                               Abnormality         Status                     ---------                               -----------         ------                     CBC with Differential[[id number]]        Abnormal            Final result                 Please view results for these tests on the individual orders.        ECG Results              EKG 12-lead (In process)  Result time 05/30/23 20:28:36      In process by Interface, Lab In [facility] (05/30/23 20:28:36)                   Narrative:    Test Reason : I10,    Vent. Rate : 064 BPM     Atrial Rate : " 064 BPM     P-R Int : 164 ms          QRS Dur : 100 ms      QT Int : 410 ms       P-R-T Axes : 040 -03 047 degrees     QTc Int : 422 ms    Sinus rhythm with occasional Premature ventricular complexes  Otherwise normal ECG  When compared with ECG of 06-DEC-2021 15:36,  Premature ventricular complexes are now Present    Referred By: TOMMIE   SELF           Confirmed By:                                   Imaging Results              X-Ray Chest 1 View (In process)                      CT Head Without Contrast (Final result)  Result time 05/30/23 20:46:55      Final result by Mohsen Roberto II, MD (05/30/23 20:46:55)                   Impression:      No evidence of abnormality demonstrated.      Electronically signed by: Mohsen Roberto  Date:    05/30/2023  Time:    20:46               Narrative:    EXAMINATION:  CT HEAD WITHOUT CONTRAST    CLINICAL HISTORY:  Headache, new or worsening (Age >= 50y);    TECHNIQUE:  Axial CT imaging of the brain is performed without contrast with 3 mm increments.    CT dose reduction technique used - Dose Rite and tube current modulation.    COMPARISON:  18 October 2020    FINDINGS:  No evidence of hemorrhage, mass, mass effect, midline shift or acute infarct seen.  The brain parenchyma attenuation and differentiation appears within normal limits. The ventricles and cisterns are normal in caliber.  No cranial or skull base abnormality is identified.                                       Medications   hydrALAZINE injection 10 mg (10 mg Intravenous Given 5/30/23 2030)     Medical Decision Making:   Differential Diagnosis:   Hyperglycemia, CVA  Clinical Tests:   Lab Tests: Ordered and Reviewed  The following lab test(s) were unremarkable: CBC, CMP and Urinalysis  Radiological Study: Ordered and Reviewed  Medical Tests: Ordered and Reviewed  ED Management:  -BP improved to 159/81 after hydralazine 10 mg IV.  States he is not as lightheaded and headache has improved.  -Encouraged to  "get prescriptions filled ASAP.  He states he just lost his job and has no money coming in.  -Offered to look up prescriptions on [facility] for possibly lower cost but he refused  -Pt refused to wait on CXR results.  States "I'm leaving".  Exam ended at 2051 and at time of discharge at 2123  report had not been released                       Clinical Impression:   Final diagnoses:  [I10] Hypertension  [I10] Hypertension, unspecified type (Primary)  [Z91.148] Noncompliance with medication regimen        ED Disposition Condition    Discharge Stable          ED Prescriptions    None       Follow-up Information       Follow up With Specialties Details Why Contact Info    Julio Cruz, DO Family Medicine In 2 days If symptoms worsen [address]  [facility] [place] [id number] [phone]               KOKI Salgado  05/30/23 2130    "

## 2023-07-10 PROBLEM — Z09 POSTOP CHECK: Status: RESOLVED | Noted: 2023-04-10 | Resolved: 2023-07-10

## 2023-07-20 ENCOUNTER — PATIENT MESSAGE (OUTPATIENT)
Dept: ADMINISTRATIVE | Facility: HOSPITAL | Age: 58
End: 2023-07-20

## 2023-07-21 ENCOUNTER — PATIENT MESSAGE (OUTPATIENT)
Dept: ADMINISTRATIVE | Facility: HOSPITAL | Age: 58
End: 2023-07-21

## 2023-08-23 ENCOUNTER — OFFICE VISIT (OUTPATIENT)
Dept: FAMILY MEDICINE | Facility: CLINIC | Age: 58
End: 2023-08-23
Payer: COMMERCIAL

## 2023-08-23 VITALS
RESPIRATION RATE: 20 BRPM | BODY MASS INDEX: 31.34 KG/M2 | HEART RATE: 70 BPM | DIASTOLIC BLOOD PRESSURE: 78 MMHG | WEIGHT: 195 LBS | SYSTOLIC BLOOD PRESSURE: 136 MMHG | OXYGEN SATURATION: 97 % | HEIGHT: 66 IN

## 2023-08-23 DIAGNOSIS — G62.9 NEUROPATHY: Primary | ICD-10-CM

## 2023-08-23 DIAGNOSIS — E11.9 TYPE 2 DIABETES MELLITUS WITHOUT COMPLICATION, WITHOUT LONG-TERM CURRENT USE OF INSULIN: ICD-10-CM

## 2023-08-23 DIAGNOSIS — F32.A DEPRESSION, UNSPECIFIED DEPRESSION TYPE: ICD-10-CM

## 2023-08-23 DIAGNOSIS — E78.2 MIXED HYPERLIPIDEMIA: ICD-10-CM

## 2023-08-23 DIAGNOSIS — I10 PRIMARY HYPERTENSION: ICD-10-CM

## 2023-08-23 DIAGNOSIS — I10 ESSENTIAL HYPERTENSION, BENIGN: ICD-10-CM

## 2023-08-23 DIAGNOSIS — Z11.59 SCREENING FOR VIRAL DISEASE: ICD-10-CM

## 2023-08-23 LAB
ANION GAP SERPL CALCULATED.3IONS-SCNC: 8 MMOL/L (ref 7–16)
BUN SERPL-MCNC: 15 MG/DL (ref 7–18)
BUN/CREAT SERPL: 16 (ref 6–20)
CALCIUM SERPL-MCNC: 10.1 MG/DL (ref 8.5–10.1)
CHLORIDE SERPL-SCNC: 99 MMOL/L (ref 98–107)
CHOLEST SERPL-MCNC: 143 MG/DL (ref 0–200)
CHOLEST/HDLC SERPL: 4.9 {RATIO}
CO2 SERPL-SCNC: 34 MMOL/L (ref 21–32)
CREAT SERPL-MCNC: 0.95 MG/DL (ref 0.7–1.3)
EGFR (NO RACE VARIABLE) (RUSH/TITUS): 93 ML/MIN/1.73M2
EST. AVERAGE GLUCOSE BLD GHB EST-MCNC: 130 MG/DL
GLUCOSE SERPL-MCNC: 90 MG/DL (ref 74–106)
HBA1C MFR BLD HPLC: 6.5 % (ref 4.5–6.6)
HCV AB SER QL: NORMAL
HDLC SERPL-MCNC: 29 MG/DL (ref 40–60)
HIV 1+O+2 AB SERPL QL: NORMAL
LDLC SERPL CALC-MCNC: 46 MG/DL
LDLC/HDLC SERPL: 1.6 {RATIO}
NONHDLC SERPL-MCNC: 114 MG/DL
POTASSIUM SERPL-SCNC: 3.5 MMOL/L (ref 3.5–5.1)
SODIUM SERPL-SCNC: 137 MMOL/L (ref 136–145)
TRIGL SERPL-MCNC: 338 MG/DL (ref 35–150)
VLDLC SERPL-MCNC: 68 MG/DL

## 2023-08-23 PROCEDURE — 86803 HEPATITIS C ANTIBODY: ICD-10-PCS | Mod: ,,, | Performed by: CLINICAL MEDICAL LABORATORY

## 2023-08-23 PROCEDURE — 87389 HIV-1 AG W/HIV-1&-2 AB AG IA: CPT | Mod: ,,, | Performed by: CLINICAL MEDICAL LABORATORY

## 2023-08-23 PROCEDURE — 4010F ACE/ARB THERAPY RXD/TAKEN: CPT | Mod: CPTII,,, | Performed by: FAMILY MEDICINE

## 2023-08-23 PROCEDURE — 80048 BASIC METABOLIC PNL TOTAL CA: CPT | Mod: ,,, | Performed by: CLINICAL MEDICAL LABORATORY

## 2023-08-23 PROCEDURE — 3078F PR MOST RECENT DIASTOLIC BLOOD PRESSURE < 80 MM HG: ICD-10-PCS | Mod: CPTII,,, | Performed by: FAMILY MEDICINE

## 2023-08-23 PROCEDURE — 1159F PR MEDICATION LIST DOCUMENTED IN MEDICAL RECORD: ICD-10-PCS | Mod: CPTII,,, | Performed by: FAMILY MEDICINE

## 2023-08-23 PROCEDURE — 99214 OFFICE O/P EST MOD 30 MIN: CPT | Mod: ,,, | Performed by: FAMILY MEDICINE

## 2023-08-23 PROCEDURE — 87389 HIV 1 / 2 ANTIBODY: ICD-10-PCS | Mod: ,,, | Performed by: CLINICAL MEDICAL LABORATORY

## 2023-08-23 PROCEDURE — 80061 LIPID PANEL: CPT | Mod: ,,, | Performed by: CLINICAL MEDICAL LABORATORY

## 2023-08-23 PROCEDURE — 3075F PR MOST RECENT SYSTOLIC BLOOD PRESS GE 130-139MM HG: ICD-10-PCS | Mod: CPTII,,, | Performed by: FAMILY MEDICINE

## 2023-08-23 PROCEDURE — 99214 PR OFFICE/OUTPT VISIT, EST, LEVL IV, 30-39 MIN: ICD-10-PCS | Mod: ,,, | Performed by: FAMILY MEDICINE

## 2023-08-23 PROCEDURE — 1159F MED LIST DOCD IN RCRD: CPT | Mod: CPTII,,, | Performed by: FAMILY MEDICINE

## 2023-08-23 PROCEDURE — 3075F SYST BP GE 130 - 139MM HG: CPT | Mod: CPTII,,, | Performed by: FAMILY MEDICINE

## 2023-08-23 PROCEDURE — 86803 HEPATITIS C AB TEST: CPT | Mod: ,,, | Performed by: CLINICAL MEDICAL LABORATORY

## 2023-08-23 PROCEDURE — 3008F PR BODY MASS INDEX (BMI) DOCUMENTED: ICD-10-PCS | Mod: CPTII,,, | Performed by: FAMILY MEDICINE

## 2023-08-23 PROCEDURE — 83036 HEMOGLOBIN A1C: ICD-10-PCS | Mod: ,,, | Performed by: CLINICAL MEDICAL LABORATORY

## 2023-08-23 PROCEDURE — 83036 HEMOGLOBIN GLYCOSYLATED A1C: CPT | Mod: ,,, | Performed by: CLINICAL MEDICAL LABORATORY

## 2023-08-23 PROCEDURE — 3044F PR MOST RECENT HEMOGLOBIN A1C LEVEL <7.0%: ICD-10-PCS | Mod: CPTII,,, | Performed by: FAMILY MEDICINE

## 2023-08-23 PROCEDURE — 4010F PR ACE/ARB THEARPY RXD/TAKEN: ICD-10-PCS | Mod: CPTII,,, | Performed by: FAMILY MEDICINE

## 2023-08-23 PROCEDURE — 80048 BASIC METABOLIC PANEL: ICD-10-PCS | Mod: ,,, | Performed by: CLINICAL MEDICAL LABORATORY

## 2023-08-23 PROCEDURE — 80061 LIPID PANEL: ICD-10-PCS | Mod: ,,, | Performed by: CLINICAL MEDICAL LABORATORY

## 2023-08-23 PROCEDURE — 3044F HG A1C LEVEL LT 7.0%: CPT | Mod: CPTII,,, | Performed by: FAMILY MEDICINE

## 2023-08-23 PROCEDURE — 3078F DIAST BP <80 MM HG: CPT | Mod: CPTII,,, | Performed by: FAMILY MEDICINE

## 2023-08-23 PROCEDURE — 3008F BODY MASS INDEX DOCD: CPT | Mod: CPTII,,, | Performed by: FAMILY MEDICINE

## 2023-08-23 RX ORDER — FLASH GLUCOSE SENSOR
1 KIT MISCELLANEOUS 4 TIMES DAILY PRN
Qty: 6 KIT | Refills: 3 | Status: SHIPPED | OUTPATIENT
Start: 2023-08-23 | End: 2024-01-20

## 2023-08-23 RX ORDER — FLASH GLUCOSE SCANNING READER
1 EACH MISCELLANEOUS 4 TIMES DAILY
Qty: 1 EACH | Refills: 5 | Status: SHIPPED | OUTPATIENT
Start: 2023-08-23 | End: 2024-01-20

## 2023-08-23 RX ORDER — GABAPENTIN 300 MG/1
300 CAPSULE ORAL 3 TIMES DAILY
Qty: 90 CAPSULE | Refills: 11 | Status: SHIPPED | OUTPATIENT
Start: 2023-08-23 | End: 2024-08-22

## 2023-08-23 RX ORDER — METOPROLOL TARTRATE 50 MG/1
50 TABLET ORAL 2 TIMES DAILY
Qty: 180 TABLET | Refills: 1 | Status: SHIPPED | OUTPATIENT
Start: 2023-08-23 | End: 2024-01-19 | Stop reason: SDUPTHER

## 2023-08-23 RX ORDER — GLYBURIDE 5 MG/1
5 TABLET ORAL
Qty: 90 TABLET | Refills: 1 | Status: SHIPPED | OUTPATIENT
Start: 2023-08-23 | End: 2024-01-17

## 2023-08-23 RX ORDER — METFORMIN HYDROCHLORIDE 1000 MG/1
1000 TABLET ORAL 2 TIMES DAILY WITH MEALS
Qty: 180 TABLET | Refills: 1 | Status: SHIPPED | OUTPATIENT
Start: 2023-08-23 | End: 2024-01-19 | Stop reason: SDUPTHER

## 2023-08-23 RX ORDER — ATORVASTATIN CALCIUM 80 MG/1
80 TABLET, FILM COATED ORAL DAILY
Qty: 90 TABLET | Refills: 1 | Status: SHIPPED | OUTPATIENT
Start: 2023-08-23 | End: 2023-11-10 | Stop reason: SDUPTHER

## 2023-08-23 RX ORDER — LISINOPRIL AND HYDROCHLOROTHIAZIDE 20; 25 MG/1; MG/1
1 TABLET ORAL DAILY
Qty: 90 TABLET | Refills: 1 | Status: SHIPPED | OUTPATIENT
Start: 2023-08-23 | End: 2024-01-19 | Stop reason: SDUPTHER

## 2023-08-23 RX ORDER — CITALOPRAM 20 MG/1
20 TABLET, FILM COATED ORAL DAILY
Qty: 90 TABLET | Refills: 1 | Status: SHIPPED | OUTPATIENT
Start: 2023-08-23 | End: 2024-02-26

## 2023-08-23 NOTE — ASSESSMENT & PLAN NOTE
Last cholesterol was 126 and LDL was 53 in December of 2022.  Patient continues to take atorvastatin 80 mg daily.  Will continue his current dose.  Follow-up in 6 months.  Lipid panel today.

## 2023-08-23 NOTE — ASSESSMENT & PLAN NOTE
Neuropathic pain which is likely 1 of the etiologies of the infection in his foot.  He continues to have decreased sensation in both lower extremities.  Will need frequent checks of his feet i.e. at least twice yearly.  Follow-up here in 3 months.

## 2023-08-23 NOTE — ASSESSMENT & PLAN NOTE
Last hemoglobin A1c was 6.8 4 months ago.  Patient did have a partial amputation that time due to osteomyelitis in his foot.  Patient has done better since then with his blood sugars remaining less than 150.  Patient does request a freestyle Jc monitor system

## 2023-08-23 NOTE — PROGRESS NOTES
Julio Cruz DO   [address]  [place]  [address]      PATIENT NAME: Syed Kelley  : 1965  DATE: 23  MRN: [id number]      Billing Provider: Julio Cruz DO  Level of Service:   Patient PCP Information       Provider PCP Type    Julio Cruz DO General            Reason for Visit / Chief Complaint: Diabetes (Follow up on Diabetes. Pt states he needs a new meter, would like to try getting a Freestyle Jc.), Foot Pain (Pt c/o MAYCOL foot pain that has been making it hard to sleep at night. ), and Hypertension (Follow up on HTN. )       Update PCP  Update Chief Complaint         History of Present Illness / Problem Focused Workflow     Syed Kelley presents to the clinic with Diabetes (Follow up on Diabetes. Pt states he needs a new meter, would like to try getting a Freestyle Jc.), Foot Pain (Pt c/o MAYCOL foot pain that has been making it hard to sleep at night. ), and Hypertension (Follow up on HTN. )     Patient is in for follow-up on his diabetes as well as his amputation of his right large toe partially.  He continues to have some pain in his lower extremities.  He also has some numbness and decreased sensation in his feet.  Patient denies any chest pain shortness in breath palpitations PND orthopnea.  Blood pressures been well controlled.  He states his blood sugars are running between 101 50.  He does not have a continuous glucose monitor.        Review of Systems     Review of Systems   Constitutional:  Negative for activity change, appetite change, chills, fatigue and fever.   HENT:  Negative for nasal congestion, ear discharge, ear pain, mouth dryness, mouth sores, postnasal drip, sinus pressure/congestion, sore throat and voice change.    Eyes:  Negative for pain, discharge, redness, itching and visual disturbance.   Respiratory:  Negative for apnea, cough, chest tightness, shortness of breath and wheezing.    Cardiovascular:  Negative for chest pain,  palpitations and leg swelling.   Gastrointestinal:  Negative for abdominal distention, abdominal pain, anal bleeding, blood in stool, change in bowel habit, constipation, diarrhea, nausea, vomiting, reflux and change in bowel habit.   Endocrine: Negative for cold intolerance, heat intolerance, polydipsia, polyphagia and polyuria.   Genitourinary:  Negative for difficulty urinating, enuresis, erectile dysfunction, frequency, genital sores, hematuria and urgency.   Musculoskeletal:  Positive for back pain and leg pain. Negative for arthralgias, gait problem, myalgias and neck pain.   Integumentary:  Negative for rash, mole/lesion, breast mass and breast discharge.   Allergic/Immunologic: Negative for environmental allergies and food allergies.   Neurological:  Negative for dizziness, vertigo, tremors, seizures, syncope, facial asymmetry, speech difficulty, weakness, light-headedness, numbness, headaches, coordination difficulties, memory loss and coordination difficulties.   Hematological:  Negative for adenopathy. Does not bruise/bleed easily.   Psychiatric/Behavioral:  Negative for agitation, behavioral problems, confusion, decreased concentration, dysphoric mood, hallucinations, self-injury, sleep disturbance and suicidal ideas. The patient is nervous/anxious. The patient is not hyperactive.    Breast: Negative for mass      Medical / Social / Family History     Past Medical History:   Diagnosis Date    Adult attention deficit disorder 3/22/2021    Controlled type 2 diabetes mellitus with hyperglycemia, without long-term current use of insulin 3/22/2021    Disorder of kidney and ureter     Essential hypertension 3/22/2021    Hyperlipidemia        Past Surgical History:   Procedure Laterality Date    APPENDECTOMY      CARDIAC CATHETERIZATION      DEBRIDEMENT OF LOWER EXTREMITY Bilateral 2/18/2023    Procedure: DEBRIDEMENT, LOWER EXTREMITY;  Surgeon: Yael Cohen MD;  Location: [facility];  Service: General;   "Laterality: Bilateral;  debride toes    LEFT HEART CATHETERIZATION N/A 5/7/2021    Procedure: Left heart cath with possible intervention;  Surgeon: Federico James DO;  Location: [facility] CATH LAB;  Service: Cardiology;  Laterality: N/A;    TOE AMPUTATION Right 3/29/2023    Procedure: AMPUTATION, TOE;  Surgeon: Yael Cohen MD;  Location: [facility] OR;  Service: General;  Laterality: Right;  Right great toe amp dr cohen wednesday       Social History    reports that he quit smoking about 38 years ago. His smoking use included cigarettes. He started smoking about 42 years ago. He has a 20.0 pack-year smoking history. His smokeless tobacco use includes chew and snuff. He reports current alcohol use. He reports that he does not use drugs.    Family History  's family history includes Cancer in his sister; Hypertension in his father; Stroke in his father.    Medications and Allergies     Medications  Outpatient Medications Marked as Taking for the 8/23/23 encounter (Office Visit) with Julio Cruz DO   Medication Sig Dispense Refill    albuterol (PROVENTIL/VENTOLIN HFA) 90 mcg/actuation inhaler Inhale 2 puffs into the lungs every 4 (four) hours as needed for Shortness of Breath.      aspirin (ECOTRIN) 81 MG EC tablet Take 1 tablet (81 mg total) by mouth once daily. 90 tablet 3    cholecalciferol, vitamin D3, (VITAMIN D3) 50 mcg (2,000 unit) Cap Take 1 capsule by mouth once daily.      dextroamphetamine-amphetamine (ADDERALL) 20 mg tablet Take 1 tablet by mouth each afternoon 30 tablet 0    dextroamphetamine-amphetamine (ADDERALL) 30 mg Tab Take 1 tablet by mouth each morning 30 tablet 0    docosahexaenoic acid/epa (FISH OIL ORAL) Take 1 capsule by mouth once daily.      insulin degludec (TRESIBA FLEXTOUCH U-200) 200 unit/mL (3 mL) insulin pen Inject 26 Units into the skin once daily. 3 pen 3    insulin syringe-needle U-100 0.3 mL 31 gauge x 5/16" Syrg Use once daily for insulin injection      multivitamin " "(ONE DAILY MULTIVITAMIN) per tablet Take 1 tablet by mouth once daily.      omega-3 fatty acids/fish oil (FISH OIL-OMEGA-3 FATTY ACIDS) 300-1,000 mg capsule Take by mouth once daily.      pen needle, diabetic 31 gauge x 5/16" Ndle Use to administer insulin twice daily      [DISCONTINUED] atorvastatin (LIPITOR) 80 MG tablet Take 1 tablet (80 mg total) by mouth once daily. 90 tablet 3    [DISCONTINUED] citalopram (CELEXA) 20 MG tablet Take 1 tablet (20 mg total) by mouth once daily. 90 tablet 1    [DISCONTINUED] glyBURIDE (DIABETA) 5 MG tablet Take 1 tablet (5 mg total) by mouth daily with breakfast. 90 tablet 3    [DISCONTINUED] lisinopriL-hydrochlorothiazide (PRINZIDE,ZESTORETIC) 20-25 mg Tab Take 1 tablet by mouth once daily. 90 tablet 1    [DISCONTINUED] metFORMIN (GLUCOPHAGE) 1000 MG tablet Take 1 tablet (1,000 mg total) by mouth 2 (two) times daily with meals. 180 tablet 1    [DISCONTINUED] metoprolol tartrate (LOPRESSOR) 50 MG tablet Take 1 tablet (50 mg total) by mouth 2 (two) times daily. 180 tablet 1       Allergies  Review of patient's allergies indicates:   Allergen Reactions    Azithromycin     M-mycin        Physical Examination     Vitals:    08/23/23 1038   BP: 136/78   Pulse:    Resp:      Physical Exam  Constitutional:       General: He is not in acute distress.     Appearance: Normal appearance. He is obese.   HENT:      Head: Normocephalic.      Nose: Nose normal.      Mouth/Throat:      Mouth: Mucous membranes are moist.      Pharynx: Oropharynx is clear. No oropharyngeal exudate or posterior oropharyngeal erythema.   Eyes:      General: No scleral icterus.        Right eye: No discharge.         Left eye: No discharge.      Conjunctiva/sclera: Conjunctivae normal.      Pupils: Pupils are equal, round, and reactive to light.   Cardiovascular:      Rate and Rhythm: Normal rate and regular rhythm.      Pulses: Normal pulses.      Heart sounds: Normal heart sounds.   Pulmonary:      Effort: Pulmonary " effort is normal. No respiratory distress.      Breath sounds: Normal breath sounds. No stridor. No wheezing, rhonchi or rales.   Chest:      Chest wall: No tenderness.   Abdominal:      General: Abdomen is flat. Bowel sounds are normal. There is no distension.      Tenderness: There is no abdominal tenderness.   Musculoskeletal:         General: Tenderness present. Normal range of motion.   Skin:     General: Skin is warm.      Capillary Refill: Capillary refill takes less than 2 seconds.      Findings: Lesion present.   Neurological:      General: No focal deficit present.      Mental Status: He is alert and oriented to person, place, and time.      Cranial Nerves: No cranial nerve deficit.      Sensory: Sensory deficit present.      Motor: No weakness.      Coordination: Coordination normal.      Gait: Gait normal.      Deep Tendon Reflexes: Reflexes normal.   Psychiatric:         Mood and Affect: Mood normal.         Behavior: Behavior normal.         Thought Content: Thought content normal.         Judgment: Judgment normal.               Lab Results   Component Value Date    WBC 5.54 05/30/2023    HGB 12.7 (L) 05/30/2023    HCT 37.6 (L) 05/30/2023    MCV 93.3 05/30/2023     05/30/2023          Sodium   Date Value Ref Range Status   05/30/2023 139 136 - 145 mmol/L Final     Potassium   Date Value Ref Range Status   05/30/2023 3.3 (L) 3.5 - 5.1 mmol/L Final     Chloride   Date Value Ref Range Status   05/30/2023 101 98 - 107 mmol/L Final     CO2   Date Value Ref Range Status   05/30/2023 32 21 - 32 mmol/L Final     Glucose   Date Value Ref Range Status   05/30/2023 86 74 - 106 mg/dL Final     BUN   Date Value Ref Range Status   05/30/2023 10 7 - 18 mg/dL Final     Creatinine   Date Value Ref Range Status   05/30/2023 0.97 0.70 - 1.30 mg/dL Final     Calcium   Date Value Ref Range Status   05/30/2023 8.3 (L) 8.5 - 10.1 mg/dL Final     Total Protein   Date Value Ref Range Status   05/30/2023 7.6 6.4 - 8.2  g/dL Final     Albumin   Date Value Ref Range Status   05/30/2023 3.5 3.5 - 5.0 g/dL Final     Bilirubin, Total   Date Value Ref Range Status   05/30/2023 0.3 >0.0 - 1.2 mg/dL Final     Alk Phos   Date Value Ref Range Status   05/30/2023 93 45 - 115 U/L Final     AST   Date Value Ref Range Status   05/30/2023 20 15 - 37 U/L Final     ALT   Date Value Ref Range Status   05/30/2023 26 16 - 61 U/L Final     Anion Gap   Date Value Ref Range Status   05/30/2023 9 7 - 16 mmol/L Final     eGFR    Date Value Ref Range Status   05/06/2021 89       eGFR   Date Value Ref Range Status   06/08/2022 76 >=60 mL/min/1.73m² Final      X-Ray Chest 1 View  Narrative: EXAMINATION:  XR CHEST 1 VIEW    CLINICAL HISTORY:  elevated blood pressure;    TECHNIQUE:  XR CHEST 1 VIEW    COMPARISON:  2021    FINDINGS:  No lines or tubes.    Lungs are clear.    Normal pleura.    Cardiac silhouette is similar to comparison exam.    No obvious acute bone findings.  Impression: No acute pulmonary disease    Electronically signed by: Samuel Jha  Date:    05/31/2023  Time:    07:41     Procedures   Lab Results   Component Value Date    CHOL 128 12/22/2022    CHOL 150 08/15/2022    CHOL 182 06/08/2022     Lab Results   Component Value Date    HDL 32 (L) 12/22/2022    HDL 36 (L) 08/15/2022    HDL 29 (L) 06/08/2022     Lab Results   Component Value Date    LDLCALC 53 12/22/2022    LDLCALC 43 08/15/2022    LDLCALC  06/08/2022      Comment:      Unable to calculate due to one of the following values:  Cholesterol <5  HDL Cholesterol <5  Triglycerides <10 or >400     Lab Results   Component Value Date    TRIG 215 (H) 12/22/2022    TRIG 355 (H) 08/15/2022    TRIG 884 (H) 06/08/2022     Lab Results   Component Value Date    CHOLHDL 4.0 12/22/2022    CHOLHDL 4.2 08/15/2022    CHOLHDL 6.3 06/08/2022      Lab Results   Component Value Date    HGBA1C 6.8 (H) 03/28/2023      Lab Results   Component Value Date    TSH 3.070 02/17/2023       Assessment and Plan (including Health Maintenance)      Problem List  Smart Sets  Document Outside HM   :    Plan:         Health Maintenance Due   Topic Date Due    Hepatitis C Screening  Never done    COVID-19 Vaccine (1) Never done    Pneumococcal Vaccines (Age 0-64) (1 - PCV) Never done    Eye Exam  Never done    HIV Screening  Never done    TETANUS VACCINE  Never done    Colorectal Cancer Screening  Never done    Shingles Vaccine (1 of 2) Never done    Hemoglobin A1c  06/28/2023       Problem List Items Addressed This Visit          Neuro    Neuropathy - Primary    Current Assessment & Plan     Neuropathic pain which is likely 1 of the etiologies of the infection in his foot.  He continues to have decreased sensation in both lower extremities.  Will need frequent checks of his feet i.e. at least twice yearly.  Follow-up here in 3 months.         Relevant Medications    gabapentin (NEURONTIN) 300 MG capsule       Psychiatric    Depression    Current Assessment & Plan     Major depression that is improved in spite of the fact that he has been laid off from his job.  No change in his medication.  Recommend counseling.  Follow-up every 3 months.         Relevant Medications    citalopram (CELEXA) 20 MG tablet       Cardiac/Vascular    Hypertension    Current Assessment & Plan     Hypertension well controlled no change in medication.  Follow-up in 3 months.  Will check CBC and CMP yearly.  Goal is blood pressure 120/70         Relevant Medications    metoprolol tartrate (LOPRESSOR) 50 MG tablet    lisinopriL-hydrochlorothiazide (PRINZIDE,ZESTORETIC) 20-25 mg Tab    HLD (hyperlipidemia)    Current Assessment & Plan     Last cholesterol was 126 and LDL was 53 in December of 2022.  Patient continues to take atorvastatin 80 mg daily.  Will continue his current dose.  Follow-up in 6 months.  Lipid panel today.         Relevant Medications    atorvastatin (LIPITOR) 80 MG tablet       Endocrine    Type 2 diabetes  mellitus without complication, without long-term current use of insulin    Current Assessment & Plan     Last hemoglobin A1c was 6.8 4 months ago.  Patient did have a partial amputation that time due to osteomyelitis in his foot.  Patient has done better since then with his blood sugars remaining less than 150.  Patient does request a freestyle Jc monitor system         Relevant Medications    metFORMIN (GLUCOPHAGE) 1000 MG tablet    glyBURIDE (DIABETA) 5 MG tablet    flash glucose sensor (FREESTYLE JC 2 SENSOR) Kit    flash glucose scanning reader (FREESTYLE JC 2 READER) Misc    Other Relevant Orders    Hemoglobin A1C    Lipid Panel    Basic Metabolic Panel     Other Visit Diagnoses       Essential hypertension, benign        Relevant Medications    metoprolol tartrate (LOPRESSOR) 50 MG tablet    lisinopriL-hydrochlorothiazide (PRINZIDE,ZESTORETIC) 20-25 mg Tab    Screening for viral disease        Relevant Orders    Hepatitis C Antibody    HIV 1/2 Ag/Ab (4th Gen)            Health Maintenance Topics with due status: Not Due       Topic Last Completion Date    Diabetes Urine Screening 12/22/2022    Foot Exam 12/22/2022    Lipid Panel 12/22/2022    Low Dose Statin 08/23/2023    Influenza Vaccine Not Due       Future Appointments   Date Time Provider Department Center   11/29/2023  2:00 PM Julio Cruz DO [facility]        Follow up in about 3 months (around 11/23/2023).     Signature:  DO [facility]  [address] [place] [address], [place]  [address]    Date of encounter: 8/23/23

## 2023-08-23 NOTE — ASSESSMENT & PLAN NOTE
Major depression that is improved in spite of the fact that he has been laid off from his job.  No change in his medication.  Recommend counseling.  Follow-up every 3 months.

## 2023-10-17 ENCOUNTER — HOSPITAL ENCOUNTER (EMERGENCY)
Facility: HOSPITAL | Age: 58
Discharge: HOME | End: 2023-10-17
Payer: COMMERCIAL

## 2023-10-17 VITALS
HEIGHT: 63 IN | OXYGEN SATURATION: 97 % | SYSTOLIC BLOOD PRESSURE: 128 MMHG | WEIGHT: 199 LBS | TEMPERATURE: 99 F | HEART RATE: 77 BPM | RESPIRATION RATE: 19 BRPM | BODY MASS INDEX: 35.26 KG/M2 | DIASTOLIC BLOOD PRESSURE: 77 MMHG

## 2023-10-17 DIAGNOSIS — R05.9 COUGH: ICD-10-CM

## 2023-10-17 DIAGNOSIS — J06.9 UPPER RESPIRATORY TRACT INFECTION, UNSPECIFIED TYPE: ICD-10-CM

## 2023-10-17 DIAGNOSIS — J40 BRONCHITIS: Primary | ICD-10-CM

## 2023-10-17 LAB — SARS-COV-2 RDRP RESP QL NAA+PROBE: NEGATIVE

## 2023-10-17 PROCEDURE — 99284 EMERGENCY DEPT VISIT MOD MDM: CPT | Mod: 25

## 2023-10-17 PROCEDURE — 87635 SARS-COV-2 COVID-19 AMP PRB: CPT | Performed by: NURSE PRACTITIONER

## 2023-10-17 PROCEDURE — 99284 PR EMERGENCY DEPT VISIT,LEVEL IV: ICD-10-PCS | Mod: ,,, | Performed by: NURSE PRACTITIONER

## 2023-10-17 PROCEDURE — 96372 THER/PROPH/DIAG INJ SC/IM: CPT | Performed by: NURSE PRACTITIONER

## 2023-10-17 PROCEDURE — 99284 EMERGENCY DEPT VISIT MOD MDM: CPT | Mod: ,,, | Performed by: NURSE PRACTITIONER

## 2023-10-17 PROCEDURE — 63600175 PHARM REV CODE 636 W HCPCS: Performed by: NURSE PRACTITIONER

## 2023-10-17 PROCEDURE — 94640 AIRWAY INHALATION TREATMENT: CPT

## 2023-10-17 PROCEDURE — 25000242 PHARM REV CODE 250 ALT 637 W/ HCPCS: Performed by: NURSE PRACTITIONER

## 2023-10-17 PROCEDURE — 94760 N-INVAS EAR/PLS OXIMETRY 1: CPT

## 2023-10-17 RX ORDER — DOXYCYCLINE 100 MG/1
100 CAPSULE ORAL 2 TIMES DAILY
Qty: 20 CAPSULE | Refills: 0 | Status: SHIPPED | OUTPATIENT
Start: 2023-10-17 | End: 2023-10-27

## 2023-10-17 RX ORDER — METHYLPREDNISOLONE ACETATE 40 MG/ML
40 INJECTION, SUSPENSION INTRA-ARTICULAR; INTRALESIONAL; INTRAMUSCULAR; SOFT TISSUE ONCE
Status: COMPLETED | OUTPATIENT
Start: 2023-10-17 | End: 2023-10-17

## 2023-10-17 RX ORDER — IPRATROPIUM BROMIDE AND ALBUTEROL SULFATE 2.5; .5 MG/3ML; MG/3ML
3 SOLUTION RESPIRATORY (INHALATION)
Status: COMPLETED | OUTPATIENT
Start: 2023-10-17 | End: 2023-10-17

## 2023-10-17 RX ORDER — ALBUTEROL SULFATE 90 UG/1
2 AEROSOL, METERED RESPIRATORY (INHALATION) EVERY 4 HOURS PRN
Qty: 18 G | Refills: 0 | Status: SHIPPED | OUTPATIENT
Start: 2023-10-17 | End: 2024-01-20 | Stop reason: SDUPTHER

## 2023-10-17 RX ORDER — DEXAMETHASONE SODIUM PHOSPHATE 4 MG/ML
4 INJECTION, SOLUTION INTRA-ARTICULAR; INTRALESIONAL; INTRAMUSCULAR; INTRAVENOUS; SOFT TISSUE
Status: COMPLETED | OUTPATIENT
Start: 2023-10-17 | End: 2023-10-17

## 2023-10-17 RX ADMIN — IPRATROPIUM BROMIDE AND ALBUTEROL SULFATE 3 ML: 2.5; .5 SOLUTION RESPIRATORY (INHALATION) at 11:10

## 2023-10-17 RX ADMIN — METHYLPREDNISOLONE ACETATE 40 MG: 40 INJECTION, SUSPENSION INTRA-ARTICULAR; INTRALESIONAL; INTRAMUSCULAR; SOFT TISSUE at 12:10

## 2023-10-17 RX ADMIN — DEXAMETHASONE SODIUM PHOSPHATE 4 MG: 4 INJECTION, SOLUTION INTRAMUSCULAR; INTRAVENOUS at 12:10

## 2023-10-17 NOTE — ED TRIAGE NOTES
Presents with c/o Productive cough with thick white sputum, sinus and chest congestion and runny nose for 1 week.  Taking Nyquil at night but wife states she can hear him coughing and wheezing during the night.  States just feels drained.

## 2023-10-17 NOTE — Clinical Note
"Syed"Jitendra Kelley was seen and treated in our emergency department on 10/17/2023.  He may return to work on 10/18/2023.       If you have any questions or concerns, please don't hesitate to call.      Smita Sanchez, FNP"

## 2023-10-17 NOTE — DISCHARGE INSTRUCTIONS
-Take doxycycline as directed and complete.  -Mucinex DM over the counter one tab by mouth twice a day for cough and congestion.  -Increase fluids and rest.

## 2023-10-19 ENCOUNTER — OFFICE VISIT (OUTPATIENT)
Dept: FAMILY MEDICINE | Facility: CLINIC | Age: 58
End: 2023-10-19
Payer: COMMERCIAL

## 2023-10-19 VITALS
HEIGHT: 63 IN | BODY MASS INDEX: 35.26 KG/M2 | OXYGEN SATURATION: 97 % | RESPIRATION RATE: 19 BRPM | TEMPERATURE: 99 F | SYSTOLIC BLOOD PRESSURE: 154 MMHG | HEART RATE: 67 BPM | DIASTOLIC BLOOD PRESSURE: 85 MMHG | WEIGHT: 199 LBS

## 2023-10-19 DIAGNOSIS — E11.621 DIABETIC ULCER OF TOE OF RIGHT FOOT ASSOCIATED WITH TYPE 2 DIABETES MELLITUS, LIMITED TO BREAKDOWN OF SKIN: ICD-10-CM

## 2023-10-19 DIAGNOSIS — L97.511 DIABETIC ULCER OF TOE OF RIGHT FOOT ASSOCIATED WITH TYPE 2 DIABETES MELLITUS, LIMITED TO BREAKDOWN OF SKIN: ICD-10-CM

## 2023-10-19 DIAGNOSIS — G47.33 OSA (OBSTRUCTIVE SLEEP APNEA): Primary | ICD-10-CM

## 2023-10-19 PROCEDURE — 3077F SYST BP >= 140 MM HG: CPT | Mod: CPTII,,, | Performed by: FAMILY MEDICINE

## 2023-10-19 PROCEDURE — 1159F MED LIST DOCD IN RCRD: CPT | Mod: CPTII,,, | Performed by: FAMILY MEDICINE

## 2023-10-19 PROCEDURE — 99213 OFFICE O/P EST LOW 20 MIN: CPT | Mod: ,,, | Performed by: FAMILY MEDICINE

## 2023-10-19 PROCEDURE — 3044F HG A1C LEVEL LT 7.0%: CPT | Mod: CPTII,,, | Performed by: FAMILY MEDICINE

## 2023-10-19 PROCEDURE — 1159F PR MEDICATION LIST DOCUMENTED IN MEDICAL RECORD: ICD-10-PCS | Mod: CPTII,,, | Performed by: FAMILY MEDICINE

## 2023-10-19 PROCEDURE — 3008F PR BODY MASS INDEX (BMI) DOCUMENTED: ICD-10-PCS | Mod: CPTII,,, | Performed by: FAMILY MEDICINE

## 2023-10-19 PROCEDURE — 3079F DIAST BP 80-89 MM HG: CPT | Mod: CPTII,,, | Performed by: FAMILY MEDICINE

## 2023-10-19 PROCEDURE — 3008F BODY MASS INDEX DOCD: CPT | Mod: CPTII,,, | Performed by: FAMILY MEDICINE

## 2023-10-19 PROCEDURE — 3079F PR MOST RECENT DIASTOLIC BLOOD PRESSURE 80-89 MM HG: ICD-10-PCS | Mod: CPTII,,, | Performed by: FAMILY MEDICINE

## 2023-10-19 PROCEDURE — 99213 PR OFFICE/OUTPT VISIT, EST, LEVL III, 20-29 MIN: ICD-10-PCS | Mod: ,,, | Performed by: FAMILY MEDICINE

## 2023-10-19 PROCEDURE — 3077F PR MOST RECENT SYSTOLIC BLOOD PRESSURE >= 140 MM HG: ICD-10-PCS | Mod: CPTII,,, | Performed by: FAMILY MEDICINE

## 2023-10-19 PROCEDURE — 3044F PR MOST RECENT HEMOGLOBIN A1C LEVEL <7.0%: ICD-10-PCS | Mod: CPTII,,, | Performed by: FAMILY MEDICINE

## 2023-10-19 PROCEDURE — 4010F PR ACE/ARB THEARPY RXD/TAKEN: ICD-10-PCS | Mod: CPTII,,, | Performed by: FAMILY MEDICINE

## 2023-10-19 PROCEDURE — 4010F ACE/ARB THERAPY RXD/TAKEN: CPT | Mod: CPTII,,, | Performed by: FAMILY MEDICINE

## 2023-10-19 NOTE — ASSESSMENT & PLAN NOTE
Patient has ulcer of right toe, he is currently on doxycline for his respiratory issues will continue that for now and have him follow back up in 1 week.

## 2023-10-19 NOTE — ASSESSMENT & PLAN NOTE
Patient reports using a CPAP machine in the past but has stopped using it and would like another sleep study to help him get another one.

## 2023-10-19 NOTE — PROGRESS NOTES
Julio Cruz DO   [place] [id number] [place]  [address]      PATIENT NAME: Syed Kelley  : 1965  DATE: 10/19/23  MRN: [id number]      Billing Provider: Julio Cruz DO  Level of Service:   Patient PCP Information       Provider PCP Type    Julio Cruz DO General            Reason for Visit / Chief Complaint: Follow-up (ER visit on 10/17/23 Bronchitis ) and Wound Check (Top of Fourth toe right foot. )       Update PCP  Update Chief Complaint         History of Present Illness / Problem Focused Workflow     Syed Kelley presents to the clinic with Follow-up (ER visit on 10/17/23 Bronchitis ) and Wound Check (Top of Fourth toe right foot. )     Patient is a 59 yo M who presents to the clinic for follow up from [facility] ED. Patient was seen at [place] ED for shortness of breath. He was was diagnosed with bronchitits and was sent home on albuterol and doxcycline. Patient reports it has been helping. Patient also states he has a wound on his 3rd toe on his right foot that looks like it is infected. He reports no pain, drainage but does report some swelling. He would also like to get a referral for a sleep study at this time.         Review of Systems     Review of Systems   Constitutional:  Positive for fatigue. Negative for chills and fever.   HENT:  Negative for nasal congestion and sinus pressure/congestion.    Respiratory:  Positive for cough. Negative for shortness of breath.    Cardiovascular:  Negative for chest pain, leg swelling and claudication.   Gastrointestinal:  Negative for abdominal pain, constipation, diarrhea, nausea and vomiting.   Integumentary:  Positive for wound.   Neurological:  Negative for numbness and headaches.   Psychiatric/Behavioral:  Negative for confusion.        Medical / Social / Family History     Past Medical History:   Diagnosis Date    Adult attention deficit disorder 3/22/2021    Controlled type 2 diabetes mellitus with hyperglycemia,  without long-term current use of insulin 3/22/2021    Disorder of kidney and ureter     Essential hypertension 3/22/2021    Hyperlipidemia        Past Surgical History:   Procedure Laterality Date    APPENDECTOMY      CARDIAC CATHETERIZATION      DEBRIDEMENT OF LOWER EXTREMITY Bilateral 2/18/2023    Procedure: DEBRIDEMENT, LOWER EXTREMITY;  Surgeon: Yael Cohen MD;  Location: [facility];  Service: General;  Laterality: Bilateral;  debride toes    LEFT HEART CATHETERIZATION N/A 5/7/2021    Procedure: Left heart cath with possible intervention;  Surgeon: Federico [place] DO;  Location: [facility];  Service: Cardiology;  Laterality: N/A;    TOE AMPUTATION Right 3/29/2023    Procedure: AMPUTATION, TOE;  Surgeon: Yael Cohen MD;  Location: [facility];  Service: General;  Laterality: Right;  Right great toe amp dr cohen wednesday       Social History    reports that he quit smoking about 38 years ago. His smoking use included cigarettes. He started smoking about 42 years ago. He has a 20.0 pack-year smoking history. His smokeless tobacco use includes chew and snuff. He reports current alcohol use. He reports that he does not use drugs.    Family History  's family history includes Cancer in his sister; Hypertension in his father; Stroke in his father.    Medications and Allergies     Medications  Outpatient Medications Marked as Taking for the 10/19/23 encounter (Office Visit) with Julio Cruz DO   Medication Sig Dispense Refill    albuterol (PROVENTIL/VENTOLIN HFA) 90 mcg/actuation inhaler Inhale 2 puffs into the lungs every 4 (four) hours as needed for Shortness of Breath. 18 g 0    aspirin (ECOTRIN) 81 MG EC tablet Take 1 tablet (81 mg total) by mouth once daily. 90 tablet 3    atorvastatin (LIPITOR) 80 MG tablet Take 1 tablet (80 mg total) by mouth once daily. 90 tablet 1    citalopram (CELEXA) 20 MG tablet Take 1 tablet (20 mg total) by mouth once daily. 90 tablet 1     "dextroamphetamine-amphetamine (ADDERALL) 20 mg tablet Take 1 tablet by mouth each afternoon 30 tablet 0    dextroamphetamine-amphetamine (ADDERALL) 30 mg Tab Take 1 tablet by mouth each morning 30 tablet 0    doxycycline (VIBRAMYCIN) 100 MG Cap Take 1 capsule (100 mg total) by mouth 2 (two) times daily. for 10 days 20 capsule 0    flash glucose scanning reader (FREESTYLE LOUISE 2 READER) Misc 1 Application by Misc.(Non-Drug; Combo Route) route 4 (four) times daily. 1 each 5    flash glucose sensor (FREESTYLE LOUISE 2 SENSOR) Kit 1 Application by Misc.(Non-Drug; Combo Route) route 4 (four) times daily as needed (blood sugar). 6 kit 3    gabapentin (NEURONTIN) 300 MG capsule Take 1 capsule (300 mg total) by mouth 3 (three) times daily. 90 capsule 11    glyBURIDE (DIABETA) 5 MG tablet Take 1 tablet (5 mg total) by mouth daily with breakfast. 90 tablet 1    insulin degludec (TRESIBA FLEXTOUCH U-200) 200 unit/mL (3 mL) insulin pen Inject 26 Units into the skin once daily. 3 pen 3    insulin syringe-needle U-100 0.3 mL 31 gauge x 5/16" Syrg Use once daily for insulin injection      lisinopriL-hydrochlorothiazide (PRINZIDE,ZESTORETIC) 20-25 mg Tab Take 1 tablet by mouth once daily. 90 tablet 1    metFORMIN (GLUCOPHAGE) 1000 MG tablet Take 1 tablet (1,000 mg total) by mouth 2 (two) times daily with meals. 180 tablet 1    metoprolol tartrate (LOPRESSOR) 50 MG tablet Take 1 tablet (50 mg total) by mouth 2 (two) times daily. 180 tablet 1    multivitamin (ONE DAILY MULTIVITAMIN) per tablet Take 1 tablet by mouth once daily.      omega-3 fatty acids/fish oil (FISH OIL-OMEGA-3 FATTY ACIDS) 300-1,000 mg capsule Take by mouth once daily.      pen needle, diabetic 31 gauge x 5/16" Ndle Use to administer insulin twice daily         Allergies  Review of patient's allergies indicates:   Allergen Reactions    Azithromycin     M-mycin        Physical Examination     Vitals:    10/19/23 1346   BP: (!) 154/85   Pulse: 67   Resp: 19   Temp: " 98.5 °F (36.9 °C)     Physical Exam  Vitals and nursing note reviewed.   Constitutional:       Appearance: Normal appearance.   HENT:      Head: Normocephalic.      Right Ear: External ear normal.      Left Ear: External ear normal.      Nose: Nose normal.      Mouth/Throat:      Pharynx: Oropharynx is clear.      Comments: Poor dentition   Eyes:      Conjunctiva/sclera: Conjunctivae normal.      Pupils: Pupils are equal, round, and reactive to light.   Cardiovascular:      Rate and Rhythm: Normal rate and regular rhythm.   Pulmonary:      Effort: Pulmonary effort is normal.      Breath sounds: Normal breath sounds.   Abdominal:      General: Abdomen is flat. Bowel sounds are normal.      Palpations: Abdomen is soft.   Musculoskeletal:      Right lower leg: No edema.      Left lower leg: No edema.        Feet:    Skin:     General: Skin is warm.      Findings: Lesion present.      Comments: Lesion on his 4th toes on the right    Neurological:      Mental Status: He is alert and oriented to person, place, and time.   Psychiatric:         Mood and Affect: Mood normal.         Behavior: Behavior normal.         Thought Content: Thought content normal.         Judgment: Judgment normal.               Lab Results   Component Value Date    WBC 5.54 05/30/2023    HGB 12.7 (L) 05/30/2023    HCT 37.6 (L) 05/30/2023    MCV 93.3 05/30/2023     05/30/2023          Sodium   Date Value Ref Range Status   08/23/2023 137 136 - 145 mmol/L Final     Potassium   Date Value Ref Range Status   08/23/2023 3.5 3.5 - 5.1 mmol/L Final     Chloride   Date Value Ref Range Status   08/23/2023 99 98 - 107 mmol/L Final     CO2   Date Value Ref Range Status   08/23/2023 34 (H) 21 - 32 mmol/L Final     Glucose   Date Value Ref Range Status   08/23/2023 90 74 - 106 mg/dL Final     BUN   Date Value Ref Range Status   08/23/2023 15 7 - 18 mg/dL Final     Creatinine   Date Value Ref Range Status   08/23/2023 0.95 0.70 - 1.30 mg/dL Final      Calcium   Date Value Ref Range Status   08/23/2023 10.1 8.5 - 10.1 mg/dL Final     Total Protein   Date Value Ref Range Status   05/30/2023 7.6 6.4 - 8.2 g/dL Final     Albumin   Date Value Ref Range Status   05/30/2023 3.5 3.5 - 5.0 g/dL Final     Bilirubin, Total   Date Value Ref Range Status   05/30/2023 0.3 >0.0 - 1.2 mg/dL Final     Alk Phos   Date Value Ref Range Status   05/30/2023 93 45 - 115 U/L Final     AST   Date Value Ref Range Status   05/30/2023 20 15 - 37 U/L Final     ALT   Date Value Ref Range Status   05/30/2023 26 16 - 61 U/L Final     Anion Gap   Date Value Ref Range Status   08/23/2023 8 7 - 16 mmol/L Final     eGFR    Date Value Ref Range Status   05/06/2021 89       eGFR   Date Value Ref Range Status   06/08/2022 76 >=60 mL/min/1.73m² Final      X-Ray Chest PA And Lateral  Narrative: EXAMINATION:  XR CHEST PA AND LATERAL    CLINICAL HISTORY:  Cough, unspecified    COMPARISON:  30 May 2023    TECHNIQUE:  XR CHEST PA AND LATERAL    FINDINGS:  The heart and mediastinum are normal in size and configuration.  The pulmonary vascularity is normal in caliber.  No lung infiltrates, effusions, pneumothorax or other abnormality is demonstrated.  Impression: No evidence of cardiopulmonary disease.    Electronically signed by: Mohsen Roberto  Date:    10/17/2023  Time:    11:52     Procedures   Lab Results   Component Value Date    CHOL 143 08/23/2023    CHOL 128 12/22/2022    CHOL 150 08/15/2022     Lab Results   Component Value Date    HDL 29 (L) 08/23/2023    HDL 32 (L) 12/22/2022    HDL 36 (L) 08/15/2022     Lab Results   Component Value Date    LDLCALC 46 08/23/2023    LDLCALC 53 12/22/2022    LDLCALC 43 08/15/2022     Lab Results   Component Value Date    TRIG 338 (H) 08/23/2023    TRIG 215 (H) 12/22/2022    TRIG 355 (H) 08/15/2022     Lab Results   Component Value Date    CHOLHDL 4.9 08/23/2023    CHOLHDL 4.0 12/22/2022    CHOLHDL 4.2 08/15/2022      Lab Results   Component  Value Date    HGBA1C 6.5 08/23/2023      Lab Results   Component Value Date    TSH 3.070 02/17/2023      Assessment and Plan (including Health Maintenance)      Problem List  Smart Sets  Document Outside HM   :    Plan:         Health Maintenance Due   Topic Date Due    COVID-19 Vaccine (1) Never done    Pneumococcal Vaccines (Age 0-64) (1 - PCV) Never done    Eye Exam  Never done    TETANUS VACCINE  Never done    Colorectal Cancer Screening  Never done    Shingles Vaccine (1 of 2) Never done    Influenza Vaccine (1) Never done       Problem List Items Addressed This Visit          Endocrine    Diabetic ulcer of toe of right foot, limited to breakdown of skin    Overview                      Current Assessment & Plan     Patient has ulcer of right toe, he is currently on doxycline for his respiratory issues will continue that for now and have him follow back up in 1 week.             Other    AC (obstructive sleep apnea) - Primary    Current Assessment & Plan     Patient reports using a CPAP machine in the past but has stopped using it and would like another sleep study to help him get another one.          Relevant Orders    Ambulatory referral/consult to Sleep Disorders       Health Maintenance Topics with due status: Not Due       Topic Last Completion Date    Diabetes Urine Screening 12/22/2022    Foot Exam 12/22/2022    Lipid Panel 08/23/2023    Hemoglobin A1c 08/23/2023    Low Dose Statin 10/19/2023       No future appointments.     Follow up in about 1 week (around 10/26/2023).     Signature:  DO Nirali Tate [address] [place]  [address]    Date of encounter: 10/19/23

## 2023-10-27 ENCOUNTER — OFFICE VISIT (OUTPATIENT)
Dept: FAMILY MEDICINE | Facility: CLINIC | Age: 58
End: 2023-10-27
Payer: COMMERCIAL

## 2023-10-27 VITALS
HEART RATE: 95 BPM | TEMPERATURE: 99 F | BODY MASS INDEX: 34.38 KG/M2 | RESPIRATION RATE: 19 BRPM | OXYGEN SATURATION: 98 % | WEIGHT: 194 LBS | DIASTOLIC BLOOD PRESSURE: 70 MMHG | SYSTOLIC BLOOD PRESSURE: 105 MMHG | HEIGHT: 63 IN

## 2023-10-27 DIAGNOSIS — F98.8 ADULT ATTENTION DEFICIT DISORDER: Primary | ICD-10-CM

## 2023-10-27 PROCEDURE — 3074F SYST BP LT 130 MM HG: CPT | Mod: CPTII,,, | Performed by: FAMILY MEDICINE

## 2023-10-27 PROCEDURE — 3044F PR MOST RECENT HEMOGLOBIN A1C LEVEL <7.0%: ICD-10-PCS | Mod: CPTII,,, | Performed by: FAMILY MEDICINE

## 2023-10-27 PROCEDURE — 1159F MED LIST DOCD IN RCRD: CPT | Mod: CPTII,,, | Performed by: FAMILY MEDICINE

## 2023-10-27 PROCEDURE — 3008F PR BODY MASS INDEX (BMI) DOCUMENTED: ICD-10-PCS | Mod: CPTII,,, | Performed by: FAMILY MEDICINE

## 2023-10-27 PROCEDURE — 3078F DIAST BP <80 MM HG: CPT | Mod: CPTII,,, | Performed by: FAMILY MEDICINE

## 2023-10-27 PROCEDURE — 1159F PR MEDICATION LIST DOCUMENTED IN MEDICAL RECORD: ICD-10-PCS | Mod: CPTII,,, | Performed by: FAMILY MEDICINE

## 2023-10-27 PROCEDURE — 4010F PR ACE/ARB THEARPY RXD/TAKEN: ICD-10-PCS | Mod: CPTII,,, | Performed by: FAMILY MEDICINE

## 2023-10-27 PROCEDURE — 99214 OFFICE O/P EST MOD 30 MIN: CPT | Mod: ,,, | Performed by: FAMILY MEDICINE

## 2023-10-27 PROCEDURE — 3044F HG A1C LEVEL LT 7.0%: CPT | Mod: CPTII,,, | Performed by: FAMILY MEDICINE

## 2023-10-27 PROCEDURE — 3008F BODY MASS INDEX DOCD: CPT | Mod: CPTII,,, | Performed by: FAMILY MEDICINE

## 2023-10-27 PROCEDURE — 4010F ACE/ARB THERAPY RXD/TAKEN: CPT | Mod: CPTII,,, | Performed by: FAMILY MEDICINE

## 2023-10-27 PROCEDURE — 1160F PR REVIEW ALL MEDS BY PRESCRIBER/CLIN PHARMACIST DOCUMENTED: ICD-10-PCS | Mod: CPTII,,, | Performed by: FAMILY MEDICINE

## 2023-10-27 PROCEDURE — 1160F RVW MEDS BY RX/DR IN RCRD: CPT | Mod: CPTII,,, | Performed by: FAMILY MEDICINE

## 2023-10-27 PROCEDURE — 3074F PR MOST RECENT SYSTOLIC BLOOD PRESSURE < 130 MM HG: ICD-10-PCS | Mod: CPTII,,, | Performed by: FAMILY MEDICINE

## 2023-10-27 PROCEDURE — 99214 PR OFFICE/OUTPT VISIT, EST, LEVL IV, 30-39 MIN: ICD-10-PCS | Mod: ,,, | Performed by: FAMILY MEDICINE

## 2023-10-27 PROCEDURE — 3078F PR MOST RECENT DIASTOLIC BLOOD PRESSURE < 80 MM HG: ICD-10-PCS | Mod: CPTII,,, | Performed by: FAMILY MEDICINE

## 2023-10-27 RX ORDER — DEXTROAMPHETAMINE SACCHARATE, AMPHETAMINE ASPARTATE, DEXTROAMPHETAMINE SULFATE AND AMPHETAMINE SULFATE 5; 5; 5; 5 MG/1; MG/1; MG/1; MG/1
TABLET ORAL
Qty: 30 TABLET | Refills: 0 | Status: SHIPPED | OUTPATIENT
Start: 2023-10-27 | End: 2023-12-01 | Stop reason: SDUPTHER

## 2023-10-27 RX ORDER — DEXTROAMPHETAMINE SACCHARATE, AMPHETAMINE ASPARTATE, DEXTROAMPHETAMINE SULFATE AND AMPHETAMINE SULFATE 7.5; 7.5; 7.5; 7.5 MG/1; MG/1; MG/1; MG/1
TABLET ORAL
Qty: 30 TABLET | Refills: 0 | Status: SHIPPED | OUTPATIENT
Start: 2023-10-27 | End: 2023-12-01 | Stop reason: SDUPTHER

## 2023-10-27 NOTE — PROGRESS NOTES
Subjective:       Patient ID: Syed Kelley is a 58 y.o. male.    Chief Complaint: Medication Refill    Pt here for med refills, has been controlled on adderall 30mg in morning and 20mg in afternoon.     Medication Refill  Pertinent negatives include no abdominal pain, arthralgias, change in bowel habit, chest pain, chills, congestion, coughing, diaphoresis, fatigue, fever, headaches, joint swelling, myalgias, nausea, neck pain, numbness, rash, sore throat, vertigo, vomiting or weakness.     Review of Systems   Constitutional:  Negative for activity change, appetite change, chills, diaphoresis, fatigue, fever and unexpected weight change.   HENT:  Negative for nasal congestion, dental problem, drooling, ear discharge, ear pain, facial swelling, hearing loss, mouth sores, nosebleeds, postnasal drip, rhinorrhea, sinus pressure/congestion, sneezing, sore throat, tinnitus, trouble swallowing, voice change and goiter.    Eyes:  Negative for photophobia, pain, discharge, redness, itching and visual disturbance.   Respiratory:  Negative for apnea, cough, choking, chest tightness, shortness of breath, wheezing and stridor.    Cardiovascular:  Negative for chest pain, palpitations, leg swelling and claudication.   Gastrointestinal:  Negative for abdominal distention, abdominal pain, anal bleeding, blood in stool, change in bowel habit, constipation, diarrhea, nausea, vomiting, reflux and fecal incontinence.   Endocrine: Negative for cold intolerance, heat intolerance, polydipsia, polyphagia and polyuria.   Genitourinary:  Negative for bladder incontinence, decreased urine volume, difficulty urinating, discharge, dysuria, enuresis, erectile dysfunction, flank pain, frequency, genital sores, hematuria, penile pain, testicular pain and urgency.   Musculoskeletal:  Negative for arthralgias, back pain, gait problem, joint swelling, leg pain, myalgias, neck pain, neck stiffness and joint deformity.   Integumentary:  Negative for  pallor, rash, wound and mole/lesion.   Allergic/Immunologic: Negative for environmental allergies, food allergies and frequent infections.   Neurological:  Negative for dizziness, vertigo, tremors, seizures, syncope, facial asymmetry, speech difficulty, weakness, light-headedness, numbness, headaches, memory loss and coordination difficulties.   Hematological:  Negative for adenopathy. Does not bruise/bleed easily.   Psychiatric/Behavioral:  Negative for agitation, behavioral problems, confusion, decreased concentration, dysphoric mood, hallucinations, self-injury, sleep disturbance and suicidal ideas. The patient is not nervous/anxious and is not hyperactive.          Objective:      Physical Exam  Vitals reviewed.   Constitutional:       Appearance: Normal appearance. He is normal weight.   HENT:      Head: Normocephalic and atraumatic.      Right Ear: Tympanic membrane and ear canal normal.      Left Ear: Tympanic membrane, ear canal and external ear normal.      Nose: Nose normal.      Mouth/Throat:      Mouth: Mucous membranes are moist.      Pharynx: Oropharynx is clear.   Eyes:      Extraocular Movements: Extraocular movements intact.      Conjunctiva/sclera: Conjunctivae normal.      Pupils: Pupils are equal, round, and reactive to light.   Cardiovascular:      Rate and Rhythm: Normal rate and regular rhythm.      Pulses: Normal pulses.      Heart sounds: Normal heart sounds.   Pulmonary:      Effort: Pulmonary effort is normal.      Breath sounds: Normal breath sounds.   Abdominal:      General: Abdomen is flat. Bowel sounds are normal.      Palpations: Abdomen is soft.   Musculoskeletal:         General: Normal range of motion.      Cervical back: Normal range of motion and neck supple.   Skin:     General: Skin is warm and dry.   Neurological:      General: No focal deficit present.      Mental Status: He is alert and oriented to person, place, and time. Mental status is at baseline.   Psychiatric:          Mood and Affect: Mood normal.         Behavior: Behavior normal.         Thought Content: Thought content normal.         Judgment: Judgment normal.         Assessment:      1. Adult attention deficit disorder        Plan:     Adult attention deficit disorder  -     dextroamphetamine-amphetamine (ADDERALL) 20 mg tablet; Take 1 tablet by mouth each afternoon  Dispense: 30 tablet; Refill: 0  -     dextroamphetamine-amphetamine (ADDERALL) 30 mg Tab; Take 1 tablet by mouth each morning  Dispense: 30 tablet; Refill: 0       Will refill adhd meds.

## 2023-10-31 ENCOUNTER — HOSPITAL ENCOUNTER (EMERGENCY)
Facility: HOSPITAL | Age: 58
Discharge: HOME | End: 2023-10-31
Attending: EMERGENCY MEDICINE
Payer: COMMERCIAL

## 2023-10-31 VITALS
BODY MASS INDEX: 34.38 KG/M2 | HEART RATE: 66 BPM | WEIGHT: 194 LBS | TEMPERATURE: 98 F | RESPIRATION RATE: 18 BRPM | OXYGEN SATURATION: 98 % | DIASTOLIC BLOOD PRESSURE: 78 MMHG | HEIGHT: 63 IN | SYSTOLIC BLOOD PRESSURE: 125 MMHG

## 2023-10-31 DIAGNOSIS — R53.1 GENERALIZED WEAKNESS: ICD-10-CM

## 2023-10-31 DIAGNOSIS — J32.9 SINUSITIS, UNSPECIFIED CHRONICITY, UNSPECIFIED LOCATION: ICD-10-CM

## 2023-10-31 DIAGNOSIS — R53.1 WEAKNESS: ICD-10-CM

## 2023-10-31 DIAGNOSIS — R42 VERTIGO: Primary | ICD-10-CM

## 2023-10-31 LAB
ALBUMIN SERPL BCP-MCNC: 3.7 G/DL (ref 3.5–5)
ALBUMIN/GLOB SERPL: 0.9 {RATIO}
ALP SERPL-CCNC: 100 U/L (ref 45–115)
ALT SERPL W P-5'-P-CCNC: 21 U/L (ref 16–61)
ANION GAP SERPL CALCULATED.3IONS-SCNC: 11 MMOL/L (ref 7–16)
AST SERPL W P-5'-P-CCNC: 17 U/L (ref 15–37)
BASOPHILS # BLD AUTO: 0.06 K/UL (ref 0–0.2)
BASOPHILS NFR BLD AUTO: 0.6 % (ref 0–1)
BILIRUB SERPL-MCNC: 0.5 MG/DL (ref ?–1.2)
BILIRUB UR QL STRIP: NEGATIVE
BUN SERPL-MCNC: 32 MG/DL (ref 7–18)
BUN/CREAT SERPL: 22 (ref 6–20)
CALCIUM SERPL-MCNC: 9 MG/DL (ref 8.5–10.1)
CHLORIDE SERPL-SCNC: 96 MMOL/L (ref 98–107)
CLARITY UR: CLEAR
CO2 SERPL-SCNC: 32 MMOL/L (ref 21–32)
COLOR UR: ABNORMAL
CREAT SERPL-MCNC: 1.44 MG/DL (ref 0.7–1.3)
DIFFERENTIAL METHOD BLD: ABNORMAL
EGFR (NO RACE VARIABLE) (RUSH/TITUS): 56 ML/MIN/1.73M2
EOSINOPHIL # BLD AUTO: 0.78 K/UL (ref 0–0.5)
EOSINOPHIL NFR BLD AUTO: 7.7 % (ref 1–4)
ERYTHROCYTE [DISTWIDTH] IN BLOOD BY AUTOMATED COUNT: 12.8 % (ref 11.5–14.5)
GLOBULIN SER-MCNC: 4.3 G/DL (ref 2–4)
GLUCOSE SERPL-MCNC: 65 MG/DL (ref 74–106)
GLUCOSE UR STRIP-MCNC: NORMAL MG/DL
HCT VFR BLD AUTO: 42.6 % (ref 40–54)
HGB BLD-MCNC: 14.3 G/DL (ref 13.5–18)
IMM GRANULOCYTES # BLD AUTO: 0.04 K/UL (ref 0–0.04)
IMM GRANULOCYTES NFR BLD: 0.4 % (ref 0–0.4)
KETONES UR STRIP-SCNC: NEGATIVE MG/DL
LEUKOCYTE ESTERASE UR QL STRIP: NEGATIVE
LYMPHOCYTES # BLD AUTO: 3.36 K/UL (ref 1–4.8)
LYMPHOCYTES NFR BLD AUTO: 33.3 % (ref 27–41)
MAGNESIUM SERPL-MCNC: 2 MG/DL (ref 1.7–2.3)
MCH RBC QN AUTO: 30.8 PG (ref 27–31)
MCHC RBC AUTO-ENTMCNC: 33.6 G/DL (ref 32–36)
MCV RBC AUTO: 91.8 FL (ref 80–96)
MONOCYTES # BLD AUTO: 0.71 K/UL (ref 0–0.8)
MONOCYTES NFR BLD AUTO: 7 % (ref 2–6)
MPC BLD CALC-MCNC: 9.3 FL (ref 9.4–12.4)
NEUTROPHILS # BLD AUTO: 5.13 K/UL (ref 1.8–7.7)
NEUTROPHILS NFR BLD AUTO: 51 % (ref 53–65)
NITRITE UR QL STRIP: NEGATIVE
NRBC # BLD AUTO: 0 X10E3/UL
NRBC, AUTO (.00): 0 %
NT-PROBNP SERPL-MCNC: 25 PG/ML (ref 1–125)
PH UR STRIP: 5 PH UNITS
PLATELET # BLD AUTO: 474 K/UL (ref 150–400)
POTASSIUM SERPL-SCNC: 3.7 MMOL/L (ref 3.5–5.1)
PROT SERPL-MCNC: 8 G/DL (ref 6.4–8.2)
PROT UR QL STRIP: 10
RBC # BLD AUTO: 4.64 M/UL (ref 4.6–6.2)
RBC # UR STRIP: NEGATIVE /UL
SODIUM SERPL-SCNC: 135 MMOL/L (ref 136–145)
SP GR UR STRIP: 1.02
TROPONIN I SERPL DL<=0.01 NG/ML-MCNC: 9.8 PG/ML
UROBILINOGEN UR STRIP-ACNC: NORMAL MG/DL
WBC # BLD AUTO: 10.08 K/UL (ref 4.5–11)

## 2023-10-31 PROCEDURE — 25000003 PHARM REV CODE 250: Performed by: EMERGENCY MEDICINE

## 2023-10-31 PROCEDURE — 99284 PR EMERGENCY DEPT VISIT,LEVEL IV: ICD-10-PCS | Mod: ,,, | Performed by: EMERGENCY MEDICINE

## 2023-10-31 PROCEDURE — 99285 EMERGENCY DEPT VISIT HI MDM: CPT | Mod: 25

## 2023-10-31 PROCEDURE — 99284 EMERGENCY DEPT VISIT MOD MDM: CPT | Mod: ,,, | Performed by: EMERGENCY MEDICINE

## 2023-10-31 PROCEDURE — 83735 ASSAY OF MAGNESIUM: CPT | Performed by: EMERGENCY MEDICINE

## 2023-10-31 PROCEDURE — 80053 COMPREHEN METABOLIC PANEL: CPT | Performed by: EMERGENCY MEDICINE

## 2023-10-31 PROCEDURE — 93010 ELECTROCARDIOGRAM REPORT: CPT | Mod: ,,, | Performed by: STUDENT IN AN ORGANIZED HEALTH CARE EDUCATION/TRAINING PROGRAM

## 2023-10-31 PROCEDURE — 93005 ELECTROCARDIOGRAM TRACING: CPT

## 2023-10-31 PROCEDURE — 83880 ASSAY OF NATRIURETIC PEPTIDE: CPT | Performed by: EMERGENCY MEDICINE

## 2023-10-31 PROCEDURE — 85025 COMPLETE CBC W/AUTO DIFF WBC: CPT | Performed by: EMERGENCY MEDICINE

## 2023-10-31 PROCEDURE — 93010 EKG 12-LEAD: ICD-10-PCS | Mod: ,,, | Performed by: STUDENT IN AN ORGANIZED HEALTH CARE EDUCATION/TRAINING PROGRAM

## 2023-10-31 PROCEDURE — 84484 ASSAY OF TROPONIN QUANT: CPT | Performed by: EMERGENCY MEDICINE

## 2023-10-31 PROCEDURE — 96360 HYDRATION IV INFUSION INIT: CPT

## 2023-10-31 PROCEDURE — 96361 HYDRATE IV INFUSION ADD-ON: CPT

## 2023-10-31 PROCEDURE — 81003 URINALYSIS AUTO W/O SCOPE: CPT | Performed by: EMERGENCY MEDICINE

## 2023-10-31 RX ORDER — MECLIZINE HYDROCHLORIDE 25 MG/1
25 TABLET ORAL 3 TIMES DAILY PRN
Qty: 40 TABLET | Refills: 0 | Status: SHIPPED | OUTPATIENT
Start: 2023-10-31 | End: 2023-12-10

## 2023-10-31 RX ORDER — SULFAMETHOXAZOLE AND TRIMETHOPRIM 800; 160 MG/1; MG/1
1 TABLET ORAL 2 TIMES DAILY
Qty: 20 TABLET | Refills: 0 | Status: SHIPPED | OUTPATIENT
Start: 2023-10-31 | End: 2023-11-10

## 2023-10-31 RX ADMIN — SODIUM CHLORIDE 1000 ML: 9 INJECTION, SOLUTION INTRAVENOUS at 05:10

## 2023-10-31 NOTE — ED NOTES
Nurse went into pt's room. Fluids were not reconnected to pt when brought back from CT. Nurse reconnected fluids and started them back.

## 2023-10-31 NOTE — ED TRIAGE NOTES
CO dizziness and dry mouth for last 2 days, stated having some slurred speech since early this am. Family states he has been getting progressively weaker over the last 3 days .

## 2023-10-31 NOTE — Clinical Note
"Syed"Jitendra Kelley was seen and treated in our emergency department on 10/31/2023.  He may return to work on 11/06/2023.       If you have any questions or concerns, please don't hesitate to call.      Irving Wheatley MD"

## 2023-10-31 NOTE — ED PROVIDER NOTES
"Encounter Date: 10/31/2023    SCRIBE #1 NOTE: I, Dona Rico, am scribing for, and in the presence of,  Irving Wheatley MD. I have scribed the entire note.       History     Chief Complaint   Patient presents with    Dizziness     58 year old male presents to the ED complaining of dizziness. Patient states that he has been experiencing dizziness for the past 2 days. He says that he is also experiencing some blurred vision that worsens when he stands up. Patient reports feel weak since yesterday and says that his speech is not how it usually sounds. He says that he feels a "tingling sensation" on the upper part of his cheeks and on his nose as well. He denies any headache, chest pain, fever, vomiting, and nausea but he does report some diarrhea. He reports a PMHx of DM and HTN.    The history is provided by the patient. No  was used.     Review of patient's allergies indicates:   Allergen Reactions    Azithromycin     M-mycin      Past Medical History:   Diagnosis Date    Adult attention deficit disorder 03/22/2021    Depression 12/22/2022    Essential hypertension 03/22/2021    Hyperlipidemia     Neuropathy 08/23/2023    AC (obstructive sleep apnea) 10/19/2023    PAD (peripheral artery disease) 02/17/2023    Type 2 diabetes mellitus      Past Surgical History:   Procedure Laterality Date    APPENDECTOMY      CARDIAC CATHETERIZATION      DEBRIDEMENT OF LOWER EXTREMITY Bilateral 2/18/2023    Procedure: DEBRIDEMENT, LOWER EXTREMITY;  Surgeon: Yael Cohen MD;  Location: [facility] OR;  Service: General;  Laterality: Bilateral;  debride toes    LEFT HEART CATHETERIZATION N/A 5/7/2021    Procedure: Left heart cath with possible intervention;  Surgeon: Federico James DO;  Location: [facility] CATH LAB;  Service: Cardiology;  Laterality: N/A;    TOE AMPUTATION Right 3/29/2023    Procedure: AMPUTATION, TOE;  Surgeon: Yael Cohen MD;  Location: [facility] OR;  Service: General;  Laterality: Right;  " Right great toe amp dr farmer wednesday     Family History   Problem Relation Age of Onset    Hypertension Father     Stroke Father     Cancer Sister      Social History     Tobacco Use    Smoking status: Former     Current packs/day: 0.00     Average packs/day: 5.0 packs/day for 4.0 years (20.0 ttl pk-yrs)     Types: Cigarettes     Start date:      Quit date:      Years since quittin.8    Smokeless tobacco: Current     Types: Chew, Snuff    Tobacco comments:     1 can/day   Substance Use Topics    Alcohol use: Yes     Comment: 2-3 drinks per/3 months    Drug use: Never     Review of Systems   Constitutional:  Negative for fever.   Respiratory:  Negative for cough.    Cardiovascular:  Negative for chest pain.   Gastrointestinal:  Negative for nausea and vomiting.   Neurological:  Positive for dizziness and weakness. Negative for headaches.       Physical Exam     Initial Vitals [10/31/23 1547]   BP Pulse Resp Temp SpO2   (!) 152/71 69 20 97.7 °F (36.5 °C) 96 %      MAP       --         Physical Exam    Nursing note and vitals reviewed.  Constitutional: He appears well-developed.   Eyes: Conjunctivae and EOM are normal. Pupils are equal, round, and reactive to light.   Pulmonary/Chest: Breath sounds normal. No respiratory distress.     Neurological: He is alert and oriented to person, place, and time. He has normal strength. No cranial nerve deficit or sensory deficit.   Cerebellar function is intact.         ED Course   Procedures  Labs Reviewed   COMPREHENSIVE METABOLIC PANEL - Abnormal; Notable for the following components:       Result Value    Sodium 135 (*)     Chloride 96 (*)     Glucose 65 (*)     BUN 32 (*)     Creatinine 1.44 (*)     BUN/Creatinine Ratio 22 (*)     Globulin 4.3 (*)     eGFR 56 (*)     All other components within normal limits   URINALYSIS, REFLEX TO URINE CULTURE - Abnormal; Notable for the following components:    Protein, UA 10 (*)     All other components within normal limits    CBC WITH DIFFERENTIAL - Abnormal; Notable for the following components:    Platelet Count 474 (*)     MPV 9.3 (*)     Neutrophils % 51.0 (*)     Monocytes % 7.0 (*)     Eosinophils % 7.7 (*)     Eosinophils, Absolute 0.78 (*)     All other components within normal limits   TROPONIN I - Normal   NT-PRO NATRIURETIC PEPTIDE - Normal   MAGNESIUM - Normal   CBC W/ AUTO DIFFERENTIAL    Narrative:     The following orders were created for panel order CBC auto differential.  Procedure                               Abnormality         Status                     ---------                               -----------         ------                     CBC with Differential[[id number]]       Abnormal            Final result                 Please view results for these tests on the individual orders.          Imaging Results              CT Head Without Contrast (Final result)  Result time 10/31/23 18:00:53      Final result by Adilson Beard DO (10/31/23 18:00:53)                   Impression:      No convincing evidence of acute intracranial hemorrhage.  Paranasal sinus disease with possible acute component of sinusitis within the right maxillary sinus.    The CT exam was performed using one or more of the following dose    reduction techniques- Automated exposure control, adjustment of the mA    and/or kV according to patient size, and/or use of iterative    reconstructed technique.    Point of Service: [facility]      Electronically signed by: Adilson Beard  Date:    10/31/2023  Time:    18:00               Narrative:    EXAMINATION:  CT HEAD WITHOUT CONTRAST    CLINICAL HISTORY:  Mental status change, unknown cause;    COMPARISON:  CT brain May 30, 2023    TECHNIQUE:  Multiple axial tomographic images of the brain were obtained without the use of intravenous contrast.    FINDINGS:  Midline structures are nondisplaced.  No convincing evidence of acute intracranial hemorrhage.  No convincing evidence of  hydrocephalus.  Mucous retention cyst versus polyp within the inferior right maxillary sinus measuring up to 2 cm in axial dimension.  Significant opacification of several ethmoid air cells bilaterally.  Small fluid within the right maxillary sinus may reflect an acute component of sinusitis.                                       X-Ray Chest AP Portable (Final result)  Result time 10/31/23 17:57:54      Final result by Adilson Beard DO (10/31/23 17:57:54)                   Impression:      No acute cardiopulmonary process demonstrated.    Point of Service: [facility]      Electronically signed by: Adilson Beard  Date:    10/31/2023  Time:    17:57               Narrative:    EXAMINATION:  XR CHEST AP PORTABLE    CLINICAL HISTORY:  Weakness;    COMPARISON:  Chest x-ray October 17, 2023    TECHNIQUE:  Frontal view/views of the chest.    FINDINGS:  The cardiomediastinal silhouette is stable in configuration.  Chronic change of the lungs without focal consolidation, pleural effusion, or pneumothorax.  Visualized osseous and surrounding soft tissue structures appear grossly unchanged.                                       Medications   sodium chloride 0.9% bolus 1,000 mL 1,000 mL (0 mLs Intravenous Stopped 10/31/23 2015)     Medical Decision Making  Amount and/or Complexity of Data Reviewed  Labs: ordered.  Radiology: ordered.              Attending Attestation:           Physician Attestation for Scribe:  Physician Attestation Statement for Scribe #1: I, Irving Wheatley MD, reviewed documentation, as scribed by Dona Rico in my presence, and it is both accurate and complete.             ED Course as of 11/01/23 1751   Tue Oct 31, 2023   1724 Medical decision-making:  Differential diagnosis includes generalized weakness, UTI, pneumonia, dehydration, stroke, electrolyte abnormality.  All labs and imaging ordered and interpreted by me. [BB]      ED Course User Index  [BB] Irving Wheatley MD                     Clinical Impression:   Final diagnoses:  [R53.1] Weakness  [R53.1] Generalized weakness  [R42] Vertigo (Primary)  [J32.9] Sinusitis, unspecified chronicity, unspecified location        ED Disposition Condition    Discharge Stable          ED Prescriptions       Medication Sig Dispense Start Date End Date Auth. Provider    meclizine (ANTIVERT) 25 mg tablet Take 1 tablet (25 mg total) by mouth 3 (three) times daily as needed. 40 tablet 10/31/2023 12/10/2023 Yuriy Matias, DO    sulfamethoxazole-trimethoprim 800-160mg (BACTRIM DS) 800-160 mg Tab Take 1 tablet by mouth 2 (two) times daily. for 10 days 20 tablet 10/31/2023 11/10/2023 Yuriy Matias, DO          Follow-up Information    None          Irving Wheatley MD  11/01/23 5624

## 2023-11-02 ENCOUNTER — TELEPHONE (OUTPATIENT)
Dept: EMERGENCY MEDICINE | Facility: HOSPITAL | Age: 58
End: 2023-11-02
Payer: COMMERCIAL

## 2023-11-10 ENCOUNTER — OFFICE VISIT (OUTPATIENT)
Dept: FAMILY MEDICINE | Facility: CLINIC | Age: 58
End: 2023-11-10
Payer: COMMERCIAL

## 2023-11-10 VITALS
BODY MASS INDEX: 35.26 KG/M2 | HEART RATE: 68 BPM | TEMPERATURE: 99 F | OXYGEN SATURATION: 95 % | SYSTOLIC BLOOD PRESSURE: 115 MMHG | DIASTOLIC BLOOD PRESSURE: 72 MMHG | HEIGHT: 63 IN | RESPIRATION RATE: 19 BRPM | WEIGHT: 199 LBS

## 2023-11-10 DIAGNOSIS — I10 PRIMARY HYPERTENSION: ICD-10-CM

## 2023-11-10 DIAGNOSIS — E11.621 DIABETIC ULCER OF RIGHT GREAT TOE: ICD-10-CM

## 2023-11-10 DIAGNOSIS — E11.9 TYPE 2 DIABETES MELLITUS WITHOUT COMPLICATION, WITHOUT LONG-TERM CURRENT USE OF INSULIN: ICD-10-CM

## 2023-11-10 DIAGNOSIS — N18.31 ACUTE RENAL FAILURE SUPERIMPOSED ON STAGE 3A CHRONIC KIDNEY DISEASE, UNSPECIFIED ACUTE RENAL FAILURE TYPE: Primary | ICD-10-CM

## 2023-11-10 DIAGNOSIS — R42 DIZZINESS: ICD-10-CM

## 2023-11-10 DIAGNOSIS — Z79.4 TYPE 2 DIABETES MELLITUS WITH HYPERGLYCEMIA, WITH LONG-TERM CURRENT USE OF INSULIN: ICD-10-CM

## 2023-11-10 DIAGNOSIS — E78.2 MIXED HYPERLIPIDEMIA: ICD-10-CM

## 2023-11-10 DIAGNOSIS — N17.9 ACUTE RENAL FAILURE SUPERIMPOSED ON STAGE 3A CHRONIC KIDNEY DISEASE, UNSPECIFIED ACUTE RENAL FAILURE TYPE: Primary | ICD-10-CM

## 2023-11-10 DIAGNOSIS — L97.519 DIABETIC ULCER OF RIGHT GREAT TOE: ICD-10-CM

## 2023-11-10 DIAGNOSIS — E11.65 TYPE 2 DIABETES MELLITUS WITH HYPERGLYCEMIA, WITH LONG-TERM CURRENT USE OF INSULIN: ICD-10-CM

## 2023-11-10 DIAGNOSIS — H53.8 BLURRED VISION, BILATERAL: ICD-10-CM

## 2023-11-10 LAB
ALBUMIN SERPL BCP-MCNC: 3 G/DL (ref 3.5–5)
ALBUMIN/GLOB SERPL: 0.8 {RATIO}
ALP SERPL-CCNC: 85 U/L (ref 45–115)
ALT SERPL W P-5'-P-CCNC: 27 U/L (ref 16–61)
ANION GAP SERPL CALCULATED.3IONS-SCNC: 11 MMOL/L (ref 7–16)
AST SERPL W P-5'-P-CCNC: 4 U/L (ref 15–37)
BASOPHILS # BLD AUTO: 0.04 K/UL (ref 0–0.2)
BASOPHILS NFR BLD AUTO: 0.6 % (ref 0–1)
BILIRUB SERPL-MCNC: <0.1 MG/DL (ref ?–1.2)
BUN SERPL-MCNC: 14 MG/DL (ref 7–18)
BUN/CREAT SERPL: 11 (ref 6–20)
CALCIUM SERPL-MCNC: 9.6 MG/DL (ref 8.5–10.1)
CHLORIDE SERPL-SCNC: 105 MMOL/L (ref 98–107)
CO2 SERPL-SCNC: 27 MMOL/L (ref 21–32)
CREAT SERPL-MCNC: 1.32 MG/DL (ref 0.7–1.3)
DIFFERENTIAL METHOD BLD: ABNORMAL
EGFR (NO RACE VARIABLE) (RUSH/TITUS): 63 ML/MIN/1.73M2
EOSINOPHIL # BLD AUTO: 0.9 K/UL (ref 0–0.5)
EOSINOPHIL NFR BLD AUTO: 13 % (ref 1–4)
ERYTHROCYTE [DISTWIDTH] IN BLOOD BY AUTOMATED COUNT: 12.6 % (ref 11.5–14.5)
EST. AVERAGE GLUCOSE BLD GHB EST-MCNC: 143 MG/DL
GLOBULIN SER-MCNC: 4 G/DL (ref 2–4)
GLUCOSE SERPL-MCNC: 204 MG/DL (ref 74–106)
HBA1C MFR BLD HPLC: 6.6 % (ref 4.5–6.6)
HCT VFR BLD AUTO: 37.5 % (ref 40–54)
HGB BLD-MCNC: 12.7 G/DL (ref 13.5–18)
IMM GRANULOCYTES # BLD AUTO: 0.05 K/UL (ref 0–0.04)
IMM GRANULOCYTES NFR BLD: 0.7 % (ref 0–0.4)
LYMPHOCYTES # BLD AUTO: 2.43 K/UL (ref 1–4.8)
LYMPHOCYTES NFR BLD AUTO: 35.2 % (ref 27–41)
MCH RBC QN AUTO: 31.5 PG (ref 27–31)
MCHC RBC AUTO-ENTMCNC: 33.9 G/DL (ref 32–36)
MCV RBC AUTO: 93.1 FL (ref 80–96)
MONOCYTES # BLD AUTO: 0.46 K/UL (ref 0–0.8)
MONOCYTES NFR BLD AUTO: 6.7 % (ref 2–6)
MPC BLD CALC-MCNC: 10.3 FL (ref 9.4–12.4)
NEUTROPHILS # BLD AUTO: 3.02 K/UL (ref 1.8–7.7)
NEUTROPHILS NFR BLD AUTO: 43.8 % (ref 53–65)
NRBC # BLD AUTO: 0 X10E3/UL
NRBC, AUTO (.00): 0 %
PLATELET # BLD AUTO: 325 K/UL (ref 150–400)
POTASSIUM SERPL-SCNC: 4.2 MMOL/L (ref 3.5–5.1)
PROT SERPL-MCNC: 7 G/DL (ref 6.4–8.2)
RBC # BLD AUTO: 4.03 M/UL (ref 4.6–6.2)
SODIUM SERPL-SCNC: 139 MMOL/L (ref 136–145)
WBC # BLD AUTO: 6.9 K/UL (ref 4.5–11)

## 2023-11-10 PROCEDURE — 83036 HEMOGLOBIN GLYCOSYLATED A1C: CPT | Mod: ,,, | Performed by: CLINICAL MEDICAL LABORATORY

## 2023-11-10 PROCEDURE — 3078F PR MOST RECENT DIASTOLIC BLOOD PRESSURE < 80 MM HG: ICD-10-PCS | Mod: CPTII,,, | Performed by: FAMILY MEDICINE

## 2023-11-10 PROCEDURE — 3078F DIAST BP <80 MM HG: CPT | Mod: CPTII,,, | Performed by: FAMILY MEDICINE

## 2023-11-10 PROCEDURE — 83036 HEMOGLOBIN A1C: ICD-10-PCS | Mod: ,,, | Performed by: CLINICAL MEDICAL LABORATORY

## 2023-11-10 PROCEDURE — 3044F PR MOST RECENT HEMOGLOBIN A1C LEVEL <7.0%: ICD-10-PCS | Mod: CPTII,,, | Performed by: FAMILY MEDICINE

## 2023-11-10 PROCEDURE — 99213 OFFICE O/P EST LOW 20 MIN: CPT | Mod: ,,, | Performed by: FAMILY MEDICINE

## 2023-11-10 PROCEDURE — 80053 COMPREHENSIVE METABOLIC PANEL: ICD-10-PCS | Mod: ,,, | Performed by: CLINICAL MEDICAL LABORATORY

## 2023-11-10 PROCEDURE — 80053 COMPREHEN METABOLIC PANEL: CPT | Mod: ,,, | Performed by: CLINICAL MEDICAL LABORATORY

## 2023-11-10 PROCEDURE — 85025 COMPLETE CBC W/AUTO DIFF WBC: CPT | Mod: ,,, | Performed by: CLINICAL MEDICAL LABORATORY

## 2023-11-10 PROCEDURE — 1159F PR MEDICATION LIST DOCUMENTED IN MEDICAL RECORD: ICD-10-PCS | Mod: CPTII,,, | Performed by: FAMILY MEDICINE

## 2023-11-10 PROCEDURE — 4010F PR ACE/ARB THEARPY RXD/TAKEN: ICD-10-PCS | Mod: CPTII,,, | Performed by: FAMILY MEDICINE

## 2023-11-10 PROCEDURE — 3044F HG A1C LEVEL LT 7.0%: CPT | Mod: CPTII,,, | Performed by: FAMILY MEDICINE

## 2023-11-10 PROCEDURE — 3008F BODY MASS INDEX DOCD: CPT | Mod: CPTII,,, | Performed by: FAMILY MEDICINE

## 2023-11-10 PROCEDURE — 99213 PR OFFICE/OUTPT VISIT, EST, LEVL III, 20-29 MIN: ICD-10-PCS | Mod: ,,, | Performed by: FAMILY MEDICINE

## 2023-11-10 PROCEDURE — 3074F PR MOST RECENT SYSTOLIC BLOOD PRESSURE < 130 MM HG: ICD-10-PCS | Mod: CPTII,,, | Performed by: FAMILY MEDICINE

## 2023-11-10 PROCEDURE — 3074F SYST BP LT 130 MM HG: CPT | Mod: CPTII,,, | Performed by: FAMILY MEDICINE

## 2023-11-10 PROCEDURE — 3008F PR BODY MASS INDEX (BMI) DOCUMENTED: ICD-10-PCS | Mod: CPTII,,, | Performed by: FAMILY MEDICINE

## 2023-11-10 PROCEDURE — 85025 CBC WITH DIFFERENTIAL: ICD-10-PCS | Mod: ,,, | Performed by: CLINICAL MEDICAL LABORATORY

## 2023-11-10 PROCEDURE — 1159F MED LIST DOCD IN RCRD: CPT | Mod: CPTII,,, | Performed by: FAMILY MEDICINE

## 2023-11-10 PROCEDURE — 4010F ACE/ARB THERAPY RXD/TAKEN: CPT | Mod: CPTII,,, | Performed by: FAMILY MEDICINE

## 2023-11-10 RX ORDER — INSULIN DEGLUDEC 200 U/ML
30 INJECTION, SOLUTION SUBCUTANEOUS DAILY
Qty: 3 PEN | Refills: 3 | Status: SHIPPED | OUTPATIENT
Start: 2023-11-10 | End: 2024-11-09

## 2023-11-10 RX ORDER — ATORVASTATIN CALCIUM 80 MG/1
80 TABLET, FILM COATED ORAL DAILY
Qty: 90 TABLET | Refills: 1 | Status: SHIPPED | OUTPATIENT
Start: 2023-11-10

## 2023-11-10 NOTE — ASSESSMENT & PLAN NOTE
Increase Tresiba dose to 30 unit.   Will monitor blood glucose if still elevated will increase by 5 units weekly  CMP pending

## 2023-11-10 NOTE — PROGRESS NOTES
Subjective:       Patient ID: Syed Kelley is a 58 y.o. male.    Chief Complaint: Diabetes (Pt reports elevated bs in am, pt states he takes extra tresiba in am to lower bs. Reports bs 120-200), Follow-up, and Depression    This is a 58 y.o male who presents today with c/o blood glucose increasing over the last month. Patient states his blood glucose has been elevated above 200 every night and morning. Patient was recently at the ED for URI and dizziness he was treated with Bactrim and meclizine and given a 1L NS bolus in the ED for an SHAHRZAD. Patient states he feels better but still has some mild dizziness and blurred vision. Patient denies HA, fever, fatigue, N/V/D, shortness of breath or chest pain.               Current Outpatient Medications:     albuterol (PROVENTIL/VENTOLIN HFA) 90 mcg/actuation inhaler, Inhale 2 puffs into the lungs every 4 (four) hours as needed for Shortness of Breath., Disp: 18 g, Rfl: 0    aspirin (ECOTRIN) 81 MG EC tablet, Take 1 tablet (81 mg total) by mouth once daily., Disp: 90 tablet, Rfl: 3    cholecalciferol, vitamin D3, (VITAMIN D3) 50 mcg (2,000 unit) Cap, Take 1 capsule by mouth once daily., Disp: , Rfl:     citalopram (CELEXA) 20 MG tablet, Take 1 tablet (20 mg total) by mouth once daily., Disp: 90 tablet, Rfl: 1    dextroamphetamine-amphetamine (ADDERALL) 20 mg tablet, Take 1 tablet by mouth each afternoon, Disp: 30 tablet, Rfl: 0    dextroamphetamine-amphetamine (ADDERALL) 30 mg Tab, Take 1 tablet by mouth each morning, Disp: 30 tablet, Rfl: 0    flash glucose scanning reader (FREESTYLE LOUISE 2 READER) Misc, 1 Application by Misc.(Non-Drug; Combo Route) route 4 (four) times daily., Disp: 1 each, Rfl: 5    flash glucose sensor (FREESTYLE LOUISE 2 SENSOR) Kit, 1 Application by Misc.(Non-Drug; Combo Route) route 4 (four) times daily as needed (blood sugar)., Disp: 6 kit, Rfl: 3    gabapentin (NEURONTIN) 300 MG capsule, Take 1 capsule (300 mg total) by mouth 3 (three) times daily.,  "Disp: 90 capsule, Rfl: 11    glyBURIDE (DIABETA) 5 MG tablet, Take 1 tablet (5 mg total) by mouth daily with breakfast., Disp: 90 tablet, Rfl: 1    insulin syringe-needle U-100 0.3 mL 31 gauge x 5/16" Syrg, Use once daily for insulin injection, Disp: , Rfl:     lisinopriL-hydrochlorothiazide (PRINZIDE,ZESTORETIC) 20-25 mg Tab, Take 1 tablet by mouth once daily., Disp: 90 tablet, Rfl: 1    meclizine (ANTIVERT) 25 mg tablet, Take 1 tablet (25 mg total) by mouth 3 (three) times daily as needed., Disp: 40 tablet, Rfl: 0    metFORMIN (GLUCOPHAGE) 1000 MG tablet, Take 1 tablet (1,000 mg total) by mouth 2 (two) times daily with meals., Disp: 180 tablet, Rfl: 1    metoprolol tartrate (LOPRESSOR) 50 MG tablet, Take 1 tablet (50 mg total) by mouth 2 (two) times daily., Disp: 180 tablet, Rfl: 1    multivitamin (ONE DAILY MULTIVITAMIN) per tablet, Take 1 tablet by mouth once daily., Disp: , Rfl:     omega-3 fatty acids/fish oil (FISH OIL-OMEGA-3 FATTY ACIDS) 300-1,000 mg capsule, Take by mouth once daily., Disp: , Rfl:     pen needle, diabetic 31 gauge x 5/16" Ndle, Use to administer insulin twice daily, Disp: , Rfl:     sulfamethoxazole-trimethoprim 800-160mg (BACTRIM DS) 800-160 mg Tab, Take 1 tablet by mouth 2 (two) times daily. for 10 days, Disp: 20 tablet, Rfl: 0    atorvastatin (LIPITOR) 80 MG tablet, Take 1 tablet (80 mg total) by mouth once daily., Disp: 90 tablet, Rfl: 1    insulin degludec (TRESIBA FLEXTOUCH U-200) 200 unit/mL (3 mL) insulin pen, Inject 30 Units into the skin once daily. Increase by 5 units if blood glucose is elevated above 200. weekly, Disp: 3 pen , Rfl: 3    Review of patient's allergies indicates:   Allergen Reactions    Azithromycin     M-mycin        Past Medical History:   Diagnosis Date    Adult attention deficit disorder 03/22/2021    Depression 12/22/2022    Essential hypertension 03/22/2021    Hyperlipidemia     Neuropathy 08/23/2023    AC (obstructive sleep apnea) 10/19/2023    PAD " (peripheral artery disease) 2023    Type 2 diabetes mellitus        Past Surgical History:   Procedure Laterality Date    APPENDECTOMY      CARDIAC CATHETERIZATION      DEBRIDEMENT OF LOWER EXTREMITY Bilateral 2023    Procedure: DEBRIDEMENT, LOWER EXTREMITY;  Surgeon: Yael Cohen MD;  Location: [facility] OR;  Service: General;  Laterality: Bilateral;  debride toes    LEFT HEART CATHETERIZATION N/A 2021    Procedure: Left heart cath with possible intervention;  Surgeon: Federico James DO;  Location: [facility] CATH LAB;  Service: Cardiology;  Laterality: N/A;    TOE AMPUTATION Right 3/29/2023    Procedure: AMPUTATION, TOE;  Surgeon: Yael Cohen MD;  Location: [facility] OR;  Service: General;  Laterality: Right;  Right great toe amp dr cohen wednesday       Family History   Problem Relation Age of Onset    Hypertension Father     Stroke Father     Cancer Sister        Social History     Tobacco Use    Smoking status: Former     Current packs/day: 0.00     Average packs/day: 5.0 packs/day for 4.0 years (20.0 ttl pk-yrs)     Types: Cigarettes     Start date:      Quit date:      Years since quittin.8    Smokeless tobacco: Current     Types: Chew, Snuff    Tobacco comments:     1 can/day   Substance Use Topics    Alcohol use: Yes     Comment: 2-3 drinks per/3 months    Drug use: Never       Review of Systems   Constitutional: Negative.  Negative for fatigue and fever.   HENT: Negative.  Negative for nasal congestion, ear discharge, ear pain, nosebleeds, sinus pressure/congestion, sneezing and sore throat.    Eyes: Negative.  Negative for pain, discharge and itching.   Respiratory: Negative.  Negative for cough and shortness of breath.    Cardiovascular: Negative.  Negative for chest pain and leg swelling.   Gastrointestinal: Negative.  Negative for abdominal distention, abdominal pain and anal bleeding.   Endocrine: Negative.  Negative for cold intolerance, heat intolerance and  polydipsia.   Genitourinary: Negative.  Negative for bladder incontinence, difficulty urinating, discharge, dysuria, erectile dysfunction, penile pain and penile swelling.   Musculoskeletal: Negative.  Negative for arthralgias, back pain, gait problem, joint swelling, leg pain, myalgias, neck pain, neck stiffness and joint deformity.   Integumentary:  Negative for rash and wound.   Allergic/Immunologic: Negative.  Negative for environmental allergies and food allergies.   Neurological:  Negative for facial asymmetry, speech difficulty, weakness, light-headedness, numbness and headaches.   Hematological: Negative.  Negative for adenopathy.   Psychiatric/Behavioral:  Negative for agitation, behavioral problems, confusion and decreased concentration.    All other systems reviewed and are negative.        Current Medications:   Medication List with Changes/Refills   Current Medications    ALBUTEROL (PROVENTIL/VENTOLIN HFA) 90 MCG/ACTUATION INHALER    Inhale 2 puffs into the lungs every 4 (four) hours as needed for Shortness of Breath.       Start Date: 10/17/2023End Date: --    ASPIRIN (ECOTRIN) 81 MG EC TABLET    Take 1 tablet (81 mg total) by mouth once daily.       Start Date: 5/8/2021  End Date: --    CHOLECALCIFEROL, VITAMIN D3, (VITAMIN D3) 50 MCG (2,000 UNIT) CAP    Take 1 capsule by mouth once daily.       Start Date: --        End Date: --    CITALOPRAM (CELEXA) 20 MG TABLET    Take 1 tablet (20 mg total) by mouth once daily.       Start Date: 8/23/2023 End Date: --    DEXTROAMPHETAMINE-AMPHETAMINE (ADDERALL) 20 MG TABLET    Take 1 tablet by mouth each afternoon       Start Date: 10/27/2023End Date: --    DEXTROAMPHETAMINE-AMPHETAMINE (ADDERALL) 30 MG TAB    Take 1 tablet by mouth each morning       Start Date: 10/27/2023End Date: --    FLASH GLUCOSE SCANNING READER (ProMed LOUISE 2 READER) [facility]    1 Application by Misc.(Non-Drug; Combo Route) route 4 (four) times daily.       Start Date: 8/23/2023 End Date:  "--    FLASH GLUCOSE SENSOR (FREESTYLE LOUISE 2 SENSOR) KIT    1 Application by Misc.(Non-Drug; Combo Route) route 4 (four) times daily as needed (blood sugar).       Start Date: 8/23/2023 End Date: --    GABAPENTIN (NEURONTIN) 300 MG CAPSULE    Take 1 capsule (300 mg total) by mouth 3 (three) times daily.       Start Date: 8/23/2023 End Date: 8/22/2024    GLYBURIDE (DIABETA) 5 MG TABLET    Take 1 tablet (5 mg total) by mouth daily with breakfast.       Start Date: 8/23/2023 End Date: --    INSULIN SYRINGE-NEEDLE U-100 0.3 ML 31 GAUGE X 5/16" SYRG    Use once daily for insulin injection       Start Date: 6/8/2022  End Date: --    LISINOPRIL-HYDROCHLOROTHIAZIDE (PRINZIDE,ZESTORETIC) 20-25 MG TAB    Take 1 tablet by mouth once daily.       Start Date: 8/23/2023 End Date: --    MECLIZINE (ANTIVERT) 25 MG TABLET    Take 1 tablet (25 mg total) by mouth 3 (three) times daily as needed.       Start Date: 10/31/2023End Date: 12/10/2023    METFORMIN (GLUCOPHAGE) 1000 MG TABLET    Take 1 tablet (1,000 mg total) by mouth 2 (two) times daily with meals.       Start Date: 8/23/2023 End Date: --    METOPROLOL TARTRATE (LOPRESSOR) 50 MG TABLET    Take 1 tablet (50 mg total) by mouth 2 (two) times daily.       Start Date: 8/23/2023 End Date: --    MULTIVITAMIN (ONE DAILY MULTIVITAMIN) PER TABLET    Take 1 tablet by mouth once daily.       Start Date: --        End Date: --    OMEGA-3 FATTY ACIDS/FISH OIL (FISH OIL-OMEGA-3 FATTY ACIDS) 300-1,000 MG CAPSULE    Take by mouth once daily.       Start Date: --        End Date: --    PEN NEEDLE, DIABETIC 31 GAUGE X 5/16" NDLE    Use to administer insulin twice daily       Start Date: 9/18/2022 End Date: --    SULFAMETHOXAZOLE-TRIMETHOPRIM 800-160MG (BACTRIM DS) 800-160 MG TAB    Take 1 tablet by mouth 2 (two) times daily. for 10 days       Start Date: 10/31/2023End Date: 11/10/2023   Changed and/or Refilled Medications    Modified Medication Previous Medication    ATORVASTATIN (LIPITOR) 80 " "MG TABLET atorvastatin (LIPITOR) 80 MG tablet       Take 1 tablet (80 mg total) by mouth once daily.    Take 1 tablet (80 mg total) by mouth once daily.       Start Date: 11/10/2023End Date: --    Start Date: 8/23/2023 End Date: 11/10/2023    INSULIN DEGLUDEC (TRESIBA FLEXTOUCH U-200) 200 UNIT/ML (3 ML) INSULIN PEN insulin degludec (TRESIBA FLEXTOUCH U-200) 200 unit/mL (3 mL) insulin pen       Inject 30 Units into the skin once daily. Increase by 5 units if blood glucose is elevated above 200. weekly    Inject 26 Units into the skin once daily.       Start Date: 11/10/2023End Date: 11/9/2024    Start Date: 12/22/2022End Date: 11/10/2023            Objective:        Vitals:    11/10/23 1344   BP: 115/72   BP Location: Left arm   Patient Position: Sitting   BP Method: Large (Automatic)   Pulse: 68   Resp: 19   Temp: 98.6 °F (37 °C)   TempSrc: Oral   SpO2: 95%   Weight: 90.3 kg (199 lb)   Height: 5' 3" (1.6 m)       Physical Exam  Constitutional:       Appearance: Normal appearance. He is normal weight.   HENT:      Head: Normocephalic.      Right Ear: Tympanic membrane normal.      Left Ear: Tympanic membrane normal.      Nose: Nose normal.      Mouth/Throat:      Mouth: Mucous membranes are moist.      Pharynx: Oropharynx is clear.   Eyes:      Extraocular Movements: Extraocular movements intact.      Conjunctiva/sclera: Conjunctivae normal.      Pupils: Pupils are equal, round, and reactive to light.   Cardiovascular:      Rate and Rhythm: Normal rate and regular rhythm.      Pulses: Normal pulses.      Heart sounds: Normal heart sounds.   Pulmonary:      Effort: Pulmonary effort is normal.      Breath sounds: Normal breath sounds.   Abdominal:      General: Abdomen is flat. Bowel sounds are normal.      Palpations: Abdomen is soft.   Musculoskeletal:         General: Normal range of motion.      Cervical back: Normal range of motion.   Skin:     General: Skin is warm and dry.      Capillary Refill: Capillary refill " takes less than 2 seconds.   Neurological:      General: No focal deficit present.      Mental Status: He is alert and oriented to person, place, and time.   Psychiatric:         Mood and Affect: Mood normal.         Behavior: Behavior normal.               Lab Results   Component Value Date    WBC 10.08 10/31/2023    HGB 14.3 10/31/2023    HCT 42.6 10/31/2023     (H) 10/31/2023    CHOL 143 08/23/2023    TRIG 338 (H) 08/23/2023    HDL 29 (L) 08/23/2023    ALT 21 10/31/2023    AST 17 10/31/2023     (L) 10/31/2023    K 3.7 10/31/2023    CL 96 (L) 10/31/2023    CREATININE 1.44 (H) 10/31/2023    BUN 32 (H) 10/31/2023    CO2 32 10/31/2023    TSH 3.070 02/17/2023    INR 0.95 02/17/2023    HGBA1C 6.5 08/23/2023    MICROALBUR 1.9 12/22/2022      Assessment:       1. Acute renal failure superimposed on stage 3a chronic kidney disease, unspecified acute renal failure type    2. Mixed hyperlipidemia    3. Diabetic ulcer of right great toe    4. Type 2 diabetes mellitus without complication, without long-term current use of insulin    5. Dizziness    6. Blurred vision, bilateral    7. Type 2 diabetes mellitus with hyperglycemia, with long-term current use of insulin    8. Primary hypertension        Plan:         Problem List Items Addressed This Visit          Ophtho    Blurred vision, bilateral     Refer to Opthamology         Relevant Orders    Ambulatory referral/consult to Ophthalmology       Cardiac/Vascular    Mixed hyperlipidemia     Lipitor 80 mg dialy         Relevant Medications    atorvastatin (LIPITOR) 80 MG tablet    Primary hypertension     Metoprolol 50 mg  Lisinopril-HCTZ              Endocrine    Type 2 diabetes mellitus with hyperglycemia     Increase Tresiba dose to 30 unit.   Will monitor blood glucose if still elevated will increase by 5 units weekly  CMP pending         Relevant Medications    insulin degludec (TRESIBA FLEXTOUCH U-200) 200 unit/mL (3 mL) insulin pen     Other Visit Diagnoses        Acute renal failure superimposed on stage 3a chronic kidney disease, unspecified acute renal failure type    -  Primary    Relevant Orders    Basic Metabolic Panel    Diabetic ulcer of right great toe        Relevant Medications    insulin degludec (TRESIBA FLEXTOUCH U-200) 200 unit/mL (3 mL) insulin pen    Type 2 diabetes mellitus without complication, without long-term current use of insulin        Relevant Medications    insulin degludec (TRESIBA FLEXTOUCH U-200) 200 unit/mL (3 mL) insulin pen    Dizziness        Relevant Orders    Ambulatory referral/consult to Ophthalmology              Follow up if symptoms worsen or fail to improve.    Kingsley Benito MD     Instructed patient that if symptoms fail to improve or worsen patient should seek immediate medical attention or report to the nearest emergency department. Patient expressed verbal agreement and understanding to this plan of care.

## 2023-12-01 ENCOUNTER — OFFICE VISIT (OUTPATIENT)
Dept: FAMILY MEDICINE | Facility: CLINIC | Age: 58
End: 2023-12-01
Payer: COMMERCIAL

## 2023-12-01 ENCOUNTER — PATIENT MESSAGE (OUTPATIENT)
Dept: ADMINISTRATIVE | Facility: HOSPITAL | Age: 58
End: 2023-12-01

## 2023-12-01 VITALS
WEIGHT: 200 LBS | TEMPERATURE: 98 F | OXYGEN SATURATION: 97 % | SYSTOLIC BLOOD PRESSURE: 127 MMHG | DIASTOLIC BLOOD PRESSURE: 85 MMHG | HEIGHT: 63 IN | HEART RATE: 62 BPM | RESPIRATION RATE: 20 BRPM | BODY MASS INDEX: 35.44 KG/M2

## 2023-12-01 DIAGNOSIS — F98.8 ADULT ATTENTION DEFICIT DISORDER: Primary | ICD-10-CM

## 2023-12-01 PROCEDURE — 99213 PR OFFICE/OUTPT VISIT, EST, LEVL III, 20-29 MIN: ICD-10-PCS | Mod: ,,, | Performed by: FAMILY MEDICINE

## 2023-12-01 PROCEDURE — 3008F PR BODY MASS INDEX (BMI) DOCUMENTED: ICD-10-PCS | Mod: CPTII,,, | Performed by: FAMILY MEDICINE

## 2023-12-01 PROCEDURE — 99213 OFFICE O/P EST LOW 20 MIN: CPT | Mod: ,,, | Performed by: FAMILY MEDICINE

## 2023-12-01 PROCEDURE — 3079F DIAST BP 80-89 MM HG: CPT | Mod: CPTII,,, | Performed by: FAMILY MEDICINE

## 2023-12-01 PROCEDURE — 3079F PR MOST RECENT DIASTOLIC BLOOD PRESSURE 80-89 MM HG: ICD-10-PCS | Mod: CPTII,,, | Performed by: FAMILY MEDICINE

## 2023-12-01 PROCEDURE — 3044F HG A1C LEVEL LT 7.0%: CPT | Mod: CPTII,,, | Performed by: FAMILY MEDICINE

## 2023-12-01 PROCEDURE — 3074F SYST BP LT 130 MM HG: CPT | Mod: CPTII,,, | Performed by: FAMILY MEDICINE

## 2023-12-01 PROCEDURE — 3008F BODY MASS INDEX DOCD: CPT | Mod: CPTII,,, | Performed by: FAMILY MEDICINE

## 2023-12-01 PROCEDURE — 3044F PR MOST RECENT HEMOGLOBIN A1C LEVEL <7.0%: ICD-10-PCS | Mod: CPTII,,, | Performed by: FAMILY MEDICINE

## 2023-12-01 PROCEDURE — 3074F PR MOST RECENT SYSTOLIC BLOOD PRESSURE < 130 MM HG: ICD-10-PCS | Mod: CPTII,,, | Performed by: FAMILY MEDICINE

## 2023-12-01 PROCEDURE — 4010F ACE/ARB THERAPY RXD/TAKEN: CPT | Mod: CPTII,,, | Performed by: FAMILY MEDICINE

## 2023-12-01 PROCEDURE — 4010F PR ACE/ARB THEARPY RXD/TAKEN: ICD-10-PCS | Mod: CPTII,,, | Performed by: FAMILY MEDICINE

## 2023-12-01 RX ORDER — DEXTROAMPHETAMINE SACCHARATE, AMPHETAMINE ASPARTATE, DEXTROAMPHETAMINE SULFATE AND AMPHETAMINE SULFATE 7.5; 7.5; 7.5; 7.5 MG/1; MG/1; MG/1; MG/1
TABLET ORAL
Qty: 30 TABLET | Refills: 0 | Status: SHIPPED | OUTPATIENT
Start: 2023-12-01 | End: 2024-02-16 | Stop reason: SDUPTHER

## 2023-12-01 RX ORDER — DEXTROAMPHETAMINE SACCHARATE, AMPHETAMINE ASPARTATE, DEXTROAMPHETAMINE SULFATE AND AMPHETAMINE SULFATE 5; 5; 5; 5 MG/1; MG/1; MG/1; MG/1
TABLET ORAL
Qty: 30 TABLET | Refills: 0 | Status: SHIPPED | OUTPATIENT
Start: 2023-12-01 | End: 2024-02-16 | Stop reason: SDUPTHER

## 2023-12-01 RX ORDER — DEXTROAMPHETAMINE SACCHARATE, AMPHETAMINE ASPARTATE, DEXTROAMPHETAMINE SULFATE AND AMPHETAMINE SULFATE 5; 5; 5; 5 MG/1; MG/1; MG/1; MG/1
TABLET ORAL
Qty: 30 TABLET | Refills: 0 | Status: SHIPPED | OUTPATIENT
Start: 2023-12-01 | End: 2023-12-01 | Stop reason: SDUPTHER

## 2023-12-01 RX ORDER — DEXTROAMPHETAMINE SACCHARATE, AMPHETAMINE ASPARTATE, DEXTROAMPHETAMINE SULFATE AND AMPHETAMINE SULFATE 7.5; 7.5; 7.5; 7.5 MG/1; MG/1; MG/1; MG/1
TABLET ORAL
Qty: 30 TABLET | Refills: 0 | Status: SHIPPED | OUTPATIENT
Start: 2023-12-01 | End: 2023-12-01 | Stop reason: SDUPTHER

## 2023-12-01 NOTE — PROGRESS NOTES
Subjective     Patient ID: Syed Kelley is a 58 y.o. male.    Chief Complaint: Medication Refill (Requests refill on Adderall)    Symptoms well-controlled on current medication.  Thirty of Adderall in the morning, 20 in the afternoon.  He does see a cardiologist.  He says his cardiologist is aware that he takes Adderall and is okay with it.    Medication Refill      Review of Systems       Objective     Physical Exam  Constitutional:       General: He is not in acute distress.  Cardiovascular:      Rate and Rhythm: Normal rate and regular rhythm.   Pulmonary:      Effort: Pulmonary effort is normal.      Breath sounds: Normal breath sounds.   Musculoskeletal:      Right lower leg: No edema.      Left lower leg: No edema.   Neurological:      Mental Status: He is alert.   Psychiatric:         Mood and Affect: Mood normal.         Behavior: Behavior normal.         Thought Content: Thought content normal.         Judgment: Judgment normal.            Assessment and Plan     1. Adult attention deficit disorder  -     Discontinue: dextroamphetamine-amphetamine (ADDERALL) 20 mg tablet; Take 1 tablet by mouth each afternoon  Dispense: 30 tablet; Refill: 0  -     Discontinue: dextroamphetamine-amphetamine (ADDERALL) 30 mg Tab; Take 1 tablet by mouth each morning  Dispense: 30 tablet; Refill: 0  -     dextroamphetamine-amphetamine (ADDERALL) 30 mg Tab; Take 1 tablet by mouth each morning  Dispense: 30 tablet; Refill: 0  -     dextroamphetamine-amphetamine (ADDERALL) 20 mg tablet; Take 1 tablet by mouth each afternoon  Dispense: 30 tablet; Refill: 0        Medication refilled for 2 months         No follow-ups on file.

## 2023-12-02 DIAGNOSIS — Z12.11 SCREENING FOR COLON CANCER: ICD-10-CM

## 2023-12-30 ENCOUNTER — HOSPITAL ENCOUNTER (EMERGENCY)
Facility: HOSPITAL | Age: 58
Discharge: HOME | End: 2023-12-30
Payer: COMMERCIAL

## 2023-12-30 VITALS
TEMPERATURE: 98 F | HEART RATE: 78 BPM | HEIGHT: 63 IN | RESPIRATION RATE: 16 BRPM | WEIGHT: 200 LBS | SYSTOLIC BLOOD PRESSURE: 111 MMHG | DIASTOLIC BLOOD PRESSURE: 80 MMHG | BODY MASS INDEX: 35.44 KG/M2 | OXYGEN SATURATION: 97 %

## 2023-12-30 DIAGNOSIS — J06.9 VIRAL URI WITH COUGH: Primary | ICD-10-CM

## 2023-12-30 PROCEDURE — 99283 EMERGENCY DEPT VISIT LOW MDM: CPT | Mod: ,,, | Performed by: NURSE PRACTITIONER

## 2023-12-30 PROCEDURE — 99281 EMR DPT VST MAYX REQ PHY/QHP: CPT

## 2023-12-30 NOTE — ED PROVIDER NOTES
Encounter Date: 2023       History     Chief Complaint   Patient presents with    Influenza     Pt is a 57 y/o male with complaint of cough and congestion x 6 days. Pt reports his grandson got the flu about 1 week ago and the whole family has been getting sick now. He is taking dayquil and nyquil  which is helping. He denies fever, chest pain, chest congestion        Review of patient's allergies indicates:   Allergen Reactions    Azithromycin     M-mycin      Past Medical History:   Diagnosis Date    Adult attention deficit disorder 2021    Depression 2022    Essential hypertension 2021    Hyperlipidemia     Neuropathy 2023    AC (obstructive sleep apnea) 10/19/2023    PAD (peripheral artery disease) 2023    Type 2 diabetes mellitus      Past Surgical History:   Procedure Laterality Date    APPENDECTOMY      CARDIAC CATHETERIZATION      DEBRIDEMENT OF LOWER EXTREMITY Bilateral 2023    Procedure: DEBRIDEMENT, LOWER EXTREMITY;  Surgeon: Yael Cohen MD;  Location: [facility] OR;  Service: General;  Laterality: Bilateral;  debride toes    LEFT HEART CATHETERIZATION N/A 2021    Procedure: Left heart cath with possible intervention;  Surgeon: Federico James DO;  Location: [facility] CATH LAB;  Service: Cardiology;  Laterality: N/A;    TOE AMPUTATION Right 3/29/2023    Procedure: AMPUTATION, TOE;  Surgeon: Yael Cohen MD;  Location: [facility] OR;  Service: General;  Laterality: Right;  Right great toe amp dr cohen wednesday     Family History   Problem Relation Age of Onset    Hypertension Father     Stroke Father     Cancer Sister      Social History     Tobacco Use    Smoking status: Former     Current packs/day: 0.00     Average packs/day: 5.0 packs/day for 4.0 years (20.0 ttl pk-yrs)     Types: Cigarettes     Start date:      Quit date:      Years since quittin.0    Smokeless tobacco: Current     Types: Chew, Snuff    Tobacco comments:     1 can/day    Substance Use Topics    Alcohol use: Yes     Comment: 2-3 drinks per/3 months    Drug use: Never     Review of Systems   Constitutional:  Negative for fever.   HENT:  Positive for congestion, rhinorrhea, sinus pressure and sinus pain. Negative for sore throat.    Respiratory:  Positive for cough. Negative for shortness of breath.    Cardiovascular:  Negative for chest pain.   Gastrointestinal:  Negative for nausea.   Genitourinary:  Negative for dysuria.   Musculoskeletal:  Negative for back pain.   Skin:  Negative for rash.   Neurological:  Negative for weakness.   Hematological:  Does not bruise/bleed easily.       Physical Exam     Initial Vitals [12/30/23 1430]   BP Pulse Resp Temp SpO2   111/80 78 16 98.1 °F (36.7 °C) 97 %      MAP       --         Physical Exam    Constitutional: He appears well-developed and well-nourished.   HENT:   Nose: Nose normal.   Mouth/Throat: Oropharynx is clear and moist and mucous membranes are normal.   Neck: Neck supple.   Cardiovascular:  Normal rate, regular rhythm, normal heart sounds and normal pulses.           Pulmonary/Chest: Breath sounds normal. No respiratory distress. He has no wheezes. He has no rhonchi. He has no rales. He exhibits no tenderness.   Musculoskeletal:      Cervical back: Neck supple.     Neurological: He is oriented to person, place, and time.   Skin: Skin is warm, dry and intact.         Medical Screening Exam   See Full Note    ED Course   Procedures  Labs Reviewed - No data to display       Imaging Results    None          Medications - No data to display  Medical Decision Making  MDM    Patient presents for emergent evaluation of acute flu like symptoms that poses a threat to life and/or bodily function.    In the ED patient found to have acute URI.    Pt refused lab work up . Patient request to continue self home treatment     Discharge MDM    Patient was discharged in stable condition.  Detailed return precautions discussed.                                         Clinical Impression:   Final diagnoses:  [J06.9] Viral URI with cough (Primary)        ED Disposition Condition    Discharge Stable          ED Prescriptions    None       Follow-up Information       Follow up With Specialties Details Why Contact Info    Julio Cruz, DO Family Medicine In 1 week  [address]  [facility] [place] [address]  [phone]               Tatianna Frazier, FNP  12/30/23 6284

## 2023-12-30 NOTE — ED TRIAGE NOTES
Presents with c/o headache and diarrhea, cough.  States lives in house with 11 others and everyone has had the flu.  Coughing a lot at night

## 2024-01-01 ENCOUNTER — HOSPITAL ENCOUNTER (EMERGENCY)
Facility: HOSPITAL | Age: 59
Discharge: HOME | End: 2024-01-01
Payer: COMMERCIAL

## 2024-01-01 VITALS
RESPIRATION RATE: 18 BRPM | SYSTOLIC BLOOD PRESSURE: 110 MMHG | HEART RATE: 80 BPM | OXYGEN SATURATION: 96 % | TEMPERATURE: 100 F | WEIGHT: 195 LBS | BODY MASS INDEX: 34.55 KG/M2 | DIASTOLIC BLOOD PRESSURE: 77 MMHG | HEIGHT: 63 IN

## 2024-01-01 DIAGNOSIS — J20.9 ACUTE BRONCHITIS, UNSPECIFIED ORGANISM: Primary | ICD-10-CM

## 2024-01-01 DIAGNOSIS — R05.9 COUGH: ICD-10-CM

## 2024-01-01 PROCEDURE — 96372 THER/PROPH/DIAG INJ SC/IM: CPT | Performed by: NURSE PRACTITIONER

## 2024-01-01 PROCEDURE — 99284 EMERGENCY DEPT VISIT MOD MDM: CPT | Mod: 25

## 2024-01-01 PROCEDURE — 25000242 PHARM REV CODE 250 ALT 637 W/ HCPCS: Performed by: NURSE PRACTITIONER

## 2024-01-01 PROCEDURE — 25000003 PHARM REV CODE 250: Performed by: NURSE PRACTITIONER

## 2024-01-01 PROCEDURE — 63600175 PHARM REV CODE 636 W HCPCS: Performed by: NURSE PRACTITIONER

## 2024-01-01 PROCEDURE — 99284 EMERGENCY DEPT VISIT MOD MDM: CPT | Mod: ,,, | Performed by: NURSE PRACTITIONER

## 2024-01-01 RX ORDER — ALBUTEROL SULFATE 90 UG/1
2 AEROSOL, METERED RESPIRATORY (INHALATION) EVERY 4 HOURS PRN
Qty: 8 G | Refills: 0 | Status: SHIPPED | OUTPATIENT
Start: 2024-01-01

## 2024-01-01 RX ORDER — ALBUTEROL SULFATE 90 UG/1
4 AEROSOL, METERED RESPIRATORY (INHALATION)
Status: COMPLETED | OUTPATIENT
Start: 2024-01-01 | End: 2024-01-01

## 2024-01-01 RX ORDER — ACETAMINOPHEN 325 MG/1
650 TABLET ORAL
Status: COMPLETED | OUTPATIENT
Start: 2024-01-01 | End: 2024-01-01

## 2024-01-01 RX ORDER — PREDNISONE 50 MG/1
50 TABLET ORAL DAILY
Qty: 5 TABLET | Refills: 0 | Status: SHIPPED | OUTPATIENT
Start: 2024-01-01 | End: 2024-01-20 | Stop reason: ALTCHOICE

## 2024-01-01 RX ORDER — DEXAMETHASONE SODIUM PHOSPHATE 4 MG/ML
8 INJECTION, SOLUTION INTRA-ARTICULAR; INTRALESIONAL; INTRAMUSCULAR; INTRAVENOUS; SOFT TISSUE
Status: COMPLETED | OUTPATIENT
Start: 2024-01-01 | End: 2024-01-01

## 2024-01-01 RX ORDER — ALBUTEROL SULFATE 90 UG/1
2 AEROSOL, METERED RESPIRATORY (INHALATION)
Status: COMPLETED | OUTPATIENT
Start: 2024-01-01 | End: 2024-01-01

## 2024-01-01 RX ADMIN — ALBUTEROL SULFATE 2 PUFF: 108 INHALANT RESPIRATORY (INHALATION) at 01:01

## 2024-01-01 RX ADMIN — ACETAMINOPHEN 650 MG: 325 TABLET ORAL at 12:01

## 2024-01-01 RX ADMIN — ALBUTEROL SULFATE 4 PUFF: 108 INHALANT RESPIRATORY (INHALATION) at 12:01

## 2024-01-01 RX ADMIN — DEXAMETHASONE SODIUM PHOSPHATE 8 MG: 4 INJECTION, SOLUTION INTRA-ARTICULAR; INTRALESIONAL; INTRAMUSCULAR; INTRAVENOUS; SOFT TISSUE at 01:01

## 2024-01-01 NOTE — DISCHARGE INSTRUCTIONS
Use your inhaler 2 puffs every 4 hours for the next 2 days, then every 4-6 hours as needed.  Take prednisone as directed.  Follow up with your primary care provider in 2 days. Return to the emergency department for any increase in symptoms or for any other new or worrisome symptoms.

## 2024-01-01 NOTE — ED PROVIDER NOTES
Encounter Date: 1/1/2024       History     Chief Complaint   Patient presents with    Influenza     58 year old male presents to the emergency department to be evaluated for cough, runny nose and body aches that began 2 weeks ago. Several of his family members tested positive for flu.  He does have an inhaler he uses at times but has not used it today.    The history is provided by the patient.   URI  The primary symptoms include cough and myalgias. Primary symptoms do not include fever, fatigue, headaches, ear pain, sore throat, swollen glands, wheezing, abdominal pain, nausea, vomiting, arthralgias or rash.   Symptoms associated with the illness include congestion and rhinorrhea.     Review of patient's allergies indicates:   Allergen Reactions    Azithromycin     M-mycin      Past Medical History:   Diagnosis Date    Adult attention deficit disorder 03/22/2021    Depression 12/22/2022    Essential hypertension 03/22/2021    Hyperlipidemia     Neuropathy 08/23/2023    AC (obstructive sleep apnea) 10/19/2023    PAD (peripheral artery disease) 02/17/2023    Type 2 diabetes mellitus      Past Surgical History:   Procedure Laterality Date    APPENDECTOMY      CARDIAC CATHETERIZATION      DEBRIDEMENT OF LOWER EXTREMITY Bilateral 2/18/2023    Procedure: DEBRIDEMENT, LOWER EXTREMITY;  Surgeon: Yael Cohen MD;  Location: [facility] OR;  Service: General;  Laterality: Bilateral;  debride toes    LEFT HEART CATHETERIZATION N/A 5/7/2021    Procedure: Left heart cath with possible intervention;  Surgeon: Federico James DO;  Location: [facility] CATH LAB;  Service: Cardiology;  Laterality: N/A;    TOE AMPUTATION Right 3/29/2023    Procedure: AMPUTATION, TOE;  Surgeon: Yael Cohen MD;  Location: [facility] OR;  Service: General;  Laterality: Right;  Right great toe amp dr cohen wednesday     Family History   Problem Relation Age of Onset    Hypertension Father     Stroke Father     Cancer Sister      Social History      Tobacco Use    Smoking status: Former     Current packs/day: 0.00     Average packs/day: 5.0 packs/day for 4.0 years (20.0 ttl pk-yrs)     Types: Cigarettes     Start date:      Quit date:      Years since quittin.0    Smokeless tobacco: Current     Types: Chew, Snuff    Tobacco comments:     1 can/day   Substance Use Topics    Alcohol use: Yes     Comment: 2-3 drinks per/3 months    Drug use: Never     Review of Systems   Constitutional:  Negative for fatigue and fever.   HENT:  Positive for congestion and rhinorrhea. Negative for ear pain and sore throat.    Respiratory:  Positive for cough. Negative for wheezing.    Gastrointestinal:  Negative for abdominal pain, nausea and vomiting.   Musculoskeletal:  Positive for myalgias. Negative for arthralgias.   Skin:  Negative for rash.   Neurological:  Negative for headaches.   All other systems reviewed and are negative.      Physical Exam     Initial Vitals [24 1241]   BP Pulse Resp Temp SpO2   110/77 80 18 100.2 °F (37.9 °C) 95 %      MAP       --         Physical Exam    Vitals reviewed.  Constitutional: He appears well-developed and well-nourished.   Neck: Neck supple.   Cardiovascular:  Normal rate and regular rhythm.           Pulmonary/Chest: He has wheezes.   Abdominal: Abdomen is soft. Bowel sounds are normal. He exhibits no distension and no mass. There is no abdominal tenderness. There is no rebound and no guarding.   Musculoskeletal:         General: Normal range of motion.      Cervical back: Neck supple.     Neurological: He is alert and oriented to person, place, and time. He has normal strength. GCS score is 15. GCS eye subscore is 4. GCS verbal subscore is 5. GCS motor subscore is 6.   Skin: Skin is warm and dry. Capillary refill takes less than 2 seconds.   Psychiatric: He has a normal mood and affect.         Medical Screening Exam   See Full Note    ED Course   Procedures  Labs Reviewed - No data to display       Imaging  Results              X-Ray Chest PA And Lateral (Final result)  Result time 01/01/24 13:12:33      Final result by Vipul Scott MD (01/01/24 13:12:33)                   Impression:      No acute findings.      Electronically signed by: Vipul Scott  Date:    01/01/2024  Time:    13:12               Narrative:    EXAMINATION:  XR CHEST PA AND LATERAL    CLINICAL HISTORY:  Cough, unspecified    TECHNIQUE:  PA and lateral views of the chest were performed.    COMPARISON:  10/31/2023    FINDINGS:  Heart size normal.  Lungs clear.  No pneumothorax or pleural effusion.                                       Medications   dexAMETHasone injection 8 mg (has no administration in time range)   albuterol inhaler 2 puff (has no administration in time range)   acetaminophen tablet 650 mg (650 mg Oral Given 1/1/24 1246)   albuterol inhaler 4 puff (4 puffs Inhalation Given 1/1/24 1246)     Medical Decision Making  58 year old male presents to the emergency department to be evaluated for cough, runny nose and body aches that began 2 weeks ago. Several of his family members tested positive for flu.  He does have an inhaler he uses at times but has not used it today.  X-ray ordered, films reviewed as well as radiologist's interpretation significant for no acute process  Albuterol administered, continues to have some mild wheezing.  Why albuterol administered as well as Decadron IM  Diagnosis: Acute bronchitis  Prescribed prednisone and albuterol    Amount and/or Complexity of Data Reviewed  Radiology: ordered.    Risk  OTC drugs.  Prescription drug management.                                      Clinical Impression:   Final diagnoses:  [R05.9] Cough  [J20.9] Acute bronchitis, unspecified organism (Primary)        ED Disposition Condition    Discharge Stable          ED Prescriptions       Medication Sig Dispense Start Date End Date Auth. Provider    predniSONE (DELTASONE) 50 MG Tab Take 1 tablet (50 mg total) by mouth once daily. for 5  days 5 tablet 1/1/2024 1/6/2024 Loretta Crain FNP    albuterol (PROVENTIL/VENTOLIN HFA) 90 mcg/actuation inhaler Inhale 2 puffs into the lungs every 4 (four) hours as needed for Wheezing. Rescue 8 g 1/1/2024 -- Loretta Crain FNP          Follow-up Information    None          Loretta Crain FNP  01/01/24 5020

## 2024-01-15 DIAGNOSIS — E11.9 TYPE 2 DIABETES MELLITUS WITHOUT COMPLICATION, WITHOUT LONG-TERM CURRENT USE OF INSULIN: ICD-10-CM

## 2024-01-17 RX ORDER — GLYBURIDE 5 MG/1
5 TABLET ORAL
Qty: 90 TABLET | Refills: 1 | Status: SHIPPED | OUTPATIENT
Start: 2024-01-17 | End: 2024-01-19 | Stop reason: SDUPTHER

## 2024-01-19 ENCOUNTER — OFFICE VISIT (OUTPATIENT)
Dept: FAMILY MEDICINE | Facility: CLINIC | Age: 59
End: 2024-01-19
Payer: COMMERCIAL

## 2024-01-19 VITALS
HEART RATE: 70 BPM | BODY MASS INDEX: 35.33 KG/M2 | OXYGEN SATURATION: 97 % | SYSTOLIC BLOOD PRESSURE: 113 MMHG | DIASTOLIC BLOOD PRESSURE: 82 MMHG | RESPIRATION RATE: 19 BRPM | TEMPERATURE: 98 F | WEIGHT: 199.38 LBS | HEIGHT: 63 IN

## 2024-01-19 DIAGNOSIS — E78.2 MIXED HYPERLIPIDEMIA: ICD-10-CM

## 2024-01-19 DIAGNOSIS — I10 ESSENTIAL HYPERTENSION, BENIGN: ICD-10-CM

## 2024-01-19 DIAGNOSIS — E11.9 TYPE 2 DIABETES MELLITUS WITHOUT COMPLICATION, WITHOUT LONG-TERM CURRENT USE OF INSULIN: ICD-10-CM

## 2024-01-19 DIAGNOSIS — M79.89 SWOLLEN LEG: ICD-10-CM

## 2024-01-19 DIAGNOSIS — F32.A ANXIETY AND DEPRESSION: ICD-10-CM

## 2024-01-19 DIAGNOSIS — M79.89 CALF SWELLING: ICD-10-CM

## 2024-01-19 DIAGNOSIS — F41.9 ANXIETY AND DEPRESSION: ICD-10-CM

## 2024-01-19 DIAGNOSIS — E55.9 VITAMIN D DEFICIENCY: ICD-10-CM

## 2024-01-19 DIAGNOSIS — F90.9 ATTENTION DEFICIT HYPERACTIVITY DISORDER (ADHD), UNSPECIFIED ADHD TYPE: ICD-10-CM

## 2024-01-19 DIAGNOSIS — F41.9 ANXIETY: Primary | ICD-10-CM

## 2024-01-19 LAB
25(OH)D3 SERPL-MCNC: 46.1 NG/ML
CREAT UR-MCNC: 145 MG/DL (ref 39–259)
MICROALBUMIN UR-MCNC: 1.6 MG/DL (ref 0–2.8)
MICROALBUMIN/CREAT RATIO PNL UR: 11 MG/G (ref 0–30)
TSH SERPL DL<=0.005 MIU/L-ACNC: 1.98 UIU/ML (ref 0.36–3.74)

## 2024-01-19 PROCEDURE — 1159F MED LIST DOCD IN RCRD: CPT | Mod: CPTII,,, | Performed by: FAMILY MEDICINE

## 2024-01-19 PROCEDURE — 3079F DIAST BP 80-89 MM HG: CPT | Mod: CPTII,,, | Performed by: FAMILY MEDICINE

## 2024-01-19 PROCEDURE — 4010F ACE/ARB THERAPY RXD/TAKEN: CPT | Mod: CPTII,,, | Performed by: FAMILY MEDICINE

## 2024-01-19 PROCEDURE — 3066F NEPHROPATHY DOC TX: CPT | Mod: CPTII,,, | Performed by: FAMILY MEDICINE

## 2024-01-19 PROCEDURE — 84443 ASSAY THYROID STIM HORMONE: CPT | Mod: ,,, | Performed by: CLINICAL MEDICAL LABORATORY

## 2024-01-19 PROCEDURE — 99214 OFFICE O/P EST MOD 30 MIN: CPT | Mod: ,,, | Performed by: FAMILY MEDICINE

## 2024-01-19 PROCEDURE — 3061F NEG MICROALBUMINURIA REV: CPT | Mod: CPTII,,, | Performed by: FAMILY MEDICINE

## 2024-01-19 PROCEDURE — 3008F BODY MASS INDEX DOCD: CPT | Mod: CPTII,,, | Performed by: FAMILY MEDICINE

## 2024-01-19 PROCEDURE — 3074F SYST BP LT 130 MM HG: CPT | Mod: CPTII,,, | Performed by: FAMILY MEDICINE

## 2024-01-19 PROCEDURE — 82043 UR ALBUMIN QUANTITATIVE: CPT | Mod: ,,, | Performed by: CLINICAL MEDICAL LABORATORY

## 2024-01-19 PROCEDURE — 82306 VITAMIN D 25 HYDROXY: CPT | Mod: ,,, | Performed by: CLINICAL MEDICAL LABORATORY

## 2024-01-19 PROCEDURE — 82570 ASSAY OF URINE CREATININE: CPT | Mod: ,,, | Performed by: CLINICAL MEDICAL LABORATORY

## 2024-01-19 RX ORDER — METOPROLOL TARTRATE 50 MG/1
50 TABLET ORAL 2 TIMES DAILY
Qty: 180 TABLET | Refills: 1 | Status: SHIPPED | OUTPATIENT
Start: 2024-01-19 | End: 2024-05-28 | Stop reason: SDUPTHER

## 2024-01-19 RX ORDER — METFORMIN HYDROCHLORIDE 1000 MG/1
1000 TABLET ORAL 2 TIMES DAILY WITH MEALS
Qty: 180 TABLET | Refills: 1 | Status: SHIPPED | OUTPATIENT
Start: 2024-01-19 | End: 2024-05-28 | Stop reason: SDUPTHER

## 2024-01-19 RX ORDER — LISINOPRIL AND HYDROCHLOROTHIAZIDE 20; 25 MG/1; MG/1
1 TABLET ORAL DAILY
Qty: 90 TABLET | Refills: 1 | Status: SHIPPED | OUTPATIENT
Start: 2024-01-19 | End: 2024-05-28 | Stop reason: SDUPTHER

## 2024-01-19 RX ORDER — GLYBURIDE 5 MG/1
5 TABLET ORAL
Qty: 90 TABLET | Refills: 1 | Status: SHIPPED | OUTPATIENT
Start: 2024-01-19 | End: 2024-05-28 | Stop reason: SDUPTHER

## 2024-01-19 NOTE — ASSESSMENT & PLAN NOTE
Blood glucose is currently controlled with last A1c 6.6. He takes  basal 28U in PM along with Metformin 1000 mg BID and Glyburide 5 mg daily. Previous attempt  to switch him from glyburide to Farxiga was not approved by his  insurance and the patient could not afford the cost. We will continue with the current plan for now Basal insulin, Glyburide, and Farxiga.
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
## 2024-01-20 PROBLEM — E55.9 VITAMIN D DEFICIENCY: Status: ACTIVE | Noted: 2024-01-20

## 2024-01-20 PROBLEM — M79.89 SWOLLEN LEG: Status: ACTIVE | Noted: 2024-01-20

## 2024-01-20 RX ORDER — CALCIUM CARB/VITAMIN D3/VIT K1 500-100-40
100 TABLET,CHEWABLE ORAL DAILY
Qty: 100 EACH | Refills: 2 | Status: SHIPPED | OUTPATIENT
Start: 2024-01-20

## 2024-01-20 RX ORDER — PEN NEEDLE, DIABETIC 30 GX3/16"
100 NEEDLE, DISPOSABLE MISCELLANEOUS DAILY
Qty: 100 EACH | Refills: 2 | Status: SHIPPED | OUTPATIENT
Start: 2024-01-20

## 2024-01-20 NOTE — ASSESSMENT & PLAN NOTE
Blood pressure is currently controlled. So, we will continue with the current plan through Lisinopril 20 mg daily and HCTZ 25 mg daily  but will adjust blood pressure medications as indicated

## 2024-01-22 ENCOUNTER — HOSPITAL ENCOUNTER (OUTPATIENT)
Dept: RADIOLOGY | Facility: HOSPITAL | Age: 59
Discharge: HOME | End: 2024-01-22
Attending: FAMILY MEDICINE
Payer: COMMERCIAL

## 2024-01-22 DIAGNOSIS — M79.89 CALF SWELLING: ICD-10-CM

## 2024-01-22 PROCEDURE — 76881 US COMPL JOINT R-T W/IMG: CPT | Mod: TC,RT

## 2024-02-07 LAB
LEFT EYE DM RETINOPATHY: NEGATIVE
RIGHT EYE DM RETINOPATHY: NEGATIVE

## 2024-02-16 ENCOUNTER — OFFICE VISIT (OUTPATIENT)
Dept: FAMILY MEDICINE | Facility: CLINIC | Age: 59
End: 2024-02-16
Payer: COMMERCIAL

## 2024-02-16 VITALS
WEIGHT: 206.63 LBS | HEIGHT: 63 IN | BODY MASS INDEX: 36.61 KG/M2 | DIASTOLIC BLOOD PRESSURE: 86 MMHG | HEART RATE: 66 BPM | RESPIRATION RATE: 20 BRPM | OXYGEN SATURATION: 96 % | TEMPERATURE: 99 F | SYSTOLIC BLOOD PRESSURE: 153 MMHG

## 2024-02-16 DIAGNOSIS — F90.2 ATTENTION DEFICIT HYPERACTIVITY DISORDER (ADHD), COMBINED TYPE: ICD-10-CM

## 2024-02-16 DIAGNOSIS — E11.65 TYPE 2 DIABETES MELLITUS WITH HYPERGLYCEMIA, WITH LONG-TERM CURRENT USE OF INSULIN: ICD-10-CM

## 2024-02-16 DIAGNOSIS — B35.1 ONYCHOMYCOSIS: Primary | ICD-10-CM

## 2024-02-16 DIAGNOSIS — F98.8 ADULT ATTENTION DEFICIT DISORDER: ICD-10-CM

## 2024-02-16 DIAGNOSIS — Z79.4 TYPE 2 DIABETES MELLITUS WITH HYPERGLYCEMIA, WITH LONG-TERM CURRENT USE OF INSULIN: ICD-10-CM

## 2024-02-16 PROCEDURE — 99214 OFFICE O/P EST MOD 30 MIN: CPT | Mod: ,,, | Performed by: FAMILY MEDICINE

## 2024-02-16 PROCEDURE — 3079F DIAST BP 80-89 MM HG: CPT | Mod: CPTII,,, | Performed by: FAMILY MEDICINE

## 2024-02-16 PROCEDURE — 4010F ACE/ARB THERAPY RXD/TAKEN: CPT | Mod: CPTII,,, | Performed by: FAMILY MEDICINE

## 2024-02-16 PROCEDURE — 3008F BODY MASS INDEX DOCD: CPT | Mod: CPTII,,, | Performed by: FAMILY MEDICINE

## 2024-02-16 PROCEDURE — 3077F SYST BP >= 140 MM HG: CPT | Mod: CPTII,,, | Performed by: FAMILY MEDICINE

## 2024-02-16 PROCEDURE — 3066F NEPHROPATHY DOC TX: CPT | Mod: CPTII,,, | Performed by: FAMILY MEDICINE

## 2024-02-16 PROCEDURE — 3061F NEG MICROALBUMINURIA REV: CPT | Mod: CPTII,,, | Performed by: FAMILY MEDICINE

## 2024-02-16 PROCEDURE — 1159F MED LIST DOCD IN RCRD: CPT | Mod: CPTII,,, | Performed by: FAMILY MEDICINE

## 2024-02-16 RX ORDER — DEXTROAMPHETAMINE SACCHARATE, AMPHETAMINE ASPARTATE, DEXTROAMPHETAMINE SULFATE AND AMPHETAMINE SULFATE 7.5; 7.5; 7.5; 7.5 MG/1; MG/1; MG/1; MG/1
TABLET ORAL
Qty: 30 TABLET | Refills: 0 | Status: SHIPPED | OUTPATIENT
Start: 2024-02-16 | End: 2024-03-15 | Stop reason: SDUPTHER

## 2024-02-16 RX ORDER — CICLOPIROX 80 MG/ML
SOLUTION TOPICAL NIGHTLY
Qty: 6.6 ML | Refills: 3 | Status: SHIPPED | OUTPATIENT
Start: 2024-02-16 | End: 2024-03-15

## 2024-02-16 RX ORDER — DEXTROAMPHETAMINE SACCHARATE, AMPHETAMINE ASPARTATE, DEXTROAMPHETAMINE SULFATE AND AMPHETAMINE SULFATE 5; 5; 5; 5 MG/1; MG/1; MG/1; MG/1
TABLET ORAL
Qty: 30 TABLET | Refills: 0 | Status: SHIPPED | OUTPATIENT
Start: 2024-02-16 | End: 2024-03-15 | Stop reason: SDUPTHER

## 2024-02-16 NOTE — ASSESSMENT & PLAN NOTE
pulled and no evidence of substance abuse.  Refilled his medicines today because his doctor is out of the office that normally refills his medicines.  No drug screen was done today.  He is to follow-up with his doctor in 1 month.

## 2024-02-16 NOTE — PROGRESS NOTES
Subjective:       Patient ID: Syed Kelley is a 59 y.o. male.    Chief Complaint: Foot Injury (Sore on right foot on toe that was amputated. On the left foot there is a sore on the bottom. ) and Health Maintenance (COVID-19 Vaccine(1) Never done pt declined/Pneumococcal Vaccines (Age 0-64)(1 of 2 - PCV) Never done pt declined/Eye Exam Never done couple of weeks ago at [facility] with Dr Fajardo/TETANUS VACCINE Never done Pt declined/Colorectal Cancer Screening pt has had this done/Shingles Vaccine(1 of 2) Never done pt declined/Influenza Vaccine(1) Never done pt declined/Foot Exam due on 12/22/2023 /Hemoglobin A1c due on 02/10/2024 /)    Patient is a 59 year old male who presents today with concerns over skin changes on both feet. He has a long history of DM which is now under control. He has poor sensation in both feet with onychomycosis in numerous toenails that will be addressed next week at a separate appointment. The patient has a callus on his left midfoot that is non painful or red along with a small blood blister at the amputation site on his stub at the great toe on right foot. Patient denies HA, fever, fatigue, N/V/D, shortness of breath or chest pain.           Current Outpatient Medications:     albuterol (PROVENTIL/VENTOLIN HFA) 90 mcg/actuation inhaler, Inhale 2 puffs into the lungs every 4 (four) hours as needed for Wheezing. Rescue, Disp: 8 g, Rfl: 0    aspirin (ECOTRIN) 81 MG EC tablet, Take 1 tablet (81 mg total) by mouth once daily., Disp: 90 tablet, Rfl: 3    atorvastatin (LIPITOR) 80 MG tablet, Take 1 tablet (80 mg total) by mouth once daily., Disp: 90 tablet, Rfl: 1    cholecalciferol, vitamin D3, (VITAMIN D3) 50 mcg (2,000 unit) Cap, Take 1 capsule by mouth once daily., Disp: , Rfl:     citalopram (CELEXA) 20 MG tablet, Take 1 tablet (20 mg total) by mouth once daily., Disp: 90 tablet, Rfl: 1    dextroamphetamine-amphetamine (ADDERALL) 20 mg tablet, Take 1 tablet by mouth each afternoon,  "Disp: 30 tablet, Rfl: 0    dextroamphetamine-amphetamine (ADDERALL) 30 mg Tab, Take 1 tablet by mouth each morning, Disp: 30 tablet, Rfl: 0    gabapentin (NEURONTIN) 300 MG capsule, Take 1 capsule (300 mg total) by mouth 3 (three) times daily., Disp: 90 capsule, Rfl: 11    glyBURIDE (DIABETA) 5 MG tablet, Take 1 tablet (5 mg total) by mouth daily with breakfast., Disp: 90 tablet, Rfl: 1    insulin degludec (TRESIBA FLEXTOUCH U-200) 200 unit/mL (3 mL) insulin pen, Inject 30 Units into the skin once daily. Increase by 5 units if blood glucose is elevated above 200. weekly, Disp: 3 pen , Rfl: 3    insulin syringe-needle U-100 0.3 mL 31 gauge x 5/16" Syrg, 100 each by abdominal subcutaneous route once daily. Use once daily for insulin injection, Disp: 100 each, Rfl: 2    lisinopriL-hydrochlorothiazide (PRINZIDE,ZESTORETIC) 20-25 mg Tab, Take 1 tablet by mouth once daily., Disp: 90 tablet, Rfl: 1    metFORMIN (GLUCOPHAGE) 1000 MG tablet, Take 1 tablet (1,000 mg total) by mouth 2 (two) times daily with meals., Disp: 180 tablet, Rfl: 1    metoprolol tartrate (LOPRESSOR) 50 MG tablet, Take 1 tablet (50 mg total) by mouth 2 (two) times daily., Disp: 180 tablet, Rfl: 1    multivitamin (ONE DAILY MULTIVITAMIN) per tablet, Take 1 tablet by mouth once daily., Disp: , Rfl:     omega-3 fatty acids/fish oil (FISH OIL-OMEGA-3 FATTY ACIDS) 300-1,000 mg capsule, Take by mouth once daily., Disp: , Rfl:     pen needle, diabetic 31 gauge x 5/16" Ndle, 100 each by abdominal subcutaneous route once daily. Use to administer insulin twice daily, Disp: 100 each, Rfl: 2    ciclopirox (PENLAC) 8 % Soln, Apply topically nightly., Disp: 6.6 mL, Rfl: 3    meclizine (ANTIVERT) 25 mg tablet, Take 1 tablet (25 mg total) by mouth 3 (three) times daily as needed., Disp: 40 tablet, Rfl: 0    Review of patient's allergies indicates:   Allergen Reactions    Azithromycin     M-mycin        Past Medical History:   Diagnosis Date    Adult attention deficit " disorder 2021    Depression 2022    Essential hypertension 2021    Hyperlipidemia     Neuropathy 2023    AC (obstructive sleep apnea) 10/19/2023    PAD (peripheral artery disease) 2023    Type 2 diabetes mellitus        Past Surgical History:   Procedure Laterality Date    APPENDECTOMY      CARDIAC CATHETERIZATION      DEBRIDEMENT OF LOWER EXTREMITY Bilateral 2023    Procedure: DEBRIDEMENT, LOWER EXTREMITY;  Surgeon: Yael Cohen MD;  Location: [facility] OR;  Service: General;  Laterality: Bilateral;  debride toes    LEFT HEART CATHETERIZATION N/A 2021    Procedure: Left heart cath with possible intervention;  Surgeon: Federico James DO;  Location: [facility] CATH LAB;  Service: Cardiology;  Laterality: N/A;    TOE AMPUTATION Right 3/29/2023    Procedure: AMPUTATION, TOE;  Surgeon: Yael Cohen MD;  Location: [facility] OR;  Service: General;  Laterality: Right;  Right great toe amp dr cohen wednesday       Family History   Problem Relation Age of Onset    Hypertension Father     Stroke Father     Cancer Sister        Social History     Tobacco Use    Smoking status: Former     Current packs/day: 0.00     Average packs/day: 5.0 packs/day for 4.0 years (20.0 ttl pk-yrs)     Types: Cigarettes     Start date:      Quit date:      Years since quittin.1     Passive exposure: Past    Smokeless tobacco: Current     Types: Chew, Snuff    Tobacco comments:     1 can/day   Substance Use Topics    Alcohol use: Yes     Comment: 2-3 drinks per/3 months    Drug use: Never       Review of Systems   Constitutional: Negative.  Negative for fatigue and fever.   HENT:  Positive for dental problem. Negative for nasal congestion, ear discharge, ear pain, nosebleeds, sinus pressure/congestion, sneezing and sore throat.    Eyes: Negative.  Negative for pain, discharge and itching.   Respiratory: Negative.  Negative for cough and shortness of breath.    Cardiovascular: Negative.   Negative for chest pain and leg swelling.   Gastrointestinal: Negative.  Negative for abdominal distention, abdominal pain and anal bleeding.   Endocrine: Negative.  Negative for cold intolerance, heat intolerance and polydipsia.   Genitourinary: Negative.  Negative for bladder incontinence, difficulty urinating, discharge, dysuria, erectile dysfunction, penile pain and penile swelling.   Musculoskeletal: Negative.  Negative for arthralgias, back pain, gait problem, joint swelling, leg pain, myalgias, neck pain, neck stiffness and joint deformity.   Integumentary:  Positive for color change and wound. Negative for rash.   Allergic/Immunologic: Negative.  Negative for environmental allergies and food allergies.   Neurological:  Negative for facial asymmetry, speech difficulty, weakness, light-headedness, numbness and headaches.   Hematological: Negative.  Negative for adenopathy.   Psychiatric/Behavioral:  Negative for agitation, behavioral problems, confusion and decreased concentration.    All other systems reviewed and are negative.        Current Medications:   Medication List with Changes/Refills   New Medications    CICLOPIROX (PENLAC) 8 % SOLN    Apply topically nightly.       Start Date: 2/16/2024 End Date: --   Current Medications    ALBUTEROL (PROVENTIL/VENTOLIN HFA) 90 MCG/ACTUATION INHALER    Inhale 2 puffs into the lungs every 4 (four) hours as needed for Wheezing. Rescue       Start Date: 1/1/2024  End Date: --    ASPIRIN (ECOTRIN) 81 MG EC TABLET    Take 1 tablet (81 mg total) by mouth once daily.       Start Date: 5/8/2021  End Date: --    ATORVASTATIN (LIPITOR) 80 MG TABLET    Take 1 tablet (80 mg total) by mouth once daily.       Start Date: 11/10/2023End Date: --    CHOLECALCIFEROL, VITAMIN D3, (VITAMIN D3) 50 MCG (2,000 UNIT) CAP    Take 1 capsule by mouth once daily.       Start Date: --        End Date: --    CITALOPRAM (CELEXA) 20 MG TABLET    Take 1 tablet (20 mg total) by mouth once daily.     "   Start Date: 8/23/2023 End Date: --    DEXTROAMPHETAMINE-AMPHETAMINE (ADDERALL) 20 MG TABLET    Take 1 tablet by mouth each afternoon       Start Date: 12/1/2023 End Date: --    DEXTROAMPHETAMINE-AMPHETAMINE (ADDERALL) 30 MG TAB    Take 1 tablet by mouth each morning       Start Date: 12/1/2023 End Date: --    GABAPENTIN (NEURONTIN) 300 MG CAPSULE    Take 1 capsule (300 mg total) by mouth 3 (three) times daily.       Start Date: 8/23/2023 End Date: 8/22/2024    GLYBURIDE (DIABETA) 5 MG TABLET    Take 1 tablet (5 mg total) by mouth daily with breakfast.       Start Date: 1/19/2024 End Date: --    INSULIN DEGLUDEC (TRESIBA FLEXTOUCH U-200) 200 UNIT/ML (3 ML) INSULIN PEN    Inject 30 Units into the skin once daily. Increase by 5 units if blood glucose is elevated above 200. weekly       Start Date: 11/10/2023End Date: 11/9/2024    INSULIN SYRINGE-NEEDLE U-100 0.3 ML 31 GAUGE X 5/16" SYRG    100 each by abdominal subcutaneous route once daily. Use once daily for insulin injection       Start Date: 1/20/2024 End Date: --    LISINOPRIL-HYDROCHLOROTHIAZIDE (PRINZIDE,ZESTORETIC) 20-25 MG TAB    Take 1 tablet by mouth once daily.       Start Date: 1/19/2024 End Date: --    MECLIZINE (ANTIVERT) 25 MG TABLET    Take 1 tablet (25 mg total) by mouth 3 (three) times daily as needed.       Start Date: 10/31/2023End Date: 12/10/2023    METFORMIN (GLUCOPHAGE) 1000 MG TABLET    Take 1 tablet (1,000 mg total) by mouth 2 (two) times daily with meals.       Start Date: 1/19/2024 End Date: --    METOPROLOL TARTRATE (LOPRESSOR) 50 MG TABLET    Take 1 tablet (50 mg total) by mouth 2 (two) times daily.       Start Date: 1/19/2024 End Date: --    MULTIVITAMIN (ONE DAILY MULTIVITAMIN) PER TABLET    Take 1 tablet by mouth once daily.       Start Date: --        End Date: --    OMEGA-3 FATTY ACIDS/FISH OIL (FISH OIL-OMEGA-3 FATTY ACIDS) 300-1,000 MG CAPSULE    Take by mouth once daily.       Start Date: --        End Date: --    PEN NEEDLE, " "DIABETIC 31 GAUGE X 5/16" NDLE    100 each by abdominal subcutaneous route once daily. Use to administer insulin twice daily       Start Date: 1/20/2024 End Date: --            Objective:        Vitals:    02/16/24 0820   BP: (!) 153/86   BP Location: Left arm   Patient Position: Sitting   BP Method: Large (Automatic)   Pulse: 66   Resp: 20   Temp: 98.5 °F (36.9 °C)   TempSrc: Oral   SpO2: 96%   Weight: 93.7 kg (206 lb 9.6 oz)   Height: 5' 3" (1.6 m)       Physical Exam  Vitals and nursing note reviewed.   Constitutional:       General: He is awake. He is not in acute distress.     Appearance: Normal appearance. He is well-developed. He is obese. He is not ill-appearing.   HENT:      Head: Normocephalic and atraumatic.      Right Ear: External ear normal.      Left Ear: External ear normal.      Nose: Nose normal.      Mouth/Throat:      Pharynx: Oropharynx is clear.      Comments: Poor dentition   Eyes:      Conjunctiva/sclera: Conjunctivae normal.      Pupils: Pupils are equal, round, and reactive to light.   Cardiovascular:      Rate and Rhythm: Normal rate and regular rhythm.   Pulmonary:      Effort: Pulmonary effort is normal.      Breath sounds: Normal breath sounds.   Abdominal:      General: Abdomen is flat. Bowel sounds are normal.      Palpations: Abdomen is soft.   Musculoskeletal:      Right lower leg: No edema.      Left lower leg: No edema.        Feet:    Feet:      Right foot:      Protective Sensation: 10 sites tested.  4 sites sensed.      Skin integrity: Callus present.      Toenail Condition: Right toenails are abnormally thick. Fungal disease present.     Left foot:      Protective Sensation: 10 sites tested.  3 sites sensed.      Skin integrity: Callus present.      Toenail Condition: Left toenails are abnormally thick. Fungal disease present.  Skin:     General: Skin is warm.      Findings: Lesion present.      Comments: Lesion on his 4th toes on the right    Neurological:      Mental Status: " He is alert and oriented to person, place, and time.   Psychiatric:         Mood and Affect: Mood normal.         Behavior: Behavior normal. Behavior is cooperative.         Thought Content: Thought content normal.         Judgment: Judgment normal.             Lab Results   Component Value Date    WBC 6.90 11/10/2023    HGB 12.7 (L) 11/10/2023    HCT 37.5 (L) 11/10/2023     11/10/2023    CHOL 143 08/23/2023    TRIG 338 (H) 08/23/2023    HDL 29 (L) 08/23/2023    ALT 27 11/10/2023    AST 4 (L) 11/10/2023     11/10/2023    K 4.2 11/10/2023     11/10/2023    CREATININE 1.32 (H) 11/10/2023    BUN 14 11/10/2023    CO2 27 11/10/2023    TSH 1.980 01/19/2024    INR 0.95 02/17/2023    HGBA1C 6.6 11/10/2023    MICROALBUR 1.6 01/19/2024      Assessment:       1. Onychomycosis    2. Type 2 diabetes mellitus with hyperglycemia, with long-term current use of insulin        Plan:         Problem List Items Addressed This Visit          Psychiatric    ADHD      pulled and no evidence of substance abuse.  Refilled his medicines today because his doctor is out of the office that normally refills his medicines.  No drug screen was done today.  He is to follow-up with his doctor in 1 month.            Derm    Onychomycosis - Primary     Numerous toenails  [place]  Set up follow up next week to safely trim toenails with dremel         Relevant Medications    ciclopirox (PENLAC) 8 % Soln       Endocrine    Type 2 diabetes mellitus with hyperglycemia     Moderately under control, stable  Will continue HgA1c checks at regular intervals           Other Visit Diagnoses       Adult attention deficit disorder        Relevant Medications    dextroamphetamine-amphetamine (ADDERALL) 30 mg Tab    dextroamphetamine-amphetamine (ADDERALL) 20 mg tablet              Follow up in about 1 week (around 2/23/2024).    Marvin Morgan MD     Instructed patient that if symptoms fail to improve or worsen patient should seek immediate  medical attention or report to the nearest emergency department. Patient expressed verbal agreement and understanding to this plan of care.

## 2024-02-26 ENCOUNTER — PATIENT OUTREACH (OUTPATIENT)
Dept: ADMINISTRATIVE | Facility: HOSPITAL | Age: 59
End: 2024-02-26

## 2024-02-26 DIAGNOSIS — F32.A DEPRESSION, UNSPECIFIED DEPRESSION TYPE: ICD-10-CM

## 2024-02-26 RX ORDER — CITALOPRAM 20 MG/1
20 TABLET, FILM COATED ORAL
Qty: 90 TABLET | Refills: 1 | Status: SHIPPED | OUTPATIENT
Start: 2024-02-26

## 2024-02-26 NOTE — PROGRESS NOTES
Health maintenance record review for population health care gaps    Population Health Chart Review & Patient Outreach Details      Further Action Needed If Patient Returns Outreach:      Health Maintenance (COVID-19 Vaccine(1) Never done pt declined/Pneumococcal Vaccines (Age 0-64)(1 of 2 - PCV) Never done pt declined/Eye Exam Never done couple of weeks ago at [facility] with Dr Fajardo/TETANUS VACCINE Never done Pt declined/Colorectal Cancer Screening pt has had this done/Shingles Vaccine(1 of 2) Never done pt declined/Influenza Vaccine(1) Never done pt declined/Foot Exam due on 2023 /Hemoglobin A1c due on 02/10/2024 /)       Updates Requested / Reviewed:     [x]  Care Everywhere    [x]     []  External Sources ([facility], [facility], [facility], etc.)    [] [facility]   [] [facility]   [] Other:    [x]  Care Team Updated   []  Removed  or Duplicate Orders   []  Immunization Reconciliation Completed / Queried    [] [place]   [] [place]   [] [place]   [] [place]      Health Maintenance Topics Addressed and Outreach Outcomes / Actions Taken:             Breast Cancer Screening []  Mammogram Order Placed    []  Mammogram Screening Scheduled    []  External Records Requested & Care Team Updated if Applicable    []  External Records Uploaded & Care Team Updated if Applicable    []  Pt Declined Scheduling Mammogram    []  Pt Will Schedule with External Provider / Order Routed & Care Team Updated if Applicable              Cervical Cancer Screening []  Pap Smear Scheduled in Primary Care or OBGYN    []  External Records Requested & Care Team Updated if Applicable       []  External Records Uploaded, Care Team Updated, & History Updated if Applicable    []  Patient Declined Scheduling Pap Smear    []  Patient Will Schedule with External Provider & Care Team Updated if Applicable                  Colorectal Cancer Screening []  Colonoscopy Case Request / Referral / Home Test Order Placed    []  External Records  Requested & Care Team Updated if Applicable    []  External Records Uploaded, Care Team Updated, & History Updated if Applicable    []  Patient Declined Completing Colon Cancer Screening    []  Patient Will Schedule with External Provider & Care Team Updated if Applicable    []  Fit Kit Mailed (add the SmartPhrase under additional notes)    []  Reminded Patient to Complete Home Test                Diabetic Eye Exam []  Eye Exam Screening Order Placed    []  Eye Camera Scheduled or Optometry/Ophthalmology Referral Placed    []  External Records Requested & Care Team Updated if Applicable    []  External Records Uploaded, Care Team Updated, & History Updated if Applicable    []  Patient Declined Scheduling Eye Exam    []  Patient Will Schedule with External Provider & Care Team Updated if Applicable             Blood Pressure Control []  Primary Care Follow Up Visit Scheduled     []  Remote Blood Pressure Reading Captured    []  Patient Declined Remote Reading or Scheduling Appt - Escalated to PCP    []  Patient Will Call Back or Send Portal Message with Reading                 HbA1c & Other Labs []  Overdue Lab(s) Ordered    []  Overdue Lab(s) Scheduled    []  External Records Uploaded & Care Team Updated if Applicable    []  Primary Care Follow Up Visit Scheduled     []  Reminded Patient to Complete A1c Home Test    []  Patient Declined Scheduling Labs or Will Call Back to Schedule    []  Patient Will Schedule with External Provider / Order Routed, & Care Team Updated if Applicable           Primary Care Appointment []  Primary Care Appt Scheduled    []  Patient Declined Scheduling or Will Call Back to Schedule    []  Pt Established with External Provider, Updated Care Team, & Informed Pt to Notify Payor if Applicable           Medication Adherence /    Statin Use []  Primary Care Appointment Scheduled    []  Patient Reminded to  Prescription    []  Patient Declined, Provider Notified if Needed    []  Sent  Provider Message to Review to Evaluate Pt for Statin, Add Exclusion Dx Codes, Document   Exclusion in Problem List, Change Statin Intensity Level to Moderate or High Intensity if Applicable                Osteoporosis Screening []  Dexa Order Placed    []  Dexa Appointment Scheduled    []  External Records Requested & Care Team Updated    []  External Records Uploaded, Care Team Updated, & History Updated if Applicable    []  Patient Declined Scheduling Dexa or Will Call Back to Schedule    []  Patient Will Schedule with External Provider / Order Routed & Care Team Updated if Applicable       Additional Notes:

## 2024-02-26 NOTE — LETTER
AUTHORIZATION FOR RELEASE OF   CONFIDENTIAL INFORMATION    Dear [facility],    We are seeing Syed Kelley, date of birth 1965, in the clinic at [facility]. Julio Cruz,  is the patient's PCP. Syed Kelley has an outstanding lab/procedure at the time we reviewed his chart. In order to help keep his health information updated, he has authorized us to request the following medical record(s):        (  )  MAMMOGRAM                                      (  )  COLONOSCOPY      (  )  PAP SMEAR                                          (  )  OUTSIDE LAB RESULTS     (  )  DEXA SCAN                                          ( x )  EYE EXAM            (  )  FOOT EXAM                                          (  )  ENTIRE RECORD     (  )  OUTSIDE IMMUNIZATIONS                 (  )  _______________         Please fax records to [facility], [place], [place], , [phone]     If you have any questions, please contact Lucina Jamison at [phone].           Patient Name: Syed Kelley  : 1965  Patient Phone #: [phone]

## 2024-03-07 ENCOUNTER — PATIENT OUTREACH (OUTPATIENT)
Dept: ADMINISTRATIVE | Facility: HOSPITAL | Age: 59
End: 2024-03-07

## 2024-03-07 NOTE — PROGRESS NOTES
Health maintenance record review for population health care gaps    Population Health Chart Review & Patient Outreach Details      Further Action Needed If Patient Returns Outreach:            Updates Requested / Reviewed:     [x]  Care Everywhere    [x]     []  External Sources ([facility], [facility], [facility], etc.)    [] [facility]   [] [facility]   [] Other:    [x]  Care Team Updated   []  Removed  or Duplicate Orders   []  Immunization Reconciliation Completed / Queried    [] [place]   [] [place]   [] [place]   [] [place]      Health Maintenance Topics Addressed and Outreach Outcomes / Actions Taken:             Breast Cancer Screening []  Mammogram Order Placed    []  Mammogram Screening Scheduled    []  External Records Requested & Care Team Updated if Applicable    []  External Records Uploaded & Care Team Updated if Applicable    []  Pt Declined Scheduling Mammogram    []  Pt Will Schedule with External Provider / Order Routed & Care Team Updated if Applicable              Cervical Cancer Screening []  Pap Smear Scheduled in Primary Care or OBGYN    []  External Records Requested & Care Team Updated if Applicable       []  External Records Uploaded, Care Team Updated, & History Updated if Applicable    []  Patient Declined Scheduling Pap Smear    []  Patient Will Schedule with External Provider & Care Team Updated if Applicable                  Colorectal Cancer Screening []  Colonoscopy Case Request / Referral / Home Test Order Placed    []  External Records Requested & Care Team Updated if Applicable    []  External Records Uploaded, Care Team Updated, & History Updated if Applicable    []  Patient Declined Completing Colon Cancer Screening    []  Patient Will Schedule with External Provider & Care Team Updated if Applicable    []  Fit Kit Mailed (add the SmartPhrase under additional notes)    []  Reminded Patient to Complete Home Test                Diabetic Eye Exam []  Eye Exam Screening Order  Placed    []  Eye Camera Scheduled or Optometry/Ophthalmology Referral Placed    []  External Records Requested & Care Team Updated if Applicable    [x]  External Records Uploaded, Care Team Updated, & History Updated if Applicable    []  Patient Declined Scheduling Eye Exam    []  Patient Will Schedule with External Provider & Care Team Updated if Applicable             Blood Pressure Control []  Primary Care Follow Up Visit Scheduled     []  Remote Blood Pressure Reading Captured    []  Patient Declined Remote Reading or Scheduling Appt - Escalated to PCP    []  Patient Will Call Back or Send Portal Message with Reading                 HbA1c & Other Labs []  Overdue Lab(s) Ordered    []  Overdue Lab(s) Scheduled    []  External Records Uploaded & Care Team Updated if Applicable    []  Primary Care Follow Up Visit Scheduled     []  Reminded Patient to Complete A1c Home Test    []  Patient Declined Scheduling Labs or Will Call Back to Schedule    []  Patient Will Schedule with External Provider / Order Routed, & Care Team Updated if Applicable           Primary Care Appointment []  Primary Care Appt Scheduled    []  Patient Declined Scheduling or Will Call Back to Schedule    []  Pt Established with External Provider, Updated Care Team, & Informed Pt to Notify Payor if Applicable           Medication Adherence /    Statin Use []  Primary Care Appointment Scheduled    []  Patient Reminded to  Prescription    []  Patient Declined, Provider Notified if Needed    []  Sent Provider Message to Review to Evaluate Pt for Statin, Add Exclusion Dx Codes, Document   Exclusion in Problem List, Change Statin Intensity Level to Moderate or High Intensity if Applicable                Osteoporosis Screening []  Dexa Order Placed    []  Dexa Appointment Scheduled    []  External Records Requested & Care Team Updated    []  External Records Uploaded, Care Team Updated, & History Updated if Applicable    []  Patient Declined  Scheduling Dexa or Will Call Back to Schedule    []  Patient Will Schedule with External Provider / Order Routed & Care Team Updated if Applicable       Additional Notes:

## 2024-03-15 ENCOUNTER — OFFICE VISIT (OUTPATIENT)
Dept: FAMILY MEDICINE | Facility: CLINIC | Age: 59
End: 2024-03-15
Payer: COMMERCIAL

## 2024-03-15 VITALS
TEMPERATURE: 98 F | HEART RATE: 66 BPM | BODY MASS INDEX: 36.43 KG/M2 | DIASTOLIC BLOOD PRESSURE: 75 MMHG | SYSTOLIC BLOOD PRESSURE: 119 MMHG | HEIGHT: 63 IN | WEIGHT: 205.63 LBS | OXYGEN SATURATION: 95 % | RESPIRATION RATE: 20 BRPM

## 2024-03-15 DIAGNOSIS — Z79.899 MEDICATION MANAGEMENT: ICD-10-CM

## 2024-03-15 DIAGNOSIS — F98.8 ADULT ATTENTION DEFICIT DISORDER: ICD-10-CM

## 2024-03-15 DIAGNOSIS — L84 CALLUS OF FOOT: ICD-10-CM

## 2024-03-15 DIAGNOSIS — E11.65 TYPE 2 DIABETES MELLITUS WITH HYPERGLYCEMIA, WITH LONG-TERM CURRENT USE OF INSULIN: Primary | ICD-10-CM

## 2024-03-15 DIAGNOSIS — B35.1 ONYCHOMYCOSIS: ICD-10-CM

## 2024-03-15 DIAGNOSIS — Z79.4 TYPE 2 DIABETES MELLITUS WITH HYPERGLYCEMIA, WITH LONG-TERM CURRENT USE OF INSULIN: Primary | ICD-10-CM

## 2024-03-15 LAB
CTP QC/QA: YES
POC (AMP) AMPHETAMINE: NEGATIVE
POC (BAR) BARBITURATES: NEGATIVE
POC (BUP) BUPRENORPHINE: NEGATIVE
POC (BZO) BENZODIAZEPINES: NEGATIVE
POC (COC) COCAINE: NEGATIVE
POC (MDMA) METHYLENEDIOXYMETHAMPHETAMINE 3,4: NEGATIVE
POC (MET) METHAMPHETAMINE: NEGATIVE
POC (MOP) OPIATES: NEGATIVE
POC (MTD) METHADONE: NEGATIVE
POC (OXY) OXYCODONE: NEGATIVE
POC (PCP) PHENCYCLIDINE: NEGATIVE
POC (TCA) TRICYCLIC ANTIDEPRESSANTS: NORMAL
POC TEMPERATURE (URINE): 96
POC THC: NEGATIVE

## 2024-03-15 PROCEDURE — 3008F BODY MASS INDEX DOCD: CPT | Mod: CPTII,,, | Performed by: NURSE PRACTITIONER

## 2024-03-15 PROCEDURE — 3061F NEG MICROALBUMINURIA REV: CPT | Mod: CPTII,,, | Performed by: NURSE PRACTITIONER

## 2024-03-15 PROCEDURE — 3078F DIAST BP <80 MM HG: CPT | Mod: CPTII,,, | Performed by: NURSE PRACTITIONER

## 2024-03-15 PROCEDURE — 2023F DILAT RTA XM W/O RTNOPTHY: CPT | Mod: CPTII,,, | Performed by: NURSE PRACTITIONER

## 2024-03-15 PROCEDURE — 3074F SYST BP LT 130 MM HG: CPT | Mod: CPTII,,, | Performed by: NURSE PRACTITIONER

## 2024-03-15 PROCEDURE — 80305 DRUG TEST PRSMV DIR OPT OBS: CPT | Mod: QW,,, | Performed by: NURSE PRACTITIONER

## 2024-03-15 PROCEDURE — 99213 OFFICE O/P EST LOW 20 MIN: CPT | Mod: 25,,, | Performed by: NURSE PRACTITIONER

## 2024-03-15 PROCEDURE — 1159F MED LIST DOCD IN RCRD: CPT | Mod: CPTII,,, | Performed by: NURSE PRACTITIONER

## 2024-03-15 PROCEDURE — 1160F RVW MEDS BY RX/DR IN RCRD: CPT | Mod: CPTII,,, | Performed by: NURSE PRACTITIONER

## 2024-03-15 PROCEDURE — 11719 TRIM NAIL(S) ANY NUMBER: CPT | Mod: ,,, | Performed by: NURSE PRACTITIONER

## 2024-03-15 PROCEDURE — 3066F NEPHROPATHY DOC TX: CPT | Mod: CPTII,,, | Performed by: NURSE PRACTITIONER

## 2024-03-15 PROCEDURE — 4010F ACE/ARB THERAPY RXD/TAKEN: CPT | Mod: CPTII,,, | Performed by: NURSE PRACTITIONER

## 2024-03-15 RX ORDER — DEXTROAMPHETAMINE SACCHARATE, AMPHETAMINE ASPARTATE, DEXTROAMPHETAMINE SULFATE AND AMPHETAMINE SULFATE 5; 5; 5; 5 MG/1; MG/1; MG/1; MG/1
TABLET ORAL
Qty: 30 TABLET | Refills: 0 | Status: SHIPPED | OUTPATIENT
Start: 2024-03-15 | End: 2024-04-19 | Stop reason: SDUPTHER

## 2024-03-15 RX ORDER — DEXTROAMPHETAMINE SACCHARATE, AMPHETAMINE ASPARTATE, DEXTROAMPHETAMINE SULFATE AND AMPHETAMINE SULFATE 7.5; 7.5; 7.5; 7.5 MG/1; MG/1; MG/1; MG/1
TABLET ORAL
Qty: 30 TABLET | Refills: 0 | Status: SHIPPED | OUTPATIENT
Start: 2024-03-15 | End: 2024-04-19 | Stop reason: SDUPTHER

## 2024-03-15 RX ORDER — PEN NEEDLE, DIABETIC 30 GX3/16"
NEEDLE, DISPOSABLE MISCELLANEOUS
COMMUNITY

## 2024-03-15 RX ORDER — LISINOPRIL AND HYDROCHLOROTHIAZIDE 12.5; 2 MG/1; MG/1
TABLET ORAL
COMMUNITY
End: 2024-04-19 | Stop reason: ALTCHOICE

## 2024-03-18 PROBLEM — M79.89 SWOLLEN LEG: Status: RESOLVED | Noted: 2024-01-20 | Resolved: 2024-03-18

## 2024-03-18 PROBLEM — Z79.899 MEDICATION MANAGEMENT: Status: ACTIVE | Noted: 2024-03-18

## 2024-03-18 PROBLEM — F98.8 ADULT ATTENTION DEFICIT DISORDER: Status: ACTIVE | Noted: 2024-03-18

## 2024-03-18 PROBLEM — L84 CALLUS OF FOOT: Status: ACTIVE | Noted: 2024-03-18

## 2024-03-18 NOTE — ASSESSMENT & PLAN NOTE
"Taking good care of your feet is very important!  1) Look at your bare feet daily for blisters, red spots, cuts, cracks, swelling or sores. If you can not see well, you can use a mirror to look or have someone help you.  2) Report promptly to your health care provider if you notice any changes in your feet as noted above or if your feet change colors, shape or just do not "feel right" for example: if your feet become less sensitive or begin hurting.  3) Wash your feet everyday. Use warm  not hot water. Check the water temperature with your wrist or other body part - not your feet.  4) Dry your feet well. Make sure you pat them dry. Do not rub the skin on your feet too hard. Make sure to dry well between your toes. Allowing moisture to stay between your toes or on your feet could lead to infection.  5) Keep your feet soft and smooth. Rub a thin coat of lotion on your feet each day but do not put lotion between your toes.  6) Wear socks and shoes at all times.  7) Never walk barefoot.   "

## 2024-03-18 NOTE — ASSESSMENT & PLAN NOTE
Diabetes is managed by patient's PCP  1) Follow your diabetic diet as directed  2) Regular physical activity as directed   3) Check your blood sugar levels as directed  4) Take your medications as directed  5) Follow up in 3 months    No Repair - Repaired With Adjacent Surgical Defect Text (Leave Blank If You Do Not Want): After obtaining clear surgical margins the defect was repaired concurrently with another surgical defect which was in close approximation.

## 2024-03-18 NOTE — PROCEDURES
"Foot Care    Date/Time: 3/15/2024 1:00 PM    Performed by: Nydia Martines FNP  Authorized by: Nydia Martines FNP    Time out: Immediately prior to procedure a "time out" was called to verify the correct patient, procedure, equipment, support staff and site/side marked as required.    Consent Done?:  Yes (Verbal)    Nail Care Type:  Trim  Patient tolerance:  Patient tolerated the procedure well with no immediate complications     Filed calluses to bilateral plantar feet smooth. Pt bing well.     "

## 2024-03-18 NOTE — PROGRESS NOTES
REMEDIOS Orr   [facility]  [address]  [place] [address]     PATIENT NAME: Syed Kelley  : 1965  DATE: 3/15/24  MRN: [id number]      Billing Provider: REMEDIOS Orr  Level of Service:   Patient PCP Information       Provider PCP Type    Julio Cruz DO General            Reason for Visit / Chief Complaint: Nail Care   Health Maintenance Due   Topic Date Due    COVID-19 Vaccine (1) Never done    Pneumococcal Vaccines (Age 0-64) (1 of 2 - PCV) Never done    TETANUS VACCINE  Never done    Colorectal Cancer Screening  Never done    Shingles Vaccine (1 of 2) Never done    Foot Exam  2023    Hemoglobin A1c  02/10/2024          History of Present Illness / Problem Focused Workflow     Syed Kelley presents to the clinic diabetic foot care. Pt has thick, long toe nails unable to be trimmed with standard clippers. Pt also has calluses to the bottom of both feet. 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 He states he does have numbness/tingling to lower extremities occasionally. He is also requesting to see if he can have his Adderall filled today. He denies any other needs at this time. NAD noted.     Hemoglobin A1C   Date Value Ref Range Status   11/10/2023 6.6 4.5 - 6.6 % Final     Comment:       Normal:               <5.7%  Pre-Diabetic:       5.7% to 6.4%  Diabetic:             >6.4%  Diabetic Goal:     <7%   2023 6.5 4.5 - 6.6 % Final     Comment:       Normal:               <5.7%  Pre-Diabetic:       5.7% to 6.4%  Diabetic:             >6.4%  Diabetic Goal:     <7%   2023 6.8 (H) 4.5 - 6.6 % Final     Comment:       Normal:               <5.7%  Pre-Diabetic:       5.7% to 6.4%  Diabetic:             >6.4%  Diabetic Goal:     <7%        CMP  Sodium   Date Value Ref Range Status   11/10/2023 139 136 - 145 mmol/L Final     Potassium   Date Value Ref Range Status   11/10/2023 4.2 3.5 - 5.1 mmol/L Final     Chloride   Date Value Ref Range Status   11/10/2023 105 98 -  107 mmol/L Final     CO2   Date Value Ref Range Status   11/10/2023 27 21 - 32 mmol/L Final     Glucose   Date Value Ref Range Status   11/10/2023 204 (H) 74 - 106 mg/dL Final     BUN   Date Value Ref Range Status   11/10/2023 14 7 - 18 mg/dL Final     Creatinine   Date Value Ref Range Status   11/10/2023 1.32 (H) 0.70 - 1.30 mg/dL Final     Calcium   Date Value Ref Range Status   11/10/2023 9.6 8.5 - 10.1 mg/dL Final     Total Protein   Date Value Ref Range Status   11/10/2023 7.0 6.4 - 8.2 g/dL Final     Albumin   Date Value Ref Range Status   11/10/2023 3.0 (L) 3.5 - 5.0 g/dL Final     Bilirubin, Total   Date Value Ref Range Status   11/10/2023 <0.1 >0.0 - 1.2 mg/dL Final     Alk Phos   Date Value Ref Range Status   11/10/2023 85 45 - 115 U/L Final     AST   Date Value Ref Range Status   11/10/2023 4 (L) 15 - 37 U/L Final     ALT   Date Value Ref Range Status   11/10/2023 27 16 - 61 U/L Final     Anion Gap   Date Value Ref Range Status   11/10/2023 11 7 - 16 mmol/L Final     eGFR   Date Value Ref Range Status   11/10/2023 63 >=60 mL/min/1.73m2 Final        Lab Results   Component Value Date    WBC 6.90 11/10/2023    RBC 4.03 (L) 11/10/2023    HGB 12.7 (L) 11/10/2023    HCT 37.5 (L) 11/10/2023    MCV 93.1 11/10/2023    MCH 31.5 (H) 11/10/2023    MCHC 33.9 11/10/2023    RDW 12.6 11/10/2023     11/10/2023    MPV 10.3 11/10/2023    LYMPH 35.2 11/10/2023    LYMPH 2.43 11/10/2023    MONO 6.7 (H) 11/10/2023    EOS 0.90 (H) 11/10/2023    BASO 0.04 11/10/2023    EOSINOPHIL 13.0 (H) 11/10/2023    BASOPHIL 0.6 11/10/2023        Lab Results   Component Value Date    CHOL 143 08/23/2023    CHOL 128 12/22/2022    CHOL 150 08/15/2022     Lab Results   Component Value Date    HDL 29 (L) 08/23/2023    HDL 32 (L) 12/22/2022    HDL 36 (L) 08/15/2022     Lab Results   Component Value Date    LDLCALC 46 08/23/2023    LDLCALC 53 12/22/2022    LDLCALC 43 08/15/2022     Lab Results   Component Value Date    TRIG 338 (H)  08/23/2023    TRIG 215 (H) 12/22/2022    TRIG 355 (H) 08/15/2022     Lab Results   Component Value Date    CHOLHDL 4.9 08/23/2023    CHOLHDL 4.0 12/22/2022    CHOLHDL 4.2 08/15/2022        Wt Readings from Last 3 Encounters:   03/15/24 1309 93.3 kg (205 lb 9.6 oz)   02/16/24 0820 93.7 kg (206 lb 9.6 oz)   01/19/24 0835 90.4 kg (199 lb 6.4 oz)        BP Readings from Last 3 Encounters:   03/15/24 119/75   02/16/24 (!) 153/86   01/19/24 113/82        Review of Systems     Review of Systems   Constitutional: Negative.    Respiratory: Negative.     Cardiovascular: Negative.    Musculoskeletal:  Positive for arthralgias.   Integumentary:         Thick, long toe nails, dry skin, calluses to both feet   Allergic/Immunologic: Negative.    Neurological:  Positive for numbness.   Hematological: Negative.    Psychiatric/Behavioral: Negative.          Medical / Social / Family History     Past Medical History:   Diagnosis Date    Adult attention deficit disorder 03/22/2021    Depression 12/22/2022    Essential hypertension 03/22/2021    Hyperlipidemia     Neuropathy 08/23/2023    AC (obstructive sleep apnea) 10/19/2023    PAD (peripheral artery disease) 02/17/2023    Swollen leg 01/20/2024    Type 2 diabetes mellitus        Past Surgical History:   Procedure Laterality Date    APPENDECTOMY      CARDIAC CATHETERIZATION      DEBRIDEMENT OF LOWER EXTREMITY Bilateral 2/18/2023    Procedure: DEBRIDEMENT, LOWER EXTREMITY;  Surgeon: Yael Cohen MD;  Location: [facility] OR;  Service: General;  Laterality: Bilateral;  debride toes    LEFT HEART CATHETERIZATION N/A 5/7/2021    Procedure: Left heart cath with possible intervention;  Surgeon: Federico James DO;  Location: [facility] CATH LAB;  Service: Cardiology;  Laterality: N/A;    TOE AMPUTATION Right 3/29/2023    Procedure: AMPUTATION, TOE;  Surgeon: Yael Cohen MD;  Location: [facility] OR;  Service: General;  Laterality: Right;  Right great toe amp dr cohen wednesday  "      Social History    reports that he quit smoking about 39 years ago. His smoking use included cigarettes. He started smoking about 43 years ago. He has a 20.0 pack-year smoking history. He has been exposed to tobacco smoke. His smokeless tobacco use includes chew and snuff. He reports current alcohol use. He reports that he does not use drugs.    Family History  's family history includes Cancer in his sister; Hypertension in his father; Stroke in his father.    Medications and Allergies     Medications  Outpatient Medications Marked as Taking for the 3/15/24 encounter (Office Visit) with Nydia Martines FNP   Medication Sig Dispense Refill    albuterol (PROVENTIL/VENTOLIN HFA) 90 mcg/actuation inhaler Inhale 2 puffs into the lungs every 4 (four) hours as needed for Wheezing. Rescue 8 g 0    aspirin (ECOTRIN) 81 MG EC tablet Take 1 tablet (81 mg total) by mouth once daily. 90 tablet 3    atorvastatin (LIPITOR) 80 MG tablet Take 1 tablet (80 mg total) by mouth once daily. 90 tablet 1    cholecalciferol, vitamin D3, (VITAMIN D3) 50 mcg (2,000 unit) Cap Take 1 capsule by mouth once daily.      citalopram (CELEXA) 20 MG tablet TAKE ONE TABLET BY MOUTH ONCE DAILY 90 tablet 1    gabapentin (NEURONTIN) 300 MG capsule Take 1 capsule (300 mg total) by mouth 3 (three) times daily. 90 capsule 11    glyBURIDE (DIABETA) 5 MG tablet Take 1 tablet (5 mg total) by mouth daily with breakfast. 90 tablet 1    insulin degludec (TRESIBA FLEXTOUCH U-200) 200 unit/mL (3 mL) insulin pen Inject 30 Units into the skin once daily. Increase by 5 units if blood glucose is elevated above 200. weekly 3 pen 3    insulin syringe-needle U-100 0.3 mL 31 gauge x 5/16" Syrg 100 each by abdominal subcutaneous route once daily. Use once daily for insulin injection 100 each 2    lisinopriL-hydrochlorothiazide (PRINZIDE,ZESTORETIC) 20-25 mg Tab Take 1 tablet by mouth once daily. 90 tablet 1    metFORMIN (GLUCOPHAGE) 1000 MG tablet Take 1 tablet " "(1,000 mg total) by mouth 2 (two) times daily with meals. 180 tablet 1    metoprolol tartrate (LOPRESSOR) 50 MG tablet Take 1 tablet (50 mg total) by mouth 2 (two) times daily. 180 tablet 1    multivitamin (ONE DAILY MULTIVITAMIN) per tablet Take 1 tablet by mouth once daily.      omega-3 fatty acids/fish oil (FISH OIL-OMEGA-3 FATTY ACIDS) 300-1,000 mg capsule Take by mouth once daily.      pen needle, diabetic (BD ULTRA-FINE SHORT PEN NEEDLE) 31 gauge x 5/16" Ndle USE AS DIRECTED TO administer insulin TWICE DAILY for 50      pen needle, diabetic 31 gauge x 5/16" Ndle 100 each by abdominal subcutaneous route once daily. Use to administer insulin twice daily 100 each 2    [DISCONTINUED] dextroamphetamine-amphetamine (ADDERALL) 20 mg tablet Take 1 tablet by mouth each afternoon 30 tablet 0    [DISCONTINUED] dextroamphetamine-amphetamine (ADDERALL) 30 mg Tab Take 1 tablet by mouth each morning 30 tablet 0       Allergies  Review of patient's allergies indicates:   Allergen Reactions    Azithromycin     Erythromycin Other (See Comments)    M-mycin        Physical Examination     Vitals:    03/15/24 1309   BP: 119/75   BP Location: Left arm   Patient Position: Sitting   BP Method: Large (Automatic)   Pulse: 66   Resp: 20   Temp: 97.6 °F (36.4 °C)   TempSrc: Oral   SpO2: 95%   Weight: 93.3 kg (205 lb 9.6 oz)   Height: 5' 3" (1.6 m)      Physical Exam  Constitutional:       Appearance: Normal appearance. He is obese.   HENT:      Head: Normocephalic.   Eyes:      Extraocular Movements: Extraocular movements intact.   Cardiovascular:      Rate and Rhythm: Normal rate and regular rhythm.      Pulses: Normal pulses.           Dorsalis pedis pulses are 2+ on the right side and 2+ on the left side.        Posterior tibial pulses are 2+ on the right side and 2+ on the left side.      Heart sounds: Normal heart sounds.   Pulmonary:      Effort: Pulmonary effort is normal.      Breath sounds: Normal breath sounds. "   Musculoskeletal:      Right foot: Normal range of motion. No deformity, bunion, Charcot foot, foot drop or prominent metatarsal heads.      Left foot: Normal range of motion. No deformity, bunion, Charcot foot, foot drop or prominent metatarsal heads.      Right Lower Extremity: (right great toe)  Feet:      Right foot:      Skin integrity: Callus and dry skin present.      Toenail Condition: Right toenails are abnormally thick and long. Fungal disease present.     Left foot:      Skin integrity: Callus and dry skin present.      Toenail Condition: Left toenails are abnormally thick and long. Fungal disease present.  Skin:     General: Skin is warm and dry.      Capillary Refill: Capillary refill takes less than 2 seconds.   Neurological:      General: No focal deficit present.      Mental Status: He is alert and oriented to person, place, and time.   Psychiatric:         Mood and Affect: Mood normal.         Behavior: Behavior normal.          Assessment and Plan (including Health Maintenance)   :    Plan:     There are no Patient Instructions on file for this visit.       Health Maintenance Due   Topic Date Due    COVID-19 Vaccine (1) Never done    Pneumococcal Vaccines (Age 0-64) (1 of 2 - PCV) Never done    TETANUS VACCINE  Never done    Colorectal Cancer Screening  Never done    Shingles Vaccine (1 of 2) Never done    Foot Exam  12/22/2023    Hemoglobin A1c  02/10/2024       Problem List Items Addressed This Visit       Adult attention deficit disorder    Current Assessment & Plan     UDS and  reviewed with no aberrant behavior noted.  Pt stable on current medication. Med refilled.            Relevant Medications    dextroamphetamine-amphetamine (ADDERALL) 20 mg tablet    dextroamphetamine-amphetamine (ADDERALL) 30 mg Tab    Callus of foot    Current Assessment & Plan     See procedure note.          Medication management    Relevant Orders    POCT Urine Drug Screen Presump (Completed)    Onychomycosis     "Current Assessment & Plan     Taking good care of your feet is very important!  1) Look at your bare feet daily for blisters, red spots, cuts, cracks, swelling or sores. If you can not see well, you can use a mirror to look or have someone help you.  2) Report promptly to your health care provider if you notice any changes in your feet as noted above or if your feet change colors, shape or just do not "feel right" for example: if your feet become less sensitive or begin hurting.  3) Wash your feet everyday. Use warm  not hot water. Check the water temperature with your wrist or other body part - not your feet.  4) Dry your feet well. Make sure you pat them dry. Do not rub the skin on your feet too hard. Make sure to dry well between your toes. Allowing moisture to stay between your toes or on your feet could lead to infection.  5) Keep your feet soft and smooth. Rub a thin coat of lotion on your feet each day but do not put lotion between your toes.  6) Wear socks and shoes at all times.  7) Never walk barefoot.          Type 2 diabetes mellitus with hyperglycemia - Primary    Current Assessment & Plan     Diabetes is managed by patient's PCP  1) Follow your diabetic diet as directed  2) Regular physical activity as directed   3) Check your blood sugar levels as directed  4) Take your medications as directed  5) Follow up in 3 months           Type 2 diabetes mellitus with hyperglycemia, with long-term current use of insulin    Medication management  -     POCT Urine Drug Screen Presump    Adult attention deficit disorder  -     dextroamphetamine-amphetamine (ADDERALL) 20 mg tablet; Take 1 tablet by mouth each afternoon  Dispense: 30 tablet; Refill: 0  -     dextroamphetamine-amphetamine (ADDERALL) 30 mg Tab; Take 1 tablet by mouth each morning  Dispense: 30 tablet; Refill: 0    Onychomycosis    Callus of foot       Health Maintenance Topics with due status: Not Due       Topic Last Completion Date    Lipid Panel " 08/23/2023    Diabetes Urine Screening 01/19/2024    Eye Exam 02/07/2024         Future Appointments   Date Time Provider Department Center   4/19/2024  8:20 AM [facility] [place]   6/6/2024  2:40 PM [facility] [place]   1/22/2025  3:00 PM Julio Cruz, DO [facility]        Follow up in about 3 months (around 6/15/2024), or if symptoms worsen or fail to improve, for foot care.    Health Maintenance Due   Topic Date Due    COVID-19 Vaccine (1) Never done    Pneumococcal Vaccines (Age 0-64) (1 of 2 - PCV) Never done    TETANUS VACCINE  Never done    Colorectal Cancer Screening  Never done    Shingles Vaccine (1 of 2) Never done    Foot Exam  12/22/2023    Hemoglobin A1c  02/10/2024        Signature:  REMEDIOS Orr    Date of encounter: 3/15/24

## 2024-04-19 ENCOUNTER — OFFICE VISIT (OUTPATIENT)
Dept: FAMILY MEDICINE | Facility: CLINIC | Age: 59
End: 2024-04-19
Payer: COMMERCIAL

## 2024-04-19 VITALS
HEIGHT: 63 IN | BODY MASS INDEX: 35.86 KG/M2 | RESPIRATION RATE: 19 BRPM | OXYGEN SATURATION: 98 % | SYSTOLIC BLOOD PRESSURE: 127 MMHG | HEART RATE: 73 BPM | TEMPERATURE: 98 F | WEIGHT: 202.38 LBS | DIASTOLIC BLOOD PRESSURE: 80 MMHG

## 2024-04-19 DIAGNOSIS — E11.65 TYPE 2 DIABETES MELLITUS WITH HYPERGLYCEMIA, WITH LONG-TERM CURRENT USE OF INSULIN: Primary | ICD-10-CM

## 2024-04-19 DIAGNOSIS — F98.8 ADULT ATTENTION DEFICIT DISORDER: ICD-10-CM

## 2024-04-19 DIAGNOSIS — Z12.5 SCREENING FOR PROSTATE CANCER: ICD-10-CM

## 2024-04-19 DIAGNOSIS — Z12.11 SCREENING FOR COLON CANCER: ICD-10-CM

## 2024-04-19 DIAGNOSIS — I10 PRIMARY HYPERTENSION: ICD-10-CM

## 2024-04-19 DIAGNOSIS — Z79.4 TYPE 2 DIABETES MELLITUS WITH HYPERGLYCEMIA, WITH LONG-TERM CURRENT USE OF INSULIN: Primary | ICD-10-CM

## 2024-04-19 DIAGNOSIS — R53.82 CHRONIC FATIGUE: ICD-10-CM

## 2024-04-19 DIAGNOSIS — Z79.899 LONG-TERM CURRENT USE OF STIMULANT: ICD-10-CM

## 2024-04-19 LAB
BASOPHILS # BLD AUTO: 0.05 K/UL (ref 0–0.2)
BASOPHILS NFR BLD AUTO: 0.6 % (ref 0–1)
CTP QC/QA: YES
DIFFERENTIAL METHOD BLD: ABNORMAL
EOSINOPHIL # BLD AUTO: 0.27 K/UL (ref 0–0.5)
EOSINOPHIL NFR BLD AUTO: 3.3 % (ref 1–4)
ERYTHROCYTE [DISTWIDTH] IN BLOOD BY AUTOMATED COUNT: 12.7 % (ref 11.5–14.5)
EST. AVERAGE GLUCOSE BLD GHB EST-MCNC: 148 MG/DL
HBA1C MFR BLD HPLC: 6.8 % (ref 4.5–6.6)
HCT VFR BLD AUTO: 40.1 % (ref 40–54)
HGB BLD-MCNC: 12.9 G/DL (ref 13.5–18)
IMM GRANULOCYTES # BLD AUTO: 0.03 K/UL (ref 0–0.04)
IMM GRANULOCYTES NFR BLD: 0.4 % (ref 0–0.4)
LYMPHOCYTES # BLD AUTO: 2.08 K/UL (ref 1–4.8)
LYMPHOCYTES NFR BLD AUTO: 25.4 % (ref 27–41)
MCH RBC QN AUTO: 31 PG (ref 27–31)
MCHC RBC AUTO-ENTMCNC: 32.2 G/DL (ref 32–36)
MCV RBC AUTO: 96.4 FL (ref 80–96)
MONOCYTES # BLD AUTO: 0.53 K/UL (ref 0–0.8)
MONOCYTES NFR BLD AUTO: 6.5 % (ref 2–6)
MPC BLD CALC-MCNC: 10 FL (ref 9.4–12.4)
NEUTROPHILS # BLD AUTO: 5.23 K/UL (ref 1.8–7.7)
NEUTROPHILS NFR BLD AUTO: 63.8 % (ref 53–65)
NRBC # BLD AUTO: 0 X10E3/UL
NRBC, AUTO (.00): 0 %
PLATELET # BLD AUTO: 335 K/UL (ref 150–400)
POC (AMP) AMPHETAMINE: NEGATIVE
POC (BAR) BARBITURATES: NEGATIVE
POC (BUP) BUPRENORPHINE: NEGATIVE
POC (BZO) BENZODIAZEPINES: NEGATIVE
POC (COC) COCAINE: NEGATIVE
POC (MDMA) METHYLENEDIOXYMETHAMPHETAMINE 3,4: NEGATIVE
POC (MET) METHAMPHETAMINE: NEGATIVE
POC (MOP) OPIATES: NEGATIVE
POC (MTD) METHADONE: NEGATIVE
POC (OXY) OXYCODONE: NEGATIVE
POC (PCP) PHENCYCLIDINE: NEGATIVE
POC (TCA) TRICYCLIC ANTIDEPRESSANTS: NEGATIVE
POC TEMPERATURE (URINE): NORMAL
POC THC: NEGATIVE
RBC # BLD AUTO: 4.16 M/UL (ref 4.6–6.2)
WBC # BLD AUTO: 8.19 K/UL (ref 4.5–11)

## 2024-04-19 PROCEDURE — 2023F DILAT RTA XM W/O RTNOPTHY: CPT | Mod: CPTII,,, | Performed by: NURSE PRACTITIONER

## 2024-04-19 PROCEDURE — 3074F SYST BP LT 130 MM HG: CPT | Mod: CPTII,,, | Performed by: NURSE PRACTITIONER

## 2024-04-19 PROCEDURE — 1160F RVW MEDS BY RX/DR IN RCRD: CPT | Mod: CPTII,,, | Performed by: NURSE PRACTITIONER

## 2024-04-19 PROCEDURE — 80305 DRUG TEST PRSMV DIR OPT OBS: CPT | Mod: QW,,, | Performed by: NURSE PRACTITIONER

## 2024-04-19 PROCEDURE — 36415 COLL VENOUS BLD VENIPUNCTURE: CPT | Performed by: NURSE PRACTITIONER

## 2024-04-19 PROCEDURE — 3066F NEPHROPATHY DOC TX: CPT | Mod: CPTII,,, | Performed by: NURSE PRACTITIONER

## 2024-04-19 PROCEDURE — 1159F MED LIST DOCD IN RCRD: CPT | Mod: CPTII,,, | Performed by: NURSE PRACTITIONER

## 2024-04-19 PROCEDURE — 3008F BODY MASS INDEX DOCD: CPT | Mod: CPTII,,, | Performed by: NURSE PRACTITIONER

## 2024-04-19 PROCEDURE — 85025 COMPLETE CBC W/AUTO DIFF WBC: CPT | Mod: ,,, | Performed by: CLINICAL MEDICAL LABORATORY

## 2024-04-19 PROCEDURE — 3079F DIAST BP 80-89 MM HG: CPT | Mod: CPTII,,, | Performed by: NURSE PRACTITIONER

## 2024-04-19 PROCEDURE — 99214 OFFICE O/P EST MOD 30 MIN: CPT | Mod: ,,, | Performed by: NURSE PRACTITIONER

## 2024-04-19 PROCEDURE — 4010F ACE/ARB THERAPY RXD/TAKEN: CPT | Mod: CPTII,,, | Performed by: NURSE PRACTITIONER

## 2024-04-19 PROCEDURE — 83036 HEMOGLOBIN GLYCOSYLATED A1C: CPT | Mod: ,,, | Performed by: CLINICAL MEDICAL LABORATORY

## 2024-04-19 PROCEDURE — 3061F NEG MICROALBUMINURIA REV: CPT | Mod: CPTII,,, | Performed by: NURSE PRACTITIONER

## 2024-04-19 RX ORDER — DEXTROAMPHETAMINE SACCHARATE, AMPHETAMINE ASPARTATE, DEXTROAMPHETAMINE SULFATE AND AMPHETAMINE SULFATE 7.5; 7.5; 7.5; 7.5 MG/1; MG/1; MG/1; MG/1
TABLET ORAL
Qty: 30 TABLET | Refills: 0 | Status: SHIPPED | OUTPATIENT
Start: 2024-04-19 | End: 2024-05-28 | Stop reason: SDUPTHER

## 2024-04-19 RX ORDER — DEXTROAMPHETAMINE SACCHARATE, AMPHETAMINE ASPARTATE, DEXTROAMPHETAMINE SULFATE AND AMPHETAMINE SULFATE 5; 5; 5; 5 MG/1; MG/1; MG/1; MG/1
TABLET ORAL
Qty: 30 TABLET | Refills: 0 | Status: SHIPPED | OUTPATIENT
Start: 2024-04-19 | End: 2024-05-28 | Stop reason: SDUPTHER

## 2024-04-19 RX ORDER — ACETAMINOPHEN 500 MG
5000 TABLET ORAL DAILY
COMMUNITY

## 2024-04-19 NOTE — PROGRESS NOTES
[facility]    Chelsey Mckoy AGPCNP-BC [facility] [place] [facility] [address] [place] [address]  [phone]          PATIENT NAME: Syed Kelley  : 1965  DATE: 24  MRN: [id number]          Reason for Visit        Chief Complaint   Patient presents with    Nausea     Pt c/o nausea after eating and loss of appetite     Peripheral Neuropathy     Pt voiced c/o neuropathy in bilateral feet at night. Pt states feet are sensitive to touch.     Diabetes     Pt states he has been out of test strips for 1 month. Pt states he can't afford the strips and has not been checking his bs. Pt has stopped taking Insulin since he has been out of test strips.    Health Maintenance     Pneumococcal Vaccines (Age 0-64)(1 of 2 - PCV) declined  TETANUS VACCINE declined  Colorectal Cancer Screening ordered  Shingles Vaccine(1 of 2) declined  COVID-19 Vaccine(1 - 2023-24 season) declined  Foot Exam   Hemoglobin A1c ordered      ADHD     Adderall refill        History of Present Illness      Syed Kelley is a 59 y.o. male with chronic conditions of Adult Attention Deficit  Disorder, Depression, Hypertension, Neuropathy, AC, PAD, Type 2 Diabetes, Obesity, and User of Smokeless Tobacco who presents today for c/o nausea after eating and decreased appetite. Neuropathy in bilateral feet. He has been out of his test strips for at least one month and has not been tammy to check his blood sugars. He is requesting refill of Adderall today. UDS collected today was presumptive negative.  Discussed with him that his UDS was presumptive negative. Informed him that I would fill it for him for this month and next waylon that an alternate method of testing would be recommended. He voiced understanding and reported his last dose was 2-3 days ago.  reviewed.       MEDICAL / SURGICAL / SOCIAL HISTORY     Past Medical History:   Diagnosis Date    Adult attention deficit  "disorder 2021    Depression 2022    Essential hypertension 2021    Hyperlipidemia     Neuropathy 2023    AC (obstructive sleep apnea) 10/19/2023    PAD (peripheral artery disease) 2023    Swollen leg 2024    Type 2 diabetes mellitus        Past Surgical History:   Procedure Laterality Date    APPENDECTOMY      CARDIAC CATHETERIZATION      DEBRIDEMENT OF LOWER EXTREMITY Bilateral 2023    Procedure: DEBRIDEMENT, LOWER EXTREMITY;  Surgeon: Yael Cohen MD;  Location: [facility] OR;  Service: General;  Laterality: Bilateral;  debride toes    LEFT HEART CATHETERIZATION N/A 2021    Procedure: Left heart cath with possible intervention;  Surgeon: Federico James DO;  Location: [facility] CATH LAB;  Service: Cardiology;  Laterality: N/A;    TOE AMPUTATION Right 3/29/2023    Procedure: AMPUTATION, TOE;  Surgeon: Yael Cohen MD;  Location: [facility] OR;  Service: General;  Laterality: Right;  Right great toe amp dr cohen wednesday       Social History     Tobacco Use    Smoking status: Former     Current packs/day: 0.00     Average packs/day: 5.0 packs/day for 4.0 years (20.0 ttl pk-yrs)     Types: Cigarettes     Start date:      Quit date:      Years since quittin.3     Passive exposure: Past    Smokeless tobacco: Current     Types: Chew, Snuff    Tobacco comments:     1 can/day   Substance Use Topics    Alcohol use: Yes     Comment: 2-3 drinks per/3 months    Drug use: Never         I personally reviewed all past medical, surgical, and social.     Review of Systems   Constitutional:  Positive for appetite change (decrease in appetite x 2-3 days) and fatigue ("just don't have any energy"). Negative for chills and fever.   HENT:  Negative for congestion, rhinorrhea and sore throat.    Respiratory:  Negative for cough and shortness of breath.    Cardiovascular:  Negative for chest pain.   Gastrointestinal:  Positive for nausea (2-3 days). Negative for abdominal " "pain, constipation and diarrhea.   Genitourinary:  Negative for dysuria, frequency and urgency.   Musculoskeletal:  Positive for arthralgias ("sometimes I get pain in my feet"). Negative for back pain, joint swelling and neck pain.   Neurological:  Negative for dizziness and headaches.   Psychiatric/Behavioral:  Positive for sleep disturbance ("I wake up 2-3 times per night"). Negative for dysphoric mood. The patient is not nervous/anxious.         MEDICATIONS / ALLERGIES / HM     Current Outpatient Medications   Medication Sig Dispense Refill    albuterol (PROVENTIL/VENTOLIN HFA) 90 mcg/actuation inhaler Inhale 2 puffs into the lungs every 4 (four) hours as needed for Wheezing. Rescue 8 g 0    aspirin (ECOTRIN) 81 MG EC tablet Take 1 tablet (81 mg total) by mouth once daily. 90 tablet 3    atorvastatin (LIPITOR) 80 MG tablet Take 1 tablet (80 mg total) by mouth once daily. 90 tablet 1    cholecalciferol, vitamin D3, (VITAMIN D3) 125 mcg (5,000 unit) Tab Take 5,000 Units by mouth once daily.      citalopram (CELEXA) 20 MG tablet TAKE ONE TABLET BY MOUTH ONCE DAILY 90 tablet 1    gabapentin (NEURONTIN) 300 MG capsule Take 1 capsule (300 mg total) by mouth 3 (three) times daily. 90 capsule 11    glyBURIDE (DIABETA) 5 MG tablet Take 1 tablet (5 mg total) by mouth daily with breakfast. 90 tablet 1    insulin degludec (TRESIBA FLEXTOUCH U-200) 200 unit/mL (3 mL) insulin pen Inject 30 Units into the skin once daily. Increase by 5 units if blood glucose is elevated above 200. weekly 3 pen 3    insulin syringe-needle U-100 0.3 mL 31 gauge x 5/16" Syrg 100 each by abdominal subcutaneous route once daily. Use once daily for insulin injection 100 each 2    lisinopriL-hydrochlorothiazide (PRINZIDE,ZESTORETIC) 20-25 mg Tab Take 1 tablet by mouth once daily. 90 tablet 1    metFORMIN (GLUCOPHAGE) 1000 MG tablet Take 1 tablet (1,000 mg total) by mouth 2 (two) times daily with meals. 180 tablet 1    metoprolol tartrate (LOPRESSOR) " "50 MG tablet Take 1 tablet (50 mg total) by mouth 2 (two) times daily. 180 tablet 1    pen needle, diabetic (BD ULTRA-FINE SHORT PEN NEEDLE) 31 gauge x 5/16" Ndle USE AS DIRECTED TO administer insulin TWICE DAILY for 50      pen needle, diabetic 31 gauge x 5/16" Ndle 100 each by abdominal subcutaneous route once daily. Use to administer insulin twice daily 100 each 2    dextroamphetamine-amphetamine (ADDERALL) 20 mg tablet Take 1 tablet by mouth each afternoon 30 tablet 0    dextroamphetamine-amphetamine (ADDERALL) 30 mg Tab Take 1 tablet by mouth each morning 30 tablet 0    meclizine (ANTIVERT) 25 mg tablet Take 1 tablet (25 mg total) by mouth 3 (three) times daily as needed. 40 tablet 0     No current facility-administered medications for this visit.       Review of patient's allergies indicates:   Allergen Reactions    Azithromycin     Erythromycin Other (See Comments)    M-mycin          There is no immunization history on file for this patient.     Health Maintenance   Topic Date Due    TETANUS VACCINE  Never done-declined     Colorectal Cancer Screening  Never done-order placed today     Shingles Vaccine (1 of 2) Never done-declined     Foot Exam  12/22/2023    Hemoglobin A1c  02/10/2024    Lipid Panel  08/23/2024    Eye Exam  02/07/2025    Hepatitis C Screening  Completed        Physical Exam     Physical Exam  Constitutional:       General: He is not in acute distress.     Appearance: He is obese.   HENT:      Head: Normocephalic.      Right Ear: External ear normal.      Left Ear: External ear normal.   Cardiovascular:      Rate and Rhythm: Normal rate and regular rhythm.      Pulses: Normal pulses.   Pulmonary:      Effort: Pulmonary effort is normal.      Breath sounds: Normal breath sounds.   Abdominal:      Palpations: Abdomen is soft.      Tenderness: There is no abdominal tenderness.   Skin:     General: Skin is warm and dry.   Neurological:      Mental Status: He is alert and oriented to person, " "place, and time.   Psychiatric:         Mood and Affect: Mood normal.         Behavior: Behavior normal.          Laboratory:    Lab Results   Component Value Date     (H) 11/10/2023     11/10/2023    K 4.2 11/10/2023     11/10/2023    CO2 27 11/10/2023    BUN 14 11/10/2023    CREATININE 1.32 (H) 11/10/2023    CALCIUM 9.6 11/10/2023    PROT 7.0 11/10/2023    ALBUMIN 3.0 (L) 11/10/2023    BILITOT <0.1 11/10/2023    ALKPHOS 85 11/10/2023    AST 4 (L) 11/10/2023    ALT 27 11/10/2023    ANIONGAP 11 11/10/2023    ESTGFRAFRICA 89 05/06/2021    EGFRNONAA 76 06/08/2022       Lab Results   Component Value Date    WBC 6.90 11/10/2023    RBC 4.03 (L) 11/10/2023    HGB 12.7 (L) 11/10/2023    HCT 37.5 (L) 11/10/2023    HCT 54 (H) 04/24/2022    MCV 93.1 11/10/2023    RDW 12.6 11/10/2023     11/10/2023        Lab Results   Component Value Date    CHOL 143 08/23/2023    TRIG 338 (H) 08/23/2023    HDL 29 (L) 08/23/2023    LDLCALC 46 08/23/2023       Lab Results   Component Value Date    TSH 1.980 01/19/2024       Lab Results   Component Value Date    HGBA1C 6.6 11/10/2023    ESTIMATEDAVG 143 11/10/2023        No results found for: "GOYWTPFR36"    Lab Results   Component Value Date    CXEGJWYK56FA 46.1 01/19/2024       No results found for: "PSA"      Point Of Care Testing:    WBC, UA   Date Value Ref Range Status   06/08/2022 NEGATIVE  Final     Nitrites, UA   Date Value Ref Range Status   10/31/2023 Negative Negative Final     Urobilinogen, UA   Date Value Ref Range Status   10/31/2023 Normal 0.2, 1.0, Normal mg/dL Final     Protein, POC   Date Value Ref Range Status   06/08/2022 NEGATIVE  Final     pH, UA   Date Value Ref Range Status   10/31/2023 5.0 5.0 to 8.0 pH Units Final     Specific Gravity, UA   Date Value Ref Range Status   10/31/2023 1.019 <=1.030 Final     Ketones, UA   Date Value Ref Range Status   10/31/2023 Negative Negative mg/dL Final     Bilirubin, POC   Date Value Ref Range Status " "  06/08/2022 NEGATIVE  Final     Glucose, UA   Date Value Ref Range Status   06/08/2022 1,000  Final       No results found for: "[id number]", "FLUAMOLEC", "FLUBMOLEC", "MOLSTREPAPOC"          Assessment/Plan     Type 2 diabetes mellitus with hyperglycemia, with long-term current use of insulin  -     Hemoglobin A1C; Future; Expected date: 04/19/2024  -     Comprehensive Metabolic Panel; Future; Expected date: 04/19/2024  -     CBC Auto Differential; Future; Expected date: 04/19/2024    Screening for colon cancer  -     Colonoscopy; Future; Expected date: 04/19/2024    Adult attention deficit disorder  -     dextroamphetamine-amphetamine (ADDERALL) 20 mg tablet; Take 1 tablet by mouth each afternoon  Dispense: 30 tablet; Refill: 0  -     dextroamphetamine-amphetamine (ADDERALL) 30 mg Tab; Take 1 tablet by mouth each morning  Dispense: 30 tablet; Refill: 0  -     POCT Urine Drug Screen Presump    Primary hypertension  -     Comprehensive Metabolic Panel; Future; Expected date: 04/19/2024  -     CBC Auto Differential; Future; Expected date: 04/19/2024    Long-term current use of stimulant  -     POCT Urine Drug Screen Presump    Screening for prostate cancer  -     PSA, Screening; Future; Expected date: 04/19/2024    Chronic fatigue  -     Vitamin B12; Future; Expected date: 04/19/2024        Future Appointments   Date Time Provider Department Center   6/6/2024  2:40 PM Nydia Martines FNP [facility] [place]   7/19/2024  4:00 PM Julio Cruz DO [facility]   1/22/2025  3:00 PM Julio Cruz,  [facility]       Workup results were reviewed and all questions were answered. Diagnosis and treatment options were discussed and the patient  is amenable with the overall treatment plan. Verbal and written discharge instructions were given including to return to clinic/ED with any acute worsening of symptoms or failure of symptoms to improve. The reasons for return to the clinic/ED " were explained in lay terms. No further intervention is warranted at this time. The patient agrees with the plan, expresses understanding, is hemodynamically stable and in no acute distress.     All questions answered to desired level of satisfaction          MERLYN Jones-BC [facility]

## 2024-04-19 NOTE — Clinical Note
Pneumococcal Vaccines (Age 0-64)(1 of 2 - PCV) declined TETANUS VACCINE declined Colorectal Cancer Screening ordered Shingles Vaccine(1 of 2) declined COVID-19 Vaccine(1 - 2023-24 season) declined Foot Exam  Hemoglobin A1c ordered

## 2024-04-19 NOTE — LETTER
April 19, 2024      [facility] [address] [place] [address]  Phone: [phone]  Fax: [phone]       Patient: Syed Kelley   YOB: 1965  Date of Visit: 04/19/2024    To Whom It May Concern:    Emmanuelle Kelley  was at [facility] on 04/19/2024. The patient may return to work on 04/20/2024 with no restrictions. If you have any questions or concerns, or if I can be of further assistance, please do not hesitate to contact me.    Sincerely,    REMEDIOS Jones LPN

## 2024-04-22 DIAGNOSIS — Z12.11 COLON CANCER SCREENING: Primary | ICD-10-CM

## 2024-04-22 RX ORDER — POLYETHYLENE GLYCOL 3350, SODIUM SULFATE ANHYDROUS, SODIUM BICARBONATE, SODIUM CHLORIDE, POTASSIUM CHLORIDE 236; 22.74; 6.74; 5.86; 2.97 G/4L; G/4L; G/4L; G/4L; G/4L
4 POWDER, FOR SOLUTION ORAL ONCE
Qty: 4000 ML | Refills: 0 | Status: SHIPPED | OUTPATIENT
Start: 2024-04-22 | End: 2024-04-22

## 2024-05-16 ENCOUNTER — TELEPHONE (OUTPATIENT)
Dept: FAMILY MEDICINE | Facility: CLINIC | Age: 59
End: 2024-05-16
Payer: COMMERCIAL

## 2024-05-16 NOTE — TELEPHONE ENCOUNTER
Spoke with him concerning him needing to come in for labs , he stated that he will try to come in soon to have them done

## 2024-05-16 NOTE — TELEPHONE ENCOUNTER
----- Message from Chelsey Mckoy NP sent at 5/16/2024  2:36 PM CDT -----  Regarding: labs  Please contact patient and remind him that he still has labs that need to be collected, if he could plant to come by clinic and have this done.

## 2024-05-28 ENCOUNTER — OFFICE VISIT (OUTPATIENT)
Dept: FAMILY MEDICINE | Facility: CLINIC | Age: 59
End: 2024-05-28
Payer: COMMERCIAL

## 2024-05-28 VITALS
RESPIRATION RATE: 18 BRPM | HEART RATE: 66 BPM | TEMPERATURE: 99 F | OXYGEN SATURATION: 97 % | SYSTOLIC BLOOD PRESSURE: 124 MMHG | DIASTOLIC BLOOD PRESSURE: 73 MMHG | HEIGHT: 63 IN | BODY MASS INDEX: 35.33 KG/M2 | WEIGHT: 199.38 LBS

## 2024-05-28 DIAGNOSIS — I10 ESSENTIAL HYPERTENSION, BENIGN: ICD-10-CM

## 2024-05-28 DIAGNOSIS — R06.02 SHORTNESS OF BREATH: ICD-10-CM

## 2024-05-28 DIAGNOSIS — Z12.5 SCREENING FOR MALIGNANT NEOPLASM OF PROSTATE: ICD-10-CM

## 2024-05-28 DIAGNOSIS — F98.8 ADULT ATTENTION DEFICIT DISORDER: Primary | ICD-10-CM

## 2024-05-28 DIAGNOSIS — D64.9 ANEMIA, UNSPECIFIED TYPE: ICD-10-CM

## 2024-05-28 DIAGNOSIS — E11.9 TYPE 2 DIABETES MELLITUS WITHOUT COMPLICATION, WITHOUT LONG-TERM CURRENT USE OF INSULIN: ICD-10-CM

## 2024-05-28 DIAGNOSIS — G62.9 NEUROPATHY: ICD-10-CM

## 2024-05-28 DIAGNOSIS — Z79.899 LONG-TERM CURRENT USE OF STIMULANT: ICD-10-CM

## 2024-05-28 DIAGNOSIS — F32.A DEPRESSION, UNSPECIFIED DEPRESSION TYPE: ICD-10-CM

## 2024-05-28 DIAGNOSIS — E78.2 MIXED HYPERLIPIDEMIA: ICD-10-CM

## 2024-05-28 LAB
ALBUMIN SERPL BCP-MCNC: 3.3 G/DL (ref 3.5–5)
ALBUMIN/GLOB SERPL: 0.8 {RATIO}
ALP SERPL-CCNC: 109 U/L (ref 45–115)
ALT SERPL W P-5'-P-CCNC: 29 U/L (ref 16–61)
ANION GAP SERPL CALCULATED.3IONS-SCNC: 10 MMOL/L (ref 7–16)
AST SERPL W P-5'-P-CCNC: 21 U/L (ref 15–37)
BILIRUB SERPL-MCNC: 0.4 MG/DL (ref ?–1.2)
BUN SERPL-MCNC: 15 MG/DL (ref 7–18)
BUN/CREAT SERPL: 16 (ref 6–20)
CALCIUM SERPL-MCNC: 9.7 MG/DL (ref 8.5–10.1)
CHLORIDE SERPL-SCNC: 99 MMOL/L (ref 98–107)
CO2 SERPL-SCNC: 31 MMOL/L (ref 21–32)
CREAT SERPL-MCNC: 0.94 MG/DL (ref 0.7–1.3)
CTP QC/QA: YES
EGFR (NO RACE VARIABLE) (RUSH/TITUS): 93 ML/MIN/1.73M2
FOLATE SERPL-MCNC: 19.7 NG/ML (ref 3.1–17.5)
GLOBULIN SER-MCNC: 4.2 G/DL (ref 2–4)
GLUCOSE SERPL-MCNC: 61 MG/DL (ref 74–106)
POC (AMP) AMPHETAMINE: ABNORMAL
POC (BAR) BARBITURATES: NEGATIVE
POC (BUP) BUPRENORPHINE: NEGATIVE
POC (BZO) BENZODIAZEPINES: NEGATIVE
POC (COC) COCAINE: NEGATIVE
POC (MDMA) METHYLENEDIOXYMETHAMPHETAMINE 3,4: NEGATIVE
POC (MET) METHAMPHETAMINE: NEGATIVE
POC (MOP) OPIATES: NEGATIVE
POC (MTD) METHADONE: NEGATIVE
POC (OXY) OXYCODONE: NEGATIVE
POC (PCP) PHENCYCLIDINE: NEGATIVE
POC (TCA) TRICYCLIC ANTIDEPRESSANTS: ABNORMAL
POC TEMPERATURE (URINE): 94
POC THC: NEGATIVE
POTASSIUM SERPL-SCNC: 4 MMOL/L (ref 3.5–5.1)
PROT SERPL-MCNC: 7.5 G/DL (ref 6.4–8.2)
PSA SERPL-MCNC: 0.91 NG/ML
SODIUM SERPL-SCNC: 136 MMOL/L (ref 136–145)
VIT B12 SERPL-MCNC: 150 PG/ML (ref 193–986)

## 2024-05-28 PROCEDURE — 36415 COLL VENOUS BLD VENIPUNCTURE: CPT

## 2024-05-28 PROCEDURE — 3066F NEPHROPATHY DOC TX: CPT | Mod: CPTII,,,

## 2024-05-28 PROCEDURE — 80305 DRUG TEST PRSMV DIR OPT OBS: CPT | Mod: QW,,,

## 2024-05-28 PROCEDURE — 4010F ACE/ARB THERAPY RXD/TAKEN: CPT | Mod: CPTII,,,

## 2024-05-28 PROCEDURE — 3078F DIAST BP <80 MM HG: CPT | Mod: CPTII,,,

## 2024-05-28 PROCEDURE — 1159F MED LIST DOCD IN RCRD: CPT | Mod: CPTII,,,

## 2024-05-28 PROCEDURE — 2023F DILAT RTA XM W/O RTNOPTHY: CPT | Mod: CPTII,,,

## 2024-05-28 PROCEDURE — 3061F NEG MICROALBUMINURIA REV: CPT | Mod: CPTII,,,

## 2024-05-28 PROCEDURE — 3074F SYST BP LT 130 MM HG: CPT | Mod: CPTII,,,

## 2024-05-28 PROCEDURE — G0103 PSA SCREENING: HCPCS | Mod: ,,, | Performed by: CLINICAL MEDICAL LABORATORY

## 2024-05-28 PROCEDURE — 80053 COMPREHEN METABOLIC PANEL: CPT | Mod: ,,, | Performed by: CLINICAL MEDICAL LABORATORY

## 2024-05-28 PROCEDURE — 1160F RVW MEDS BY RX/DR IN RCRD: CPT | Mod: CPTII,,,

## 2024-05-28 PROCEDURE — 82607 VITAMIN B-12: CPT | Mod: ,,, | Performed by: CLINICAL MEDICAL LABORATORY

## 2024-05-28 PROCEDURE — 3008F BODY MASS INDEX DOCD: CPT | Mod: CPTII,,,

## 2024-05-28 PROCEDURE — 99214 OFFICE O/P EST MOD 30 MIN: CPT | Mod: ,,,

## 2024-05-28 PROCEDURE — 82746 ASSAY OF FOLIC ACID SERUM: CPT | Mod: ,,, | Performed by: CLINICAL MEDICAL LABORATORY

## 2024-05-28 PROCEDURE — 3044F HG A1C LEVEL LT 7.0%: CPT | Mod: CPTII,,,

## 2024-05-28 RX ORDER — DEXTROAMPHETAMINE SACCHARATE, AMPHETAMINE ASPARTATE, DEXTROAMPHETAMINE SULFATE AND AMPHETAMINE SULFATE 5; 5; 5; 5 MG/1; MG/1; MG/1; MG/1
TABLET ORAL
Qty: 30 TABLET | Refills: 0 | Status: SHIPPED | OUTPATIENT
Start: 2024-05-28

## 2024-05-28 RX ORDER — ATORVASTATIN CALCIUM 80 MG/1
80 TABLET, FILM COATED ORAL DAILY
Qty: 90 TABLET | Refills: 1 | Status: SHIPPED | OUTPATIENT
Start: 2024-05-28

## 2024-05-28 RX ORDER — CITALOPRAM 20 MG/1
20 TABLET, FILM COATED ORAL DAILY
Qty: 90 TABLET | Refills: 1 | Status: CANCELLED | OUTPATIENT
Start: 2024-05-28

## 2024-05-28 RX ORDER — ALBUTEROL SULFATE 90 UG/1
2 AEROSOL, METERED RESPIRATORY (INHALATION) EVERY 4 HOURS PRN
Qty: 8 G | Refills: 0 | Status: SHIPPED | OUTPATIENT
Start: 2024-05-28

## 2024-05-28 RX ORDER — METOPROLOL TARTRATE 50 MG/1
50 TABLET ORAL 2 TIMES DAILY
Qty: 180 TABLET | Refills: 1 | Status: SHIPPED | OUTPATIENT
Start: 2024-05-28

## 2024-05-28 RX ORDER — METFORMIN HYDROCHLORIDE 1000 MG/1
1000 TABLET ORAL 2 TIMES DAILY WITH MEALS
Qty: 180 TABLET | Refills: 1 | Status: SHIPPED | OUTPATIENT
Start: 2024-05-28

## 2024-05-28 RX ORDER — GABAPENTIN 300 MG/1
300 CAPSULE ORAL 3 TIMES DAILY
Qty: 270 CAPSULE | Refills: 1 | Status: CANCELLED | OUTPATIENT
Start: 2024-05-28

## 2024-05-28 RX ORDER — DEXTROAMPHETAMINE SACCHARATE, AMPHETAMINE ASPARTATE, DEXTROAMPHETAMINE SULFATE AND AMPHETAMINE SULFATE 7.5; 7.5; 7.5; 7.5 MG/1; MG/1; MG/1; MG/1
TABLET ORAL
Qty: 30 TABLET | Refills: 0 | Status: SHIPPED | OUTPATIENT
Start: 2024-05-28

## 2024-05-28 RX ORDER — GLYBURIDE 5 MG/1
5 TABLET ORAL
Qty: 90 TABLET | Refills: 1 | Status: SHIPPED | OUTPATIENT
Start: 2024-05-28

## 2024-05-28 RX ORDER — LISINOPRIL AND HYDROCHLOROTHIAZIDE 20; 25 MG/1; MG/1
1 TABLET ORAL DAILY
Qty: 90 TABLET | Refills: 1 | Status: SHIPPED | OUTPATIENT
Start: 2024-05-28

## 2024-05-28 NOTE — PROGRESS NOTES
HPI:   Syed Kelley is a pleasant 59 y.o. patient who reports to clinic with complaints of Medication refill and lab work. He is here to establish care today. He has a past medical history of Type 2 DM, Hypertension, Hyperlipidemia, Vit D deficiency, depression, ADHD, Neuropathy, AC (Alfalfa), . He states that he uses an inhaler when he gets short of breath. He states he mainly gets short of breath occasionally when he is doing stuff and walking around a lot. He states that he has AC, and he could not afford to get his C-pap. He states insurance covered all but $200 dollars and he could not afford the $200 to buy the machine. He is not a smoker, but does dip tobacco. He denies any chest pain. He denies any issues going on today. He states he was just told to come in for lab today.     Hyperlipidemia  This is a chronic problem. The current episode started more than 1 year ago. The problem is controlled. Recent lipid tests were reviewed and are normal. Pertinent negatives include no chest pain.   Diabetes  He presents for his follow-up diabetic visit. He has type 2 diabetes mellitus. No MedicAlert identification noted. His disease course has been stable. There are no hypoglycemic associated symptoms. Pertinent negatives for hypoglycemia include no dizziness. Pertinent negatives for diabetes include no chest pain. There are no hypoglycemic complications. There are no diabetic complications.                Past Medical History:   Diagnosis Date    Adult attention deficit disorder 03/22/2021    Depression 12/22/2022    Essential hypertension 03/22/2021    Hyperlipidemia     Neuropathy 08/23/2023    AC (obstructive sleep apnea) 10/19/2023    PAD (peripheral artery disease) 02/17/2023    Swollen leg 01/20/2024    Type 2 diabetes mellitus        PAST SURGICAL HISTORY:   Past Surgical History:   Procedure Laterality Date    APPENDECTOMY      CARDIAC CATHETERIZATION      DEBRIDEMENT OF LOWER EXTREMITY Bilateral 2/18/2023  "   Procedure: DEBRIDEMENT, LOWER EXTREMITY;  Surgeon: Yael Cohen MD;  Location: [facility] OR;  Service: General;  Laterality: Bilateral;  debride toes    LEFT HEART CATHETERIZATION N/A 5/7/2021    Procedure: Left heart cath with possible intervention;  Surgeon: Federico James DO;  Location: [facility] CATH LAB;  Service: Cardiology;  Laterality: N/A;    TOE AMPUTATION Right 3/29/2023    Procedure: AMPUTATION, TOE;  Surgeon: Yael Cohen MD;  Location: [facility] OR;  Service: General;  Laterality: Right;  Right great toe amp dr cohen wednesday       MEDICATIONS:    Current Outpatient Medications:     aspirin (ECOTRIN) 81 MG EC tablet, Take 1 tablet (81 mg total) by mouth once daily., Disp: 90 tablet, Rfl: 3    cholecalciferol, vitamin D3, (VITAMIN D3) 125 mcg (5,000 unit) Tab, Take 5,000 Units by mouth once daily., Disp: , Rfl:     citalopram (CELEXA) 20 MG tablet, TAKE ONE TABLET BY MOUTH ONCE DAILY, Disp: 90 tablet, Rfl: 1    gabapentin (NEURONTIN) 300 MG capsule, Take 1 capsule (300 mg total) by mouth 3 (three) times daily., Disp: 90 capsule, Rfl: 11    insulin degludec (TRESIBA FLEXTOUCH U-200) 200 unit/mL (3 mL) insulin pen, Inject 30 Units into the skin once daily. Increase by 5 units if blood glucose is elevated above 200. weekly, Disp: 3 pen , Rfl: 3    insulin syringe-needle U-100 0.3 mL 31 gauge x 5/16" Syrg, 100 each by abdominal subcutaneous route once daily. Use once daily for insulin injection, Disp: 100 each, Rfl: 2    pen needle, diabetic (BD ULTRA-FINE SHORT PEN NEEDLE) 31 gauge x 5/16" Ndle, USE AS DIRECTED TO administer insulin TWICE DAILY for 50, Disp: , Rfl:     pen needle, diabetic 31 gauge x 5/16" Ndle, 100 each by abdominal subcutaneous route once daily. Use to administer insulin twice daily, Disp: 100 each, Rfl: 2    albuterol (PROVENTIL/VENTOLIN HFA) 90 mcg/actuation inhaler, Inhale 2 puffs into the lungs every 4 (four) hours as needed for Wheezing. Rescue, Disp: 8 g, Rfl: 0    " atorvastatin (LIPITOR) 80 MG tablet, Take 1 tablet (80 mg total) by mouth once daily., Disp: 90 tablet, Rfl: 1    dextroamphetamine-amphetamine (ADDERALL) 20 mg tablet, Take 1 tablet by mouth each afternoon, Disp: 30 tablet, Rfl: 0    dextroamphetamine-amphetamine (ADDERALL) 30 mg Tab, Take 1 tablet by mouth each morning, Disp: 30 tablet, Rfl: 0    glyBURIDE (DIABETA) 5 MG tablet, Take 1 tablet (5 mg total) by mouth daily with breakfast., Disp: 90 tablet, Rfl: 1    lisinopriL-hydrochlorothiazide (PRINZIDE,ZESTORETIC) 20-25 mg Tab, Take 1 tablet by mouth once daily., Disp: 90 tablet, Rfl: 1    meclizine (ANTIVERT) 25 mg tablet, Take 1 tablet (25 mg total) by mouth 3 (three) times daily as needed. (Patient not taking: Reported on 5/28/2024), Disp: 40 tablet, Rfl: 0    metFORMIN (GLUCOPHAGE) 1000 MG tablet, Take 1 tablet (1,000 mg total) by mouth 2 (two) times daily with meals., Disp: 180 tablet, Rfl: 1    metoprolol tartrate (LOPRESSOR) 50 MG tablet, Take 1 tablet (50 mg total) by mouth 2 (two) times daily., Disp: 180 tablet, Rfl: 1    ALLERGIES:   Review of patient's allergies indicates:   Allergen Reactions    Azithromycin     Erythromycin Other (See Comments)    M-mycin          Review of Systems   Constitutional: Negative.  Negative for activity change, chills and fever.   HENT: Negative.  Negative for drooling and nosebleeds.    Eyes: Negative.    Respiratory: Negative.  Negative for chest tightness.    Cardiovascular: Negative.  Negative for chest pain and palpitations.   Gastrointestinal: Negative.    Endocrine: Negative.    Genitourinary: Negative.  Negative for difficulty urinating.   Musculoskeletal: Negative.    Integumentary:  Negative.   Allergic/Immunologic: Negative.    Neurological: Negative.  Negative for dizziness.   Hematological: Negative.    Psychiatric/Behavioral: Negative.     All other systems reviewed and are negative.         Physical Exam  Constitutional:       General: He is not in acute  "distress.     Appearance: Normal appearance. He is well-developed. He is obese. He is not ill-appearing.   HENT:      Head: Normocephalic and atraumatic.      Right Ear: Tympanic membrane normal.      Left Ear: Tympanic membrane normal.      Nose: Nose normal.      Mouth/Throat:      Mouth: Mucous membranes are moist.      Pharynx: Oropharynx is clear. No posterior oropharyngeal erythema.   Cardiovascular:      Rate and Rhythm: Normal rate and regular rhythm.      Pulses: Normal pulses.      Heart sounds: Normal heart sounds.   Pulmonary:      Effort: Pulmonary effort is normal. No accessory muscle usage or respiratory distress.      Breath sounds: Normal breath sounds.   Abdominal:      General: Abdomen is flat. Bowel sounds are normal. There is no distension.      Palpations: Abdomen is soft.      Tenderness: There is no abdominal tenderness.   Musculoskeletal:         General: Normal range of motion.      Cervical back: Normal range of motion.   Skin:     General: Skin is warm and dry.      Capillary Refill: Capillary refill takes less than 2 seconds.   Neurological:      Mental Status: He is alert and oriented to person, place, and time. Mental status is at baseline.   Psychiatric:         Mood and Affect: Mood normal.         Speech: Speech normal.         Behavior: Behavior normal. Behavior is cooperative.         Thought Content: Thought content normal.          VITAL SIGNS:   /73 (BP Location: Right arm, Patient Position: Sitting, BP Method: Large (Automatic))   Pulse 66   Temp 98.5 °F (36.9 °C) (Oral)   Resp 18   Ht 5' 3" (1.6 m)   Wt 90.4 kg (199 lb 6.4 oz)   SpO2 97%   BMI 35.32 kg/m²       ASSESSMENT/PLAN  1. Long-term current use of stimulant  Assessment & Plan:  Drug screen positive for adderal. On adderall.     Orders:  -     POCT Urine Drug Screen Presump    2. Mixed hyperlipidemia  Assessment & Plan:  Low fat, low chol diet, less fried foods, more baked foods, more vegetables. "   Exercise daily  Take medications as ordered for hyperlipidemia  Return to the clinic as needed   Follow up in 6 months   Lipids panel today ,will call with results and adjust medications accordingly.      Orders:  -     atorvastatin (LIPITOR) 80 MG tablet; Take 1 tablet (80 mg total) by mouth once daily.  Dispense: 90 tablet; Refill: 1    3. Depression, unspecified depression type  Assessment & Plan:  Continue current medication, is effective at this time. Return to the clinic as needed.   Symptoms are controlled.      4. Adult attention deficit disorder  Assessment & Plan:  UDS and  reviewed with no aberrant behavior noted.   Pt stable on current medication. Med refilled. Explained the risk verses benefit of adderal at his age with his conditions. He voiced understanding.     Orders:  -     dextroamphetamine-amphetamine (ADDERALL) 20 mg tablet; Take 1 tablet by mouth each afternoon  Dispense: 30 tablet; Refill: 0  -     dextroamphetamine-amphetamine (ADDERALL) 30 mg Tab; Take 1 tablet by mouth each morning  Dispense: 30 tablet; Refill: 0    5. Neuropathy  Assessment & Plan:  Continue current medication, is effective at this time. Return to the clinic as needed.         6. Type 2 diabetes mellitus without complication, without long-term current use of insulin  Assessment & Plan:  Continue current medication, is effective at this time. Return to the clinic as needed.   A1c in 3 months  -Blood sugar is controlled at this time, will get an A1C today.   -Work on diet, specifically cutting back on bread, breaded things, cereal, sugars, sugary drinks, cakes etc.   -Add free weights if you can even light weights will help.   -Keep sugars, fasting sugar goal is , after eating no greater than 180.   -A1C goal is 7.0% or less.       Orders:  -     glyBURIDE (DIABETA) 5 MG tablet; Take 1 tablet (5 mg total) by mouth daily with breakfast.  Dispense: 90 tablet; Refill: 1  -     metFORMIN (GLUCOPHAGE) 1000 MG tablet;  Take 1 tablet (1,000 mg total) by mouth 2 (two) times daily with meals.  Dispense: 180 tablet; Refill: 1    7. Essential hypertension, benign  Assessment & Plan:  -Hypertension is controlled at this point. Continue current medication, is effective at this time. Return to the clinic as needed.   Low salt, low sodium diet, less fried foods, more baked foods, more green leafy vegetables, more fruits, less bread  -Exercise at least 30-45 minutes a day  -Follow up in 6 months.       Orders:  -     lisinopriL-hydrochlorothiazide (PRINZIDE,ZESTORETIC) 20-25 mg Tab; Take 1 tablet by mouth once daily.  Dispense: 90 tablet; Refill: 1  -     metoprolol tartrate (LOPRESSOR) 50 MG tablet; Take 1 tablet (50 mg total) by mouth 2 (two) times daily.  Dispense: 180 tablet; Refill: 1  -     Comprehensive Metabolic Panel; Future; Expected date: 05/28/2024  -     Comprehensive Metabolic Panel; Future; Expected date: 05/28/2024    8. Shortness of breath  -     albuterol (PROVENTIL/VENTOLIN HFA) 90 mcg/actuation inhaler; Inhale 2 puffs into the lungs every 4 (four) hours as needed for Wheezing. Rescue  Dispense: 8 g; Refill: 0    9. Anemia, unspecified type  -     Vitamin B12 & Folate; Future; Expected date: 05/28/2024    10. Screening for malignant neoplasm of prostate  -     PSA, Screening; Future; Expected date: 05/28/2024             Patient Instructions   Follow up with Dr. Cruz in August.   RTC if needed before the.   Orders Placed This Encounter   Procedures    Comprehensive Metabolic Panel           Standing Status:   Future     Number of Occurrences:   1     Standing Expiration Date:   8/26/2025    Vitamin B12 & Folate           Standing Status:   Future     Number of Occurrences:   1     Standing Expiration Date:   8/26/2025    PSA, Screening           Standing Status:   Future     Number of Occurrences:   1     Standing Expiration Date:   8/26/2025    Comprehensive Metabolic Panel           Standing Status:   Future      Number of Occurrences:   1     Standing Expiration Date:   8/26/2025    POCT Urine Drug Screen Presump     Interpretive Information:     Negative:  No drug detected at the cut off level.   Positive:  This result represents presumptive positive for the   tested drug, other substances may yield a positive response other   than the analyte of interest. This result should be utilized for   diagnostic purpose only. Confirmation testing will be performed upon physician request only.

## 2024-05-28 NOTE — ASSESSMENT & PLAN NOTE
-Hypertension is controlled at this point. Continue current medication, is effective at this time. Return to the clinic as needed.   Low salt, low sodium diet, less fried foods, more baked foods, more green leafy vegetables, more fruits, less bread  -Exercise at least 30-45 minutes a day  -Follow up in 6 months.

## 2024-05-28 NOTE — ASSESSMENT & PLAN NOTE
Low fat, low chol diet, less fried foods, more baked foods, more vegetables.   Exercise daily  Take medications as ordered for hyperlipidemia  Return to the clinic as needed   Follow up in 6 months   Lipids panel today ,will call with results and adjust medications accordingly.

## 2024-05-28 NOTE — ASSESSMENT & PLAN NOTE
Continue current medication, is effective at this time. Return to the clinic as needed.   Symptoms are controlled.

## 2024-05-28 NOTE — PROGRESS NOTES
Is he on B12? I do not see in his med list where he is. Please let him know his B12 is low and he needs to do monthly IM b12 injections. If he is okay with that I can set him up to have them in the clinic. Other labs look good.  no

## 2024-05-28 NOTE — ASSESSMENT & PLAN NOTE
UDS and  reviewed with no aberrant behavior noted.   Pt stable on current medication. Med refilled. Explained the risk verses benefit of adderal at his age with his conditions. He voiced understanding.

## 2024-05-28 NOTE — ASSESSMENT & PLAN NOTE
Continue current medication, is effective at this time. Return to the clinic as needed.   A1c in 3 months  -Blood sugar is controlled at this time, will get an A1C today.   -Work on diet, specifically cutting back on bread, breaded things, cereal, sugars, sugary drinks, cakes etc.   -Add free weights if you can even light weights will help.   -Keep sugars, fasting sugar goal is , after eating no greater than 180.   -A1C goal is 7.0% or less.

## 2024-05-29 DIAGNOSIS — E53.8 B12 DEFICIENCY: Primary | ICD-10-CM

## 2024-05-29 RX ORDER — CYANOCOBALAMIN 1000 UG/ML
1000 INJECTION, SOLUTION INTRAMUSCULAR; SUBCUTANEOUS ONCE
Status: COMPLETED | OUTPATIENT
Start: 2024-05-29 | End: 2024-06-06

## 2024-06-06 ENCOUNTER — CLINICAL SUPPORT (OUTPATIENT)
Dept: FAMILY MEDICINE | Facility: CLINIC | Age: 59
End: 2024-06-06
Payer: COMMERCIAL

## 2024-06-06 ENCOUNTER — OFFICE VISIT (OUTPATIENT)
Dept: FAMILY MEDICINE | Facility: CLINIC | Age: 59
End: 2024-06-06
Payer: COMMERCIAL

## 2024-06-06 VITALS
DIASTOLIC BLOOD PRESSURE: 75 MMHG | HEART RATE: 72 BPM | HEIGHT: 63 IN | BODY MASS INDEX: 35.15 KG/M2 | OXYGEN SATURATION: 97 % | TEMPERATURE: 98 F | SYSTOLIC BLOOD PRESSURE: 120 MMHG | WEIGHT: 198.38 LBS | RESPIRATION RATE: 20 BRPM

## 2024-06-06 DIAGNOSIS — E11.65 TYPE 2 DIABETES MELLITUS WITH HYPERGLYCEMIA, WITH LONG-TERM CURRENT USE OF INSULIN: Primary | ICD-10-CM

## 2024-06-06 DIAGNOSIS — L84 CALLUS OF FOOT: ICD-10-CM

## 2024-06-06 DIAGNOSIS — E53.8 B12 DEFICIENCY: Primary | ICD-10-CM

## 2024-06-06 DIAGNOSIS — Z79.4 TYPE 2 DIABETES MELLITUS WITH HYPERGLYCEMIA, WITH LONG-TERM CURRENT USE OF INSULIN: Primary | ICD-10-CM

## 2024-06-06 DIAGNOSIS — B35.1 ONYCHOMYCOSIS: ICD-10-CM

## 2024-06-06 DIAGNOSIS — G62.9 NEUROPATHY: ICD-10-CM

## 2024-06-06 PROCEDURE — 96372 THER/PROPH/DIAG INJ SC/IM: CPT | Mod: ,,,

## 2024-06-06 PROCEDURE — 11719 TRIM NAIL(S) ANY NUMBER: CPT | Mod: ,,, | Performed by: NURSE PRACTITIONER

## 2024-06-06 PROCEDURE — 1160F RVW MEDS BY RX/DR IN RCRD: CPT | Mod: CPTII,,, | Performed by: NURSE PRACTITIONER

## 2024-06-06 PROCEDURE — 1159F MED LIST DOCD IN RCRD: CPT | Mod: CPTII,,, | Performed by: NURSE PRACTITIONER

## 2024-06-06 PROCEDURE — 3078F DIAST BP <80 MM HG: CPT | Mod: CPTII,,, | Performed by: NURSE PRACTITIONER

## 2024-06-06 PROCEDURE — 99213 OFFICE O/P EST LOW 20 MIN: CPT | Mod: 25,,, | Performed by: NURSE PRACTITIONER

## 2024-06-06 PROCEDURE — 2023F DILAT RTA XM W/O RTNOPTHY: CPT | Mod: CPTII,,, | Performed by: NURSE PRACTITIONER

## 2024-06-06 PROCEDURE — 4010F ACE/ARB THERAPY RXD/TAKEN: CPT | Mod: CPTII,,, | Performed by: NURSE PRACTITIONER

## 2024-06-06 PROCEDURE — 3074F SYST BP LT 130 MM HG: CPT | Mod: CPTII,,, | Performed by: NURSE PRACTITIONER

## 2024-06-06 PROCEDURE — 3066F NEPHROPATHY DOC TX: CPT | Mod: CPTII,,, | Performed by: NURSE PRACTITIONER

## 2024-06-06 PROCEDURE — 3044F HG A1C LEVEL LT 7.0%: CPT | Mod: CPTII,,, | Performed by: NURSE PRACTITIONER

## 2024-06-06 PROCEDURE — 3061F NEG MICROALBUMINURIA REV: CPT | Mod: CPTII,,, | Performed by: NURSE PRACTITIONER

## 2024-06-06 PROCEDURE — 3008F BODY MASS INDEX DOCD: CPT | Mod: CPTII,,, | Performed by: NURSE PRACTITIONER

## 2024-06-06 RX ADMIN — CYANOCOBALAMIN 1000 MCG: 1000 INJECTION, SOLUTION INTRAMUSCULAR; SUBCUTANEOUS at 02:06

## 2024-06-06 NOTE — PROCEDURES
"Foot Care    Date/Time: 6/6/2024 2:40 PM    Performed by: Nydia Martines FNP  Authorized by: Nydia Martines FNP    Time out: Immediately prior to procedure a "time out" was called to verify the correct patient, procedure, equipment, support staff and site/side marked as required.    Consent Done?:  Yes (Verbal)    Nail Care Type:  Trim  Location(s): All  (Left 1st Toe, Left 3rd Toe, Left 2nd Toe, Left 4th Toe, Left 5th Toe, Right 1st Toe, Right 2nd Toe, Right 3rd Toe, Right 4th Toe and Right 5th Toe)  Patient tolerance:  Patient tolerated the procedure well with no immediate complications     Filed calluses smooth to bilateral plantar feet. Pt bing well.     "

## 2024-06-06 NOTE — ASSESSMENT & PLAN NOTE
Neuropathy  is controlled. Current medical regimen is effective;   Will adjust according to results and monitor.

## 2024-06-06 NOTE — PROGRESS NOTES
REMEDIOS Orr   [facility]  [address]  [place] [address]     PATIENT NAME: Syed Kelley  : 1965  DATE: 24  MRN: [id number]      Billing Provider: REMEDIOS Orr  Level of Service:   Patient PCP Information       Provider PCP Type    Julio Cruz DO General            Reason for Visit / Chief Complaint: Nail Care (Diabetic foot care )   Health Maintenance Due   Topic Date Due    Pneumococcal Vaccines (Age 0-64) (1 of 2 - PCV) Never done    TETANUS VACCINE  Never done    Colorectal Cancer Screening  Never done    Shingles Vaccine (1 of 2) Never done          History of Present Illness / Problem Focused Workflow     Syed Kelley presents to the clinic for diabetic foot care. Pt has thick, long toe nails unable to be trimmed with standard clippers. Pt also has calluses to the bottom of both feet. 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 He states he does have numbness/tingling to lower extremities occasionally. He also has fungus to nails he is applying liquid medication to. He denies any other needs at this time. NAD noted.     Hemoglobin A1C   Date Value Ref Range Status   2024 6.8 (H) 4.5 - 6.6 % Final     Comment:       Normal:               <5.7%  Pre-Diabetic:       5.7% to 6.4%  Diabetic:             >6.4%  Diabetic Goal:     <7%   11/10/2023 6.6 4.5 - 6.6 % Final     Comment:       Normal:               <5.7%  Pre-Diabetic:       5.7% to 6.4%  Diabetic:             >6.4%  Diabetic Goal:     <7%   2023 6.5 4.5 - 6.6 % Final     Comment:       Normal:               <5.7%  Pre-Diabetic:       5.7% to 6.4%  Diabetic:             >6.4%  Diabetic Goal:     <7%        CMP  Sodium   Date Value Ref Range Status   2024 136 136 - 145 mmol/L Final     Potassium   Date Value Ref Range Status   2024 4.0 3.5 - 5.1 mmol/L Final     Chloride   Date Value Ref Range Status   2024 99 98 - 107 mmol/L Final     CO2   Date Value Ref Range Status   2024 31 21  - 32 mmol/L Final     Glucose   Date Value Ref Range Status   05/28/2024 61 (L) 74 - 106 mg/dL Final     BUN   Date Value Ref Range Status   05/28/2024 15 7 - 18 mg/dL Final     Creatinine   Date Value Ref Range Status   05/28/2024 0.94 0.70 - 1.30 mg/dL Final     Calcium   Date Value Ref Range Status   05/28/2024 9.7 8.5 - 10.1 mg/dL Final     Total Protein   Date Value Ref Range Status   05/28/2024 7.5 6.4 - 8.2 g/dL Final     Albumin   Date Value Ref Range Status   05/28/2024 3.3 (L) 3.5 - 5.0 g/dL Final     Bilirubin, Total   Date Value Ref Range Status   05/28/2024 0.4 >0.0 - 1.2 mg/dL Final     Alk Phos   Date Value Ref Range Status   05/28/2024 109 45 - 115 U/L Final     AST   Date Value Ref Range Status   05/28/2024 21 15 - 37 U/L Final     ALT   Date Value Ref Range Status   05/28/2024 29 16 - 61 U/L Final     Anion Gap   Date Value Ref Range Status   05/28/2024 10 7 - 16 mmol/L Final     eGFR   Date Value Ref Range Status   05/28/2024 93 >=60 mL/min/1.73m2 Final        Lab Results   Component Value Date    WBC 8.19 04/19/2024    RBC 4.16 (L) 04/19/2024    HGB 12.9 (L) 04/19/2024    HCT 40.1 04/19/2024    MCV 96.4 (H) 04/19/2024    MCH 31.0 04/19/2024    MCHC 32.2 04/19/2024    RDW 12.7 04/19/2024     04/19/2024    MPV 10.0 04/19/2024    LYMPH 25.4 (L) 04/19/2024    LYMPH 2.08 04/19/2024    MONO 6.5 (H) 04/19/2024    EOS 0.27 04/19/2024    BASO 0.05 04/19/2024    EOSINOPHIL 3.3 04/19/2024    BASOPHIL 0.6 04/19/2024        Lab Results   Component Value Date    CHOL 143 08/23/2023    CHOL 128 12/22/2022    CHOL 150 08/15/2022     Lab Results   Component Value Date    HDL 29 (L) 08/23/2023    HDL 32 (L) 12/22/2022    HDL 36 (L) 08/15/2022     Lab Results   Component Value Date    LDLCALC 46 08/23/2023    LDLCALC 53 12/22/2022    LDLCALC 43 08/15/2022     Lab Results   Component Value Date    TRIG 338 (H) 08/23/2023    TRIG 215 (H) 12/22/2022    TRIG 355 (H) 08/15/2022     Lab Results   Component Value  Date    CHOLHDL 4.9 08/23/2023    CHOLHDL 4.0 12/22/2022    CHOLHDL 4.2 08/15/2022        Wt Readings from Last 3 Encounters:   06/06/24 1418 90 kg (198 lb 6.4 oz)   05/28/24 0902 90.4 kg (199 lb 6.4 oz)   04/19/24 1447 91.8 kg (202 lb 6.4 oz)        BP Readings from Last 3 Encounters:   06/06/24 120/75   05/28/24 124/73   04/19/24 127/80        Review of Systems     Review of Systems   Constitutional: Negative.    Respiratory: Negative.     Cardiovascular: Negative.    Musculoskeletal:  Positive for arthralgias.   Integumentary:         Thick, long toe nails, dry skin, calluses to both feet   Allergic/Immunologic: Negative.    Neurological:  Positive for numbness.   Hematological: Negative.    Psychiatric/Behavioral: Negative.          Medical / Social / Family History     Past Medical History:   Diagnosis Date    Adult attention deficit disorder 03/22/2021    Depression 12/22/2022    Essential hypertension 03/22/2021    Hyperlipidemia     Neuropathy 08/23/2023    AC (obstructive sleep apnea) 10/19/2023    PAD (peripheral artery disease) 02/17/2023    Swollen leg 01/20/2024    Type 2 diabetes mellitus        Past Surgical History:   Procedure Laterality Date    APPENDECTOMY      CARDIAC CATHETERIZATION      DEBRIDEMENT OF LOWER EXTREMITY Bilateral 2/18/2023    Procedure: DEBRIDEMENT, LOWER EXTREMITY;  Surgeon: Yael Cohen MD;  Location: [facility] OR;  Service: General;  Laterality: Bilateral;  debride toes    LEFT HEART CATHETERIZATION N/A 5/7/2021    Procedure: Left heart cath with possible intervention;  Surgeon: Federico James DO;  Location: [facility] CATH LAB;  Service: Cardiology;  Laterality: N/A;    TOE AMPUTATION Right 3/29/2023    Procedure: AMPUTATION, TOE;  Surgeon: Yael Cohen MD;  Location: [facility] OR;  Service: General;  Laterality: Right;  Right great toe amp dr cohen wednesday       Social History    reports that he quit smoking about 39 years ago. His smoking use included cigarettes.  "He started smoking about 43 years ago. He has a 20 pack-year smoking history. He has been exposed to tobacco smoke. His smokeless tobacco use includes chew and snuff. He reports current alcohol use. He reports that he does not use drugs.    Family History  's family history includes Cancer in his sister; Hypertension in his father; Stroke in his father.    Medications and Allergies     Medications  Outpatient Medications Marked as Taking for the 6/6/24 encounter (Office Visit) with Nydia Martines FNP   Medication Sig Dispense Refill    albuterol (PROVENTIL/VENTOLIN HFA) 90 mcg/actuation inhaler Inhale 2 puffs into the lungs every 4 (four) hours as needed for Wheezing. Rescue 8 g 0    aspirin (ECOTRIN) 81 MG EC tablet Take 1 tablet (81 mg total) by mouth once daily. 90 tablet 3    atorvastatin (LIPITOR) 80 MG tablet Take 1 tablet (80 mg total) by mouth once daily. 90 tablet 1    cholecalciferol, vitamin D3, (VITAMIN D3) 125 mcg (5,000 unit) Tab Take 5,000 Units by mouth once daily.      citalopram (CELEXA) 20 MG tablet TAKE ONE TABLET BY MOUTH ONCE DAILY 90 tablet 1    dextroamphetamine-amphetamine (ADDERALL) 20 mg tablet Take 1 tablet by mouth each afternoon 30 tablet 0    dextroamphetamine-amphetamine (ADDERALL) 30 mg Tab Take 1 tablet by mouth each morning 30 tablet 0    gabapentin (NEURONTIN) 300 MG capsule Take 1 capsule (300 mg total) by mouth 3 (three) times daily. 90 capsule 11    glyBURIDE (DIABETA) 5 MG tablet Take 1 tablet (5 mg total) by mouth daily with breakfast. 90 tablet 1    insulin degludec (TRESIBA FLEXTOUCH U-200) 200 unit/mL (3 mL) insulin pen Inject 30 Units into the skin once daily. Increase by 5 units if blood glucose is elevated above 200. weekly 3 pen 3    insulin syringe-needle U-100 0.3 mL 31 gauge x 5/16" Syrg 100 each by abdominal subcutaneous route once daily. Use once daily for insulin injection 100 each 2    lisinopriL-hydrochlorothiazide (PRINZIDE,ZESTORETIC) 20-25 mg Tab Take 1 " "tablet by mouth once daily. 90 tablet 1    metFORMIN (GLUCOPHAGE) 1000 MG tablet Take 1 tablet (1,000 mg total) by mouth 2 (two) times daily with meals. 180 tablet 1    metoprolol tartrate (LOPRESSOR) 50 MG tablet Take 1 tablet (50 mg total) by mouth 2 (two) times daily. 180 tablet 1    pen needle, diabetic (BD ULTRA-FINE SHORT PEN NEEDLE) 31 gauge x 5/16" Ndle USE AS DIRECTED TO administer insulin TWICE DAILY for 50      pen needle, diabetic 31 gauge x 5/16" Ndle 100 each by abdominal subcutaneous route once daily. Use to administer insulin twice daily 100 each 2     Current Facility-Administered Medications for the 6/6/24 encounter (Office Visit) with Nydia Martines FNP   Medication Dose Route Frequency Provider Last Rate Last Admin    [COMPLETED] cyanocobalamin injection 1,000 mcg  1,000 mcg Intramuscular Once Alejandra Velarde FNP   1,000 mcg at 06/06/24 1423       Allergies  Review of patient's allergies indicates:   Allergen Reactions    Azithromycin     Erythromycin Other (See Comments)    M-mycin        Physical Examination     Vitals:    06/06/24 1418   BP: 120/75   BP Location: Left arm   Patient Position: Sitting   BP Method: Large (Automatic)   Pulse: 72   Resp: 20   Temp: 98.3 °F (36.8 °C)   TempSrc: Oral   SpO2: 97%   Weight: 90 kg (198 lb 6.4 oz)   Height: 5' 3" (1.6 m)      Physical Exam  Constitutional:       Appearance: Normal appearance. He is obese.   HENT:      Head: Normocephalic.   Eyes:      Extraocular Movements: Extraocular movements intact.   Cardiovascular:      Rate and Rhythm: Normal rate and regular rhythm.      Pulses: Normal pulses.           Dorsalis pedis pulses are 2+ on the right side and 2+ on the left side.        Posterior tibial pulses are 2+ on the right side and 2+ on the left side.      Heart sounds: Normal heart sounds.   Pulmonary:      Effort: Pulmonary effort is normal.      Breath sounds: Normal breath sounds.   Musculoskeletal:      Right foot: Normal range of " motion. No deformity, bunion, Charcot foot, foot drop or prominent metatarsal heads.      Left foot: Normal range of motion. No deformity, bunion, Charcot foot, foot drop or prominent metatarsal heads.      Right Lower Extremity: (right great toe)  Feet:      Right foot:      Skin integrity: Callus and dry skin present.      Toenail Condition: Right toenails are abnormally thick and long. Fungal disease present.     Left foot:      Skin integrity: Callus and dry skin present.      Toenail Condition: Left toenails are abnormally thick and long. Fungal disease present.  Skin:     General: Skin is warm and dry.      Capillary Refill: Capillary refill takes less than 2 seconds.   Neurological:      General: No focal deficit present.      Mental Status: He is alert and oriented to person, place, and time.   Psychiatric:         Mood and Affect: Mood normal.         Behavior: Behavior normal.          Assessment and Plan (including Health Maintenance)   :    Plan:     There are no Patient Instructions on file for this visit.       Health Maintenance Due   Topic Date Due    Pneumococcal Vaccines (Age 0-64) (1 of 2 - PCV) Never done    TETANUS VACCINE  Never done    Colorectal Cancer Screening  Never done    Shingles Vaccine (1 of 2) Never done       Problem List Items Addressed This Visit       Callus of foot    Current Assessment & Plan     See procedure note.          Neuropathy    Current Assessment & Plan     Neuropathy  is controlled. Current medical regimen is effective;   Will adjust according to results and monitor.          Onychomycosis    Current Assessment & Plan     Taking good care of your feet is very important!  1) Look at your bare feet daily for blisters, red spots, cuts, cracks, swelling or sores. If you can not see well, you can use a mirror to look or have someone help you.  2) Report promptly to your health care provider if you notice any changes in your feet as noted above or if your feet change  "colors, shape or just do not "feel right" for example: if your feet become less sensitive or begin hurting.  3) Wash your feet everyday. Use warm  not hot water. Check the water temperature with your wrist or other body part - not your feet.  4) Dry your feet well. Make sure you pat them dry. Do not rub the skin on your feet too hard. Make sure to dry well between your toes. Allowing moisture to stay between your toes or on your feet could lead to infection.  5) Keep your feet soft and smooth. Rub a thin coat of lotion on your feet each day but do not put lotion between your toes.  6) Wear socks and shoes at all times.  7) Never walk barefoot.          Type 2 diabetes mellitus with hyperglycemia - Primary    Current Assessment & Plan     Diabetes is managed by patient's PCP  1) Follow your diabetic diet as directed  2) Regular physical activity as directed   3) Check your blood sugar levels as directed  4) Take your medications as directed  5) Follow up in 3 months             Type 2 diabetes mellitus with hyperglycemia, with long-term current use of insulin    Callus of foot    Neuropathy    Onychomycosis         Health Maintenance Topics with due status: Not Due       Topic Last Completion Date    Lipid Panel 08/23/2023    Diabetes Urine Screening 01/19/2024    Eye Exam 02/07/2024    Hemoglobin A1c 04/19/2024    Low Dose Statin 06/06/2024    Foot Exam 06/06/2024         Future Appointments   Date Time Provider Department Center   7/17/2024  8:00 AM Chelsey Mckoy NP [facility] [place]   8/28/2024  2:00 PM Julio Cruz, DO [facility] [place]   9/9/2024  2:20 PM Nydia Martines FNP [facility] [place]   10/10/2024  7:00 AM [facility] GI ROOM 03 [facility]   1/22/2025  3:00 PM Julio Cruz, DO [facility] [place]        Follow up in about 3 months (around 9/6/2024), or if symptoms worsen or fail to improve, for diabetic foot care .    Health Maintenance Due   Topic Date Due    " Pneumococcal Vaccines (Age 0-64) (1 of 2 - PCV) Never done    TETANUS VACCINE  Never done    Colorectal Cancer Screening  Never done    Shingles Vaccine (1 of 2) Never done        Signature:  REMEDIOS Orr    Date of encounter: 6/6/24

## 2024-06-06 NOTE — ASSESSMENT & PLAN NOTE
Diabetes is managed by patient's PCP  1) Follow your diabetic diet as directed  2) Regular physical activity as directed   3) Check your blood sugar levels as directed  4) Take your medications as directed  5) Follow up in 3 months

## 2024-07-08 ENCOUNTER — PATIENT MESSAGE (OUTPATIENT)
Dept: FAMILY MEDICINE | Facility: CLINIC | Age: 59
End: 2024-07-08
Payer: COMMERCIAL

## 2024-07-08 DIAGNOSIS — G62.9 NEUROPATHY: ICD-10-CM

## 2024-07-09 RX ORDER — GABAPENTIN 300 MG/1
300 CAPSULE ORAL 3 TIMES DAILY
Qty: 90 CAPSULE | Refills: 0 | Status: SHIPPED | OUTPATIENT
Start: 2024-07-09

## 2024-07-17 ENCOUNTER — OFFICE VISIT (OUTPATIENT)
Dept: FAMILY MEDICINE | Facility: CLINIC | Age: 59
End: 2024-07-17
Payer: COMMERCIAL

## 2024-07-17 VITALS
TEMPERATURE: 98 F | SYSTOLIC BLOOD PRESSURE: 143 MMHG | WEIGHT: 200.63 LBS | HEIGHT: 63 IN | HEART RATE: 60 BPM | OXYGEN SATURATION: 96 % | BODY MASS INDEX: 35.55 KG/M2 | RESPIRATION RATE: 18 BRPM | DIASTOLIC BLOOD PRESSURE: 88 MMHG

## 2024-07-17 DIAGNOSIS — G62.9 NEUROPATHY: Chronic | ICD-10-CM

## 2024-07-17 DIAGNOSIS — Z79.4 TYPE 2 DIABETES MELLITUS WITH HYPERGLYCEMIA, WITH LONG-TERM CURRENT USE OF INSULIN: Primary | Chronic | ICD-10-CM

## 2024-07-17 DIAGNOSIS — Z79.899 LONG-TERM CURRENT USE OF STIMULANT: ICD-10-CM

## 2024-07-17 DIAGNOSIS — F32.A DEPRESSION, UNSPECIFIED DEPRESSION TYPE: ICD-10-CM

## 2024-07-17 DIAGNOSIS — F98.8 ADULT ATTENTION DEFICIT DISORDER: Chronic | ICD-10-CM

## 2024-07-17 DIAGNOSIS — E53.8 VITAMIN B12 DEFICIENCY: Chronic | ICD-10-CM

## 2024-07-17 DIAGNOSIS — E11.65 TYPE 2 DIABETES MELLITUS WITH HYPERGLYCEMIA, WITH LONG-TERM CURRENT USE OF INSULIN: Primary | Chronic | ICD-10-CM

## 2024-07-17 LAB
CTP QC/QA: YES
EST. AVERAGE GLUCOSE BLD GHB EST-MCNC: 146 MG/DL
HBA1C MFR BLD HPLC: 6.7 % (ref 4.5–6.6)
POC (AMP) AMPHETAMINE: NEGATIVE
POC (BAR) BARBITURATES: NEGATIVE
POC (BUP) BUPRENORPHINE: NEGATIVE
POC (BZO) BENZODIAZEPINES: NEGATIVE
POC (COC) COCAINE: NEGATIVE
POC (MDMA) METHYLENEDIOXYMETHAMPHETAMINE 3,4: NEGATIVE
POC (MET) METHAMPHETAMINE: NEGATIVE
POC (MOP) OPIATES: NEGATIVE
POC (MTD) METHADONE: NEGATIVE
POC (OXY) OXYCODONE: NEGATIVE
POC (PCP) PHENCYCLIDINE: NEGATIVE
POC (TCA) TRICYCLIC ANTIDEPRESSANTS: NEGATIVE
POC TEMPERATURE (URINE): 98
POC THC: NEGATIVE

## 2024-07-17 PROCEDURE — 3077F SYST BP >= 140 MM HG: CPT | Mod: CPTII,,, | Performed by: NURSE PRACTITIONER

## 2024-07-17 PROCEDURE — 3079F DIAST BP 80-89 MM HG: CPT | Mod: CPTII,,, | Performed by: NURSE PRACTITIONER

## 2024-07-17 PROCEDURE — 1160F RVW MEDS BY RX/DR IN RCRD: CPT | Mod: CPTII,,, | Performed by: NURSE PRACTITIONER

## 2024-07-17 PROCEDURE — 4010F ACE/ARB THERAPY RXD/TAKEN: CPT | Mod: CPTII,,, | Performed by: NURSE PRACTITIONER

## 2024-07-17 PROCEDURE — 80305 DRUG TEST PRSMV DIR OPT OBS: CPT | Mod: QW,,, | Performed by: NURSE PRACTITIONER

## 2024-07-17 PROCEDURE — 3061F NEG MICROALBUMINURIA REV: CPT | Mod: CPTII,,, | Performed by: NURSE PRACTITIONER

## 2024-07-17 PROCEDURE — 1159F MED LIST DOCD IN RCRD: CPT | Mod: CPTII,,, | Performed by: NURSE PRACTITIONER

## 2024-07-17 PROCEDURE — 3008F BODY MASS INDEX DOCD: CPT | Mod: CPTII,,, | Performed by: NURSE PRACTITIONER

## 2024-07-17 PROCEDURE — 99214 OFFICE O/P EST MOD 30 MIN: CPT | Mod: 25,,, | Performed by: NURSE PRACTITIONER

## 2024-07-17 PROCEDURE — 3066F NEPHROPATHY DOC TX: CPT | Mod: CPTII,,, | Performed by: NURSE PRACTITIONER

## 2024-07-17 PROCEDURE — 96372 THER/PROPH/DIAG INJ SC/IM: CPT | Mod: ,,, | Performed by: NURSE PRACTITIONER

## 2024-07-17 PROCEDURE — 3044F HG A1C LEVEL LT 7.0%: CPT | Mod: CPTII,,, | Performed by: NURSE PRACTITIONER

## 2024-07-17 PROCEDURE — 2023F DILAT RTA XM W/O RTNOPTHY: CPT | Mod: CPTII,,, | Performed by: NURSE PRACTITIONER

## 2024-07-17 PROCEDURE — 83036 HEMOGLOBIN GLYCOSYLATED A1C: CPT | Mod: ,,, | Performed by: CLINICAL MEDICAL LABORATORY

## 2024-07-17 RX ORDER — CYANOCOBALAMIN 1000 UG/ML
1000 INJECTION, SOLUTION INTRAMUSCULAR; SUBCUTANEOUS
Status: COMPLETED | OUTPATIENT
Start: 2024-07-17 | End: 2024-07-17

## 2024-07-17 RX ORDER — DEXTROAMPHETAMINE SACCHARATE, AMPHETAMINE ASPARTATE, DEXTROAMPHETAMINE SULFATE AND AMPHETAMINE SULFATE 7.5; 7.5; 7.5; 7.5 MG/1; MG/1; MG/1; MG/1
TABLET ORAL
Qty: 30 TABLET | Refills: 0 | Status: SHIPPED | OUTPATIENT
Start: 2024-07-17

## 2024-07-17 RX ORDER — DEXTROAMPHETAMINE SACCHARATE, AMPHETAMINE ASPARTATE, DEXTROAMPHETAMINE SULFATE AND AMPHETAMINE SULFATE 5; 5; 5; 5 MG/1; MG/1; MG/1; MG/1
TABLET ORAL
Qty: 30 TABLET | Refills: 0 | Status: SHIPPED | OUTPATIENT
Start: 2024-07-17

## 2024-07-17 RX ORDER — GABAPENTIN 300 MG/1
300 CAPSULE ORAL 3 TIMES DAILY
Qty: 90 CAPSULE | Refills: 1 | Status: SHIPPED | OUTPATIENT
Start: 2024-07-17

## 2024-07-17 RX ORDER — CITALOPRAM 20 MG/1
20 TABLET, FILM COATED ORAL DAILY
Qty: 90 TABLET | Refills: 1 | Status: SHIPPED | OUTPATIENT
Start: 2024-07-17

## 2024-07-17 RX ADMIN — CYANOCOBALAMIN 1000 MCG: 1000 INJECTION, SOLUTION INTRAMUSCULAR; SUBCUTANEOUS at 09:07

## 2024-07-17 NOTE — PATIENT INSTRUCTIONS
Please bring ALL medications bottles (from ALL providers) including over-the-counter medications to your next appointment !!!         get over-the-counter Vitamin B12 1000 mcg and take once daily

## 2024-07-17 NOTE — PROGRESS NOTES
[facility]    Chelsey Mckoy AGPCNP-BC  [facility] [id number] [address] [place] [address]  [phone]          PATIENT NAME: Syed Kelley  : 1965  DATE: 24  MRN: [id number]          Reason for Visit        Chief Complaint   Patient presents with    Diabetes     One month follow up    attention deficit disorder     Med refill    Health Maintenance     Pneumococcal Vaccines (Age 0-64)(1 of 2 - PCV) Never done-dec  TETANUS VACCINE Never done-dec  Colorectal Cancer Screening Never done-10/2024  Shingles Vaccine(1 of 2) Never done-dec  Hemoglobin A1c due on 2024         History of Present Illness      Syed Kelley is a 59 y.o. male with chronic conditions of Adult Attention Deficit Disorder, Depression, Hypertension, Neuropathy, AC, PAD, Type 2 Diabetes, Obesity, and User of Smokeless Tobacco who presents today for follow up of diabetes and ADHD. Discuss with him again today that I am not a mental health provider and that it is recommended that he establish with mental health for his ADHD.  was reviewed his last refill of Adderall was reported 2024. The last time I filled this medication for him his urine drug screen was negative and we discussed him having an alternate method of testing if it was negative on future check. Urine drug screen today is negative. He reports being out of medication for a month secondary to not being able to afford the copay to come in for refills last month. Discussed the important of compliance and not start and stopping this particular medication.  reviewed and is consistent with his reports of being out of medication. Review of old records indicate that the he was evaluated by Dr. Lin 2019 at which time her reports confirms mixed ADHD. He is scheduled for colonoscopy 10/10/2024. He declines all recommended vaccines today and understands the deleterious effects of  tahmina han       MEDICAL / SURGICAL / SOCIAL HISTORY     Past Medical History:   Diagnosis Date    Adult attention deficit disorder 2021    Depression 2022    Essential hypertension 2021    Hyperlipidemia     Neuropathy 2023    AC (obstructive sleep apnea) 10/19/2023    PAD (peripheral artery disease) 2023    Swollen leg 2024    Type 2 diabetes mellitus        Past Surgical History:   Procedure Laterality Date    APPENDECTOMY      CARDIAC CATHETERIZATION      DEBRIDEMENT OF LOWER EXTREMITY Bilateral 2023    Procedure: DEBRIDEMENT, LOWER EXTREMITY;  Surgeon: Yael Cohen MD;  Location: [facility] OR;  Service: General;  Laterality: Bilateral;  debride toes    LEFT HEART CATHETERIZATION N/A 2021    Procedure: Left heart cath with possible intervention;  Surgeon: Federico James DO;  Location: [facility] CATH LAB;  Service: Cardiology;  Laterality: N/A;    TOE AMPUTATION Right 3/29/2023    Procedure: AMPUTATION, TOE;  Surgeon: Yael Cohen MD;  Location: [facility] OR;  Service: General;  Laterality: Right;  Right great toe amp dr cohen wednesday       Social History     Tobacco Use    Smoking status: Former     Current packs/day: 0.00     Average packs/day: 5.0 packs/day for 4.0 years (20.0 ttl pk-yrs)     Types: Cigarettes     Start date:      Quit date:      Years since quittin.5     Passive exposure: Past    Smokeless tobacco: Current     Types: Chew, Snuff    Tobacco comments:     1 can/day   Substance Use Topics    Alcohol use: Yes     Comment: 2-3 drinks per/3 months    Drug use: Never         I personally reviewed all past medical, surgical, and social.     Review of Systems   Constitutional:  Negative for chills and fever.   HENT:  Negative for congestion, hearing loss, rhinorrhea and sore throat.    Eyes:  Negative for visual disturbance.   Respiratory:  Negative for cough and shortness of breath.    Cardiovascular:  Negative for chest pain.  "  Gastrointestinal:  Negative for abdominal pain, constipation and diarrhea.   Genitourinary:  Negative for dysuria, hematuria and urgency.   Musculoskeletal:  Negative for arthralgias, back pain, myalgias and neck pain.   Neurological:  Positive for numbness (bilateral feet "neuropathy"). Negative for dizziness and headaches.   Psychiatric/Behavioral:  Negative for dysphoric mood and sleep disturbance. The patient is not nervous/anxious.         MEDICATIONS / ALLERGIES / HM     Current Outpatient Medications   Medication Sig Dispense Refill    albuterol (PROVENTIL/VENTOLIN HFA) 90 mcg/actuation inhaler Inhale 2 puffs into the lungs every 4 (four) hours as needed for Wheezing. Rescue 8 g 0    aspirin (ECOTRIN) 81 MG EC tablet Take 1 tablet (81 mg total) by mouth once daily. 90 tablet 3    atorvastatin (LIPITOR) 80 MG tablet Take 1 tablet (80 mg total) by mouth once daily. 90 tablet 1    glyBURIDE (DIABETA) 5 MG tablet Take 1 tablet (5 mg total) by mouth daily with breakfast. 90 tablet 1    insulin degludec (TRESIBA FLEXTOUCH U-200) 200 unit/mL (3 mL) insulin pen Inject 30 Units into the skin once daily. Increase by 5 units if blood glucose is elevated above 200. weekly 3 pen 3    insulin syringe-needle U-100 0.3 mL 31 gauge x 5/16" Syrg 100 each by abdominal subcutaneous route once daily. Use once daily for insulin injection 100 each 2    lisinopriL-hydrochlorothiazide (PRINZIDE,ZESTORETIC) 20-25 mg Tab Take 1 tablet by mouth once daily. 90 tablet 1    metFORMIN (GLUCOPHAGE) 1000 MG tablet Take 1 tablet (1,000 mg total) by mouth 2 (two) times daily with meals. 180 tablet 1    metoprolol tartrate (LOPRESSOR) 50 MG tablet Take 1 tablet (50 mg total) by mouth 2 (two) times daily. 180 tablet 1    pen needle, diabetic (BD ULTRA-FINE SHORT PEN NEEDLE) 31 gauge x 5/16" Ndle USE AS DIRECTED TO administer insulin TWICE DAILY for 50      pen needle, diabetic 31 gauge x 5/16" Ndle 100 each by abdominal subcutaneous route once " "daily. Use to administer insulin twice daily 100 each 2    citalopram (CELEXA) 20 MG tablet Take 1 tablet (20 mg total) by mouth once daily. 90 tablet 1    dextroamphetamine-amphetamine (ADDERALL) 20 mg tablet Take 1 tablet by mouth each afternoon 30 tablet 0    dextroamphetamine-amphetamine (ADDERALL) 30 mg Tab Take 1 tablet by mouth each morning 30 tablet 0    gabapentin (NEURONTIN) 300 MG capsule Take 1 capsule (300 mg total) by mouth 3 (three) times daily. 90 capsule 1     No current facility-administered medications for this visit.       Review of patient's allergies indicates:   Allergen Reactions    Azithromycin     Erythromycin Other (See Comments)    M-mycin          There is no immunization history on file for this patient.     Health Maintenance   Topic Date Due    TETANUS VACCINE  Never done-declined     Colorectal Cancer Screening  Never done-scheduled 10/10/2024    Shingles Vaccine (1 of 2) Never done-declined     Hemoglobin A1c  07/19/2024-repeat today results pending     Lipid Panel  08/23/2024    Eye Exam  02/07/2025    Low Dose Statin  06/06/2025    Foot Exam  06/06/2025    Hepatitis C Screening  Completed        Physical Exam      Vital Signs  Temp: 97.7 °F (36.5 °C)  Temp Source: Oral  Pulse: 60  Resp: 18  SpO2: 96 %  BP: (!) 143/88  BP Location: Right arm  Patient Position: Sitting  Pain Score: 0-No pain  Height and Weight  Height: 5' 3" (160 cm)  Weight: 91 kg (200 lb 9.6 oz)  BSA (Calculated - sq m): 2.01 sq meters  BMI (Calculated): 35.5  Weight in (lb) to have BMI = 25: 140.8]    Physical Exam  Constitutional:       General: He is not in acute distress.     Appearance: He is obese.   HENT:      Head: Normocephalic.      Right Ear: External ear normal.      Left Ear: External ear normal.   Cardiovascular:      Rate and Rhythm: Normal rate and regular rhythm.      Pulses: Normal pulses.   Pulmonary:      Effort: Pulmonary effort is normal.      Breath sounds: Normal breath sounds.   Abdominal: "      Palpations: Abdomen is soft.      Tenderness: There is no abdominal tenderness.   Skin:     General: Skin is warm and dry.   Neurological:      Mental Status: He is alert and oriented to person, place, and time.   Psychiatric:         Mood and Affect: Mood normal.         Behavior: Behavior normal.          Laboratory:    Lab Results   Component Value Date    GLU 61 (L) 05/28/2024     05/28/2024    K 4.0 05/28/2024    CL 99 05/28/2024    CO2 31 05/28/2024    BUN 15 05/28/2024    CREATININE 0.94 05/28/2024    CALCIUM 9.7 05/28/2024    PROT 7.5 05/28/2024    ALBUMIN 3.3 (L) 05/28/2024    BILITOT 0.4 05/28/2024    ALKPHOS 109 05/28/2024    AST 21 05/28/2024    ALT 29 05/28/2024    ANIONGAP 10 05/28/2024    ESTGFRAFRICA 89 05/06/2021    EGFRNONAA 76 06/08/2022       Lab Results   Component Value Date    WBC 8.19 04/19/2024    RBC 4.16 (L) 04/19/2024    HGB 12.9 (L) 04/19/2024    HCT 40.1 04/19/2024    HCT 54 (H) 04/24/2022    MCV 96.4 (H) 04/19/2024    RDW 12.7 04/19/2024     04/19/2024        Lab Results   Component Value Date    CHOL 143 08/23/2023    TRIG 338 (H) 08/23/2023    HDL 29 (L) 08/23/2023    LDLCALC 46 08/23/2023       Lab Results   Component Value Date    TSH 1.980 01/19/2024       Lab Results   Component Value Date    HGBA1C 6.8 (H) 04/19/2024    ESTIMATEDAVG 148 04/19/2024        Lab Results   Component Value Date    YPIUSFWX98 150 (L) 05/28/2024       Lab Results   Component Value Date    XOJXUYPU71GM 46.1 01/19/2024       Lab Results   Component Value Date    PSA 0.913 05/28/2024         Point Of Care Testing:    WBC, UA   Date Value Ref Range Status   06/08/2022 NEGATIVE  Final     Nitrites, UA   Date Value Ref Range Status   10/31/2023 Negative Negative Final     Urobilinogen, UA   Date Value Ref Range Status   10/31/2023 Normal 0.2, 1.0, Normal mg/dL Final     Protein, POC   Date Value Ref Range Status   06/08/2022 NEGATIVE  Final     pH, UA   Date Value Ref Range Status  "  10/31/2023 5.0 5.0 to 8.0 pH Units Final     Specific Gravity, UA   Date Value Ref Range Status   10/31/2023 1.019 <=1.030 Final     Ketones, UA   Date Value Ref Range Status   10/31/2023 Negative Negative mg/dL Final     Bilirubin, POC   Date Value Ref Range Status   06/08/2022 NEGATIVE  Final     Glucose, UA   Date Value Ref Range Status   06/08/2022 1,000  Final       No results found for: "[id number]", "FLUAMOLEC", "FLUBMOLEC", "MOLSTREPAPOC"          Assessment/Plan     Type 2 diabetes mellitus with hyperglycemia, with long-term current use of insulin  -     04/19/2024 Hemoglobin A1c 6.8%   -     Hemoglobin A1C; drawn today results pending     Adult attention deficit disorder  -     diagnosed 11/21/2019 by Dr. Lin  -      reviewed   -     dextroamphetamine-amphetamine (ADDERALL) 20 mg tablet; Take 1 tablet by mouth each afternoon  Dispense: 30 tablet; Refill: 0  -     dextroamphetamine-amphetamine (ADDERALL) 30 mg Tab; Take 1 tablet by mouth each morning  Dispense: 30 tablet; Refill: 0    Depression, unspecified depression type  -     citalopram (CELEXA) 20 MG tablet; Take 1 tablet (20 mg total) by mouth once daily.  Dispense: 90 tablet; Refill: 1    Long-term current use of stimulant  -     POCT Urine Drug Screen Presumptive negative, aligns with his reports of being out of medication     Vitamin B12 deficiency  -     05/28/2024 Vitamin B12 150 (036-046)  -     cyanocobalamin injection 1,000 mcg today in clinic   -     encouraged to get OTC Vitamin B12 1000 mcg and take once daily     Neuropathy  -     gabapentin (NEURONTIN) 300 MG capsule; Take 1 capsule (300 mg total) by mouth 3 (three) times daily.  Dispense: 90 capsule; Refill: 1        Future Appointments   Date Time Provider Department Center   9/9/2024  2:20 PM Nydia Martines FNP [facility]   10/10/2024  7:00 AM [facility] GI ROOM 03 [facility]   10/17/2024  8:00 AM Chelsey Mckoy NP [facility]   1/22/2025  " 3:00 PM Julio Cruz, DO [facility]       Workup results were reviewed and all questions were answered. Diagnosis and treatment options were discussed and the patient  is amenable with the overall treatment plan. Verbal and written discharge instructions were given including to return to clinic/ED with any acute worsening of symptoms or failure of symptoms to improve. The reasons for return to the clinic/ED were explained in lay terms. No further intervention is warranted at this time. The patient agrees with the plan, expresses understanding, is hemodynamically stable and in no acute distress.     All questions answered to desired level of satisfaction          MERLYN Jones-BC [facility]

## 2024-07-25 ENCOUNTER — TELEPHONE (OUTPATIENT)
Dept: FAMILY MEDICINE | Facility: CLINIC | Age: 59
End: 2024-07-25
Payer: COMMERCIAL

## 2024-07-25 NOTE — TELEPHONE ENCOUNTER
Patient wife called and stated that his blood sugar was running high , stated that he has been out of his strips all week and that he hasn't been taking his tresiba , instructed him that that medication should be taken as instructed , she stated that his sugar was 244 and he just started back taking his injection , spoke with ayla she stated to notify him to take meds as prescribed , increase fluids and watch his diet

## 2024-08-23 ENCOUNTER — OFFICE VISIT (OUTPATIENT)
Dept: FAMILY MEDICINE | Facility: CLINIC | Age: 59
End: 2024-08-23
Payer: COMMERCIAL

## 2024-08-23 VITALS
HEIGHT: 63 IN | HEART RATE: 62 BPM | RESPIRATION RATE: 20 BRPM | DIASTOLIC BLOOD PRESSURE: 74 MMHG | OXYGEN SATURATION: 96 % | WEIGHT: 200.38 LBS | SYSTOLIC BLOOD PRESSURE: 143 MMHG | TEMPERATURE: 98 F | BODY MASS INDEX: 35.5 KG/M2

## 2024-08-23 DIAGNOSIS — F98.8 ADULT ATTENTION DEFICIT DISORDER: Primary | Chronic | ICD-10-CM

## 2024-08-23 DIAGNOSIS — Z79.899 LONG-TERM CURRENT USE OF STIMULANT: ICD-10-CM

## 2024-08-23 DIAGNOSIS — E53.8 VITAMIN B12 DEFICIENCY: ICD-10-CM

## 2024-08-23 PROBLEM — G47.33 OBSTRUCTIVE SLEEP APNEA SYNDROME: Status: ACTIVE | Noted: 2024-08-23

## 2024-08-23 PROBLEM — I10 HYPERTENSION: Status: ACTIVE | Noted: 2022-02-07

## 2024-08-23 LAB

## 2024-08-23 PROCEDURE — 3066F NEPHROPATHY DOC TX: CPT | Mod: CPTII,,, | Performed by: NURSE PRACTITIONER

## 2024-08-23 PROCEDURE — 1160F RVW MEDS BY RX/DR IN RCRD: CPT | Mod: CPTII,,, | Performed by: NURSE PRACTITIONER

## 2024-08-23 PROCEDURE — 1159F MED LIST DOCD IN RCRD: CPT | Mod: CPTII,,, | Performed by: NURSE PRACTITIONER

## 2024-08-23 PROCEDURE — 3061F NEG MICROALBUMINURIA REV: CPT | Mod: CPTII,,, | Performed by: NURSE PRACTITIONER

## 2024-08-23 PROCEDURE — 80305 DRUG TEST PRSMV DIR OPT OBS: CPT | Mod: QW,,, | Performed by: NURSE PRACTITIONER

## 2024-08-23 PROCEDURE — 3077F SYST BP >= 140 MM HG: CPT | Mod: CPTII,,, | Performed by: NURSE PRACTITIONER

## 2024-08-23 PROCEDURE — 3044F HG A1C LEVEL LT 7.0%: CPT | Mod: CPTII,,, | Performed by: NURSE PRACTITIONER

## 2024-08-23 PROCEDURE — 96372 THER/PROPH/DIAG INJ SC/IM: CPT | Mod: ,,, | Performed by: NURSE PRACTITIONER

## 2024-08-23 PROCEDURE — 2023F DILAT RTA XM W/O RTNOPTHY: CPT | Mod: CPTII,,, | Performed by: NURSE PRACTITIONER

## 2024-08-23 PROCEDURE — 4010F ACE/ARB THERAPY RXD/TAKEN: CPT | Mod: CPTII,,, | Performed by: NURSE PRACTITIONER

## 2024-08-23 PROCEDURE — 99213 OFFICE O/P EST LOW 20 MIN: CPT | Mod: 25,,, | Performed by: NURSE PRACTITIONER

## 2024-08-23 PROCEDURE — 3008F BODY MASS INDEX DOCD: CPT | Mod: CPTII,,, | Performed by: NURSE PRACTITIONER

## 2024-08-23 PROCEDURE — 3078F DIAST BP <80 MM HG: CPT | Mod: CPTII,,, | Performed by: NURSE PRACTITIONER

## 2024-08-23 RX ORDER — CYANOCOBALAMIN 1000 UG/ML
1000 INJECTION, SOLUTION INTRAMUSCULAR; SUBCUTANEOUS
Status: COMPLETED | OUTPATIENT
Start: 2024-08-23 | End: 2024-08-23

## 2024-08-23 RX ORDER — DEXTROAMPHETAMINE SACCHARATE, AMPHETAMINE ASPARTATE, DEXTROAMPHETAMINE SULFATE AND AMPHETAMINE SULFATE 5; 5; 5; 5 MG/1; MG/1; MG/1; MG/1
TABLET ORAL
Qty: 30 TABLET | Refills: 0 | Status: SHIPPED | OUTPATIENT
Start: 2024-08-23

## 2024-08-23 RX ORDER — DEXTROAMPHETAMINE SACCHARATE, AMPHETAMINE ASPARTATE, DEXTROAMPHETAMINE SULFATE AND AMPHETAMINE SULFATE 7.5; 7.5; 7.5; 7.5 MG/1; MG/1; MG/1; MG/1
TABLET ORAL
Qty: 30 TABLET | Refills: 0 | Status: SHIPPED | OUTPATIENT
Start: 2024-08-23

## 2024-08-23 RX ADMIN — CYANOCOBALAMIN 1000 MCG: 1000 INJECTION, SOLUTION INTRAMUSCULAR; SUBCUTANEOUS at 08:08

## 2024-08-23 NOTE — PROGRESS NOTES
[facility]    Chelsey Mckoy AGPCNP-BC  [facility] [place] [facility] [address] [place] [address]  [phone]          PATIENT NAME: Syed Kelley  : 1965  DATE: 24  MRN: [id number]          Reason for Visit        Chief Complaint   Patient presents with    ADHD     Pt is needing adderall refilled.        History of Present Illness      Syed Kelley is a 59 y.o. male with chronic conditions of  Adult Attention Deficit Disorder, Depression, Hypertension, Neuropathy, AC, PAD, Type 2 Diabetes, Obesity, and User of Smokeless Tobacco who presents today for follow up of drug therapy requiring monitoring. Current medication regimen is Adderall 20 mg one tablet by mouth every afternoon and Adderall 30 mg one tablet by mouth every morning. [place] Prescription Monitoring Program () Database results reviewed.  He got his refill on 2024; therefore has been out of his medication for almost a week.  He is also due for his B 12 injection and is requesting to have this administered today also.       MEDICAL / SURGICAL / SOCIAL HISTORY     Past Medical History:   Diagnosis Date    Adult attention deficit disorder 2021    Depression 2022    Essential hypertension 2021    Hyperlipidemia     Neuropathy 2023    AC (obstructive sleep apnea) 10/19/2023    PAD (peripheral artery disease) 2023    Swollen leg 2024    Type 2 diabetes mellitus        Past Surgical History:   Procedure Laterality Date    APPENDECTOMY      CARDIAC CATHETERIZATION      DEBRIDEMENT OF LOWER EXTREMITY Bilateral 2023    Procedure: DEBRIDEMENT, LOWER EXTREMITY;  Surgeon: Yael Cohen MD;  Location: [facility] OR;  Service: General;  Laterality: Bilateral;  debride toes    LEFT HEART CATHETERIZATION N/A 2021    Procedure: Left heart cath with possible intervention;  Surgeon: Federico James DO;  Location: [facility] CATH  "LAB;  Service: Cardiology;  Laterality: N/A;    TOE AMPUTATION Right 3/29/2023    Procedure: AMPUTATION, TOE;  Surgeon: Yael Cohen MD;  Location: [facility];  Service: General;  Laterality: Right;  Right great toe amp dr cohen wednesday       Social History     Tobacco Use    Smoking status: Former     Current packs/day: 0.00     Average packs/day: 5.0 packs/day for 4.0 years (20.0 ttl pk-yrs)     Types: Cigarettes     Start date:      Quit date:      Years since quittin.6     Passive exposure: Past    Smokeless tobacco: Current     Types: Chew, Snuff    Tobacco comments:     1 can/day   Substance Use Topics    Alcohol use: Yes     Comment: 2-3 drinks per/3 months    Drug use: Never         I personally reviewed all past medical, surgical, and social.     Review of Systems   Constitutional:  Negative for chills and fever.   HENT:  Negative for congestion, hearing loss, rhinorrhea and sore throat.    Eyes:  Negative for visual disturbance.   Respiratory:  Negative for cough and shortness of breath.    Cardiovascular:  Negative for chest pain.   Gastrointestinal:  Negative for abdominal pain, constipation and diarrhea.   Genitourinary:  Negative for dysuria, hematuria and urgency.   Musculoskeletal:  Negative for arthralgias, back pain, myalgias and neck pain.   Neurological:  Positive for numbness (bilateral feet "neuropathy"). Negative for dizziness and headaches.   Psychiatric/Behavioral:  Negative for dysphoric mood and sleep disturbance. The patient is not nervous/anxious.         MEDICATIONS / ALLERGIES / HM     Current Outpatient Medications   Medication Sig Dispense Refill    albuterol (PROVENTIL/VENTOLIN HFA) 90 mcg/actuation inhaler Inhale 2 puffs into the lungs every 4 (four) hours as needed for Wheezing. Rescue 8 g 0    aspirin (ECOTRIN) 81 MG EC tablet Take 1 tablet (81 mg total) by mouth once daily. 90 tablet 3    atorvastatin (LIPITOR) 80 MG tablet Take 1 tablet (80 mg total) by mouth " "once daily. 90 tablet 1    citalopram (CELEXA) 20 MG tablet Take 1 tablet (20 mg total) by mouth once daily. 90 tablet 1    gabapentin (NEURONTIN) 300 MG capsule Take 1 capsule (300 mg total) by mouth 3 (three) times daily. 90 capsule 1    glyBURIDE (DIABETA) 5 MG tablet Take 1 tablet (5 mg total) by mouth daily with breakfast. 90 tablet 1    insulin degludec (TRESIBA FLEXTOUCH U-200) 200 unit/mL (3 mL) insulin pen Inject 30 Units into the skin once daily. Increase by 5 units if blood glucose is elevated above 200. weekly 3 pen 3    insulin syringe-needle U-100 0.3 mL 31 gauge x 5/16" Syrg 100 each by abdominal subcutaneous route once daily. Use once daily for insulin injection 100 each 2    lisinopriL-hydrochlorothiazide (PRINZIDE,ZESTORETIC) 20-25 mg Tab Take 1 tablet by mouth once daily. 90 tablet 1    metFORMIN (GLUCOPHAGE) 1000 MG tablet Take 1 tablet (1,000 mg total) by mouth 2 (two) times daily with meals. 180 tablet 1    metoprolol tartrate (LOPRESSOR) 50 MG tablet Take 1 tablet (50 mg total) by mouth 2 (two) times daily. 180 tablet 1    pen needle, diabetic (BD ULTRA-FINE SHORT PEN NEEDLE) 31 gauge x 5/16" Ndle USE AS DIRECTED TO administer insulin TWICE DAILY for 50      pen needle, diabetic 31 gauge x 5/16" Ndle 100 each by abdominal subcutaneous route once daily. Use to administer insulin twice daily 100 each 2    dextroamphetamine-amphetamine (ADDERALL) 20 mg tablet Take 1 tablet by mouth each afternoon 30 tablet 0    dextroamphetamine-amphetamine (ADDERALL) 30 mg Tab Take 1 tablet by mouth each morning 30 tablet 0     No current facility-administered medications for this visit.       Review of patient's allergies indicates:   Allergen Reactions    Azithromycin     Erythromycin Other (See Comments)    M-mycin          There is no immunization history on file for this patient.     Health Maintenance   Topic Date Due    TETANUS VACCINE  Never done    Colorectal Cancer Screening  Never done    Shingles " "Vaccine (1 of 2) Never done    Lipid Panel  08/23/2024    Hemoglobin A1c  10/17/2024    Eye Exam  02/07/2025    Foot Exam  06/06/2025    Low Dose Statin  08/23/2025    Hepatitis C Screening  Completed        Physical Exam      Vital Signs  Temp: 98.2 °F (36.8 °C)  Temp Source: Oral  Pulse: 62  Resp: 20  SpO2: 96 %  BP: (!) 143/74  BP Location: Left arm  Patient Position: Sitting  Pain Score: 0-No pain  Height and Weight  Height: 5' 3" (160 cm)  Weight: 90.9 kg (200 lb 6.4 oz)  BSA (Calculated - sq m): 2.01 sq meters  BMI (Calculated): 35.5  Weight in (lb) to have BMI = 25: 140.8]    Physical Exam  Constitutional:       General: He is not in acute distress.  HENT:      Head: Normocephalic.      Right Ear: External ear normal.      Left Ear: External ear normal.   Cardiovascular:      Rate and Rhythm: Normal rate and regular rhythm.      Pulses: Normal pulses.      Heart sounds: Normal heart sounds.   Pulmonary:      Effort: Pulmonary effort is normal.      Breath sounds: Normal breath sounds.   Abdominal:      Palpations: Abdomen is soft.      Tenderness: There is no abdominal tenderness.   Skin:     General: Skin is warm and dry.   Neurological:      Mental Status: He is alert and oriented to person, place, and time.   Psychiatric:         Mood and Affect: Mood normal.         Behavior: Behavior normal.          Laboratory:    Lab Results   Component Value Date    GLU 61 (L) 05/28/2024     05/28/2024    K 4.0 05/28/2024    CL 99 05/28/2024    CO2 31 05/28/2024    BUN 15 05/28/2024    CREATININE 0.94 05/28/2024    CALCIUM 9.7 05/28/2024    PROT 7.5 05/28/2024    ALBUMIN 3.3 (L) 05/28/2024    BILITOT 0.4 05/28/2024    ALKPHOS 109 05/28/2024    AST 21 05/28/2024    ALT 29 05/28/2024    ANIONGAP 10 05/28/2024    ESTGFRAFRICA 89 05/06/2021    EGFRNONAA 76 06/08/2022       Lab Results   Component Value Date    WBC 8.19 04/19/2024    RBC 4.16 (L) 04/19/2024    HGB 12.9 (L) 04/19/2024    HCT 40.1 04/19/2024    HCT 54 " "(H) 04/24/2022    MCV 96.4 (H) 04/19/2024    RDW 12.7 04/19/2024     04/19/2024        Lab Results   Component Value Date    CHOL 143 08/23/2023    TRIG 338 (H) 08/23/2023    HDL 29 (L) 08/23/2023    LDLCALC 46 08/23/2023       Lab Results   Component Value Date    TSH 1.980 01/19/2024       Lab Results   Component Value Date    HGBA1C 6.7 (H) 07/17/2024    ESTIMATEDAVG 146 07/17/2024        Lab Results   Component Value Date    PYEBGFFE64 150 (L) 05/28/2024       Lab Results   Component Value Date    FZARXJAF42GI 46.1 01/19/2024       Lab Results   Component Value Date    PSA 0.913 05/28/2024         Point Of Care Testing:    WBC, UA   Date Value Ref Range Status   06/08/2022 NEGATIVE  Final     Nitrites, UA   Date Value Ref Range Status   10/31/2023 Negative Negative Final     Urobilinogen, UA   Date Value Ref Range Status   10/31/2023 Normal 0.2, 1.0, Normal mg/dL Final     Protein, POC   Date Value Ref Range Status   06/08/2022 NEGATIVE  Final     pH, UA   Date Value Ref Range Status   10/31/2023 5.0 5.0 to 8.0 pH Units Final     Specific Gravity, UA   Date Value Ref Range Status   10/31/2023 1.019 <=1.030 Final     Ketones, UA   Date Value Ref Range Status   10/31/2023 Negative Negative mg/dL Final     Bilirubin, POC   Date Value Ref Range Status   06/08/2022 NEGATIVE  Final     Glucose, UA   Date Value Ref Range Status   06/08/2022 1,000  Final       No results found for: "[id number]", "FLUAMOLEC", "FLUBMOLEC", "MOLSTREPAPOC"          Assessment/Plan     Adult attention deficit disorder  -     dextroamphetamine-amphetamine (ADDERALL) 20 mg tablet; Take 1 tablet by mouth each afternoon  Dispense: 30 tablet; Refill: 0  -     dextroamphetamine-amphetamine (ADDERALL) 30 mg Tab; Take 1 tablet by mouth each morning  Dispense: 30 tablet; Refill: 0    Long-term current use of stimulant  -     dextroamphetamine-amphetamine (ADDERALL) 20 mg tablet; Take 1 tablet by mouth each afternoon  Dispense: 30 tablet; " Refill: 0  -     POCT Urine Drug Screen Presumptive negative, has been out of medication x 6-7 days   -     dextroamphetamine-amphetamine (ADDERALL) 30 mg Tab; Take 1 tablet by mouth each morning  Dispense: 30 tablet; Refill: 0    Vitamin B12 deficiency  -     05/28/2024 Vitamin B 12 150 ([id number])   -     cyanocobalamin injection 1,000 mcg        Future Appointments   Date Time Provider Department Center   9/9/2024  2:20 PM Nydia Martines FNP [facility]   9/20/2024  8:40 AM Chelsey Mckoy NP [facility]   10/10/2024  7:00 AM [facility] GI ROOM 03 [facility]   1/22/2025  3:00 PM Julio Cruz, DO [facility]       Workup results were reviewed and all questions were answered. Diagnosis and treatment options were discussed and the patient  is amenable with the overall treatment plan. Verbal and written discharge instructions were given including to return to clinic/ED with any acute worsening of symptoms or failure of symptoms to improve. The reasons for return to the clinic/ED were explained in lay terms. No further intervention is warranted at this time. The patient agrees with the plan, expresses understanding, is hemodynamically stable and in no acute distress.     All questions answered to desired level of satisfaction          MERLYN Jones-BC [facility]

## 2024-09-10 ENCOUNTER — OFFICE VISIT (OUTPATIENT)
Dept: FAMILY MEDICINE | Facility: CLINIC | Age: 59
End: 2024-09-10
Payer: COMMERCIAL

## 2024-09-10 VITALS
HEART RATE: 65 BPM | HEIGHT: 63 IN | SYSTOLIC BLOOD PRESSURE: 129 MMHG | OXYGEN SATURATION: 96 % | RESPIRATION RATE: 19 BRPM | BODY MASS INDEX: 35.44 KG/M2 | WEIGHT: 200 LBS | TEMPERATURE: 98 F | DIASTOLIC BLOOD PRESSURE: 86 MMHG

## 2024-09-10 DIAGNOSIS — G62.9 NEUROPATHY: ICD-10-CM

## 2024-09-10 DIAGNOSIS — E11.65 TYPE 2 DIABETES MELLITUS WITH HYPERGLYCEMIA, WITHOUT LONG-TERM CURRENT USE OF INSULIN: Primary | ICD-10-CM

## 2024-09-10 DIAGNOSIS — B35.1 ONYCHOMYCOSIS: ICD-10-CM

## 2024-09-10 DIAGNOSIS — L84 CALLUS OF FOOT: ICD-10-CM

## 2024-09-10 NOTE — PROCEDURES
"Foot Care    Date/Time: 9/10/2024 10:20 AM    Performed by: Nydia Martines FNP  Authorized by: Nydia Martines FNP    Time out: Immediately prior to procedure a "time out" was called to verify the correct patient, procedure, equipment, support staff and site/side marked as required.    Consent Done?:  Yes (Verbal)    Nail Care Type:  Trim(Right 5th Toe, Right 4th Toe, Left 5th Toe, Left 1st Toe, Left 2nd Toe, Left 3rd Toe, Right 2nd Toe, Left 4th Toe and Right 3rd Toe)  Patient tolerance:  Patient tolerated the procedure well with no immediate complications     Filed calluses to bilateral plantar feet smooth. Pt bing well.     "

## 2024-09-10 NOTE — PROGRESS NOTES
REMEDIOS Orr   [facility]  [address]  [place] [address]     PATIENT NAME: Syed Kelley  : 1965  DATE: 9/10/24  MRN: [id number]      Billing Provider: REMEDIOS Orr  Level of Service:   Patient PCP Information       Provider PCP Type    Julio Cruz DO General            Reason for Visit / Chief Complaint: Diabetes (Pt is here for his diabetic footcare. He said that everything is going alright.)   Health Maintenance Due   Topic Date Due    Pneumococcal Vaccines (Age 0-64) (1 of 2 - PCV) Never done    TETANUS VACCINE  Never done    Colorectal Cancer Screening  Never done    Shingles Vaccine (1 of 2) Never done    Lipid Panel  2024    Influenza Vaccine (1) 2024          History of Present Illness / Problem Focused Workflow     Syed Kelley presents to the clinic for diabetic foot care. Pt has thick, long toe nails unable to be trimmed with standard clippers. Pt also has calluses to the bottom of both feet. 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 He states he does have numbness/tingling to lower extremities occasionally. He also has fungus to nails he is applying liquid medication to. He denies any other needs at this time. NAD noted.     Hemoglobin A1C   Date Value Ref Range Status   2024 6.7 (H) 4.5 - 6.6 % Final     Comment:       Normal:               <5.7%  Pre-Diabetic:       5.7% to 6.4%  Diabetic:             >6.4%  Diabetic Goal:     <7%   2024 6.8 (H) 4.5 - 6.6 % Final     Comment:       Normal:               <5.7%  Pre-Diabetic:       5.7% to 6.4%  Diabetic:             >6.4%  Diabetic Goal:     <7%   11/10/2023 6.6 4.5 - 6.6 % Final     Comment:       Normal:               <5.7%  Pre-Diabetic:       5.7% to 6.4%  Diabetic:             >6.4%  Diabetic Goal:     <7%        CMP  Sodium   Date Value Ref Range Status   2024 136 136 - 145 mmol/L Final     Potassium   Date Value Ref Range Status   2024 4.0 3.5 - 5.1 mmol/L Final      Chloride   Date Value Ref Range Status   05/28/2024 99 98 - 107 mmol/L Final     CO2   Date Value Ref Range Status   05/28/2024 31 21 - 32 mmol/L Final     Glucose   Date Value Ref Range Status   05/28/2024 61 (L) 74 - 106 mg/dL Final     BUN   Date Value Ref Range Status   05/28/2024 15 7 - 18 mg/dL Final     Creatinine   Date Value Ref Range Status   05/28/2024 0.94 0.70 - 1.30 mg/dL Final     Calcium   Date Value Ref Range Status   05/28/2024 9.7 8.5 - 10.1 mg/dL Final     Total Protein   Date Value Ref Range Status   05/28/2024 7.5 6.4 - 8.2 g/dL Final     Albumin   Date Value Ref Range Status   05/28/2024 3.3 (L) 3.5 - 5.0 g/dL Final     Bilirubin, Total   Date Value Ref Range Status   05/28/2024 0.4 >0.0 - 1.2 mg/dL Final     Alk Phos   Date Value Ref Range Status   05/28/2024 109 45 - 115 U/L Final     AST   Date Value Ref Range Status   05/28/2024 21 15 - 37 U/L Final     ALT   Date Value Ref Range Status   05/28/2024 29 16 - 61 U/L Final     Anion Gap   Date Value Ref Range Status   05/28/2024 10 7 - 16 mmol/L Final     eGFR   Date Value Ref Range Status   05/28/2024 93 >=60 mL/min/1.73m2 Final        Lab Results   Component Value Date    WBC 8.19 04/19/2024    RBC 4.16 (L) 04/19/2024    HGB 12.9 (L) 04/19/2024    HCT 40.1 04/19/2024    MCV 96.4 (H) 04/19/2024    MCH 31.0 04/19/2024    MCHC 32.2 04/19/2024    RDW 12.7 04/19/2024     04/19/2024    MPV 10.0 04/19/2024    LYMPH 25.4 (L) 04/19/2024    LYMPH 2.08 04/19/2024    MONO 6.5 (H) 04/19/2024    EOS 0.27 04/19/2024    BASO 0.05 04/19/2024    EOSINOPHIL 3.3 04/19/2024    BASOPHIL 0.6 04/19/2024        Lab Results   Component Value Date    CHOL 143 08/23/2023    CHOL 128 12/22/2022    CHOL 150 08/15/2022     Lab Results   Component Value Date    HDL 29 (L) 08/23/2023    HDL 32 (L) 12/22/2022    HDL 36 (L) 08/15/2022     Lab Results   Component Value Date    LDLCALC 46 08/23/2023    LDLCALC 53 12/22/2022    LDLCALC 43 08/15/2022     Lab Results    Component Value Date    TRIG 338 (H) 08/23/2023    TRIG 215 (H) 12/22/2022    TRIG 355 (H) 08/15/2022     Lab Results   Component Value Date    CHOLHDL 4.9 08/23/2023    CHOLHDL 4.0 12/22/2022    CHOLHDL 4.2 08/15/2022        Wt Readings from Last 3 Encounters:   09/10/24 1008 90.7 kg (200 lb)   08/23/24 0825 90.9 kg (200 lb 6.4 oz)   07/17/24 0814 91 kg (200 lb 9.6 oz)        BP Readings from Last 3 Encounters:   09/10/24 129/86   08/23/24 (!) 143/74   07/17/24 (!) 143/88        Review of Systems     Review of Systems   Constitutional: Negative.    Respiratory: Negative.     Cardiovascular: Negative.    Musculoskeletal:  Positive for arthralgias.   Integumentary:         Thick, long toe nails, dry skin, calluses to both feet   Allergic/Immunologic: Negative.    Neurological:  Positive for numbness.   Hematological: Negative.    Psychiatric/Behavioral: Negative.          Medical / Social / Family History     Past Medical History:   Diagnosis Date    Adult attention deficit disorder 03/22/2021    Depression 12/22/2022    Essential hypertension 03/22/2021    Hyperlipidemia     Neuropathy 08/23/2023    AC (obstructive sleep apnea) 10/19/2023    PAD (peripheral artery disease) 02/17/2023    Swollen leg 01/20/2024    Type 2 diabetes mellitus        Past Surgical History:   Procedure Laterality Date    APPENDECTOMY      CARDIAC CATHETERIZATION      DEBRIDEMENT OF LOWER EXTREMITY Bilateral 2/18/2023    Procedure: DEBRIDEMENT, LOWER EXTREMITY;  Surgeon: Yael Cohen MD;  Location: [facility] OR;  Service: General;  Laterality: Bilateral;  debride toes    LEFT HEART CATHETERIZATION N/A 5/7/2021    Procedure: Left heart cath with possible intervention;  Surgeon: Federico James DO;  Location: [facility] CATH LAB;  Service: Cardiology;  Laterality: N/A;    TOE AMPUTATION Right 3/29/2023    Procedure: AMPUTATION, TOE;  Surgeon: Yael Cohen MD;  Location: [facility] OR;  Service: General;  Laterality: Right;  Right great  "toe amp dr farmer wednesday       Social History    reports that he quit smoking about 39 years ago. His smoking use included cigarettes. He started smoking about 43 years ago. He has a 20 pack-year smoking history. He has been exposed to tobacco smoke. His smokeless tobacco use includes chew and snuff. He reports current alcohol use. He reports that he does not use drugs.    Family History  's family history includes Cancer in his sister; Hypertension in his father; Stroke in his father.    Medications and Allergies     Medications  Outpatient Medications Marked as Taking for the 9/10/24 encounter (Office Visit) with Nydia Martines FNP   Medication Sig Dispense Refill    albuterol (PROVENTIL/VENTOLIN HFA) 90 mcg/actuation inhaler Inhale 2 puffs into the lungs every 4 (four) hours as needed for Wheezing. Rescue 8 g 0    aspirin (ECOTRIN) 81 MG EC tablet Take 1 tablet (81 mg total) by mouth once daily. 90 tablet 3    atorvastatin (LIPITOR) 80 MG tablet Take 1 tablet (80 mg total) by mouth once daily. 90 tablet 1    citalopram (CELEXA) 20 MG tablet Take 1 tablet (20 mg total) by mouth once daily. 90 tablet 1    dextroamphetamine-amphetamine (ADDERALL) 20 mg tablet Take 1 tablet by mouth each afternoon 30 tablet 0    dextroamphetamine-amphetamine (ADDERALL) 30 mg Tab Take 1 tablet by mouth each morning 30 tablet 0    gabapentin (NEURONTIN) 300 MG capsule Take 1 capsule (300 mg total) by mouth 3 (three) times daily. 90 capsule 1    glyBURIDE (DIABETA) 5 MG tablet Take 1 tablet (5 mg total) by mouth daily with breakfast. 90 tablet 1    insulin degludec (TRESIBA FLEXTOUCH U-200) 200 unit/mL (3 mL) insulin pen Inject 30 Units into the skin once daily. Increase by 5 units if blood glucose is elevated above 200. weekly 3 pen 3    insulin syringe-needle U-100 0.3 mL 31 gauge x 5/16" Syrg 100 each by abdominal subcutaneous route once daily. Use once daily for insulin injection 100 each 2    lisinopriL-hydrochlorothiazide " "(PRINZIDE,ZESTORETIC) 20-25 mg Tab Take 1 tablet by mouth once daily. 90 tablet 1    metFORMIN (GLUCOPHAGE) 1000 MG tablet Take 1 tablet (1,000 mg total) by mouth 2 (two) times daily with meals. 180 tablet 1    metoprolol tartrate (LOPRESSOR) 50 MG tablet Take 1 tablet (50 mg total) by mouth 2 (two) times daily. 180 tablet 1    pen needle, diabetic (BD ULTRA-FINE SHORT PEN NEEDLE) 31 gauge x 5/16" Ndle USE AS DIRECTED TO administer insulin TWICE DAILY for 50      pen needle, diabetic 31 gauge x 5/16" Ndle 100 each by abdominal subcutaneous route once daily. Use to administer insulin twice daily 100 each 2       Allergies  Review of patient's allergies indicates:   Allergen Reactions    Azithromycin     Erythromycin Other (See Comments)    M-mycin        Physical Examination     Vitals:    09/10/24 1008   BP: 129/86   BP Location: Right arm   Patient Position: Sitting   BP Method: Large (Automatic)   Pulse: 65   Resp: 19   Temp: 98.1 °F (36.7 °C)   TempSrc: Oral   SpO2: 96%   Weight: 90.7 kg (200 lb)   Height: 5' 3" (1.6 m)      Physical Exam  Constitutional:       Appearance: Normal appearance. He is obese.   HENT:      Head: Normocephalic.   Eyes:      Extraocular Movements: Extraocular movements intact.   Cardiovascular:      Rate and Rhythm: Normal rate and regular rhythm.      Pulses: Normal pulses.           Dorsalis pedis pulses are 2+ on the right side and 2+ on the left side.        Posterior tibial pulses are 2+ on the right side and 2+ on the left side.      Heart sounds: Normal heart sounds.   Pulmonary:      Effort: Pulmonary effort is normal.      Breath sounds: Normal breath sounds.   Musculoskeletal:      Right foot: Normal range of motion. No deformity, bunion, Charcot foot, foot drop or prominent metatarsal heads.      Left foot: Normal range of motion. No deformity, bunion, Charcot foot, foot drop or prominent metatarsal heads.      Right Lower Extremity: (right great toe)  Feet:      Right foot: " "     Skin integrity: Callus and dry skin present.      Toenail Condition: Right toenails are abnormally thick and long. Fungal disease present.     Left foot:      Skin integrity: Callus and dry skin present.      Toenail Condition: Left toenails are abnormally thick and long. Fungal disease present.  Skin:     General: Skin is warm and dry.      Capillary Refill: Capillary refill takes less than 2 seconds.   Neurological:      General: No focal deficit present.      Mental Status: He is alert and oriented to person, place, and time.   Psychiatric:         Mood and Affect: Mood normal.         Behavior: Behavior normal.          Assessment and Plan (including Health Maintenance)   :    Plan:     There are no Patient Instructions on file for this visit.       Health Maintenance Due   Topic Date Due    Pneumococcal Vaccines (Age 0-64) (1 of 2 - PCV) Never done    TETANUS VACCINE  Never done    Colorectal Cancer Screening  Never done    Shingles Vaccine (1 of 2) Never done    Lipid Panel  08/23/2024    Influenza Vaccine (1) 09/01/2024       Problem List Items Addressed This Visit       Callus of foot    Current Assessment & Plan     See procedure note.          Neuropathy    Current Assessment & Plan     Neuropathy  is controlled. Current medical regimen is effective;   Will adjust according to results and monitor.          Onychomycosis    Current Assessment & Plan     Taking good care of your feet is very important!  1) Look at your bare feet daily for blisters, red spots, cuts, cracks, swelling or sores. If you can not see well, you can use a mirror to look or have someone help you.  2) Report promptly to your health care provider if you notice any changes in your feet as noted above or if your feet change colors, shape or just do not "feel right" for example: if your feet become less sensitive or begin hurting.  3) Wash your feet everyday. Use warm  not hot water. Check the water temperature with your wrist or " other body part - not your feet.  4) Dry your feet well. Make sure you pat them dry. Do not rub the skin on your feet too hard. Make sure to dry well between your toes. Allowing moisture to stay between your toes or on your feet could lead to infection.  5) Keep your feet soft and smooth. Rub a thin coat of lotion on your feet each day but do not put lotion between your toes.  6) Wear socks and shoes at all times.  7) Never walk barefoot.          Type 2 diabetes mellitus with hyperglycemia - Primary    Current Assessment & Plan     Diabetes is managed by patient's PCP  1) Follow your diabetic diet as directed  2) Regular physical activity as directed   3) Check your blood sugar levels as directed  4) Take your medications as directed  5) Follow up in 3 months                Type 2 diabetes mellitus with hyperglycemia, without long-term current use of insulin    Onychomycosis    Callus of foot    Neuropathy         Health Maintenance Topics with due status: Not Due       Topic Last Completion Date    Diabetes Urine Screening 01/19/2024    Eye Exam 02/07/2024    Foot Exam 06/06/2024    Hemoglobin A1c 07/17/2024    Low Dose Statin 08/23/2024         Future Appointments   Date Time Provider Department Center   9/20/2024  8:40 AM Chelsey Mckoy NP [facility] [place]   10/10/2024  7:00 AM [facility] GI ROOM 03 [facility]   12/11/2024  9:20 AM Nydia Martines FNP [facility] [place]   1/22/2025  3:00 PM [facility] [place]        Follow up in about 3 months (around 12/10/2024), or if symptoms worsen or fail to improve, for foot care.    Health Maintenance Due   Topic Date Due    Pneumococcal Vaccines (Age 0-64) (1 of 2 - PCV) Never done    TETANUS VACCINE  Never done    Colorectal Cancer Screening  Never done    Shingles Vaccine (1 of 2) Never done    Lipid Panel  08/23/2024    Influenza Vaccine (1) 09/01/2024        Signature:  REMEDIOS Orr    Date of encounter: 9/10/24

## 2024-09-26 DIAGNOSIS — Z12.12 SCREENING FOR COLORECTAL CANCER: Primary | ICD-10-CM

## 2024-09-26 DIAGNOSIS — Z12.11 SCREENING FOR COLORECTAL CANCER: Primary | ICD-10-CM

## 2024-09-26 RX ORDER — POLYETHYLENE GLYCOL 3350, SODIUM SULFATE ANHYDROUS, SODIUM BICARBONATE, SODIUM CHLORIDE, POTASSIUM CHLORIDE 236; 22.74; 6.74; 5.86; 2.97 G/4L; G/4L; G/4L; G/4L; G/4L
4 POWDER, FOR SOLUTION ORAL ONCE
Qty: 4000 ML | Refills: 0 | Status: SHIPPED | OUTPATIENT
Start: 2024-09-26 | End: 2024-09-26

## 2024-10-10 ENCOUNTER — ANESTHESIA EVENT (OUTPATIENT)
Dept: GASTROENTEROLOGY | Facility: HOSPITAL | Age: 59
End: 2024-10-10
Payer: COMMERCIAL

## 2024-10-10 ENCOUNTER — ANESTHESIA (OUTPATIENT)
Dept: GASTROENTEROLOGY | Facility: HOSPITAL | Age: 59
End: 2024-10-10
Payer: COMMERCIAL

## 2024-10-10 ENCOUNTER — HOSPITAL ENCOUNTER (OUTPATIENT)
Dept: GASTROENTEROLOGY | Facility: HOSPITAL | Age: 59
Discharge: HOME | End: 2024-10-10
Attending: NURSE PRACTITIONER | Admitting: INTERNAL MEDICINE
Payer: COMMERCIAL

## 2024-10-10 VITALS
RESPIRATION RATE: 17 BRPM | DIASTOLIC BLOOD PRESSURE: 94 MMHG | TEMPERATURE: 98 F | OXYGEN SATURATION: 97 % | HEART RATE: 64 BPM | HEIGHT: 63 IN | BODY MASS INDEX: 35.44 KG/M2 | WEIGHT: 200 LBS | SYSTOLIC BLOOD PRESSURE: 143 MMHG

## 2024-10-10 DIAGNOSIS — Z12.11 SCREENING FOR COLON CANCER: ICD-10-CM

## 2024-10-10 LAB — GLUCOSE SERPL-MCNC: 135 MG/DL (ref 70–105)

## 2024-10-10 PROCEDURE — 88305 TISSUE EXAM BY PATHOLOGIST: CPT | Mod: TC,SUR | Performed by: INTERNAL MEDICINE

## 2024-10-10 PROCEDURE — 88342 IMHCHEM/IMCYTCHM 1ST ANTB: CPT | Mod: TC,SUR | Performed by: INTERNAL MEDICINE

## 2024-10-10 PROCEDURE — 88305 TISSUE EXAM BY PATHOLOGIST: CPT | Mod: 26,,, | Performed by: PATHOLOGY

## 2024-10-10 PROCEDURE — 25000003 PHARM REV CODE 250: Performed by: NURSE ANESTHETIST, CERTIFIED REGISTERED

## 2024-10-10 PROCEDURE — 37000008 HC ANESTHESIA 1ST 15 MINUTES

## 2024-10-10 PROCEDURE — 37000009 HC ANESTHESIA EA ADD 15 MINS

## 2024-10-10 PROCEDURE — 27201423 OPTIME MED/SURG SUP & DEVICES STERILE SUPPLY

## 2024-10-10 PROCEDURE — 63600175 PHARM REV CODE 636 W HCPCS: Performed by: NURSE ANESTHETIST, CERTIFIED REGISTERED

## 2024-10-10 RX ORDER — SODIUM CHLORIDE 0.9 % (FLUSH) 0.9 %
10 SYRINGE (ML) INJECTION EVERY 6 HOURS PRN
Status: DISCONTINUED | OUTPATIENT
Start: 2024-10-10 | End: 2024-10-11 | Stop reason: HOSPADM

## 2024-10-10 RX ORDER — PROPOFOL 10 MG/ML
VIAL (ML) INTRAVENOUS
Status: DISCONTINUED | OUTPATIENT
Start: 2024-10-10 | End: 2024-10-10

## 2024-10-10 RX ORDER — SODIUM CHLORIDE, SODIUM LACTATE, POTASSIUM CHLORIDE, CALCIUM CHLORIDE 600; 310; 30; 20 MG/100ML; MG/100ML; MG/100ML; MG/100ML
INJECTION, SOLUTION INTRAVENOUS CONTINUOUS
Status: DISCONTINUED | OUTPATIENT
Start: 2024-10-10 | End: 2024-10-11 | Stop reason: HOSPADM

## 2024-10-10 RX ORDER — LIDOCAINE HYDROCHLORIDE 20 MG/ML
INJECTION, SOLUTION EPIDURAL; INFILTRATION; INTRACAUDAL; PERINEURAL
Status: DISCONTINUED | OUTPATIENT
Start: 2024-10-10 | End: 2024-10-10

## 2024-10-10 RX ADMIN — PROPOFOL 100 MG: 10 INJECTION, EMULSION INTRAVENOUS at 08:10

## 2024-10-10 RX ADMIN — PROPOFOL 80 MG: 10 INJECTION, EMULSION INTRAVENOUS at 08:10

## 2024-10-10 RX ADMIN — SODIUM CHLORIDE: 9 INJECTION, SOLUTION INTRAVENOUS at 08:10

## 2024-10-10 RX ADMIN — PROPOFOL 80 MG: 10 INJECTION, EMULSION INTRAVENOUS at 09:10

## 2024-10-10 RX ADMIN — LIDOCAINE HYDROCHLORIDE 80 MG: 20 INJECTION, SOLUTION INTRAVENOUS at 08:10

## 2024-10-10 NOTE — TRANSFER OF CARE
"Anesthesia Transfer of Care Note    Patient: Syed Kelley    Procedure(s) Performed: * No procedures listed *    Patient location: GI    Anesthesia Type: MAC    Transport from OR: Transported from OR on room air with adequate spontaneous ventilation    Post pain: adequate analgesia    Post assessment: no apparent anesthetic complications    Post vital signs: stable    Level of consciousness: sedated and responds to stimulation    Nausea/Vomiting: no nausea/vomiting    Complications: none    Transfer of care protocol was followedComments: Good SV continue, NAD noted, VSS, RTRN      Last vitals: Visit Vitals  BP (!) 105/55 (BP Location: Right arm, Patient Position: Lying)   Pulse 75   Temp 36.6 °C (97.8 °F) (Oral)   Resp 17   Ht 5' 3" (1.6 m)   Wt 90.7 kg (200 lb)   SpO2 98%   BMI 35.43 kg/m²     "

## 2024-10-10 NOTE — DISCHARGE INSTRUCTIONS
Procedure Date  10/10/24     Impression  Overall Impression:   One 12 mm polyp in the cecum; performed cold snare removal; placed 1 clip successfully; hemostasis achieved  One 12 mm polyp in the descending colon; performed hot snare removal  Subcentimeter polyp in the sigmoid colon was removed with cold snare  Hemorrhoids     Recommendation  - Repeat colonoscopy in 3 years   - Discharge patient to home  - Advance diet as tolerated  - Continue present medications  - Await pathology results  - Patient has a contact number available for emergencies. The signs and symptoms of potential delayed complications were discussed with the patient. Return to normal activities tomorrow. Written discharge instructions were provided to the patient.     Continue home medications as instructed. No driving for 24 hrs. No signing legal documents for 24 hrs. Drink plenty of fluids

## 2024-10-10 NOTE — ANESTHESIA PREPROCEDURE EVALUATION
10/10/2024  Syed Kelley is a 59 y.o., male.      Pre-op Assessment    I have reviewed the Patient Summary Reports.     I have reviewed the Nursing Notes. I have reviewed the NPO Status.   I have reviewed the Medications.     Review of Systems  Anesthesia Hx:  No problems with previous Anesthesia   History of prior surgery of interest to airway management or planning:          Denies Family Hx of Anesthesia complications.    Denies Personal Hx of Anesthesia complications.                    Cardiovascular:     Hypertension                                  Hypertension         Pulmonary:        Sleep Apnea     Obstructive Sleep Apnea (AC).           Endocrine:  Diabetes    Diabetes                      Psych:  Psychiatric History                Active Ambulatory Problems     Diagnosis Date Noted    Blurred vision, bilateral 11/10/2023    Type 2 diabetes mellitus with hyperglycemia 11/10/2023    Mixed hyperlipidemia 11/10/2023    Essential hypertension, benign 11/10/2023    Diabetes 01/19/2024    Depression 01/19/2024    ADHD 01/19/2024    Vitamin D deficiency 01/20/2024    Onychomycosis 02/16/2024    Adult attention deficit disorder 03/18/2024    Long-term current use of stimulant 03/18/2024    Callus of foot 03/18/2024    Neuropathy 05/28/2024    Obstructive sleep apnea syndrome 08/23/2024    Hypertension 02/07/2022     Resolved Ambulatory Problems     Diagnosis Date Noted    Swollen leg 01/20/2024     Past Medical History:   Diagnosis Date    Essential hypertension 03/22/2021    Hyperlipidemia     AC (obstructive sleep apnea) 10/19/2023    PAD (peripheral artery disease) 02/17/2023    Type 2 diabetes mellitus       Review of patient's allergies indicates:   Allergen Reactions    Azithromycin     Erythromycin Other (See Comments)    M-mycin       Current Outpatient Medications on File Prior to Encounter  "  Medication Sig Dispense Refill    lisinopriL-hydrochlorothiazide (PRINZIDE,ZESTORETIC) 20-25 mg Tab Take 1 tablet by mouth once daily. 90 tablet 1    metoprolol tartrate (LOPRESSOR) 50 MG tablet Take 1 tablet (50 mg total) by mouth 2 (two) times daily. 180 tablet 1    albuterol (PROVENTIL/VENTOLIN HFA) 90 mcg/actuation inhaler Inhale 2 puffs into the lungs every 4 (four) hours as needed for Wheezing. Rescue 8 g 0    aspirin (ECOTRIN) 81 MG EC tablet Take 1 tablet (81 mg total) by mouth once daily. 90 tablet 3    atorvastatin (LIPITOR) 80 MG tablet Take 1 tablet (80 mg total) by mouth once daily. 90 tablet 1    citalopram (CELEXA) 20 MG tablet Take 1 tablet (20 mg total) by mouth once daily. 90 tablet 1    dextroamphetamine-amphetamine (ADDERALL) 20 mg tablet Take 1 tablet by mouth each afternoon 30 tablet 0    dextroamphetamine-amphetamine (ADDERALL) 30 mg Tab Take 1 tablet by mouth each morning 30 tablet 0    gabapentin (NEURONTIN) 300 MG capsule Take 1 capsule (300 mg total) by mouth 3 (three) times daily. 90 capsule 1    glyBURIDE (DIABETA) 5 MG tablet Take 1 tablet (5 mg total) by mouth daily with breakfast. 90 tablet 1    insulin degludec (TRESIBA FLEXTOUCH U-200) 200 unit/mL (3 mL) insulin pen Inject 30 Units into the skin once daily. Increase by 5 units if blood glucose is elevated above 200. weekly 3 pen 3    insulin syringe-needle U-100 0.3 mL 31 gauge x 5/16" Syrg 100 each by abdominal subcutaneous route once daily. Use once daily for insulin injection 100 each 2    metFORMIN (GLUCOPHAGE) 1000 MG tablet Take 1 tablet (1,000 mg total) by mouth 2 (two) times daily with meals. 180 tablet 1    pen needle, diabetic (BD ULTRA-FINE SHORT PEN NEEDLE) 31 gauge x 5/16" Ndle USE AS DIRECTED TO administer insulin TWICE DAILY for 50      pen needle, diabetic 31 gauge x 5/16" Ndle 100 each by abdominal subcutaneous route once daily. Use to administer insulin twice daily 100 each 2     No current facility-administered " medications on file prior to encounter.      Past Surgical History:   Procedure Laterality Date    APPENDECTOMY      CARDIAC CATHETERIZATION      DEBRIDEMENT OF LOWER EXTREMITY Bilateral 02/18/2023    Procedure: DEBRIDEMENT, LOWER EXTREMITY;  Surgeon: Yael Cohen MD;  Location: [facility];  Service: General;  Laterality: Bilateral;  debride toes    FINGER AMPUTATION Right     1st digit    KIDNEY SURGERY Right     Patient states right kidney removed at age 5.    LEFT HEART CATHETERIZATION N/A 05/07/2021    Procedure: Left heart cath with possible intervention;  Surgeon: Federico James DO;  Location: [facility] CATH LAB;  Service: Cardiology;  Laterality: N/A;    TOE AMPUTATION Right 03/29/2023    Procedure: AMPUTATION, TOE;  Surgeon: Yael Cohen MD;  Location: [facility] OR;  Service: General;  Laterality: Right;  Right great toe amp dr darian wednesday        Physical Exam  General: Well nourished    Airway:  Mallampati: II   Mouth Opening: Normal  TM Distance: Normal, at least 6 cm  Tongue: Normal  Neck ROM: Normal ROM        Anesthesia Plan  Type of Anesthesia, risks & benefits discussed:    Anesthesia Type: MAC  Intra-op Monitoring Plan: Standard ASA Monitors  Post Op Pain Control Plan: multimodal analgesia  Induction:  IV  Informed Consent: Informed consent signed with the Patient and all parties understand the risks and agree with anesthesia plan.  All questions answered. Patient consented to blood products? No  ASA Score: 3  Day of Surgery Review of History & Physical: H&P Update referred to the surgeon/provider.I have interviewed and examined the patient. I have reviewed the patient's H&P dated: There are no significant changes.     Ready For Surgery From Anesthesia Perspective.     .

## 2024-10-10 NOTE — ANESTHESIA POSTPROCEDURE EVALUATION
Anesthesia Post Evaluation    Patient: Syed Kelley    Procedure(s) Performed: * No procedures listed *    Final Anesthesia Type: general (The pt was Not Arousable even with painful stimulation during procedure)      Patient location during evaluation: GI PACU ([facility])  Patient participation: Yes- Able to Participate  Level of consciousness: awake and alert  Post-procedure vital signs: reviewed and stable  Pain management: adequate  Airway patency: patent    PONV status at discharge: No PONV  Anesthetic complications: no      Cardiovascular status: blood pressure returned to baseline, hemodynamically stable and stable  Respiratory status: unassisted  Hydration status: euvolemic  Follow-up not needed.  Comments: Refer to nursing note for pain/basilio score upon discharge from recovery.               Vitals Value Taken Time   /94 10/10/24 0946   Temp 36.6 °C (97.8 °F) 10/10/24 0912   Pulse 63 10/10/24 0947   Resp 17 10/10/24 0946   SpO2 97 % 10/10/24 0947   Vitals shown include unfiled device data.      Event Time   Out of Recovery 10:01:03         Pain/Basilio Score: Basilio Score: 9 (10/10/2024  9:15 AM)

## 2024-10-10 NOTE — H&P
History & Physical - Short Stay  Gastroenterology      SUBJECTIVE:     Procedure: Colonoscopy    Chief Complaint/Indication for Procedure: Screening    (Not in a hospital admission)      Review of patient's allergies indicates:   Allergen Reactions    Azithromycin     Erythromycin Other (See Comments)    M-mycin         Past Medical History:   Diagnosis Date    Adult attention deficit disorder 2021    Depression 2022    Essential hypertension 2021    Hyperlipidemia     Neuropathy 2023    AC (obstructive sleep apnea) 10/19/2023    PAD (peripheral artery disease) 2023    Swollen leg 2024    Type 2 diabetes mellitus      Past Surgical History:   Procedure Laterality Date    APPENDECTOMY      CARDIAC CATHETERIZATION      DEBRIDEMENT OF LOWER EXTREMITY Bilateral 2023    Procedure: DEBRIDEMENT, LOWER EXTREMITY;  Surgeon: Yael Cohen MD;  Location: [facility] OR;  Service: General;  Laterality: Bilateral;  debride toes    FINGER AMPUTATION Right     1st digit    KIDNEY SURGERY Right     Patient states right kidney removed at age 5.    LEFT HEART CATHETERIZATION N/A 2021    Procedure: Left heart cath with possible intervention;  Surgeon: Federico James DO;  Location: [facility] CATH LAB;  Service: Cardiology;  Laterality: N/A;    TOE AMPUTATION Right 2023    Procedure: AMPUTATION, TOE;  Surgeon: Yael Cohen MD;  Location: [facility] OR;  Service: General;  Laterality: Right;  Right great toe amp dr cohen wednesday     Family History   Problem Relation Name Age of Onset    Hypertension Father      Stroke Father      Cancer Sister       Social History     Tobacco Use    Smoking status: Former     Current packs/day: 0.00     Average packs/day: 5.0 packs/day for 4.0 years (20.0 ttl pk-yrs)     Types: Cigarettes     Start date:      Quit date:      Years since quittin.8     Passive exposure: Past    Smokeless tobacco: Current     Types: Chew, Snuff     Tobacco comments:     1 can/day   Substance Use Topics    Alcohol use: Yes     Comment: 2-3 drinks per/3 months    Drug use: Never         OBJECTIVE:     Vital Signs (Most Recent)  Temp: 98.7 °F (37.1 °C) (10/10/24 0738)  Pulse: 75 (10/10/24 0738)  Resp: 20 (10/10/24 0738)  BP: (!) 166/94 (10/10/24 0738)  SpO2: 98 % (10/10/24 0738)    Physical Exam:                                                       GENERAL:  Comfortable, in no acute distress.                                 HEENT EXAM:  Nonicteric.  No adenopathy.  Oropharynx is clear.               NECK:  Supple.                                                               LUNGS:  Clear.                                                               CARDIAC:  Regular rate and rhythm.  S1, S2.  No murmur.                      ABDOMEN:  Soft, positive bowel sounds, nontender.  No hepatosplenomegaly or masses.  No rebound or guarding.                                             EXTREMITIES:  No edema.     MENTAL STATUS:  Normal, alert and oriented.      ASSESSMENT/PLAN:     Assessment: Screening    Plan: Colonoscopy

## 2024-10-11 LAB
DHEA SERPL-MCNC: NORMAL
ESTROGEN SERPL-MCNC: NORMAL PG/ML
INSULIN SERPL-ACNC: NORMAL U[IU]/ML
LAB AP GROSS DESCRIPTION: NORMAL
LAB AP LABORATORY NOTES: NORMAL
LAB AP MISMATCH REPAIR PROTEINS: NORMAL
T3RU NFR SERPL: NORMAL %

## 2024-10-12 NOTE — PROGRESS NOTES
Notified patient's wife over phone regarding findings of colon cancer within the polyp. Plan repeat colonoscopy in 4-6 months to visualize site.

## 2024-10-14 DIAGNOSIS — Z12.11 SCREENING FOR COLON CANCER: Primary | ICD-10-CM

## 2024-10-17 ENCOUNTER — OFFICE VISIT (OUTPATIENT)
Dept: FAMILY MEDICINE | Facility: CLINIC | Age: 59
End: 2024-10-17
Payer: COMMERCIAL

## 2024-10-17 VITALS
WEIGHT: 203.38 LBS | HEART RATE: 68 BPM | HEIGHT: 63 IN | SYSTOLIC BLOOD PRESSURE: 149 MMHG | RESPIRATION RATE: 18 BRPM | DIASTOLIC BLOOD PRESSURE: 89 MMHG | OXYGEN SATURATION: 92 % | BODY MASS INDEX: 36.04 KG/M2 | TEMPERATURE: 99 F

## 2024-10-17 DIAGNOSIS — Z79.899 LONG-TERM CURRENT USE OF STIMULANT: ICD-10-CM

## 2024-10-17 DIAGNOSIS — F98.8 ADULT ATTENTION DEFICIT DISORDER: Chronic | ICD-10-CM

## 2024-10-17 DIAGNOSIS — R06.02 SHORTNESS OF BREATH: ICD-10-CM

## 2024-10-17 DIAGNOSIS — J04.0 ACUTE LARYNGITIS: Primary | ICD-10-CM

## 2024-10-17 DIAGNOSIS — E78.2 MIXED HYPERLIPIDEMIA: ICD-10-CM

## 2024-10-17 DIAGNOSIS — E11.9 TYPE 2 DIABETES MELLITUS WITHOUT COMPLICATION, WITHOUT LONG-TERM CURRENT USE OF INSULIN: ICD-10-CM

## 2024-10-17 DIAGNOSIS — I10 ESSENTIAL HYPERTENSION, BENIGN: ICD-10-CM

## 2024-10-17 LAB
ALBUMIN SERPL BCP-MCNC: 3.4 G/DL (ref 3.5–5)
ALBUMIN/GLOB SERPL: 0.8 {RATIO}
ALP SERPL-CCNC: 102 U/L (ref 45–115)
ALT SERPL W P-5'-P-CCNC: 28 U/L (ref 16–61)
ANION GAP SERPL CALCULATED.3IONS-SCNC: 11 MMOL/L (ref 7–16)
AST SERPL W P-5'-P-CCNC: 19 U/L (ref 15–37)
BASOPHILS # BLD AUTO: 0.05 K/UL (ref 0–0.2)
BASOPHILS NFR BLD AUTO: 0.6 % (ref 0–1)
BILIRUB SERPL-MCNC: 0.4 MG/DL (ref ?–1.2)
BUN SERPL-MCNC: 13 MG/DL (ref 7–18)
BUN/CREAT SERPL: 13 (ref 6–20)
CALCIUM SERPL-MCNC: 9.6 MG/DL (ref 8.5–10.1)
CHLORIDE SERPL-SCNC: 98 MMOL/L (ref 98–107)
CHOLEST SERPL-MCNC: 147 MG/DL (ref 0–200)
CHOLEST/HDLC SERPL: 4.6 {RATIO}
CO2 SERPL-SCNC: 30 MMOL/L (ref 21–32)
CREAT SERPL-MCNC: 1.03 MG/DL (ref 0.7–1.3)
DIFFERENTIAL METHOD BLD: ABNORMAL
EGFR (NO RACE VARIABLE) (RUSH/TITUS): 84 ML/MIN/1.73M2
EOSINOPHIL # BLD AUTO: 0.16 K/UL (ref 0–0.5)
EOSINOPHIL NFR BLD AUTO: 1.9 % (ref 1–4)
ERYTHROCYTE [DISTWIDTH] IN BLOOD BY AUTOMATED COUNT: 12.6 % (ref 11.5–14.5)
EST. AVERAGE GLUCOSE BLD GHB EST-MCNC: 166 MG/DL
GLOBULIN SER-MCNC: 4.4 G/DL (ref 2–4)
GLUCOSE SERPL-MCNC: 168 MG/DL (ref 74–106)
GLUCOSE SERPL-MCNC: 232 MG/DL (ref 70–110)
HBA1C MFR BLD HPLC: 7.4 % (ref 4.5–6.6)
HCT VFR BLD AUTO: 41.7 % (ref 40–54)
HDLC SERPL-MCNC: 32 MG/DL (ref 40–60)
HGB BLD-MCNC: 13.8 G/DL (ref 13.5–18)
IMM GRANULOCYTES # BLD AUTO: 0.06 K/UL (ref 0–0.04)
IMM GRANULOCYTES NFR BLD: 0.7 % (ref 0–0.4)
LYMPHOCYTES # BLD AUTO: 1.85 K/UL (ref 1–4.8)
LYMPHOCYTES NFR BLD AUTO: 22.3 % (ref 27–41)
MCH RBC QN AUTO: 30.7 PG (ref 27–31)
MCHC RBC AUTO-ENTMCNC: 33.1 G/DL (ref 32–36)
MCV RBC AUTO: 92.9 FL (ref 80–96)
MONOCYTES # BLD AUTO: 0.56 K/UL (ref 0–0.8)
MONOCYTES NFR BLD AUTO: 6.7 % (ref 2–6)
MPC BLD CALC-MCNC: 9.4 FL (ref 9.4–12.4)
NEUTROPHILS # BLD AUTO: 5.62 K/UL (ref 1.8–7.7)
NEUTROPHILS NFR BLD AUTO: 67.8 % (ref 53–65)
NONHDLC SERPL-MCNC: 115 MG/DL
NRBC # BLD AUTO: 0 X10E3/UL
NRBC, AUTO (.00): 0 %
PLATELET # BLD AUTO: 331 K/UL (ref 150–400)
POTASSIUM SERPL-SCNC: 4 MMOL/L (ref 3.5–5.1)
PROT SERPL-MCNC: 7.8 G/DL (ref 6.4–8.2)
RBC # BLD AUTO: 4.49 M/UL (ref 4.6–6.2)
SODIUM SERPL-SCNC: 135 MMOL/L (ref 136–145)
TRIGL SERPL-MCNC: 436 MG/DL (ref 35–150)
VLDLC SERPL-MCNC: ABNORMAL MG/DL
WBC # BLD AUTO: 8.3 K/UL (ref 4.5–11)

## 2024-10-17 PROCEDURE — 80061 LIPID PANEL: CPT | Mod: ,,, | Performed by: CLINICAL MEDICAL LABORATORY

## 2024-10-17 PROCEDURE — 83036 HEMOGLOBIN GLYCOSYLATED A1C: CPT | Mod: ,,, | Performed by: CLINICAL MEDICAL LABORATORY

## 2024-10-17 PROCEDURE — 85025 COMPLETE CBC W/AUTO DIFF WBC: CPT | Mod: ,,, | Performed by: CLINICAL MEDICAL LABORATORY

## 2024-10-17 PROCEDURE — 80053 COMPREHEN METABOLIC PANEL: CPT | Mod: ,,, | Performed by: CLINICAL MEDICAL LABORATORY

## 2024-10-17 RX ORDER — METFORMIN HYDROCHLORIDE 1000 MG/1
1000 TABLET ORAL 2 TIMES DAILY WITH MEALS
Qty: 180 TABLET | Refills: 1 | Status: SHIPPED | OUTPATIENT
Start: 2024-10-17

## 2024-10-17 RX ORDER — CEFTRIAXONE 1 G/1
1 INJECTION, POWDER, FOR SOLUTION INTRAMUSCULAR; INTRAVENOUS
Status: COMPLETED | OUTPATIENT
Start: 2024-10-17 | End: 2024-10-17

## 2024-10-17 RX ORDER — DEXTROAMPHETAMINE SACCHARATE, AMPHETAMINE ASPARTATE, DEXTROAMPHETAMINE SULFATE AND AMPHETAMINE SULFATE 5; 5; 5; 5 MG/1; MG/1; MG/1; MG/1
TABLET ORAL
Qty: 30 TABLET | Refills: 0 | Status: SHIPPED | OUTPATIENT
Start: 2024-10-17

## 2024-10-17 RX ORDER — INSULIN DEGLUDEC 200 U/ML
30 INJECTION, SOLUTION SUBCUTANEOUS DAILY
Qty: 3 PEN | Refills: 3 | Status: SHIPPED | OUTPATIENT
Start: 2024-10-17 | End: 2025-10-17

## 2024-10-17 RX ORDER — ALBUTEROL SULFATE 0.83 MG/ML
2.5 SOLUTION RESPIRATORY (INHALATION) EVERY 6 HOURS PRN
Qty: 25 EACH | Refills: 0 | Status: SHIPPED | OUTPATIENT
Start: 2024-10-17 | End: 2025-10-17

## 2024-10-17 RX ORDER — LISINOPRIL AND HYDROCHLOROTHIAZIDE 20; 25 MG/1; MG/1
1 TABLET ORAL DAILY
Qty: 90 TABLET | Refills: 1 | Status: SHIPPED | OUTPATIENT
Start: 2024-10-17

## 2024-10-17 RX ORDER — DEXTROAMPHETAMINE SACCHARATE, AMPHETAMINE ASPARTATE, DEXTROAMPHETAMINE SULFATE AND AMPHETAMINE SULFATE 7.5; 7.5; 7.5; 7.5 MG/1; MG/1; MG/1; MG/1
TABLET ORAL
Qty: 30 TABLET | Refills: 0 | Status: SHIPPED | OUTPATIENT
Start: 2024-10-17

## 2024-10-17 RX ORDER — ATORVASTATIN CALCIUM 80 MG/1
80 TABLET, FILM COATED ORAL DAILY
Qty: 90 TABLET | Refills: 1 | Status: SHIPPED | OUTPATIENT
Start: 2024-10-17

## 2024-10-17 RX ORDER — DEXTROAMPHETAMINE SACCHARATE, AMPHETAMINE ASPARTATE, DEXTROAMPHETAMINE SULFATE AND AMPHETAMINE SULFATE 7.5; 7.5; 7.5; 7.5 MG/1; MG/1; MG/1; MG/1
1 TABLET ORAL EVERY MORNING
Qty: 30 TABLET | Refills: 0 | Status: SHIPPED | OUTPATIENT
Start: 2024-10-17

## 2024-10-17 RX ORDER — METOPROLOL TARTRATE 50 MG/1
50 TABLET ORAL 2 TIMES DAILY
Qty: 180 TABLET | Refills: 1 | Status: SHIPPED | OUTPATIENT
Start: 2024-10-17

## 2024-10-17 RX ORDER — ALBUTEROL SULFATE 90 UG/1
2 INHALANT RESPIRATORY (INHALATION) EVERY 4 HOURS PRN
Qty: 8 G | Refills: 0 | Status: SHIPPED | OUTPATIENT
Start: 2024-10-17

## 2024-10-17 RX ORDER — AMOXICILLIN AND CLAVULANATE POTASSIUM 875; 125 MG/1; MG/1
1 TABLET, FILM COATED ORAL EVERY 12 HOURS
Qty: 20 TABLET | Refills: 0 | Status: SHIPPED | OUTPATIENT
Start: 2024-10-17 | End: 2024-10-27

## 2024-10-17 RX ORDER — GLYBURIDE 5 MG/1
5 TABLET ORAL
Qty: 90 TABLET | Refills: 1 | Status: SHIPPED | OUTPATIENT
Start: 2024-10-17

## 2024-10-17 RX ORDER — DEXAMETHASONE SODIUM PHOSPHATE 4 MG/ML
2 INJECTION, SOLUTION INTRA-ARTICULAR; INTRALESIONAL; INTRAMUSCULAR; INTRAVENOUS; SOFT TISSUE
Status: COMPLETED | OUTPATIENT
Start: 2024-10-17 | End: 2024-10-17

## 2024-10-17 RX ORDER — PREDNISONE 5 MG/1
5 TABLET ORAL DAILY
Qty: 5 TABLET | Refills: 0 | Status: SHIPPED | OUTPATIENT
Start: 2024-10-17 | End: 2024-10-22

## 2024-10-17 RX ADMIN — CEFTRIAXONE 1 G: 1 INJECTION, POWDER, FOR SOLUTION INTRAMUSCULAR; INTRAVENOUS at 09:10

## 2024-10-17 RX ADMIN — DEXAMETHASONE SODIUM PHOSPHATE 2 MG: 4 INJECTION, SOLUTION INTRA-ARTICULAR; INTRALESIONAL; INTRAMUSCULAR; INTRAVENOUS; SOFT TISSUE at 09:10

## 2024-10-17 NOTE — ASSESSMENT & PLAN NOTE
Declines swabs.   .   Rocephin 1G/Decadron 2mg injections given. No adverse reaction noted.   Augmentin prescribed to take as directed. Instructed to take all abx until gone even if start to feel better.    Low dose Prednisone 5mg to take daily as prescribed.   but not feeling well. Pt aware to watch diet in carbs and increase water intake while on steroids. He states he can monitor his BP and BG at home.   Instructed to go to ER over weekend for any increased sob or difficulty breathing. Pt flaquito.

## 2024-10-17 NOTE — ASSESSMENT & PLAN NOTE
Declines cxr.   Will treat acute laryngitis in hopes of preventing pneumonia.   Instructed to go to ER for any increase sob or difficulty breathing.

## 2024-10-17 NOTE — PROGRESS NOTES
"   REMEDIOS Orr   [facility]  [address]  [place] [address]     PATIENT NAME: Syed Kelley  : 1965  DATE: 10/17/24  MRN: [id number]      Billing Provider: REMEDIOS Orr  Level of Service:   Patient PCP Information       Provider PCP Type    Julio Cruz,  General            Reason for Visit / Chief Complaint: Establish Care (Has sinus could and needs refills. States that last time he had this they put him on breathing treatments and he has a nebulizer just needs hoses and the medicine )   Health Maintenance Due   Topic Date Due    Pneumococcal Vaccines (Age 0-64) (1 of 2 - PCV) Never done    TETANUS VACCINE  Never done    Shingles Vaccine (1 of 2) Never done    Lipid Panel  2024          Update PCP  Update Chief Complaint         History of Present Illness / Problem Focused Workflow     Syed Kelley presents to the clinic with wife for check up and labs. He also has a "head cold" today. He has productive cough with yellow sputum, runny nose, nasal congestion, wheezing, increased sob. He states has been going on for over a week but just now had copay to be seen. He declines swabs today. He has tried a breathing treatment from a friend that maybe helped "some" and is requesting some of this med sent in along with new tubing so he can use her machine. He denies headaches, earaches, sore throat, fever, abd pain, n/v/d, rash. He states sugar has been up slightly this past week. Will get A1C today and RBG is 232 in clinic. He did eat some cereal this morning. He states he has not had his Adderall in over a month due to not being able to afford it and his  does show he has not had it filled since 2024. He denies any other needs at this time. NAD noted.     Hemoglobin A1C   Date Value Ref Range Status   2024 6.7 (H) 4.5 - 6.6 % Final     Comment:       Normal:               <5.7%  Pre-Diabetic:       5.7% to 6.4%  Diabetic:             >6.4%  Diabetic Goal:     <7% "   04/19/2024 6.8 (H) 4.5 - 6.6 % Final     Comment:       Normal:               <5.7%  Pre-Diabetic:       5.7% to 6.4%  Diabetic:             >6.4%  Diabetic Goal:     <7%   11/10/2023 6.6 4.5 - 6.6 % Final     Comment:       Normal:               <5.7%  Pre-Diabetic:       5.7% to 6.4%  Diabetic:             >6.4%  Diabetic Goal:     <7%        CMP  Sodium   Date Value Ref Range Status   05/28/2024 136 136 - 145 mmol/L Final     Potassium   Date Value Ref Range Status   05/28/2024 4.0 3.5 - 5.1 mmol/L Final     Chloride   Date Value Ref Range Status   05/28/2024 99 98 - 107 mmol/L Final     CO2   Date Value Ref Range Status   05/28/2024 31 21 - 32 mmol/L Final     Glucose   Date Value Ref Range Status   05/28/2024 61 (L) 74 - 106 mg/dL Final     BUN   Date Value Ref Range Status   05/28/2024 15 7 - 18 mg/dL Final     Creatinine   Date Value Ref Range Status   05/28/2024 0.94 0.70 - 1.30 mg/dL Final     Calcium   Date Value Ref Range Status   05/28/2024 9.7 8.5 - 10.1 mg/dL Final     Total Protein   Date Value Ref Range Status   05/28/2024 7.5 6.4 - 8.2 g/dL Final     Albumin   Date Value Ref Range Status   05/28/2024 3.3 (L) 3.5 - 5.0 g/dL Final     Bilirubin, Total   Date Value Ref Range Status   05/28/2024 0.4 >0.0 - 1.2 mg/dL Final     Alk Phos   Date Value Ref Range Status   05/28/2024 109 45 - 115 U/L Final     AST   Date Value Ref Range Status   05/28/2024 21 15 - 37 U/L Final     ALT   Date Value Ref Range Status   05/28/2024 29 16 - 61 U/L Final     Anion Gap   Date Value Ref Range Status   05/28/2024 10 7 - 16 mmol/L Final     eGFR   Date Value Ref Range Status   05/28/2024 93 >=60 mL/min/1.73m2 Final        Lab Results   Component Value Date    WBC 8.19 04/19/2024    RBC 4.16 (L) 04/19/2024    HGB 12.9 (L) 04/19/2024    HCT 40.1 04/19/2024    MCV 96.4 (H) 04/19/2024    MCH 31.0 04/19/2024    MCHC 32.2 04/19/2024    RDW 12.7 04/19/2024     04/19/2024    MPV 10.0 04/19/2024    LYMPH 25.4 (L)  04/19/2024    LYMPH 2.08 04/19/2024    MONO 6.5 (H) 04/19/2024    EOS 0.27 04/19/2024    BASO 0.05 04/19/2024    EOSINOPHIL 3.3 04/19/2024    BASOPHIL 0.6 04/19/2024        Lab Results   Component Value Date    CHOL 143 08/23/2023    CHOL 128 12/22/2022    CHOL 150 08/15/2022     Lab Results   Component Value Date    HDL 29 (L) 08/23/2023    HDL 32 (L) 12/22/2022    HDL 36 (L) 08/15/2022     Lab Results   Component Value Date    LDLCALC 46 08/23/2023    LDLCALC 53 12/22/2022    LDLCALC 43 08/15/2022     Lab Results   Component Value Date    TRIG 338 (H) 08/23/2023    TRIG 215 (H) 12/22/2022    TRIG 355 (H) 08/15/2022     Lab Results   Component Value Date    CHOLHDL 4.9 08/23/2023    CHOLHDL 4.0 12/22/2022    CHOLHDL 4.2 08/15/2022        Wt Readings from Last 3 Encounters:   10/17/24 0824 92.3 kg (203 lb 6.4 oz)   10/10/24 0738 90.7 kg (200 lb)   09/10/24 1008 90.7 kg (200 lb)        BP Readings from Last 3 Encounters:   10/17/24 (!) 149/89   10/10/24 (!) 143/94   09/10/24 129/86        Review of Systems     Review of Systems   Constitutional: Negative.    HENT:  Positive for nasal congestion and rhinorrhea.    Eyes: Negative.    Respiratory:  Positive for cough, shortness of breath and wheezing.    Gastrointestinal: Negative.    Endocrine: Negative.    Genitourinary: Negative.    Musculoskeletal: Negative.    Integumentary:  Negative.   Allergic/Immunologic: Negative.    Neurological: Negative.    Hematological: Negative.    Psychiatric/Behavioral: Negative.          Medical / Social / Family History     Past Medical History:   Diagnosis Date    Adult attention deficit disorder 03/22/2021    Depression 12/22/2022    Essential hypertension 03/22/2021    Hyperlipidemia     Neuropathy 08/23/2023    AC (obstructive sleep apnea) 10/19/2023    PAD (peripheral artery disease) 02/17/2023    Swollen leg 01/20/2024    Type 2 diabetes mellitus        Past Surgical History:   Procedure Laterality Date    APPENDECTOMY       "CARDIAC CATHETERIZATION      COLONOSCOPY  10/09/2024    DEBRIDEMENT OF LOWER EXTREMITY Bilateral 02/18/2023    Procedure: DEBRIDEMENT, LOWER EXTREMITY;  Surgeon: Yael Cohen MD;  Location: [facility] OR;  Service: General;  Laterality: Bilateral;  debride toes    FINGER AMPUTATION Right     1st digit    KIDNEY SURGERY Right     Patient states right kidney removed at age 5.    LEFT HEART CATHETERIZATION N/A 05/07/2021    Procedure: Left heart cath with possible intervention;  Surgeon: Federico James DO;  Location: [facility] CATH LAB;  Service: Cardiology;  Laterality: N/A;    TOE AMPUTATION Right 03/29/2023    Procedure: AMPUTATION, TOE;  Surgeon: Yael Cohen MD;  Location: [facility] OR;  Service: General;  Laterality: Right;  Right great toe amp dr cohen wednesday       Social History    reports that he quit smoking about 39 years ago. His smoking use included cigarettes. He started smoking about 43 years ago. He has a 20 pack-year smoking history. He has been exposed to tobacco smoke. His smokeless tobacco use includes chew and snuff. He reports current alcohol use. He reports that he does not use drugs.    Family History  's family history includes Cancer in his sister; Hypertension in his father; Stroke in his father.    Medications and Allergies     Medications  Outpatient Medications Marked as Taking for the 10/17/24 encounter (Office Visit) with Nydia Martines FNP   Medication Sig Dispense Refill    aspirin (ECOTRIN) 81 MG EC tablet Take 1 tablet (81 mg total) by mouth once daily. 90 tablet 3    citalopram (CELEXA) 20 MG tablet Take 1 tablet (20 mg total) by mouth once daily. 90 tablet 1    gabapentin (NEURONTIN) 300 MG capsule Take 1 capsule (300 mg total) by mouth 3 (three) times daily. 90 capsule 1    insulin syringe-needle U-100 0.3 mL 31 gauge x 5/16" Syrg 100 each by abdominal subcutaneous route once daily. Use once daily for insulin injection 100 each 2    pen needle, diabetic (BD ULTRA-FINE " "SHORT PEN NEEDLE) 31 gauge x 5/16" Ndle USE AS DIRECTED TO administer insulin TWICE DAILY for 50      pen needle, diabetic 31 gauge x 5/16" Ndle 100 each by abdominal subcutaneous route once daily. Use to administer insulin twice daily 100 each 2    [DISCONTINUED] albuterol (PROVENTIL/VENTOLIN HFA) 90 mcg/actuation inhaler Inhale 2 puffs into the lungs every 4 (four) hours as needed for Wheezing. Rescue 8 g 0    [DISCONTINUED] atorvastatin (LIPITOR) 80 MG tablet Take 1 tablet (80 mg total) by mouth once daily. 90 tablet 1    [DISCONTINUED] glyBURIDE (DIABETA) 5 MG tablet Take 1 tablet (5 mg total) by mouth daily with breakfast. 90 tablet 1    [DISCONTINUED] insulin degludec (TRESIBA FLEXTOUCH U-200) 200 unit/mL (3 mL) insulin pen Inject 30 Units into the skin once daily. Increase by 5 units if blood glucose is elevated above 200. weekly 3 pen 3    [DISCONTINUED] lisinopriL-hydrochlorothiazide (PRINZIDE,ZESTORETIC) 20-25 mg Tab Take 1 tablet by mouth once daily. 90 tablet 1    [DISCONTINUED] metFORMIN (GLUCOPHAGE) 1000 MG tablet Take 1 tablet (1,000 mg total) by mouth 2 (two) times daily with meals. 180 tablet 1    [DISCONTINUED] metoprolol tartrate (LOPRESSOR) 50 MG tablet Take 1 tablet (50 mg total) by mouth 2 (two) times daily. 180 tablet 1     Current Facility-Administered Medications for the 10/17/24 encounter (Office Visit) with Nydia Martines FNP   Medication Dose Route Frequency Provider Last Rate Last Admin    [COMPLETED] cefTRIAXone injection 1 g  1 g Intramuscular 1 time in Clinic/HOD Nydia Martines FNP   1 g at 10/17/24 0941    [COMPLETED] dexAMETHasone injection 2 mg  2 mg Intramuscular 1 time in Clinic/HOD Nydia Martines FNP   2 mg at 10/17/24 0942       Allergies  Review of patient's allergies indicates:   Allergen Reactions    Azithromycin     Erythromycin Other (See Comments)    M-mycin        Physical Examination     Vitals:    10/17/24 0824   BP: (!) 149/89   BP Location: Left arm   Patient Position: " "Sitting   Pulse: 68   Resp: 18   Temp: 99.1 °F (37.3 °C)   TempSrc: Oral   SpO2: (!) 92%   Weight: 92.3 kg (203 lb 6.4 oz)   Height: 5' 3" (1.6 m)      Physical Exam  Constitutional:       Appearance: Normal appearance. He is obese.   HENT:      Head: Normocephalic.      Right Ear: Hearing, ear canal and external ear normal. A middle ear effusion is present.      Left Ear: Hearing, ear canal and external ear normal. A middle ear effusion is present.      Nose: Congestion present.      Right Turbinates: Swollen.      Left Turbinates: Swollen.      Mouth/Throat:      Mouth: Mucous membranes are moist.      Pharynx: Oropharynx is clear.   Eyes:      Extraocular Movements: Extraocular movements intact.   Cardiovascular:      Rate and Rhythm: Normal rate and regular rhythm.      Pulses: Normal pulses.      Heart sounds: Normal heart sounds.   Pulmonary:      Effort: Pulmonary effort is normal.      Breath sounds: Normal breath sounds.   Skin:     General: Skin is warm and dry.      Capillary Refill: Capillary refill takes less than 2 seconds.   Neurological:      General: No focal deficit present.      Mental Status: He is alert and oriented to person, place, and time.   Psychiatric:         Mood and Affect: Mood normal.         Behavior: Behavior normal.          Assessment and Plan (including Health Maintenance)      Problem List  Smart Sets  Document Outside HM   :    Plan:     There are no Patient Instructions on file for this visit.       Health Maintenance Due   Topic Date Due    Pneumococcal Vaccines (Age 0-64) (1 of 2 - PCV) Never done    TETANUS VACCINE  Never done    Shingles Vaccine (1 of 2) Never done    Lipid Panel  08/23/2024       Problem List Items Addressed This Visit       Acute laryngitis - Primary    Current Assessment & Plan     Declines swabs.   .   Rocephin 1G/Decadron 2mg injections given. No adverse reaction noted.   Augmentin prescribed to take as directed. Instructed to take all abx " until gone even if start to feel better.    Low dose Prednisone 5mg to take daily as prescribed.   but not feeling well. Pt aware to watch diet in carbs and increase water intake while on steroids. He states he can monitor his BP and BG at home.   Instructed to go to ER over weekend for any increased sob or difficulty breathing. Pt vu.          Relevant Medications    cefTRIAXone injection 1 g (Completed)    dexAMETHasone injection 2 mg (Completed)    predniSONE (DELTASONE) 5 MG tablet    Adult attention deficit disorder    Current Assessment & Plan      reviewed with no aberrant behavior noted.   Pt stable on current medication. Med refilled.          Relevant Medications    dextroamphetamine-amphetamine (ADDERALL) 20 mg tablet    dextroamphetamine-amphetamine (ADDERALL) 30 mg Tab    dextroamphetamine-amphetamine (ADDERALL) 20 mg tablet    dextroamphetamine-amphetamine (ADDERALL) 30 mg Tab    dextroamphetamine-amphetamine (ADDERALL) 20 mg tablet    dextroamphetamine-amphetamine (ADDERALL) 30 mg Tab    Diabetes    Current Assessment & Plan     Labs pending. Will inform of results when available.     Work on diet, specfically cutting back on bread, breaded things and cereal  Exercise at least 20-30 mins daily  Add free weights if you can even very light weight will help  Keep an on sugars, fasting sugar goal is , after eating no greater than 180  A1C goal is 7.0% or less          Relevant Medications    glyBURIDE (DIABETA) 5 MG tablet    insulin degludec (TRESIBA FLEXTOUCH U-200) 200 unit/mL (3 mL) insulin pen    metFORMIN (GLUCOPHAGE) 1000 MG tablet    Other Relevant Orders    Hemoglobin A1C    POCT glucose (Completed)    Essential hypertension, benign    Current Assessment & Plan     BP elevated today, but pt not feeling well.     1) Check your blood pressure at home. Please notify me at the clinic if the systolic (top number) is consistently over 140 or under 110 or if the diastolic (bottom number) is  consistently over 90 or under 60.  2) Regular aerobic physical activity ( e.g. brisk walking) at least 30 min 5 out of 7 days of the week  3) Low sodium diet.  4) Take your meds as directed  5) To the ER for any signs of chest pain/tightness/palpitations/shortness of breath/difficulty speaking/numbness or tingling in face or extremities  6) Have yearly eye exams           Relevant Medications    lisinopriL-hydrochlorothiazide (PRINZIDE,ZESTORETIC) 20-25 mg Tab    metoprolol tartrate (LOPRESSOR) 50 MG tablet    Other Relevant Orders    CBC Auto Differential    Comprehensive Metabolic Panel    Long-term current use of stimulant    Current Assessment & Plan     See above plan.          Relevant Medications    dextroamphetamine-amphetamine (ADDERALL) 20 mg tablet    dextroamphetamine-amphetamine (ADDERALL) 30 mg Tab    Mixed hyperlipidemia    Current Assessment & Plan     Labs pending. Will inform of results when available.          Relevant Medications    atorvastatin (LIPITOR) 80 MG tablet    Other Relevant Orders    Lipid Panel    Shortness of breath    Current Assessment & Plan     Declines cxr.   Will treat acute laryngitis in hopes of preventing pneumonia.   Instructed to go to ER for any increase sob or difficulty breathing.          Relevant Medications    albuterol (PROVENTIL/VENTOLIN HFA) 90 mcg/actuation inhaler    albuterol (PROVENTIL) 2.5 mg /3 mL (0.083 %) nebulizer solution    amoxicillin-clavulanate 875-125mg (AUGMENTIN) 875-125 mg per tablet     Acute laryngitis  -     cefTRIAXone injection 1 g  -     dexAMETHasone injection 2 mg  -     predniSONE (DELTASONE) 5 MG tablet; Take 1 tablet (5 mg total) by mouth once daily. for 5 days  Dispense: 5 tablet; Refill: 0    Shortness of breath  -     albuterol (PROVENTIL/VENTOLIN HFA) 90 mcg/actuation inhaler; Inhale 2 puffs into the lungs every 4 (four) hours as needed for Wheezing. Rescue  Dispense: 8 g; Refill: 0  -     albuterol (PROVENTIL) 2.5 mg /3 mL (0.083  %) nebulizer solution; Take 3 mLs (2.5 mg total) by nebulization every 6 (six) hours as needed for Wheezing. Rescue  Dispense: 25 each; Refill: 0  -     amoxicillin-clavulanate 875-125mg (AUGMENTIN) 875-125 mg per tablet; Take 1 tablet by mouth every 12 (twelve) hours. for 10 days  Dispense: 20 tablet; Refill: 0    Mixed hyperlipidemia  -     Lipid Panel; Future; Expected date: 10/17/2024  -     atorvastatin (LIPITOR) 80 MG tablet; Take 1 tablet (80 mg total) by mouth once daily.  Dispense: 90 tablet; Refill: 1    Adult attention deficit disorder  -     dextroamphetamine-amphetamine (ADDERALL) 20 mg tablet; Take 1 tablet by mouth each afternoon  Dispense: 30 tablet; Refill: 0  -     dextroamphetamine-amphetamine (ADDERALL) 30 mg Tab; Take 1 tablet by mouth each morning  Dispense: 30 tablet; Refill: 0  -     dextroamphetamine-amphetamine (ADDERALL) 20 mg tablet; TAKE ONE TABLET BY MOUTH EVERY AFTERNOON.  Dispense: 30 tablet; Refill: 0  -     dextroamphetamine-amphetamine (ADDERALL) 30 mg Tab; Take 1 tablet (30 mg total) by mouth every morning.  Dispense: 30 tablet; Refill: 0  -     dextroamphetamine-amphetamine (ADDERALL) 20 mg tablet; TAKE ONE TABLET BY MOUTH EVERY AFTERNOON.  Dispense: 30 tablet; Refill: 0  -     dextroamphetamine-amphetamine (ADDERALL) 30 mg Tab; Take 1 tablet (30 mg total) by mouth every morning.  Dispense: 30 tablet; Refill: 0    Long-term current use of stimulant  -     dextroamphetamine-amphetamine (ADDERALL) 20 mg tablet; Take 1 tablet by mouth each afternoon  Dispense: 30 tablet; Refill: 0  -     dextroamphetamine-amphetamine (ADDERALL) 30 mg Tab; Take 1 tablet by mouth each morning  Dispense: 30 tablet; Refill: 0    Type 2 diabetes mellitus without complication, without long-term current use of insulin  -     Hemoglobin A1C; Future; Expected date: 10/17/2024  -     glyBURIDE (DIABETA) 5 MG tablet; Take 1 tablet (5 mg total) by mouth daily with breakfast.  Dispense: 90 tablet; Refill: 1  -      insulin degludec (TRESIBA FLEXTOUCH U-200) 200 unit/mL (3 mL) insulin pen; Inject 30 Units into the skin once daily. Increase by 5 units if blood glucose is elevated above 200. weekly  Dispense: 3 Pen; Refill: 3  -     metFORMIN (GLUCOPHAGE) 1000 MG tablet; Take 1 tablet (1,000 mg total) by mouth 2 (two) times daily with meals.  Dispense: 180 tablet; Refill: 1  -     POCT glucose    Essential hypertension, benign  -     CBC Auto Differential; Future; Expected date: 10/17/2024  -     Comprehensive Metabolic Panel; Future; Expected date: 10/17/2024  -     lisinopriL-hydrochlorothiazide (PRINZIDE,ZESTORETIC) 20-25 mg Tab; Take 1 tablet by mouth once daily.  Dispense: 90 tablet; Refill: 1  -     metoprolol tartrate (LOPRESSOR) 50 MG tablet; Take 1 tablet (50 mg total) by mouth 2 (two) times daily.  Dispense: 180 tablet; Refill: 1       Health Maintenance Topics with due status: Not Due       Topic Last Completion Date    Diabetes Urine Screening 01/19/2024    Eye Exam 02/07/2024    Foot Exam 06/06/2024    Hemoglobin A1c 07/17/2024    Colorectal Cancer Screening 10/10/2024    Low Dose Statin 10/17/2024    RSV Vaccine (Age 60+ and Pregnant patients) Not Due         Future Appointments   Date Time Provider Department Center   12/11/2024  9:20 AM Nydia Martines FNP [facility] [place]   1/17/2025  8:20 AM Julio Cruz, DO [facility] [place]   1/22/2025  3:00 PM Julio Cruz, DO [facility] [place]   3/10/2025  9:30 AM [facility] GI ROOM 03 [facility]        No follow-ups on file.    Health Maintenance Due   Topic Date Due    Pneumococcal Vaccines (Age 0-64) (1 of 2 - PCV) Never done    TETANUS VACCINE  Never done    Shingles Vaccine (1 of 2) Never done    Lipid Panel  08/23/2024        Signature:  REMEDIOS Orr    Date of encounter: 10/17/24

## 2024-10-17 NOTE — ASSESSMENT & PLAN NOTE
Labs pending. Will inform of results when available.     Work on diet, specfically cutting back on bread, breaded things and cereal  Exercise at least 20-30 mins daily  Add free weights if you can even very light weight will help  Keep an on sugars, fasting sugar goal is , after eating no greater than 180  A1C goal is 7.0% or less

## 2024-10-17 NOTE — ASSESSMENT & PLAN NOTE
BP elevated today, but pt not feeling well.     1) Check your blood pressure at home. Please notify me at the clinic if the systolic (top number) is consistently over 140 or under 110 or if the diastolic (bottom number) is consistently over 90 or under 60.  2) Regular aerobic physical activity ( e.g. brisk walking) at least 30 min 5 out of 7 days of the week  3) Low sodium diet.  4) Take your meds as directed  5) To the ER for any signs of chest pain/tightness/palpitations/shortness of breath/difficulty speaking/numbness or tingling in face or extremities  6) Have yearly eye exams

## 2024-10-18 ENCOUNTER — PATIENT MESSAGE (OUTPATIENT)
Dept: FAMILY MEDICINE | Facility: CLINIC | Age: 59
End: 2024-10-18
Payer: COMMERCIAL

## 2024-10-18 ENCOUNTER — HOSPITAL ENCOUNTER (EMERGENCY)
Facility: HOSPITAL | Age: 59
Discharge: HOME | End: 2024-10-18
Attending: EMERGENCY MEDICINE
Payer: COMMERCIAL

## 2024-10-18 VITALS
HEIGHT: 63 IN | TEMPERATURE: 98 F | WEIGHT: 210 LBS | BODY MASS INDEX: 37.21 KG/M2 | DIASTOLIC BLOOD PRESSURE: 86 MMHG | SYSTOLIC BLOOD PRESSURE: 167 MMHG | OXYGEN SATURATION: 97 % | RESPIRATION RATE: 20 BRPM | HEART RATE: 77 BPM

## 2024-10-18 DIAGNOSIS — S50.02XA CONTUSION OF LEFT ELBOW, INITIAL ENCOUNTER: ICD-10-CM

## 2024-10-18 DIAGNOSIS — S20.219A CONTUSION OF CHEST WALL, UNSPECIFIED LATERALITY, INITIAL ENCOUNTER: ICD-10-CM
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
## 2024-10-18 DIAGNOSIS — T14.90XA TRAUMA: ICD-10-CM

## 2024-10-18 LAB
ALBUMIN SERPL BCP-MCNC: 3.6 G/DL (ref 3.5–5)
ALBUMIN/GLOB SERPL: 0.8 {RATIO}
ALP SERPL-CCNC: 92 U/L (ref 45–115)
ALT SERPL W P-5'-P-CCNC: 30 U/L (ref 16–61)
AMPHET UR QL SCN: POSITIVE
ANION GAP SERPL CALCULATED.3IONS-SCNC: 9 MMOL/L (ref 7–16)
APTT PPP: 26.6 SECONDS (ref 25.2–37.3)
AST SERPL W P-5'-P-CCNC: 21 U/L (ref 15–37)
BARBITURATES UR QL SCN: NEGATIVE
BASOPHILS # BLD AUTO: 0.04 K/UL (ref 0–0.2)
BASOPHILS NFR BLD AUTO: 0.4 % (ref 0–1)
BENZODIAZ METAB UR QL SCN: NEGATIVE
BILIRUB SERPL-MCNC: 0.4 MG/DL (ref ?–1.2)
BILIRUB UR QL STRIP: NEGATIVE
BUN SERPL-MCNC: 14 MG/DL (ref 7–18)
BUN/CREAT SERPL: 12 (ref 6–20)
CALCIUM SERPL-MCNC: 9.9 MG/DL (ref 8.5–10.1)
CANNABINOIDS UR QL SCN: NEGATIVE
CAOX CRY UR QL COMP ASSIST: ABNORMAL
CHLORIDE SERPL-SCNC: 99 MMOL/L (ref 98–107)
CLARITY UR: ABNORMAL
CO2 SERPL-SCNC: 30 MMOL/L (ref 21–32)
COCAINE UR QL SCN: NEGATIVE
COLOR UR: ABNORMAL
CREAT SERPL-MCNC: 1.2 MG/DL (ref 0.7–1.3)
DIFFERENTIAL METHOD BLD: ABNORMAL
EGFR (NO RACE VARIABLE) (RUSH/TITUS): 70 ML/MIN/1.73M2
EOSINOPHIL # BLD AUTO: 0.04 K/UL (ref 0–0.5)
EOSINOPHIL NFR BLD AUTO: 0.4 % (ref 1–4)
ERYTHROCYTE [DISTWIDTH] IN BLOOD BY AUTOMATED COUNT: 12.7 % (ref 11.5–14.5)
ETHANOL, BLOOD (CATEGORY): NOT DETECTED
GLOBULIN SER-MCNC: 4.5 G/DL (ref 2–4)
GLUCOSE SERPL-MCNC: 212 MG/DL (ref 74–106)
GLUCOSE UR STRIP-MCNC: 300 MG/DL
HCT VFR BLD AUTO: 40.8 % (ref 40–54)
HGB BLD-MCNC: 13.4 G/DL (ref 13.5–18)
HYALINE CASTS #/AREA URNS LPF: ABNORMAL /LPF
IMM GRANULOCYTES # BLD AUTO: 0.11 K/UL (ref 0–0.04)
IMM GRANULOCYTES NFR BLD: 1 % (ref 0–0.4)
INDIRECT COOMBS: NORMAL
INR BLD: 0.98
KETONES UR STRIP-SCNC: NEGATIVE MG/DL
LACTATE SERPL-SCNC: 1.8 MMOL/L (ref 0.4–2)
LACTATE SERPL-SCNC: 2.3 MMOL/L (ref 0.4–2)
LEUKOCYTE ESTERASE UR QL STRIP: NEGATIVE
LYMPHOCYTES # BLD AUTO: 2.38 K/UL (ref 1–4.8)
LYMPHOCYTES NFR BLD AUTO: 22.7 % (ref 27–41)
MCH RBC QN AUTO: 30.5 PG (ref 27–31)
MCHC RBC AUTO-ENTMCNC: 32.8 G/DL (ref 32–36)
MCV RBC AUTO: 92.9 FL (ref 80–96)
MONOCYTES # BLD AUTO: 0.7 K/UL (ref 0–0.8)
MONOCYTES NFR BLD AUTO: 6.7 % (ref 2–6)
MPC BLD CALC-MCNC: 9.1 FL (ref 9.4–12.4)
MUCOUS, UA: ABNORMAL /LPF
NEUTROPHILS # BLD AUTO: 7.23 K/UL (ref 1.8–7.7)
NEUTROPHILS NFR BLD AUTO: 68.8 % (ref 53–65)
NITRITE UR QL STRIP: NEGATIVE
NRBC # BLD AUTO: 0 X10E3/UL
NRBC, AUTO (.00): 0 %
OPIATES UR QL SCN: NEGATIVE
PCP UR QL SCN: NEGATIVE
PH UR STRIP: 5.5 PH UNITS
PLATELET # BLD AUTO: 339 K/UL (ref 150–400)
POTASSIUM SERPL-SCNC: 3.6 MMOL/L (ref 3.5–5.1)
PROT SERPL-MCNC: 8.1 G/DL (ref 6.4–8.2)
PROT UR QL STRIP: 70
PROTHROMBIN TIME: 12.9 SECONDS (ref 11.7–14.7)
RBC # BLD AUTO: 4.39 M/UL (ref 4.6–6.2)
RBC # UR STRIP: ABNORMAL /UL
RBC #/AREA URNS HPF: 14 /HPF
RH BLD: NORMAL
SODIUM SERPL-SCNC: 134 MMOL/L (ref 136–145)
SP GR UR STRIP: 1.02
SPECIMEN OUTDATE: NORMAL
SQUAMOUS #/AREA URNS LPF: ABNORMAL /HPF
UROBILINOGEN UR STRIP-ACNC: NORMAL MG/DL
WBC # BLD AUTO: 10.5 K/UL (ref 4.5–11)
WBC #/AREA URNS HPF: 2 /HPF

## 2024-10-18 PROCEDURE — 83605 ASSAY OF LACTIC ACID: CPT | Performed by: EMERGENCY MEDICINE

## 2024-10-18 PROCEDURE — 81001 URINALYSIS AUTO W/SCOPE: CPT | Mod: XB | Performed by: EMERGENCY MEDICINE

## 2024-10-18 PROCEDURE — 85730 THROMBOPLASTIN TIME PARTIAL: CPT | Performed by: EMERGENCY MEDICINE

## 2024-10-18 PROCEDURE — 99285 EMERGENCY DEPT VISIT HI MDM: CPT | Mod: 25

## 2024-10-18 PROCEDURE — 25500020 PHARM REV CODE 255: Performed by: EMERGENCY MEDICINE

## 2024-10-18 PROCEDURE — 36415 COLL VENOUS BLD VENIPUNCTURE: CPT | Performed by: EMERGENCY MEDICINE

## 2024-10-18 PROCEDURE — 80053 COMPREHEN METABOLIC PANEL: CPT | Performed by: EMERGENCY MEDICINE

## 2024-10-18 PROCEDURE — 86901 BLOOD TYPING SEROLOGIC RH(D): CPT | Performed by: EMERGENCY MEDICINE

## 2024-10-18 PROCEDURE — 82077 ASSAY SPEC XCP UR&BREATH IA: CPT | Performed by: EMERGENCY MEDICINE

## 2024-10-18 PROCEDURE — 85610 PROTHROMBIN TIME: CPT | Performed by: EMERGENCY MEDICINE

## 2024-10-18 PROCEDURE — 85025 COMPLETE CBC W/AUTO DIFF WBC: CPT | Performed by: EMERGENCY MEDICINE

## 2024-10-18 PROCEDURE — 81003 URINALYSIS AUTO W/O SCOPE: CPT | Performed by: EMERGENCY MEDICINE

## 2024-10-18 PROCEDURE — 80307 DRUG TEST PRSMV CHEM ANLYZR: CPT | Performed by: EMERGENCY MEDICINE

## 2024-10-18 RX ORDER — IOPAMIDOL 755 MG/ML
100 INJECTION, SOLUTION INTRAVASCULAR
Status: COMPLETED | OUTPATIENT
Start: 2024-10-18 | End: 2024-10-18

## 2024-10-18 RX ADMIN — IOPAMIDOL 100 ML: 755 INJECTION, SOLUTION INTRAVENOUS at 04:10
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
## 2024-10-18 NOTE — DISCHARGE INSTRUCTIONS
Return the ER if symptoms are worse sitting or no symptoms develop    Follow up with primary care    Take ibuprofen and Tylenol as needed

## 2024-10-18 NOTE — PROGRESS NOTES
Please let pt know his A1C is up from 6.7 to 7.4. We had discussed diet at his visit so I would not change any meds right now and just watch carb intake. We will just keep a check on this. His cholesterol is elevated, but he had ate that morning and I have also calculated his risk score of having a heart attack/stroke over the next 10 years and it did not recommend increasing/adding medication to cholesterol. Everything else was good. Thanks!

## 2024-11-07 ENCOUNTER — TELEPHONE (OUTPATIENT)
Dept: GASTROENTEROLOGY | Facility: CLINIC | Age: 59
End: 2024-11-07
Payer: COMMERCIAL

## 2024-11-07 NOTE — TELEPHONE ENCOUNTER
Attempted to contact pt - spoke w/ spouse whom which referral info r/t appt 11-11-24 at 10am w/ a check in 0930 given for scheduled appt w/ Dr Gonzalez at [facility] - address/phone number provided if needed for contact - good understanding voiced

## 2024-11-11 ENCOUNTER — TELEPHONE (OUTPATIENT)
Dept: SURGERY | Facility: CLINIC | Age: 59
End: 2024-11-11
Payer: COMMERCIAL

## 2024-11-11 NOTE — TELEPHONE ENCOUNTER
----- Message from Nurse Chávez sent at 11/11/2024  3:36 PM CST -----  Regarding: FW: Return call    ----- Message -----  From: Caity Wong  Sent: 11/11/2024   2:52 PM CST  To: Sean MATTHEWS Staff  Subject: Return call                                      Who Called: Tamela Kelley    Patient is returning phone call    Who Left Message for Patient:Ana Cristina  Does the patient know what this is regarding?:appt      Preferred Method of Contact: Phone Call  Patient's Preferred Phone Number on File: [phone]   Best Call Back Number, if different:  Additional Information:   - - - none known

## 2024-11-18 ENCOUNTER — OFFICE VISIT (OUTPATIENT)
Dept: SURGERY | Facility: CLINIC | Age: 59
End: 2024-11-18
Attending: SURGERY
Payer: COMMERCIAL

## 2024-11-18 VITALS — BODY MASS INDEX: 33.49 KG/M2 | HEIGHT: 63 IN | WEIGHT: 189 LBS

## 2024-11-18 DIAGNOSIS — C18.6 MALIGNANT NEOPLASM OF DESCENDING COLON: Primary | ICD-10-CM

## 2024-11-18 PROCEDURE — 3061F NEG MICROALBUMINURIA REV: CPT | Mod: CPTII,,, | Performed by: SURGERY

## 2024-11-18 PROCEDURE — 99203 OFFICE O/P NEW LOW 30 MIN: CPT | Mod: S$PBB,,, | Performed by: SURGERY

## 2024-11-18 PROCEDURE — 3008F BODY MASS INDEX DOCD: CPT | Mod: CPTII,,, | Performed by: SURGERY

## 2024-11-18 PROCEDURE — 3066F NEPHROPATHY DOC TX: CPT | Mod: CPTII,,, | Performed by: SURGERY

## 2024-11-18 PROCEDURE — 1159F MED LIST DOCD IN RCRD: CPT | Mod: CPTII,,, | Performed by: SURGERY

## 2024-11-18 PROCEDURE — 99999 PR PBB SHADOW E&M-EST. PATIENT-LVL IV: CPT | Mod: PBBFAC,,, | Performed by: SURGERY

## 2024-11-18 PROCEDURE — 3051F HG A1C>EQUAL 7.0%<8.0%: CPT | Mod: CPTII,,, | Performed by: SURGERY

## 2024-11-18 PROCEDURE — 99214 OFFICE O/P EST MOD 30 MIN: CPT | Mod: PBBFAC | Performed by: SURGERY

## 2024-11-18 PROCEDURE — 4010F ACE/ARB THERAPY RXD/TAKEN: CPT | Mod: CPTII,,, | Performed by: SURGERY

## 2024-11-18 RX ORDER — HYDROCODONE BITARTRATE AND ACETAMINOPHEN 7.5; 325 MG/1; MG/1
1 TABLET ORAL EVERY 6 HOURS PRN
COMMUNITY

## 2024-11-19 NOTE — PROGRESS NOTES
General Surgery History and Physical      Patient ID: Syed Kelley is a 59 y.o. male.    Chief Complaint: Referral (Colon cancer)      HPI:  59-year-old male referred for incidental early cancer identified and a polyp that was removed relatively small in the descending colon a few weeks ago.  Patient had no symptoms or complaints before the procedure.  He had polyps removed in the past about 10 years ago.  No symptoms of blood in his stool, weight loss, diarrhea.  Three polyps removed in 1 of them had a very small foci invasive cancer.  Margins were negative up to the cautery line.  He has been complaining of some milder diarrhea since colonoscopy otherwise no other complaints.  No family history of colon cancer.  Only previous surgery was a nephrectomy has a young kid due to a rupture.  Area was not inked at the time of colonoscopy due to the low suspicion for it containing malignancy.  CT and C E a levels were all found to be normal  Review of Systems   Constitutional:  Negative for activity change, appetite change, fatigue and fever.   HENT:  Negative for trouble swallowing.    Respiratory:  Negative for cough and shortness of breath.    Cardiovascular:  Negative for chest pain and palpitations.   Gastrointestinal:  Negative for abdominal distention, abdominal pain, blood in stool, constipation and diarrhea.   Genitourinary:  Negative for flank pain.   Musculoskeletal:  Negative for neck pain and neck stiffness.   Neurological:  Negative for weakness.             Review of patient's allergies indicates:   Allergen Reactions    Azithromycin     Erythromycin Other (See Comments)    M-mycin        Past Medical History:   Diagnosis Date    Adult attention deficit disorder 03/22/2021    Depression 12/22/2022    Essential hypertension 03/22/2021    Hyperlipidemia     Neuropathy 08/23/2023    AC (obstructive sleep apnea)  10/19/2023    PAD (peripheral artery disease) 2023    Swollen leg 2024    Type 2 diabetes mellitus        Past Surgical History:   Procedure Laterality Date    APPENDECTOMY      CARDIAC CATHETERIZATION      COLONOSCOPY  10/09/2024    DEBRIDEMENT OF LOWER EXTREMITY Bilateral 2023    Procedure: DEBRIDEMENT, LOWER EXTREMITY;  Surgeon: Yael Cohen MD;  Location: [facility] OR;  Service: General;  Laterality: Bilateral;  debride toes    FINGER AMPUTATION Right     1st digit    KIDNEY SURGERY Right     Patient states right kidney removed at age 5.    LEFT HEART CATHETERIZATION N/A 2021    Procedure: Left heart cath with possible intervention;  Surgeon: Federico James DO;  Location: [facility] CATH LAB;  Service: Cardiology;  Laterality: N/A;    TOE AMPUTATION Right 2023    Procedure: AMPUTATION, TOE;  Surgeon: Yael Cohen MD;  Location: [facility] OR;  Service: General;  Laterality: Right;  Right great toe amp dr darian wednesday       Family History   Problem Relation Name Age of Onset    Hypertension Father      Stroke Father      Cancer Sister         Social History     Socioeconomic History    Marital status:     Number of children: 4   Tobacco Use    Smoking status: Former     Current packs/day: 0.00     Average packs/day: 5.0 packs/day for 4.0 years (20.0 ttl pk-yrs)     Types: Cigarettes     Start date:      Quit date:      Years since quittin.9     Passive exposure: Past    Smokeless tobacco: Current     Types: Chew, Snuff    Tobacco comments:     1 can/day   Substance and Sexual Activity    Alcohol use: Yes     Comment: 2-3 drinks per/3 months    Drug use: Never    Sexual activity: Yes     Partners: Female     Social Drivers of Health     Financial Resource Strain: Medium Risk (2024)    Overall Financial Resource Strain (CARDIA)     Difficulty of Paying Living Expenses: Somewhat hard   Food Insecurity: Food Insecurity Present (2024)    Hunger Vital  Sign     Worried About Running Out of Food in the Last Year: Sometimes true     Ran Out of Food in the Last Year: Never true   Transportation Needs: No Transportation Needs (3/29/2023)    PRAPARE - Transportation     Lack of Transportation (Medical): No     Lack of Transportation (Non-Medical): No   Physical Activity: Unknown (6/5/2024)    Exercise Vital Sign     Days of Exercise per Week: 1 day   Stress: No Stress Concern Present (6/5/2024)    [employer] - Occupational Stress Questionnaire     Feeling of Stress : Only a little   Housing Stability: Low Risk  (3/29/2023)    Housing Stability Vital Sign     Unable to Pay for Housing in the Last Year: No     Number of Places Lived in the Last Year: 1     Unstable Housing in the Last Year: No       There were no vitals filed for this visit.    Physical Exam  Constitutional:       General: He is not in acute distress.  HENT:      Head: Normocephalic.   Cardiovascular:      Rate and Rhythm: Normal rate and regular rhythm.      Pulses: Normal pulses.   Pulmonary:      Effort: Pulmonary effort is normal. No respiratory distress.      Breath sounds: Normal breath sounds.   Abdominal:      General: Abdomen is flat. There is no distension.      Palpations: Abdomen is soft.      Tenderness: There is no abdominal tenderness.   Musculoskeletal:         General: Normal range of motion.   Skin:     General: Skin is warm.   Neurological:      General: No focal deficit present.      Mental Status: He is oriented to person, place, and time.         Assessment & Plan:    Malignant neoplasm of descending colon        Early descending colon cancer.  I counseled patient that this was a small incidental finding and a relatively small polyp that did not have any suspicion for cancer in it.  My surgical procedure would before nodes and staging for the patient however I do require inking to ensure that the appropriate area is taken out and removed.  Therefore I  would not be able to perform any operations on this gentleman.  Recommend watching this area and treating it as a stage I cancer.  If there is a recurrence on repeat colonoscopy would be happy to help with the at the time.  All questions answered.

## 2024-12-09 ENCOUNTER — HOSPITAL ENCOUNTER (INPATIENT)
Facility: HOSPITAL | Age: 59
LOS: 4 days | Discharge: HOME | DRG: 153 | End: 2024-12-13
Attending: FAMILY MEDICINE | Admitting: FAMILY MEDICINE
Payer: COMMERCIAL

## 2024-12-09 DIAGNOSIS — I10 HYPERTENSION, UNSPECIFIED TYPE: ICD-10-CM

## 2024-12-09 DIAGNOSIS — R05.9 COUGH: ICD-10-CM

## 2024-12-09 DIAGNOSIS — R09.02 HYPOXIA: Primary | ICD-10-CM

## 2024-12-09 DIAGNOSIS — J22 LOWER RESPIRATORY INFECTION: ICD-10-CM

## 2024-12-09 DIAGNOSIS — J22 ACUTE LOWER RESPIRATORY INFECTION: ICD-10-CM

## 2024-12-09 DIAGNOSIS — R06.02 SOB (SHORTNESS OF BREATH): ICD-10-CM

## 2024-12-09 LAB
ALBUMIN SERPL BCP-MCNC: 3.1 G/DL (ref 3.5–5)
ALBUMIN/GLOB SERPL: 0.7 {RATIO}
ALP SERPL-CCNC: 99 U/L (ref 40–150)
ALT SERPL W P-5'-P-CCNC: 15 U/L
ANION GAP SERPL CALCULATED.3IONS-SCNC: 16 MMOL/L (ref 7–16)
APTT PPP: 30.9 SECONDS (ref 25.2–37.3)
AST SERPL W P-5'-P-CCNC: 19 U/L (ref 5–34)
BASOPHILS # BLD AUTO: 0.03 K/UL (ref 0–0.2)
BASOPHILS NFR BLD AUTO: 0.3 % (ref 0–1)
BILIRUB SERPL-MCNC: 0.3 MG/DL
BILIRUB UR QL STRIP: NEGATIVE
BUN SERPL-MCNC: 16 MG/DL (ref 8–26)
BUN/CREAT SERPL: 14 (ref 6–20)
CALCIUM SERPL-MCNC: 10.3 MG/DL (ref 8.4–10.2)
CHLORIDE SERPL-SCNC: 95 MMOL/L (ref 98–107)
CLARITY UR: CLEAR
CO2 SERPL-SCNC: 32 MMOL/L (ref 22–29)
COLOR UR: YELLOW
CREAT SERPL-MCNC: 1.11 MG/DL (ref 0.72–1.25)
D DIMER PPP FEU-MCNC: >20 ΜG/ML (ref 0–0.47)
DIFFERENTIAL METHOD BLD: ABNORMAL
EGFR (NO RACE VARIABLE) (RUSH/TITUS): 76 ML/MIN/1.73M2
EOSINOPHIL # BLD AUTO: 0.17 K/UL (ref 0–0.5)
EOSINOPHIL NFR BLD AUTO: 1.5 % (ref 1–4)
ERYTHROCYTE [DISTWIDTH] IN BLOOD BY AUTOMATED COUNT: 12.4 % (ref 11.5–14.5)
GLOBULIN SER-MCNC: 4.6 G/DL (ref 2–4)
GLUCOSE SERPL-MCNC: 465 MG/DL (ref 70–105)
GLUCOSE SERPL-MCNC: 68 MG/DL (ref 74–100)
GLUCOSE SERPL-MCNC: 74 MG/DL (ref 70–105)
GLUCOSE UR STRIP-MCNC: >=1000 MG/DL
HCT VFR BLD AUTO: 43 % (ref 40–54)
HGB BLD-MCNC: 14 G/DL (ref 13.5–18)
INR BLD: 0.88
KETONES UR STRIP-SCNC: NEGATIVE MG/DL
LEUKOCYTE ESTERASE UR QL STRIP: NEGATIVE
LYMPHOCYTES # BLD AUTO: 2.27 K/UL (ref 1–4.8)
LYMPHOCYTES NFR BLD AUTO: 19.5 % (ref 27–41)
MCH RBC QN AUTO: 30.4 PG (ref 27–31)
MCHC RBC AUTO-ENTMCNC: 32.6 G/DL (ref 32–36)
MCV RBC AUTO: 93.5 FL (ref 80–96)
MONOCYTES # BLD AUTO: 1.01 K/UL (ref 0–0.8)
MONOCYTES NFR BLD AUTO: 8.7 % (ref 2–6)
MPC BLD CALC-MCNC: 9.1 FL (ref 9.4–12.4)
NEUTROPHILS # BLD AUTO: 8.17 K/UL (ref 1.8–7.7)
NEUTROPHILS NFR BLD AUTO: 70 % (ref 53–65)
NITRITE UR QL STRIP: NEGATIVE
NT-PROBNP SERPL-MCNC: 50 PG/ML (ref 1–125)
PH UR STRIP: 5.5 PH UNITS
PLATELET # BLD AUTO: 421 K/UL (ref 150–400)
POTASSIUM SERPL-SCNC: 4.4 MMOL/L (ref 3.5–5.1)
PROT SERPL-MCNC: 7.7 G/DL (ref 6.4–8.3)
PROT UR QL STRIP: NEGATIVE
PROTHROMBIN TIME: 12.5 SECONDS (ref 11.7–14.7)
RBC # BLD AUTO: 4.6 M/UL (ref 4.6–6.2)
RBC # UR STRIP: NEGATIVE /UL
SARS-COV-2 RDRP RESP QL NAA+PROBE: NEGATIVE
SODIUM SERPL-SCNC: 139 MMOL/L (ref 136–145)
SP GR UR STRIP: 1.01
TROPONIN I SERPL HS-MCNC: <2.7 NG/L
UROBILINOGEN UR STRIP-ACNC: 0.2 MG/DL
WBC # BLD AUTO: 11.65 K/UL (ref 4.5–11)

## 2024-12-09 PROCEDURE — 25500020 PHARM REV CODE 255: Performed by: NURSE PRACTITIONER

## 2024-12-09 PROCEDURE — 27000716 HC OXISENSOR PROBE, ANY SIZE

## 2024-12-09 PROCEDURE — 11000001 HC ACUTE MED/SURG PRIVATE ROOM

## 2024-12-09 PROCEDURE — 80053 COMPREHEN METABOLIC PANEL: CPT | Performed by: NURSE PRACTITIONER

## 2024-12-09 PROCEDURE — 25000242 PHARM REV CODE 250 ALT 637 W/ HCPCS: Performed by: NURSE PRACTITIONER

## 2024-12-09 PROCEDURE — 94761 N-INVAS EAR/PLS OXIMETRY MLT: CPT

## 2024-12-09 PROCEDURE — 99285 EMERGENCY DEPT VISIT HI MDM: CPT | Mod: ,,, | Performed by: NURSE PRACTITIONER

## 2024-12-09 PROCEDURE — 96374 THER/PROPH/DIAG INJ IV PUSH: CPT

## 2024-12-09 PROCEDURE — 36415 COLL VENOUS BLD VENIPUNCTURE: CPT | Performed by: NURSE PRACTITIONER

## 2024-12-09 PROCEDURE — 25000003 PHARM REV CODE 250: Performed by: NURSE PRACTITIONER

## 2024-12-09 PROCEDURE — 94799 UNLISTED PULMONARY SVC/PX: CPT

## 2024-12-09 PROCEDURE — 84484 ASSAY OF TROPONIN QUANT: CPT | Performed by: NURSE PRACTITIONER

## 2024-12-09 PROCEDURE — 63600175 PHARM REV CODE 636 W HCPCS: Performed by: NURSE PRACTITIONER

## 2024-12-09 PROCEDURE — 96361 HYDRATE IV INFUSION ADD-ON: CPT

## 2024-12-09 PROCEDURE — 99223 1ST HOSP IP/OBS HIGH 75: CPT | Mod: ,,, | Performed by: NURSE PRACTITIONER

## 2024-12-09 PROCEDURE — 83880 ASSAY OF NATRIURETIC PEPTIDE: CPT | Performed by: NURSE PRACTITIONER

## 2024-12-09 PROCEDURE — 94640 AIRWAY INHALATION TREATMENT: CPT

## 2024-12-09 PROCEDURE — 96375 TX/PRO/DX INJ NEW DRUG ADDON: CPT

## 2024-12-09 PROCEDURE — 81003 URINALYSIS AUTO W/O SCOPE: CPT | Performed by: NURSE PRACTITIONER

## 2024-12-09 PROCEDURE — 93005 ELECTROCARDIOGRAM TRACING: CPT

## 2024-12-09 PROCEDURE — 85610 PROTHROMBIN TIME: CPT | Performed by: NURSE PRACTITIONER

## 2024-12-09 PROCEDURE — 87635 SARS-COV-2 COVID-19 AMP PRB: CPT | Performed by: NURSE PRACTITIONER

## 2024-12-09 PROCEDURE — 99285 EMERGENCY DEPT VISIT HI MDM: CPT | Mod: 25

## 2024-12-09 PROCEDURE — 82962 GLUCOSE BLOOD TEST: CPT

## 2024-12-09 PROCEDURE — 85379 FIBRIN DEGRADATION QUANT: CPT | Performed by: NURSE PRACTITIONER

## 2024-12-09 PROCEDURE — 87040 BLOOD CULTURE FOR BACTERIA: CPT | Performed by: NURSE PRACTITIONER

## 2024-12-09 PROCEDURE — 94760 N-INVAS EAR/PLS OXIMETRY 1: CPT

## 2024-12-09 PROCEDURE — 93010 ELECTROCARDIOGRAM REPORT: CPT | Mod: ,,, | Performed by: INTERNAL MEDICINE

## 2024-12-09 PROCEDURE — 85730 THROMBOPLASTIN TIME PARTIAL: CPT | Performed by: NURSE PRACTITIONER

## 2024-12-09 PROCEDURE — 99900035 HC TECH TIME PER 15 MIN (STAT)

## 2024-12-09 PROCEDURE — 27000221 HC OXYGEN, UP TO 24 HOURS

## 2024-12-09 PROCEDURE — 85025 COMPLETE CBC W/AUTO DIFF WBC: CPT | Performed by: NURSE PRACTITIONER

## 2024-12-09 RX ORDER — HYDROCHLOROTHIAZIDE 25 MG/1
25 TABLET ORAL DAILY
Status: DISCONTINUED | OUTPATIENT
Start: 2024-12-10 | End: 2024-12-13 | Stop reason: HOSPADM

## 2024-12-09 RX ORDER — IBUPROFEN 200 MG
16 TABLET ORAL
Status: DISCONTINUED | OUTPATIENT
Start: 2024-12-09 | End: 2024-12-13 | Stop reason: HOSPADM

## 2024-12-09 RX ORDER — GABAPENTIN 300 MG/1
300 CAPSULE ORAL 3 TIMES DAILY
Status: DISCONTINUED | OUTPATIENT
Start: 2024-12-09 | End: 2024-12-13 | Stop reason: HOSPADM

## 2024-12-09 RX ORDER — LEVOFLOXACIN 5 MG/ML
750 INJECTION, SOLUTION INTRAVENOUS
Status: DISCONTINUED | OUTPATIENT
Start: 2024-12-09 | End: 2024-12-13

## 2024-12-09 RX ORDER — AMOXICILLIN 250 MG
1 CAPSULE ORAL 2 TIMES DAILY
Status: DISCONTINUED | OUTPATIENT
Start: 2024-12-09 | End: 2024-12-13 | Stop reason: HOSPADM

## 2024-12-09 RX ORDER — SODIUM CHLORIDE 9 MG/ML
1000 INJECTION, SOLUTION INTRAVENOUS
Status: COMPLETED | OUTPATIENT
Start: 2024-12-09 | End: 2024-12-09

## 2024-12-09 RX ORDER — PROMETHAZINE HYDROCHLORIDE 25 MG/1
25 TABLET ORAL EVERY 6 HOURS PRN
Status: DISCONTINUED | OUTPATIENT
Start: 2024-12-09 | End: 2024-12-13 | Stop reason: HOSPADM

## 2024-12-09 RX ORDER — GUAIFENESIN AND DEXTROMETHORPHAN HYDROBROMIDE 10; 100 MG/5ML; MG/5ML
10 SYRUP ORAL EVERY 6 HOURS PRN
Status: DISCONTINUED | OUTPATIENT
Start: 2024-12-09 | End: 2024-12-13 | Stop reason: HOSPADM

## 2024-12-09 RX ORDER — HYDROCODONE BITARTRATE AND ACETAMINOPHEN 7.5; 325 MG/1; MG/1
1 TABLET ORAL EVERY 6 HOURS PRN
Status: DISCONTINUED | OUTPATIENT
Start: 2024-12-09 | End: 2024-12-13 | Stop reason: HOSPADM

## 2024-12-09 RX ORDER — SODIUM CHLORIDE 0.9 % (FLUSH) 0.9 %
10 SYRINGE (ML) INJECTION EVERY 12 HOURS PRN
Status: DISCONTINUED | OUTPATIENT
Start: 2024-12-09 | End: 2024-12-13 | Stop reason: HOSPADM

## 2024-12-09 RX ORDER — IBUPROFEN 200 MG
24 TABLET ORAL
Status: DISCONTINUED | OUTPATIENT
Start: 2024-12-09 | End: 2024-12-13 | Stop reason: HOSPADM

## 2024-12-09 RX ORDER — ATORVASTATIN CALCIUM 40 MG/1
80 TABLET, FILM COATED ORAL DAILY
Status: DISCONTINUED | OUTPATIENT
Start: 2024-12-10 | End: 2024-12-13 | Stop reason: HOSPADM

## 2024-12-09 RX ORDER — BUDESONIDE 0.25 MG/2ML
0.25 INHALANT ORAL DAILY
Status: DISCONTINUED | OUTPATIENT
Start: 2024-12-10 | End: 2024-12-13 | Stop reason: HOSPADM

## 2024-12-09 RX ORDER — LISINOPRIL 10 MG/1
20 TABLET ORAL DAILY
Status: DISCONTINUED | OUTPATIENT
Start: 2024-12-10 | End: 2024-12-13 | Stop reason: HOSPADM

## 2024-12-09 RX ORDER — HEPARIN SODIUM 5000 [USP'U]/ML
5000 INJECTION, SOLUTION INTRAVENOUS; SUBCUTANEOUS EVERY 8 HOURS
Status: DISCONTINUED | OUTPATIENT
Start: 2024-12-09 | End: 2024-12-10

## 2024-12-09 RX ORDER — CITALOPRAM 20 MG/1
20 TABLET, FILM COATED ORAL DAILY
Status: DISCONTINUED | OUTPATIENT
Start: 2024-12-10 | End: 2024-12-13 | Stop reason: HOSPADM

## 2024-12-09 RX ORDER — GUAIFENESIN AND DEXTROMETHORPHAN HYDROBROMIDE 10; 100 MG/5ML; MG/5ML
10 SYRUP ORAL
Status: COMPLETED | OUTPATIENT
Start: 2024-12-09 | End: 2024-12-09

## 2024-12-09 RX ORDER — BUDESONIDE 0.5 MG/2ML
0.5 INHALANT ORAL EVERY 12 HOURS
Status: DISCONTINUED | OUTPATIENT
Start: 2024-12-09 | End: 2024-12-09

## 2024-12-09 RX ORDER — IOPAMIDOL 755 MG/ML
100 INJECTION, SOLUTION INTRAVASCULAR
Status: COMPLETED | OUTPATIENT
Start: 2024-12-09 | End: 2024-12-09

## 2024-12-09 RX ORDER — ASPIRIN 81 MG/1
81 TABLET ORAL DAILY
Status: DISCONTINUED | OUTPATIENT
Start: 2024-12-10 | End: 2024-12-13 | Stop reason: HOSPADM

## 2024-12-09 RX ORDER — INSULIN GLARGINE 100 [IU]/ML
30 INJECTION, SOLUTION SUBCUTANEOUS DAILY
Status: DISCONTINUED | OUTPATIENT
Start: 2024-12-10 | End: 2024-12-09

## 2024-12-09 RX ORDER — INSULIN GLARGINE 100 [IU]/ML
30 INJECTION, SOLUTION SUBCUTANEOUS NIGHTLY
Status: DISCONTINUED | OUTPATIENT
Start: 2024-12-09 | End: 2024-12-13 | Stop reason: HOSPADM

## 2024-12-09 RX ORDER — CEFTRIAXONE 1 G/1
1 INJECTION, POWDER, FOR SOLUTION INTRAMUSCULAR; INTRAVENOUS
Status: COMPLETED | OUTPATIENT
Start: 2024-12-09 | End: 2024-12-09

## 2024-12-09 RX ORDER — METOPROLOL TARTRATE 50 MG/1
50 TABLET ORAL 2 TIMES DAILY
Status: DISCONTINUED | OUTPATIENT
Start: 2024-12-09 | End: 2024-12-13 | Stop reason: HOSPADM

## 2024-12-09 RX ORDER — ONDANSETRON HYDROCHLORIDE 2 MG/ML
4 INJECTION, SOLUTION INTRAVENOUS EVERY 8 HOURS PRN
Status: DISCONTINUED | OUTPATIENT
Start: 2024-12-09 | End: 2024-12-13 | Stop reason: HOSPADM

## 2024-12-09 RX ORDER — IPRATROPIUM BROMIDE AND ALBUTEROL SULFATE 2.5; .5 MG/3ML; MG/3ML
3 SOLUTION RESPIRATORY (INHALATION) EVERY 6 HOURS
Status: DISCONTINUED | OUTPATIENT
Start: 2024-12-09 | End: 2024-12-13 | Stop reason: HOSPADM

## 2024-12-09 RX ORDER — GLUCAGON 1 MG
1 KIT INJECTION
Status: DISCONTINUED | OUTPATIENT
Start: 2024-12-09 | End: 2024-12-13 | Stop reason: HOSPADM

## 2024-12-09 RX ORDER — IPRATROPIUM BROMIDE AND ALBUTEROL SULFATE 2.5; .5 MG/3ML; MG/3ML
3 SOLUTION RESPIRATORY (INHALATION)
Status: COMPLETED | OUTPATIENT
Start: 2024-12-09 | End: 2024-12-09

## 2024-12-09 RX ORDER — ACETAMINOPHEN 325 MG/1
650 TABLET ORAL EVERY 8 HOURS PRN
Status: DISCONTINUED | OUTPATIENT
Start: 2024-12-09 | End: 2024-12-13 | Stop reason: HOSPADM

## 2024-12-09 RX ADMIN — IOPAMIDOL 100 ML: 755 INJECTION, SOLUTION INTRAVENOUS at 04:12

## 2024-12-09 RX ADMIN — HEPARIN SODIUM 5000 UNITS: 5000 INJECTION INTRAVENOUS; SUBCUTANEOUS at 09:12

## 2024-12-09 RX ADMIN — GUAIFENESIN AND DEXTROMETHORPHAN 10 ML: 100; 10 SYRUP ORAL at 03:12

## 2024-12-09 RX ADMIN — CEFTRIAXONE SODIUM 1 G: 1 INJECTION, POWDER, FOR SOLUTION INTRAMUSCULAR; INTRAVENOUS at 02:12

## 2024-12-09 RX ADMIN — METHYLPREDNISOLONE SODIUM SUCCINATE 60 MG: 40 INJECTION, POWDER, FOR SOLUTION INTRAMUSCULAR; INTRAVENOUS at 03:12

## 2024-12-09 RX ADMIN — IPRATROPIUM BROMIDE AND ALBUTEROL SULFATE 3 ML: 2.5; .5 SOLUTION RESPIRATORY (INHALATION) at 01:12

## 2024-12-09 RX ADMIN — INSULIN GLARGINE 30 UNITS: 100 INJECTION, SOLUTION SUBCUTANEOUS at 09:12

## 2024-12-09 RX ADMIN — GABAPENTIN 300 MG: 300 CAPSULE ORAL at 08:12

## 2024-12-09 RX ADMIN — GUAIFENESIN AND DEXTROMETHORPHAN 10 ML: 100; 10 SYRUP ORAL at 10:12

## 2024-12-09 RX ADMIN — SENNOSIDES AND DOCUSATE SODIUM 1 TABLET: 50; 8.6 TABLET ORAL at 08:12

## 2024-12-09 RX ADMIN — SODIUM CHLORIDE 1000 ML: 9 INJECTION, SOLUTION INTRAVENOUS at 03:12

## 2024-12-09 RX ADMIN — LEVOFLOXACIN 750 MG: 750 INJECTION, SOLUTION INTRAVENOUS at 06:12

## 2024-12-09 RX ADMIN — IPRATROPIUM BROMIDE AND ALBUTEROL SULFATE 3 ML: 2.5; .5 SOLUTION RESPIRATORY (INHALATION) at 07:12

## 2024-12-09 NOTE — ED PROVIDER NOTES
Encounter Date: 12/9/2024       History     Chief Complaint   Patient presents with    Cough    Nasal Congestion     Patient Identification  Syed Kelley is a 59 y.o. male.    Patient information was obtained from patient.  History/Exam limitations: none.  Patient presented to the Emergency Department by private vehicle.    Chief Complaint   Cough and Nasal Congestion          URI  The primary symptoms include headaches, cough, wheezing and arthralgias. Primary symptoms do not include fever, fatigue, ear pain, sore throat, swollen glands, abdominal pain, nausea, vomiting, myalgias or rash. The current episode started several days ago. This is a new problem. The problem has been gradually worsening.   The headache began more than 2 days ago.   Symptoms associated with the illness include sinus pressure and congestion. The illness is not associated with rhinorrhea.     Review of patient's allergies indicates:   Allergen Reactions    Azithromycin     Erythromycin Other (See Comments)    M-mycin      Past Medical History:   Diagnosis Date    Adult attention deficit disorder 03/22/2021    Depression 12/22/2022    Essential hypertension 03/22/2021    Hyperlipidemia     Neuropathy 08/23/2023    AC (obstructive sleep apnea) 10/19/2023    PAD (peripheral artery disease) 02/17/2023    Swollen leg 01/20/2024    Type 2 diabetes mellitus      Past Surgical History:   Procedure Laterality Date    APPENDECTOMY      CARDIAC CATHETERIZATION      COLONOSCOPY  10/09/2024    DEBRIDEMENT OF LOWER EXTREMITY Bilateral 02/18/2023    Procedure: DEBRIDEMENT, LOWER EXTREMITY;  Surgeon: Yael Cohen MD;  Location: [facility] OR;  Service: General;  Laterality: Bilateral;  debride toes    FINGER AMPUTATION Right     1st digit    KIDNEY SURGERY Right     Patient states right kidney removed at age 5.    LEFT HEART CATHETERIZATION N/A 05/07/2021    Procedure: Left heart cath with possible intervention;  Surgeon: Federico James DO;  Location:  [facility] [place] CATH LAB;  Service: Cardiology;  Laterality: N/A;    TOE AMPUTATION Right 2023    Procedure: AMPUTATION, TOE;  Surgeon: Yael Cohen MD;  Location: [facility] [place] OR;  Service: General;  Laterality: Right;  Right great toe amp dr cohen wednesday     Family History   Problem Relation Name Age of Onset    Hypertension Father      Stroke Father      Cancer Sister       Social History     Tobacco Use    Smoking status: Former     Current packs/day: 0.00     Average packs/day: 5.0 packs/day for 4.0 years (20.0 ttl pk-yrs)     Types: Cigarettes     Start date:      Quit date:      Years since quittin.9     Passive exposure: Past    Smokeless tobacco: Current     Types: Chew, Snuff    Tobacco comments:     1 can/day   Substance Use Topics    Alcohol use: Yes     Comment: 2-3 drinks per/3 months    Drug use: Never     Review of Systems   Constitutional:  Negative for activity change, appetite change, fatigue and fever.   HENT:  Positive for congestion, postnasal drip, sinus pressure and sinus pain. Negative for ear discharge, ear pain, rhinorrhea, sneezing, sore throat, trouble swallowing and voice change.    Respiratory:  Positive for cough, chest tightness, shortness of breath and wheezing.    Gastrointestinal:  Negative for abdominal pain, diarrhea, nausea and vomiting.   Musculoskeletal:  Positive for arthralgias. Negative for myalgias.   Skin:  Negative for rash.   Neurological:  Positive for headaches.       Physical Exam     Initial Vitals [24 1234]   BP Pulse Resp Temp SpO2   95/68 89 16 97.9 °F (36.6 °C) 98 %      MAP       --         Physical Exam    Vitals reviewed.  Constitutional: Vital signs are normal. He appears well-developed and well-nourished. He is cooperative.   HENT:   Head: Normocephalic and atraumatic.   Right Ear: Hearing normal.   Left Ear: Hearing normal.   Nose: Nose normal. Mouth/Throat: Mucous membranes are normal.   Eyes: Conjunctivae and lids are normal. Right  eye exhibits no nystagmus. Left eye exhibits no nystagmus.   Neck: Trachea normal.   Normal range of motion.  Cardiovascular:  Normal rate and regular rhythm.           No murmur heard.  Pulmonary/Chest: He has wheezes. He has rhonchi.   Musculoskeletal:      Cervical back: Normal range of motion.     Neurological: He is alert and oriented to person, place, and time. Gait normal.   Skin: Skin is warm.   Psychiatric: He has a normal mood and affect. His speech is normal and behavior is normal. Judgment and thought content normal. Cognition and memory are normal.         Medical Screening Exam   See Full Note    ED Course   Procedures  Labs Reviewed   COMPREHENSIVE METABOLIC PANEL - Abnormal       Result Value    Sodium 139      Potassium 4.4      Chloride 95 (*)     CO2 32 (*)     Anion Gap 16      Glucose 68 (*)     BUN 16      Creatinine 1.11      BUN/Creatinine Ratio 14      Calcium 10.3 (*)     Total Protein 7.7      Albumin 3.1 (*)     Globulin 4.6 (*)     A/G Ratio 0.7      Bilirubin, Total 0.3      Alk Phos 99      ALT 15      AST 19      eGFR 76     D DIMER, QUANTITATIVE - Abnormal    D-Dimer >20.00 (*)    CBC WITH DIFFERENTIAL - Abnormal    WBC 11.65 (*)     RBC 4.60      Hemoglobin 14.0      Hematocrit 43.0      MCV 93.5      MCH 30.4      MCHC 32.6      RDW 12.4      Platelet Count 421 (*)     MPV 9.1 (*)     Neutrophils % 70.0 (*)     Lymphocytes % 19.5 (*)     Neutrophils, Abs 8.17 (*)     Lymphocytes, Absolute 2.27      Diff Type Auto      Monocytes % 8.7 (*)     Eosinophils % 1.5      Basophils % 0.3      Monocytes, Absolute 1.01 (*)     Eosinophils, Absolute 0.17      Basophils, Absolute 0.03     SARS-COV-2 RNA AMPLIFICATION, QUAL - Normal    SARS COV-2 Molecular Negative      Narrative:     Negative SARS-CoV results should not be used as the sole basis for treatment or patient management decisions; negative results should be considered in the context of a patient's recent exposures, history and the  presene of clinical signs and symptoms consistent with COVID-19.  Negative results should be treated as presumptive and confirmed by molecular assay, if necessary for patient management.   NT-PRO NATRIURETIC PEPTIDE - Normal    ProBNP 50     TROPONIN I - Normal    Troponin I High Sensitivity <2.7     APTT - Normal    PTT 30.9     PROTIME-INR - Normal    PT 12.5      INR 0.88     CULTURE, BLOOD   CBC W/ AUTO DIFFERENTIAL    Narrative:     The following orders were created for panel order CBC auto differential.  Procedure                               Abnormality         Status                     ---------                               -----------         ------                     CBC with Differential[[id number]]       Abnormal            Final result                 Please view results for these tests on the individual orders.   URINALYSIS, REFLEX TO URINE CULTURE   POCT GLUCOSE MONITORING CONTINUOUS    POC Glucose 74          ECG Results              EKG 12-lead  (SOB) (In process)        Collection Time Result Time QRS Duration OHS QTC Calculation    12/09/24 14:10:23 12/09/24 14:21:26 84 432                     In process by Interface, Lab In [facility] (12/09/24 14:21:30)                   Narrative:    Test Reason : R06.02,    Vent. Rate :  77 BPM     Atrial Rate :  77 BPM     P-R Int : 162 ms          QRS Dur :  84 ms      QT Int : 382 ms       P-R-T Axes :  55  18  34 degrees    QTcB Int : 432 ms    Sinus rhythm with occasional Premature ventricular complexes and Fusion  complexes  Otherwise normal ECG  When compared with ECG of 31-Oct-2023 16:29,  No significant change was found    Referred By: AAAREFERRAL SELF           Confirmed By:                                   Imaging Results              CTA Chest Non-Coronary (PE Studies) (Final result)  Result time 12/09/24 16:25:45      Final result by Kari Rodriguez MD (12/09/24 16:25:45)                   Impression:      1. No evidence of pulmonary  embolism to the segmental arterial level. If there is continued clinical concern, consider further evaluation with lower extremity doppler.    2.  No acute pulmonary process      Electronically signed by: Kari Rodriguez  Date:    12/09/2024  Time:    16:25               Narrative:      CMS MANDATED QUALITY DATA - CT RADIATION - 436    All CT scans at this facility utilize dose modulation, iterative reconstruction, and/or weight based dosing when appropriate to reduce radiation dose to as low as reasonably achievable.    CLINICAL HISTORY:  ([id number])60 y/o  (1965) M    Pulmonary embolism (PE) suspected, unknown D-dimer;    TECHNIQUE:  (A#[id number], exam time 12/9/2024 16:20)    CTA CHEST NON CORONARY (PE STUDIES) [id number]    Axial CT examination of the chest with attention to the pulmonary arteries was performed using contiguous axial images from the diaphragms to the lung apices following the intravenous administration of 100 cc Omnipaque 350 non-ionic contrast material. Images were reviewed using lung, mediastinal, and bone windows. The study was performed with thin sections with subsequent 3-D reformats/MIP/reconstructions in multiple planes.    COMPARISON:  10/18/2024    FINDINGS:  CTA PE evaluation: This is a high quality study for the evaluation of pulmonary embolism . The pulmonary arteries are normal in appearance without pulmonary emboli noted up to the segmental level, noting the limitations of CT technique for identifying small or isolated subsegmental emboli.    CT Chest:    Visualized neck: normal    Lungs: The lungs are clear.    Airway: The trachea and central bronchial tree appear normal.    Pleura: There is no pleural effusion. There is no pneumothorax.    Cardiovascular: The heart is normal in size. There are no findings of right heart failure. The pulmonary artery is normal in size. There is no pericardial effusion. The thoracic aorta and great vessels are normal in course and  caliber.    Mediastinum: There is no supraclavicular  axillary  mediastinal  or hilar lymphadenopathy.    Soft tissues: The peripheral soft tissues appear normal.    Musculoskeletal: The visualized osseous structures appear normal.  There are no suspicious osseous lesions.    Esophagus: normal    Upper Abdomen: visualized                                       X-Ray Chest PA And Lateral (Final result)  Result time 12/09/24 13:34:43      Final result by Bony Giraldo MD (12/09/24 13:34:43)                   Impression:      1. No acute cardiopulmonary process.      Electronically signed by: Bony Giraldo MD  Date:    12/09/2024  Time:    13:34               Narrative:    EXAMINATION:  XR CHEST PA AND LATERAL    CLINICAL HISTORY:  Cough, unspecified    TECHNIQUE:  PA and lateral views of the chest were performed.    COMPARISON:  10/18/2024    FINDINGS:  The cardiomediastinal silhouette is not enlarged.  There is no pleural effusion.  The trachea is midline.  The lungs are symmetrically expanded bilaterally without evidence of acute parenchymal process.  There are a few scattered calcified granulomas.  No large focal consolidation seen.  There is no pneumothorax.  The osseous structures are remarkable for degenerative change and levo scoliotic curvature of the spine..                                       Medications   albuterol-ipratropium 2.5 mg-0.5 mg/3 mL nebulizer solution 3 mL (3 mLs Nebulization Given 12/9/24 1345)   cefTRIAXone injection 1 g (1 g Intravenous Given 12/9/24 1423)   0.9% NaCl infusion (1,000 mLs Intravenous New Bag 12/9/24 1537)   methylPREDNISolone sodium succinate injection 60 mg (60 mg Intravenous Given 12/9/24 1537)   dextromethorphan-guaiFENesin  mg/5 ml liquid 10 mL (10 mLs Oral Given 12/9/24 1538)   iopamidoL (ISOVUE-370) injection 100 mL (100 mLs Intravenous Given 12/9/24 1620)     Medical Decision Making  GENIE is a 58 y/o  male who presents to the ER for c/o URI  symptoms x1 week. He has been taking OTC cold/cough medications with no improvement in symptoms. He has been afebrile, but has c/o productive cough with green sputum. He also has c/o chest tightness and some dyspnea. He is a previous smoker and currently uses smokeless tobacco.     Amount and/or Complexity of Data Reviewed  Labs: ordered. Decision-making details documented in ED Course.  Radiology: ordered.    Risk  OTC drugs.  Prescription drug management.  Decision regarding hospitalization.  Risk Details: No infiltrates noted on imaging, but patient has oxygen requirement. Also has negative d-dimer with negative PE of chest               ED Course as of 12/09/24 1655   Mon Dec 09, 2024   1443 WBC(!): 11.65 [MM]   1443 Platelet Count(!): 421 [MM]   1443 MPV(!): 9.1 [MM]   1443 Neutrophils Relative(!): 70.0 [MM]   1443 Lymph %(!): 19.5 [MM]   1443 Neutrophils, Abs(!): 8.17 [MM]   1443 Mono %(!): 8.7 [MM]   1527 Clinical correlation with possib [MM]   1529 Chloride(!): 95 [MM]   1529 CO2(!): 32 [MM]   1529 Glucose(!): 68 [MM]   1529 Calcium(!): 10.3 [MM]   1530 Albumin(!): 3.1 [MM]   1530 Globulin, Total(!): 4.6 [MM]   1540 Oxygen requirement and will need admission for supplemental oxygen, antibiotics, nebulized medications, low-dose steroids and rehydration with IV fluids  [MM]   1544 D dimer, quantitative(!)  CT PE protocol pending [MM]   1600 Patient transported in wheelchair for CT scan [MM]   1633 CT negative for PE. Will hydrate and repeat D-dimer in AM. Plans to admit [MM]   1649 Spoke with Dr. Morrow hospitalist on call at [facility]      ED Course User Index  [MM] Betty Mckoy FNP                           Clinical Impression:   Final diagnoses:  [R05.9] Cough  [R06.02] SOB (shortness of breath)  [R09.02] Hypoxia (Primary)  [J22] Lower respiratory infection        ED Disposition Condition    Admit Stable                Betty Mckoy FNP  12/09/24 4207

## 2024-12-09 NOTE — PLAN OF CARE
Problem: Adult Inpatient Plan of Care  Goal: Plan of Care Review  Outcome: Progressing  Goal: Patient-Specific Goal (Individualized)  Outcome: Progressing  Goal: Absence of Hospital-Acquired Illness or Injury  Outcome: Progressing     Problem: Diabetes Comorbidity  Goal: Blood Glucose Level Within Targeted Range  Outcome: Progressing     Problem: Breathing Pattern Ineffective  Goal: Effective Breathing Pattern  Outcome: Progressing     Problem: Fall Injury Risk  Goal: Absence of Fall and Fall-Related Injury  12/9/2024 1739 by Abril Mercado RN  Outcome: Progressing  12/9/2024 1739 by Abril Mercado RN  Outcome: Progressing     Problem: Pneumonia  Goal: Fluid Balance  Outcome: Progressing  Goal: Effective Oxygenation and Ventilation  Outcome: Progressing

## 2024-12-10 PROBLEM — E83.42 HYPOMAGNESEMIA: Status: ACTIVE | Noted: 2024-12-10

## 2024-12-10 PROBLEM — R79.89 ELEVATED D-DIMER: Status: ACTIVE | Noted: 2024-12-10

## 2024-12-10 LAB
ANION GAP SERPL CALCULATED.3IONS-SCNC: 17 MMOL/L (ref 7–16)
BASOPHILS # BLD AUTO: 0.01 K/UL (ref 0–0.2)
BASOPHILS NFR BLD AUTO: 0.1 % (ref 0–1)
BUN SERPL-MCNC: 19 MG/DL (ref 8–26)
BUN/CREAT SERPL: 16 (ref 6–20)
CALCIUM SERPL-MCNC: 10 MG/DL (ref 8.4–10.2)
CHLORIDE SERPL-SCNC: 95 MMOL/L (ref 98–107)
CO2 SERPL-SCNC: 29 MMOL/L (ref 22–29)
CREAT SERPL-MCNC: 1.2 MG/DL (ref 0.72–1.25)
D DIMER PPP FEU-MCNC: 16.98 ΜG/ML (ref 0–0.47)
DIFFERENTIAL METHOD BLD: ABNORMAL
EGFR (NO RACE VARIABLE) (RUSH/TITUS): 70 ML/MIN/1.73M2
EOSINOPHIL # BLD AUTO: 0.01 K/UL (ref 0–0.5)
EOSINOPHIL NFR BLD AUTO: 0.1 % (ref 1–4)
ERYTHROCYTE [DISTWIDTH] IN BLOOD BY AUTOMATED COUNT: 11.9 % (ref 11.5–14.5)
GLUCOSE SERPL-MCNC: 229 MG/DL (ref 70–105)
GLUCOSE SERPL-MCNC: 230 MG/DL (ref 74–100)
GLUCOSE SERPL-MCNC: 281 MG/DL (ref 70–105)
GLUCOSE SERPL-MCNC: 299 MG/DL (ref 70–105)
GLUCOSE SERPL-MCNC: 380 MG/DL (ref 70–105)
HCT VFR BLD AUTO: 38 % (ref 40–54)
HGB BLD-MCNC: 12.4 G/DL (ref 13.5–18)
LYMPHOCYTES # BLD AUTO: 0.87 K/UL (ref 1–4.8)
LYMPHOCYTES NFR BLD AUTO: 8 % (ref 27–41)
LYMPHOCYTES NFR BLD MANUAL: 8 % (ref 27–41)
MAGNESIUM SERPL-MCNC: 1.4 MG/DL (ref 1.6–2.6)
MCH RBC QN AUTO: 30.3 PG (ref 27–31)
MCHC RBC AUTO-ENTMCNC: 32.6 G/DL (ref 32–36)
MCV RBC AUTO: 92.9 FL (ref 80–96)
MONOCYTES # BLD AUTO: 0.48 K/UL (ref 0–0.8)
MONOCYTES NFR BLD AUTO: 4.4 % (ref 2–6)
MONOCYTES NFR BLD MANUAL: 7 % (ref 2–6)
MPC BLD CALC-MCNC: 9.1 FL (ref 9.4–12.4)
NEUTROPHILS # BLD AUTO: 9.44 K/UL (ref 1.8–7.7)
NEUTROPHILS NFR BLD AUTO: 87.4 % (ref 53–65)
NEUTS BAND NFR BLD MANUAL: 1 % (ref 1–5)
NEUTS SEG NFR BLD MANUAL: 84 % (ref 50–62)
NRBC BLD MANUAL-RTO: ABNORMAL %
PHOSPHATE SERPL-MCNC: 2.5 MG/DL (ref 2.3–4.7)
PLATELET # BLD AUTO: 349 K/UL (ref 150–400)
POTASSIUM SERPL-SCNC: 4.1 MMOL/L (ref 3.5–5.1)
RBC # BLD AUTO: 4.09 M/UL (ref 4.6–6.2)
SARS-COV-2 RDRP RESP QL NAA+PROBE: NEGATIVE
SODIUM SERPL-SCNC: 137 MMOL/L (ref 136–145)
WBC # BLD AUTO: 10.81 K/UL (ref 4.5–11)

## 2024-12-10 PROCEDURE — 27000944

## 2024-12-10 PROCEDURE — 63600175 PHARM REV CODE 636 W HCPCS: Performed by: NURSE PRACTITIONER

## 2024-12-10 PROCEDURE — 94761 N-INVAS EAR/PLS OXIMETRY MLT: CPT

## 2024-12-10 PROCEDURE — 25000003 PHARM REV CODE 250: Performed by: NURSE PRACTITIONER

## 2024-12-10 PROCEDURE — 25000242 PHARM REV CODE 250 ALT 637 W/ HCPCS: Performed by: NURSE PRACTITIONER

## 2024-12-10 PROCEDURE — 87635 SARS-COV-2 COVID-19 AMP PRB: CPT | Performed by: NURSE PRACTITIONER

## 2024-12-10 PROCEDURE — 84100 ASSAY OF PHOSPHORUS: CPT | Performed by: NURSE PRACTITIONER

## 2024-12-10 PROCEDURE — 87070 CULTURE OTHR SPECIMN AEROBIC: CPT | Performed by: NURSE PRACTITIONER

## 2024-12-10 PROCEDURE — 85379 FIBRIN DEGRADATION QUANT: CPT | Performed by: NURSE PRACTITIONER

## 2024-12-10 PROCEDURE — 85025 COMPLETE CBC W/AUTO DIFF WBC: CPT | Performed by: NURSE PRACTITIONER

## 2024-12-10 PROCEDURE — 99900035 HC TECH TIME PER 15 MIN (STAT)

## 2024-12-10 PROCEDURE — 94640 AIRWAY INHALATION TREATMENT: CPT

## 2024-12-10 PROCEDURE — 11000001 HC ACUTE MED/SURG PRIVATE ROOM

## 2024-12-10 PROCEDURE — 36415 COLL VENOUS BLD VENIPUNCTURE: CPT | Performed by: NURSE PRACTITIONER

## 2024-12-10 PROCEDURE — 80048 BASIC METABOLIC PNL TOTAL CA: CPT | Performed by: NURSE PRACTITIONER

## 2024-12-10 PROCEDURE — 99232 SBSQ HOSP IP/OBS MODERATE 35: CPT | Mod: ,,, | Performed by: NURSE PRACTITIONER

## 2024-12-10 PROCEDURE — 82962 GLUCOSE BLOOD TEST: CPT

## 2024-12-10 PROCEDURE — 27000982 HC MATTRESS, MATRIX LAL RENTAL

## 2024-12-10 PROCEDURE — 27000958

## 2024-12-10 PROCEDURE — 27000221 HC OXYGEN, UP TO 24 HOURS

## 2024-12-10 PROCEDURE — 83735 ASSAY OF MAGNESIUM: CPT | Performed by: NURSE PRACTITIONER

## 2024-12-10 RX ORDER — INSULIN ASPART 100 [IU]/ML
0-10 INJECTION, SOLUTION INTRAVENOUS; SUBCUTANEOUS
Status: DISCONTINUED | OUTPATIENT
Start: 2024-12-10 | End: 2024-12-13 | Stop reason: HOSPADM

## 2024-12-10 RX ORDER — ENOXAPARIN SODIUM 100 MG/ML
40 INJECTION SUBCUTANEOUS EVERY 24 HOURS
Status: DISCONTINUED | OUTPATIENT
Start: 2024-12-10 | End: 2024-12-13 | Stop reason: HOSPADM

## 2024-12-10 RX ORDER — MAGNESIUM SULFATE HEPTAHYDRATE 40 MG/ML
4 INJECTION, SOLUTION INTRAVENOUS ONCE
Status: COMPLETED | OUTPATIENT
Start: 2024-12-10 | End: 2024-12-10

## 2024-12-10 RX ADMIN — GABAPENTIN 300 MG: 300 CAPSULE ORAL at 09:12

## 2024-12-10 RX ADMIN — METOPROLOL TARTRATE 50 MG: 50 TABLET, FILM COATED ORAL at 09:12

## 2024-12-10 RX ADMIN — GABAPENTIN 300 MG: 300 CAPSULE ORAL at 08:12

## 2024-12-10 RX ADMIN — INSULIN ASPART 3 UNITS: 100 INJECTION, SOLUTION INTRAVENOUS; SUBCUTANEOUS at 08:12

## 2024-12-10 RX ADMIN — INSULIN GLARGINE 30 UNITS: 100 INJECTION, SOLUTION SUBCUTANEOUS at 08:12

## 2024-12-10 RX ADMIN — INSULIN ASPART 4 UNITS: 100 INJECTION, SOLUTION INTRAVENOUS; SUBCUTANEOUS at 05:12

## 2024-12-10 RX ADMIN — ATORVASTATIN CALCIUM 80 MG: 40 TABLET, FILM COATED ORAL at 09:12

## 2024-12-10 RX ADMIN — SENNOSIDES AND DOCUSATE SODIUM 1 TABLET: 50; 8.6 TABLET ORAL at 08:12

## 2024-12-10 RX ADMIN — LISINOPRIL 20 MG: 10 TABLET ORAL at 09:12

## 2024-12-10 RX ADMIN — HEPARIN SODIUM 5000 UNITS: 5000 INJECTION INTRAVENOUS; SUBCUTANEOUS at 06:12

## 2024-12-10 RX ADMIN — IPRATROPIUM BROMIDE AND ALBUTEROL SULFATE 3 ML: 2.5; .5 SOLUTION RESPIRATORY (INHALATION) at 07:12

## 2024-12-10 RX ADMIN — BUDESONIDE 0.25 MG: 0.25 INHALANT RESPIRATORY (INHALATION) at 07:12

## 2024-12-10 RX ADMIN — INSULIN ASPART 10 UNITS: 100 INJECTION, SOLUTION INTRAVENOUS; SUBCUTANEOUS at 11:12

## 2024-12-10 RX ADMIN — IPRATROPIUM BROMIDE AND ALBUTEROL SULFATE 3 ML: 2.5; .5 SOLUTION RESPIRATORY (INHALATION) at 12:12

## 2024-12-10 RX ADMIN — ASPIRIN 81 MG: 81 TABLET, COATED ORAL at 09:12

## 2024-12-10 RX ADMIN — LEVOFLOXACIN 750 MG: 750 INJECTION, SOLUTION INTRAVENOUS at 06:12

## 2024-12-10 RX ADMIN — GABAPENTIN 300 MG: 300 CAPSULE ORAL at 03:12

## 2024-12-10 RX ADMIN — IPRATROPIUM BROMIDE AND ALBUTEROL SULFATE 3 ML: 2.5; .5 SOLUTION RESPIRATORY (INHALATION) at 01:12

## 2024-12-10 RX ADMIN — MAGNESIUM SULFATE HEPTAHYDRATE 4 G: 40 INJECTION, SOLUTION INTRAVENOUS at 07:12

## 2024-12-10 RX ADMIN — ENOXAPARIN SODIUM 40 MG: 40 INJECTION SUBCUTANEOUS at 05:12

## 2024-12-10 RX ADMIN — HYDROCHLOROTHIAZIDE 25 MG: 25 TABLET ORAL at 09:12

## 2024-12-10 RX ADMIN — CITALOPRAM HYDROBROMIDE 20 MG: 20 TABLET ORAL at 09:12

## 2024-12-10 RX ADMIN — METOPROLOL TARTRATE 50 MG: 50 TABLET, FILM COATED ORAL at 08:12

## 2024-12-10 NOTE — ASSESSMENT & PLAN NOTE
"Patient's FSGs are uncontrolled due to hyperglycemia on current medication regimen.  Last A1c reviewed-   Lab Results   Component Value Date    HGBA1C 7.4 (H) 10/17/2024     Most recent fingerstick glucose reviewed- No results for input(s): "POCTGLUCOSE" in the last 24 hours.  Current correctional scale   Tresiba 30 units with instructions to titrate as needed  Maintain anti-hyperglycemic dose as follows-   Antihyperglycemics (From admission, onward)      Start     Stop Route Frequency Ordered    12/09/24 2100  insulin glargine U-100 (Lantus) injection 30 Units         -- SubQ Nightly 12/09/24 1753        Tresiba substituted with Lantus  Hold Oral hypoglycemics while patient is in the hospital.  Hold Metformin until 12/11    12/10/2024: RBC up to 380 mg/dL today. Currently receiving Lantus 30 units at HS. Still holding Metformin due to recently having IV contrast to rule out PE.  -Accuchecks AC & HS with moderate SSI coverage  " DAD CALLING   PINK EYE IN BOTH EYES   GOOPY EYES RED IRRITATED GLASSY STARTED USING OTC EYE DROPS   NO FEVER NO EAR PAIN   NO COUGH CONGESTION   ALLERGIC TO AMOXICILLIN

## 2024-12-10 NOTE — ASSESSMENT & PLAN NOTE
Patient's blood pressure range in the last 24 hours was: BP  Min: 95/68  Max: 122/69.The patient's inpatient anti-hypertensive regimen is listed below:  Current Antihypertensives  lisinopriL tablet 20 mg, Daily, Oral  metoprolol tartrate (LOPRESSOR) tablet 50 mg, 2 times daily, Oral  hydroCHLOROthiazide tablet 25 mg, Daily, Oral    Plan  - BP is controlled, no changes needed to their regimen  - Hold antihypertensives for SBP <120 or DBP<60

## 2024-12-10 NOTE — PROGRESS NOTES
[facility] - Medical Surgical Unit  Hospital Medicine  Progress Note    Patient Name: Syed Kelley  MRN: [id number]  Patient Class: IP- Inpatient   Admission Date: 12/9/2024  Length of Stay: 1 days  Attending Physician: Julio Cruz DO  Primary Care Provider: Julio Cruz DO        Subjective     Principal Problem:Acute lower respiratory infection        HPI:  GENIE is a 58 y/o  male with a PMH of hypertension, IDDM, ADHD, depression, AC, PAD, & neuropathy who presented to the ER today via private vehicle for complaints of URI symptoms x1 week. He had been taking OTC cold/cough medications with no improvement in symptoms. He had no known fever. He did endorse a productive cough with green sputum, dizziness, headache, dyspnea, and chest tightness. He is a previous cigarette smoker and a current smokeless tobacco user. While in the ER he was given Duoneb with no significant improvement in symptoms. He was 87-90% on room air and placed on supplemental oxygen. Patient was also slightly hypotensive and had an elevated d-dimer. Labs were otherwise unremarkable.  CT chest PE and chest xray were negative. Clinical findings raised concerned for possible pneumonia. Patient is being admitted inpatient for supplemental oxygen, IV rehydration, and IV antibiotics. He will also continue Duonebs ATC with Pulmicort BID. He was given one dose of IV solumedrol in ED. Will not continue on admission since Mr. Kelley has IDDM. Sputum and blood culture pending.     Overview/Hospital Course:  12/10/2024: Hospital Day # 2. Patient continues to have SOB. O2 sat 91% on 2L/NC. Continues to receive Duo Nebs, Budesonide nebs, IV Levaquin and oxygen support. Labs to day WBC 10.81, H/H 12.4/38.0, Na 137, K+ 4.1, Mag 1.4, D-dimer down to 16.98 today. Had negative CT chest PE yesterday. Had BLE venous doppler today that was negative for DVT. Patient reports only minimal improvement of symptoms today.    Interval History:  Continues to have SOB. O2 sat 91% on 2L/NC.    Review of Systems   Constitutional:  Positive for activity change and fatigue. Negative for appetite change, chills and fever.   HENT:  Positive for postnasal drip. Negative for congestion, ear discharge, ear pain, rhinorrhea, sinus pressure, sinus pain, sore throat and trouble swallowing.    Eyes:  Negative for photophobia, pain, redness and visual disturbance.   Respiratory:  Positive for cough, shortness of breath and wheezing. Negative for chest tightness.    Cardiovascular:  Negative for chest pain, palpitations and leg swelling.   Gastrointestinal:  Negative for abdominal pain, constipation, diarrhea and nausea.   Genitourinary:  Negative for dysuria, flank pain, frequency and hematuria.   Musculoskeletal:  Negative for back pain, gait problem, myalgias, neck pain and neck stiffness.   Neurological:  Negative for dizziness, syncope, speech difficulty, weakness, numbness and headaches.   Hematological: Negative.    Psychiatric/Behavioral: Negative.       Objective:     Vital Signs (Most Recent):  Temp: 98.7 °F (37.1 °C) (12/10/24 0730)  Pulse: 68 (12/10/24 0730)  Resp: (!) 21 (12/10/24 0730)  BP: 121/81 (12/10/24 0901)  SpO2: (!) 91 % (12/10/24 0904) Vital Signs (24h Range):  Temp:  [97.9 °F (36.6 °C)-99 °F (37.2 °C)] 98.7 °F (37.1 °C)  Pulse:  [] 68  Resp:  [10-25] 21  SpO2:  [90 %-100 %] 91 %  BP: ()/(47-81) 121/81     Weight: 87.1 kg (192 lb)  Body mass index is 34.01 kg/m².    Intake/Output Summary (Last 24 hours) at 12/10/2024 1101  Last data filed at 12/10/2024 0914  Gross per 24 hour   Intake 1596.46 ml   Output --   Net 1596.46 ml         Physical Exam  Vitals and nursing note reviewed.   Constitutional:       Appearance: Normal appearance.   HENT:      Right Ear: Tympanic membrane, ear canal and external ear normal.      Left Ear: Tympanic membrane, ear canal and external ear normal.      Mouth/Throat:      Mouth: Mucous membranes are moist.    Eyes:      Extraocular Movements: Extraocular movements intact.      Pupils: Pupils are equal, round, and reactive to light.   Cardiovascular:      Rate and Rhythm: Normal rate and regular rhythm.      Pulses: Normal pulses.      Heart sounds: Normal heart sounds.   Pulmonary:      Effort: Pulmonary effort is normal.      Breath sounds: Examination of the right-lower field reveals decreased breath sounds. Examination of the left-lower field reveals decreased breath sounds. Decreased breath sounds present. No wheezing, rhonchi or rales.   Abdominal:      General: Bowel sounds are normal.      Palpations: Abdomen is soft.      Tenderness: There is no abdominal tenderness.   Musculoskeletal:         General: Normal range of motion.      Cervical back: Normal range of motion and neck supple.      Right lower leg: No edema.      Left lower leg: No edema.   Lymphadenopathy:      Cervical: No cervical adenopathy.   Skin:     General: Skin is warm and dry.      Capillary Refill: Capillary refill takes less than 2 seconds.      Findings: No rash.   Neurological:      General: No focal deficit present.      Mental Status: He is alert and oriented to person, place, and time. Mental status is at baseline.      Sensory: Sensation is intact.      Motor: Motor function is intact.   Psychiatric:         Attention and Perception: Attention normal.         Mood and Affect: Mood normal.         Speech: Speech normal.         Behavior: Behavior normal. Behavior is cooperative.         Judgment: Judgment normal.             Significant Labs: All pertinent labs within the past 24 hours have been reviewed.  Recent Lab Results  (Last 5 results in the past 24 hours)        12/10/24  1104   12/10/24  0559   12/10/24  0442   12/09/24  2043   12/09/24  1924        Anion Gap     17           Appearance, UA         Clear       Bands     1           Baso #     0.01           Basophil %     0.1           Bilirubin (UA)         Negative       BUN      19           BUN/CREAT RATIO     16           Calcium     10.0           Chloride     95           CO2     29           Color, UA         Yellow       Creatinine     1.20           D-Dimer     16.98           Differential Method     Manual           eGFR     70           Eos #     0.01           Eos %     0.1           Glucose     230           Glucose, UA         >=1000       Hematocrit     38.0           Hemoglobin     12.4           Ketones, UA         Negative       Leukocyte Esterase, UA         Negative       Lymph #     0.87           Lymph %     8.0                8           Magnesium      1.4           MCH     30.3           MCHC     32.6           MCV     92.9           Mono #     0.48           Mono %     4.4                7           MPV     9.1           Neutrophils, Abs     9.44           Neutrophils Relative     87.4           NITRITE UA         Negative       nRBC               Blood, UA         Negative       pH, UA         5.5       Phosphorus Level     2.5           Platelet Count     349           POC Glucose 380   299     465         Potassium     4.1           Protein, UA         Negative       RBC     4.09           RDW     11.9           Segmented Neutrophils, Man %     84           Sodium     137           Spec Grav UA         1.010       UROBILINOGEN UA         0.2       WBC     10.81                                  Significant Imaging: I have reviewed all pertinent imaging results/findings within the past 24 hours.    Assessment and Plan     * Acute lower respiratory infection  Sputum culture pending  Levaquin 750 mg IV daily  IV fluid replacment  Continue supplemental oxygen to maintain sats %  Duonebs every 6 hours  Pulmicort BID    12/10/2024: O2 sat 91% on 2L/NC. Continues to received Duo Nebs and Pulmicort nebs. Sputum culture collected and is pending results. Due to D-dimer elevated will repeat COVID swab.  -Continue Lovenox 750 mg IV daily (Day 2)  -Repeat COVID  "swab    Elevated d-dimer  12/10/2024: D-dimer was >20.0 on admission. Had negative CT PE chest on admission. Today d-dimer down to 16.98. Had negative BLE venous doppler. Will repeat COVID swab today.      Hypomagnesemia  12/10/2024: Patient has Abnormal Magnesium: hypomagnesemia. Will continue to monitor electrolytes closely. Will replace the affected electrolytes and repeat labs to be done after interventions completed. The patient's magnesium results have been reviewed and are listed below.  Recent Labs   Lab 12/10/24  0442   MG 1.4*      -Mag 4 gm IV today  -Repeat Mag level in am    Obstructive sleep apnea syndrome  12/10/2024: Has AC, but is not on CPAP at home due to financial difficulty.      Essential hypertension, benign  Patient's blood pressure range in the last 24 hours was: BP  Min: 95/68  Max: 122/69.The patient's inpatient anti-hypertensive regimen is listed below:  Current Antihypertensives  lisinopriL tablet 20 mg, Daily, Oral  metoprolol tartrate (LOPRESSOR) tablet 50 mg, 2 times daily, Oral  hydroCHLOROthiazide tablet 25 mg, Daily, Oral    Plan  - BP is controlled, no changes needed to their regimen  - Hold antihypertensives for SBP <120 or DBP<60    Type 2 diabetes mellitus with hyperglycemia  Patient's FSGs are uncontrolled due to hyperglycemia on current medication regimen.  Last A1c reviewed-   Lab Results   Component Value Date    HGBA1C 7.4 (H) 10/17/2024     Most recent fingerstick glucose reviewed- No results for input(s): "POCTGLUCOSE" in the last 24 hours.  Current correctional scale   Tresiba 30 units with instructions to titrate as needed  Maintain anti-hyperglycemic dose as follows-   Antihyperglycemics (From admission, onward)      Start     Stop Route Frequency Ordered    12/09/24 2100  insulin glargine U-100 (Lantus) injection 30 Units         -- SubQ Nightly 12/09/24 1753        Tresiba substituted with Lantus  Hold Oral hypoglycemics while patient is in the hospital.  Hold " Metformin until 12/11    12/10/2024: RBC up to 380 mg/dL today. Currently receiving Lantus 30 units at HS. Still holding Metformin due to recently having IV contrast to rule out PE.  -Accuchecks AC & HS with moderate SSI coverage      VTE Risk Mitigation (From admission, onward)           Ordered     heparin (porcine) injection 5,000 Units  Every 8 hours         12/09/24 1745     IP VTE HIGH RISK PATIENT  Once         12/09/24 1745     Place FENG hose  Until discontinued         12/09/24 1745                    Discharge Planning   MILANA:      Code Status: Full Code   Medical Readiness for Discharge Date:            Discussed patient's case, labs and medications with Dr. Anna. He agreed with current POC.                REMEDIOS Avendano  Department of Hospital Medicine   [facility] - [facility]

## 2024-12-10 NOTE — HPI
GENIE is a 58 y/o  male with a PMH of hypertension, IDDM, ADHD, depression, AC, PAD, & neuropathy who presented to the ER today via private vehicle for complaints of URI symptoms x1 week. He had been taking OTC cold/cough medications with no improvement in symptoms. He had no known fever. He did endorse a productive cough with green sputum, dizziness, headache, dyspnea, and chest tightness. He is a previous cigarette smoker and a current smokeless tobacco user. While in the ER he was given Duoneb with no significant improvement in symptoms. He was 87-90% on room air and placed on supplemental oxygen. Patient was also slightly hypotensive and had an elevated d-dimer. Labs were otherwise unremarkable.  CT chest PE and chest xray were negative. Clinical findings raised concerned for possible pneumonia. Patient is being admitted inpatient for supplemental oxygen, IV rehydration, and IV antibiotics. He will also continue Duonebs ATC with Pulmicort BID. He was given one dose of IV solumedrol in ED. Will not continue on admission since Mr. Kelley has IDDM. Sputum and blood culture pending.

## 2024-12-10 NOTE — NURSING
Patient has removed his oxygen to eat and sats 87-89% on room air, replaced nasal cannual. Pt denies any SOB will recheck again after he finishes lunch. Blood sugar 380, nofitied provider and new orders recieved

## 2024-12-10 NOTE — ASSESSMENT & PLAN NOTE
Sputum culture pending  Levaquin 750 mg IV daily  IV fluid replacment  Continue supplemental oxygen to maintain sats %  Duonebs every 6 hours  Pulmicort BID    12/10/2024: O2 sat 91% on 2L/NC. Continues to received Duo Nebs and Pulmicort nebs. Sputum culture collected and is pending results. Due to D-dimer elevated will repeat COVID swab.  -Continue Lovenox 750 mg IV daily (Day 2)  -Repeat COVID swab

## 2024-12-10 NOTE — HOSPITAL COURSE
12/10/2024: Hospital Day # 2. Patient continues to have SOB. O2 sat 91% on 2L/NC. Continues to receive Duo Nebs, Budesonide nebs, IV Levaquin and oxygen support. Labs to day WBC 10.81, H/H 12.4/38.0, Na 137, K+ 4.1, Mag 1.4, D-dimer down to 16.98 today. Had negative CT chest PE yesterday. Had BLE venous doppler today that was negative for DVT. Patient reports only minimal improvement of symptoms today.    12/11/2024; Inpatient Day # 3, SOB is mildly improve with O2 sats in the lower 90% on 2 Liters of supplemental oxygen via nasal bi-prong. Will add Pulmicort to therapy regimen. K+ down to 3.4, mostly due to nebs will replace today. Respiratory Culture grew out yeast, will cover with Diflucan 150 mg IV. Will qualify patient for home oxygen. Estimated date of discharge is 12/13/2024 12/12/2024: Hospital Day #4 Patient reports improvement in shortness of breath, still with c/o cough. Attempting to wean O2 today and at time of exam he is on room air and oxygenating well with no respiratory distress or increased work of breathing. Pt previously diagnosed with sleep apnea but states did not get his CPAP d/t financial restraints. He has been afebrile. WBC count is 7.85. Blood cultures no growth to date. Magnesium was low on yesterday and supplemented with repeat level 1.4 so remains low, will repeat supplementation and recheck in AM. Overall, pt improving, anticipate discharge home in AM.     12/13/2024: Hospital Day #5. Reports feeling much better today. Weaned off oxygen and ambulating down hallway with no complaint of SOB. O2 sat 95% on room air. Lungs CTA. Mag up to 1.8 will start on po mag supplement. Will discharge home today with hospital follow up with PCP next week.

## 2024-12-10 NOTE — ASSESSMENT & PLAN NOTE
12/10/2024: D-dimer was >20.0 on admission. Had negative CT PE chest on admission. Today d-dimer down to 16.98. Had negative BLE venous doppler. Will repeat COVID swab today.

## 2024-12-10 NOTE — H&P
[facility] - Medical Surgical Unit  Hospital Medicine  History & Physical    Patient Name: Syed Kelley  MRN: [id number]  Patient Class: IP- Inpatient  Admission Date: 12/9/2024  Attending Physician: Julio Cruz DO   Primary Care Provider: Julio Cruz DO         Patient information was obtained from patient and ER records.     Subjective:     Principal Problem:Acute lower respiratory infection    Chief Complaint:   Chief Complaint   Patient presents with    Cough    Nasal Congestion        HPI: GENIE is a 60 y/o  male with a PMH of hypertension, IDDM, ADHD, depression, AC, PAD, & neuropathy who presented to the ER today via private vehicle for complaints of URI symptoms x1 week. He had been taking OTC cold/cough medications with no improvement in symptoms. He had no known fever. He did endorse a productive cough with green sputum, dizziness, headache, dyspnea, and chest tightness. He is a previous cigarette smoker and a current smokeless tobacco user. While in the ER he was given Duoneb with no significant improvement in symptoms. He was 87-90% on room air and placed on supplemental oxygen. Patient was also slightly hypotensive and had an elevated d-dimer. Labs were otherwise unremarkable.  CT chest PE and chest xray were negative. Clinical findings raised concerned for possible pneumonia. Patient is being admitted inpatient for supplemental oxygen, IV rehydration, and IV antibiotics. He will also continue Duonebs ATC with Pulmicort BID. He was given one dose of IV solumedrol in ED. Will not continue on admission since Mr. Kelley has IDDM. Sputum and blood culture pending.     Past Medical History:   Diagnosis Date    ADHD     Adult attention deficit disorder 03/22/2021    Depression 12/22/2022    Essential hypertension 03/22/2021    Hyperlipidemia     Neuropathy 08/23/2023    AC (obstructive sleep apnea) 10/19/2023    PAD (peripheral artery disease) 02/17/2023    Swollen leg  01/20/2024    Type 2 diabetes mellitus        Past Surgical History:   Procedure Laterality Date    APPENDECTOMY      CARDIAC CATHETERIZATION      COLONOSCOPY  10/09/2024    DEBRIDEMENT OF LOWER EXTREMITY Bilateral 02/18/2023    Procedure: DEBRIDEMENT, LOWER EXTREMITY;  Surgeon: Yael Cohen MD;  Location: [facility] OR;  Service: General;  Laterality: Bilateral;  debride toes    FINGER AMPUTATION Right     1st digit    KIDNEY SURGERY Right     Patient states right kidney removed at age 5.    LEFT HEART CATHETERIZATION N/A 05/07/2021    Procedure: Left heart cath with possible intervention;  Surgeon: Federico James DO;  Location: [facility] CATH LAB;  Service: Cardiology;  Laterality: N/A;    TOE AMPUTATION Right 03/29/2023    Procedure: AMPUTATION, TOE;  Surgeon: Yael Cohen MD;  Location: [facility] OR;  Service: General;  Laterality: Right;  Right great toe amp dr cohen wednesday     Review of patient's allergies indicates:   Allergen Reactions    Azithromycin     Erythromycin Other (See Comments)    M-mycin      No current facility-administered medications on file prior to encounter.     Current Outpatient Medications on File Prior to Encounter   Medication Sig    atorvastatin (LIPITOR) 80 MG tablet Take 1 tablet (80 mg total) by mouth once daily.    citalopram (CELEXA) 20 MG tablet Take 1 tablet (20 mg total) by mouth once daily.    gabapentin (NEURONTIN) 300 MG capsule Take 1 capsule (300 mg total) by mouth 3 (three) times daily.    glyBURIDE (DIABETA) 5 MG tablet Take 1 tablet (5 mg total) by mouth daily with breakfast.    lisinopriL-hydrochlorothiazide (PRINZIDE,ZESTORETIC) 20-25 mg Tab Take 1 tablet by mouth once daily.    metFORMIN (GLUCOPHAGE) 1000 MG tablet Take 1 tablet (1,000 mg total) by mouth 2 (two) times daily with meals.    metoprolol tartrate (LOPRESSOR) 50 MG tablet Take 1 tablet (50 mg total) by mouth 2 (two) times daily.    aspirin (ECOTRIN) 81 MG EC tablet Take 1 tablet (81 mg total) by  "mouth once daily.    dextroamphetamine-amphetamine (ADDERALL) 20 mg tablet Take 1 tablet by mouth each afternoon    dextroamphetamine-amphetamine (ADDERALL) 20 mg tablet TAKE ONE TABLET BY MOUTH EVERY AFTERNOON. (Patient not taking: Reported on 11/18/2024)    dextroamphetamine-amphetamine (ADDERALL) 20 mg tablet TAKE ONE TABLET BY MOUTH EVERY AFTERNOON. (Patient not taking: Reported on 11/18/2024)    dextroamphetamine-amphetamine (ADDERALL) 30 mg Tab Take 1 tablet by mouth each morning    dextroamphetamine-amphetamine (ADDERALL) 30 mg Tab Take 1 tablet (30 mg total) by mouth every morning. (Patient not taking: Reported on 11/18/2024)    dextroamphetamine-amphetamine (ADDERALL) 30 mg Tab Take 1 tablet (30 mg total) by mouth every morning. (Patient not taking: Reported on 11/18/2024)    HYDROcodone-acetaminophen (NORCO) 7.5-325 mg per tablet Take 1 tablet by mouth every 6 (six) hours as needed for Pain.    insulin degludec (TRESIBA FLEXTOUCH U-200) 200 unit/mL (3 mL) insulin pen Inject 30 Units into the skin once daily. Increase by 5 units if blood glucose is elevated above 200. weekly    INV vitamin d3 50,000 iu/placebo Cap capsule Take 1 capsule by mouth once a week as directed for a duration of 26 weeks. Take capsule whole, do not open the capsule. For Investigational Use only. Protocol: Subject ID: ? Take 1 capsule by mouth once a week as directed for a duration of 26 weeks.    pen needle, diabetic (BD ULTRA-FINE SHORT PEN NEEDLE) 31 gauge x 5/16" Ndle USE AS DIRECTED TO administer insulin TWICE DAILY for 50    pen needle, diabetic 31 gauge x 5/16" Ndle 100 each by abdominal subcutaneous route once daily. Use to administer insulin twice daily (Patient not taking: Reported on 11/18/2024)    [DISCONTINUED] albuterol (PROVENTIL) 2.5 mg /3 mL (0.083 %) nebulizer solution Take 3 mLs (2.5 mg total) by nebulization every 6 (six) hours as needed for Wheezing. Rescue    [DISCONTINUED] albuterol (PROVENTIL/VENTOLIN HFA) 90 " "mcg/actuation inhaler Inhale 2 puffs into the lungs every 4 (four) hours as needed for Wheezing. Rescue    [DISCONTINUED] insulin syringe-needle U-100 0.3 mL 31 gauge x /" Syrg 100 each by abdominal subcutaneous route once daily. Use once daily for insulin injection (Patient not taking: Reported on 2024)     Family History       Problem Relation (Age of Onset)    Cancer Sister    Hypertension Father    Stroke Father          Tobacco Use    Smoking status: Former     Current packs/day: 0.00     Average packs/day: 5.0 packs/day for 4.0 years (20.0 ttl pk-yrs)     Types: Cigarettes     Start date:      Quit date:      Years since quittin.9     Passive exposure: Past    Smokeless tobacco: Current     Types: Chew, Snuff    Tobacco comments:     1 can/day   Substance and Sexual Activity    Alcohol use: Not Currently     Comment: 2-3 drinks per/3 months    Drug use: Yes     Types: Amphetamines     Comment: prescibed adderal    Sexual activity: Yes     Partners: Female     Review of Systems   Constitutional:  Positive for fatigue. Negative for activity change, appetite change and fever.   HENT:  Positive for postnasal drip, sinus pressure and sinus pain. Negative for congestion, ear discharge, ear pain, rhinorrhea, sneezing, sore throat, trouble swallowing and voice change.    Respiratory:  Positive for cough, chest tightness and shortness of breath. Negative for wheezing.    Gastrointestinal:  Positive for nausea. Negative for diarrhea and vomiting.   Musculoskeletal:  Negative for myalgias.   Neurological:  Positive for dizziness and headaches.     Objective:     Vital Signs (Most Recent):  Temp: 99 °F (37.2 °C) (24)  Pulse: 88 (24)  Resp: 18 (24)  BP: (!) 99/47 (24)  SpO2: (!) 93 % (24) Vital Signs (24h Range):  Temp:  [97.9 °F (36.6 °C)-99 °F (37.2 °C)] 99 °F (37.2 °C)  Pulse:  [] 88  Resp:  [12-18] 18  SpO2:  [90 %-98 %] 93 %  BP: " ()/(47-79) 99/47     Weight: 87.1 kg (192 lb)  Body mass index is 34.01 kg/m².     Physical Exam  Vitals reviewed.   Constitutional:       Appearance: Normal appearance. He is obese.   Cardiovascular:      Rate and Rhythm: Normal rate and regular rhythm.      Heart sounds: No murmur heard.  Pulmonary:      Effort: Pulmonary effort is normal.      Breath sounds: Decreased air movement present. Rhonchi present.   Neurological:      Mental Status: He is alert and oriented to person, place, and time.      Gait: Gait normal.   Psychiatric:         Mood and Affect: Mood normal.         Behavior: Behavior normal. Behavior is cooperative.         Thought Content: Thought content normal.         Judgment: Judgment normal.       Significant Labs: All pertinent labs within the past 24 hours have been reviewed.  CBC:   Recent Labs   Lab 12/09/24  1426   WBC 11.65*   HGB 14.0   HCT 43.0   *     CMP:   Recent Labs   Lab 12/09/24  1426      K 4.4   CL 95*   CO2 32*   GLU 68*   BUN 16   CREATININE 1.11   CALCIUM 10.3*   PROT 7.7   ALBUMIN 3.1*   BILITOT 0.3   ALKPHOS 99   AST 19   ALT 15   ANIONGAP 16     Significant Imaging: I have reviewed all pertinent imaging results/findings within the past 24 hours.    CTA Chest Non-Coronary (PE Studies)  Narrative: CMS MANDATED QUALITY DATA - CT RADIATION - 436    All CT scans at this facility utilize dose modulation, iterative reconstruction, and/or weight based dosing when appropriate to reduce radiation dose to as low as reasonably achievable.    CLINICAL HISTORY:  ([id number])60 y/o  (1965) M    Pulmonary embolism (PE) suspected, unknown D-dimer;    TECHNIQUE:  (A#[id number], exam time 12/9/2024 16:20)    CTA CHEST NON CORONARY (PE STUDIES) [id number]    Axial CT examination of the chest with attention to the pulmonary arteries was performed using contiguous axial images from the diaphragms to the lung apices following the intravenous administration of 100 cc Omnipaque  350 non-ionic contrast material. Images were reviewed using lung, mediastinal, and bone windows. The study was performed with thin sections with subsequent 3-D reformats/MIP/reconstructions in multiple planes.    COMPARISON:  10/18/2024    FINDINGS:  CTA PE evaluation: This is a high quality study for the evaluation of pulmonary embolism . The pulmonary arteries are normal in appearance without pulmonary emboli noted up to the segmental level, noting the limitations of CT technique for identifying small or isolated subsegmental emboli.    CT Chest:    Visualized neck: normal    Lungs: The lungs are clear.    Airway: The trachea and central bronchial tree appear normal.    Pleura: There is no pleural effusion. There is no pneumothorax.    Cardiovascular: The heart is normal in size. There are no findings of right heart failure. The pulmonary artery is normal in size. There is no pericardial effusion. The thoracic aorta and great vessels are normal in course and caliber.    Mediastinum: There is no supraclavicular  axillary  mediastinal  or hilar lymphadenopathy.    Soft tissues: The peripheral soft tissues appear normal.    Musculoskeletal: The visualized osseous structures appear normal.  There are no suspicious osseous lesions.    Esophagus: normal    Upper Abdomen: visualized  Impression: 1. No evidence of pulmonary embolism to the segmental arterial level. If there is continued clinical concern, consider further evaluation with lower extremity doppler.    2.  No acute pulmonary process    Electronically signed by: Kari Rodriguez  Date:    12/09/2024  Time:    16:25  X-Ray Chest PA And Lateral  Narrative: EXAMINATION:  XR CHEST PA AND LATERAL    CLINICAL HISTORY:  Cough, unspecified    TECHNIQUE:  PA and lateral views of the chest were performed.    COMPARISON:  10/18/2024    FINDINGS:  The cardiomediastinal silhouette is not enlarged.  There is no pleural effusion.  The trachea is midline.  The lungs are  "symmetrically expanded bilaterally without evidence of acute parenchymal process.  There are a few scattered calcified granulomas.  No large focal consolidation seen.  There is no pneumothorax.  The osseous structures are remarkable for degenerative change and levo scoliotic curvature of the spine..  Impression: 1. No acute cardiopulmonary process.    Electronically signed by: Bony Giraldo MD  Date:    12/09/2024  Time:    13:34     Assessment/Plan:     * Acute lower respiratory infection  Sputum culture pending  Levaquin 750 mg IV daily  IV fluid replacment  Continue supplemental oxygen to maintain sats %  Duonebs every 6 hours  Pulmicort BID      Essential hypertension, benign  Patient's blood pressure range in the last 24 hours was: BP  Min: 95/68  Max: 122/69.The patient's inpatient anti-hypertensive regimen is listed below:  Current Antihypertensives  lisinopriL tablet 20 mg, Daily, Oral  metoprolol tartrate (LOPRESSOR) tablet 50 mg, 2 times daily, Oral  hydroCHLOROthiazide tablet 25 mg, Daily, Oral    Plan  - BP is controlled, no changes needed to their regimen  - Hold antihypertensives for SBP <120 or DBP<60    Type 2 diabetes mellitus with hyperglycemia  Patient's FSGs are uncontrolled due to hyperglycemia on current medication regimen.  Last A1c reviewed-   Lab Results   Component Value Date    HGBA1C 7.4 (H) 10/17/2024     Most recent fingerstick glucose reviewed- No results for input(s): "POCTGLUCOSE" in the last 24 hours.  Current correctional scale   Tresiba 30 units with instructions to titrate as needed  Maintain anti-hyperglycemic dose as follows-   Antihyperglycemics (From admission, onward)      Start     Stop Route Frequency Ordered    12/09/24 2100  insulin glargine U-100 (Lantus) injection 30 Units         -- SubQ Nightly 12/09/24 1753        Tresiba substituted with Lantus  Hold Oral hypoglycemics while patient is in the hospital.  Hold Metformin until 12/11      VTE Risk Mitigation " (From admission, onward)           Ordered     heparin (porcine) injection 5,000 Units  Every 8 hours         12/09/24 1745     IP VTE HIGH RISK PATIENT  Once         12/09/24 1745     Place FENG hose  Until discontinued         12/09/24 1745                  REMEDIOS Goodrich  Department of Hospital Medicine  [facility] - Medical Surgical Unit

## 2024-12-10 NOTE — PLAN OF CARE
Patient's SAT this AM was 91% on 2 LPM NC        Problem: Pneumonia  Goal: Resolution of Infection Signs and Symptoms  Outcome: Progressing  Goal: Effective Oxygenation and Ventilation  Outcome: Progressing

## 2024-12-10 NOTE — ASSESSMENT & PLAN NOTE
Sputum culture pending  Levaquin 750 mg IV daily  IV fluid replacment  Continue supplemental oxygen to maintain sats %  Duonebs every 6 hours  Pulmicort BID

## 2024-12-10 NOTE — ASSESSMENT & PLAN NOTE
"Patient's FSGs are uncontrolled due to hyperglycemia on current medication regimen.  Last A1c reviewed-   Lab Results   Component Value Date    HGBA1C 7.4 (H) 10/17/2024     Most recent fingerstick glucose reviewed- No results for input(s): "POCTGLUCOSE" in the last 24 hours.  Current correctional scale   Tresiba 30 units with instructions to titrate as needed  Maintain anti-hyperglycemic dose as follows-   Antihyperglycemics (From admission, onward)      Start     Stop Route Frequency Ordered    12/09/24 2100  insulin glargine U-100 (Lantus) injection 30 Units         -- SubQ Nightly 12/09/24 1753        Tresiba substituted with Lantus  Hold Oral hypoglycemics while patient is in the hospital.  Hold Metformin until 12/11  "

## 2024-12-10 NOTE — SUBJECTIVE & OBJECTIVE
Past Medical History:   Diagnosis Date    ADHD     Adult attention deficit disorder 03/22/2021    Depression 12/22/2022    Essential hypertension 03/22/2021    Hyperlipidemia     Neuropathy 08/23/2023    AC (obstructive sleep apnea) 10/19/2023    PAD (peripheral artery disease) 02/17/2023    Swollen leg 01/20/2024    Type 2 diabetes mellitus        Past Surgical History:   Procedure Laterality Date    APPENDECTOMY      CARDIAC CATHETERIZATION      COLONOSCOPY  10/09/2024    DEBRIDEMENT OF LOWER EXTREMITY Bilateral 02/18/2023    Procedure: DEBRIDEMENT, LOWER EXTREMITY;  Surgeon: Yael Cohen MD;  Location: [facility] OR;  Service: General;  Laterality: Bilateral;  debride toes    FINGER AMPUTATION Right     1st digit    KIDNEY SURGERY Right     Patient states right kidney removed at age 5.    LEFT HEART CATHETERIZATION N/A 05/07/2021    Procedure: Left heart cath with possible intervention;  Surgeon: Federico James DO;  Location: [facility] CATH LAB;  Service: Cardiology;  Laterality: N/A;    TOE AMPUTATION Right 03/29/2023    Procedure: AMPUTATION, TOE;  Surgeon: Yael Cohen MD;  Location: [facility] OR;  Service: General;  Laterality: Right;  Right great toe amp dr cohen wednesday     Review of patient's allergies indicates:   Allergen Reactions    Azithromycin     Erythromycin Other (See Comments)    M-mycin      No current facility-administered medications on file prior to encounter.     Current Outpatient Medications on File Prior to Encounter   Medication Sig    atorvastatin (LIPITOR) 80 MG tablet Take 1 tablet (80 mg total) by mouth once daily.    citalopram (CELEXA) 20 MG tablet Take 1 tablet (20 mg total) by mouth once daily.    gabapentin (NEURONTIN) 300 MG capsule Take 1 capsule (300 mg total) by mouth 3 (three) times daily.    glyBURIDE (DIABETA) 5 MG tablet Take 1 tablet (5 mg total) by mouth daily with breakfast.    lisinopriL-hydrochlorothiazide (PRINZIDE,ZESTORETIC) 20-25 mg Tab Take 1 tablet by  "mouth once daily.    metFORMIN (GLUCOPHAGE) 1000 MG tablet Take 1 tablet (1,000 mg total) by mouth 2 (two) times daily with meals.    metoprolol tartrate (LOPRESSOR) 50 MG tablet Take 1 tablet (50 mg total) by mouth 2 (two) times daily.    aspirin (ECOTRIN) 81 MG EC tablet Take 1 tablet (81 mg total) by mouth once daily.    dextroamphetamine-amphetamine (ADDERALL) 20 mg tablet Take 1 tablet by mouth each afternoon    dextroamphetamine-amphetamine (ADDERALL) 20 mg tablet TAKE ONE TABLET BY MOUTH EVERY AFTERNOON. (Patient not taking: Reported on 11/18/2024)    dextroamphetamine-amphetamine (ADDERALL) 20 mg tablet TAKE ONE TABLET BY MOUTH EVERY AFTERNOON. (Patient not taking: Reported on 11/18/2024)    dextroamphetamine-amphetamine (ADDERALL) 30 mg Tab Take 1 tablet by mouth each morning    dextroamphetamine-amphetamine (ADDERALL) 30 mg Tab Take 1 tablet (30 mg total) by mouth every morning. (Patient not taking: Reported on 11/18/2024)    dextroamphetamine-amphetamine (ADDERALL) 30 mg Tab Take 1 tablet (30 mg total) by mouth every morning. (Patient not taking: Reported on 11/18/2024)    HYDROcodone-acetaminophen (NORCO) 7.5-325 mg per tablet Take 1 tablet by mouth every 6 (six) hours as needed for Pain.    insulin degludec (TRESIBA FLEXTOUCH U-200) 200 unit/mL (3 mL) insulin pen Inject 30 Units into the skin once daily. Increase by 5 units if blood glucose is elevated above 200. weekly    INV vitamin d3 50,000 iu/placebo Cap capsule Take 1 capsule by mouth once a week as directed for a duration of 26 weeks. Take capsule whole, do not open the capsule. For Investigational Use only. Protocol: Subject ID: ? Take 1 capsule by mouth once a week as directed for a duration of 26 weeks.    pen needle, diabetic (BD ULTRA-FINE SHORT PEN NEEDLE) 31 gauge x 5/16" Ndle USE AS DIRECTED TO administer insulin TWICE DAILY for 50    pen needle, diabetic 31 gauge x 5/16" Ndle 100 each by abdominal subcutaneous route once daily. Use to " "administer insulin twice daily (Patient not taking: Reported on 2024)    [DISCONTINUED] albuterol (PROVENTIL) 2.5 mg /3 mL (0.083 %) nebulizer solution Take 3 mLs (2.5 mg total) by nebulization every 6 (six) hours as needed for Wheezing. Rescue    [DISCONTINUED] albuterol (PROVENTIL/VENTOLIN HFA) 90 mcg/actuation inhaler Inhale 2 puffs into the lungs every 4 (four) hours as needed for Wheezing. Rescue    [DISCONTINUED] insulin syringe-needle U-100 0.3 mL 31 gauge x 5/16" Syrg 100 each by abdominal subcutaneous route once daily. Use once daily for insulin injection (Patient not taking: Reported on 2024)     Family History       Problem Relation (Age of Onset)    Cancer Sister    Hypertension Father    Stroke Father          Tobacco Use    Smoking status: Former     Current packs/day: 0.00     Average packs/day: 5.0 packs/day for 4.0 years (20.0 ttl pk-yrs)     Types: Cigarettes     Start date:      Quit date:      Years since quittin.9     Passive exposure: Past    Smokeless tobacco: Current     Types: Chew, Snuff    Tobacco comments:     1 can/day   Substance and Sexual Activity    Alcohol use: Not Currently     Comment: 2-3 drinks per/3 months    Drug use: Yes     Types: Amphetamines     Comment: prescibed adderal    Sexual activity: Yes     Partners: Female     Review of Systems   Constitutional:  Positive for fatigue. Negative for activity change, appetite change and fever.   HENT:  Positive for postnasal drip, sinus pressure and sinus pain. Negative for congestion, ear discharge, ear pain, rhinorrhea, sneezing, sore throat, trouble swallowing and voice change.    Respiratory:  Positive for cough, chest tightness and shortness of breath. Negative for wheezing.    Gastrointestinal:  Positive for nausea. Negative for diarrhea and vomiting.   Musculoskeletal:  Negative for myalgias.   Neurological:  Positive for dizziness and headaches.     Objective:     Vital Signs (Most Recent):  Temp: 99 " °F (37.2 °C) (12/09/24 1946)  Pulse: 88 (12/09/24 1946)  Resp: 18 (12/09/24 1946)  BP: (!) 99/47 (12/09/24 1946)  SpO2: (!) 93 % (12/09/24 1946) Vital Signs (24h Range):  Temp:  [97.9 °F (36.6 °C)-99 °F (37.2 °C)] 99 °F (37.2 °C)  Pulse:  [] 88  Resp:  [12-18] 18  SpO2:  [90 %-98 %] 93 %  BP: ()/(47-79) 99/47     Weight: 87.1 kg (192 lb)  Body mass index is 34.01 kg/m².     Physical Exam  Vitals reviewed.   Constitutional:       Appearance: Normal appearance. He is obese.   Cardiovascular:      Rate and Rhythm: Normal rate and regular rhythm.      Heart sounds: No murmur heard.  Pulmonary:      Effort: Pulmonary effort is normal.      Breath sounds: Decreased air movement present. Rhonchi present.   Neurological:      Mental Status: He is alert and oriented to person, place, and time.      Gait: Gait normal.   Psychiatric:         Mood and Affect: Mood normal.         Behavior: Behavior normal. Behavior is cooperative.         Thought Content: Thought content normal.         Judgment: Judgment normal.       Significant Labs: All pertinent labs within the past 24 hours have been reviewed.  CBC:   Recent Labs   Lab 12/09/24  1426   WBC 11.65*   HGB 14.0   HCT 43.0   *     CMP:   Recent Labs   Lab 12/09/24  1426      K 4.4   CL 95*   CO2 32*   GLU 68*   BUN 16   CREATININE 1.11   CALCIUM 10.3*   PROT 7.7   ALBUMIN 3.1*   BILITOT 0.3   ALKPHOS 99   AST 19   ALT 15   ANIONGAP 16     Significant Imaging: I have reviewed all pertinent imaging results/findings within the past 24 hours.    CTA Chest Non-Coronary (PE Studies)  Narrative: CMS MANDATED QUALITY DATA - CT RADIATION - 436    All CT scans at this facility utilize dose modulation, iterative reconstruction, and/or weight based dosing when appropriate to reduce radiation dose to as low as reasonably achievable.    CLINICAL HISTORY:  ([id number] y/o  (1965) M    Pulmonary embolism (PE) suspected, unknown  D-dimer;    TECHNIQUE:  (A#[id number], exam time 12/9/2024 16:20)    CTA CHEST NON CORONARY (PE STUDIES) [id number]    Axial CT examination of the chest with attention to the pulmonary arteries was performed using contiguous axial images from the diaphragms to the lung apices following the intravenous administration of 100 cc Omnipaque 350 non-ionic contrast material. Images were reviewed using lung, mediastinal, and bone windows. The study was performed with thin sections with subsequent 3-D reformats/MIP/reconstructions in multiple planes.    COMPARISON:  10/18/2024    FINDINGS:  CTA PE evaluation: This is a high quality study for the evaluation of pulmonary embolism . The pulmonary arteries are normal in appearance without pulmonary emboli noted up to the segmental level, noting the limitations of CT technique for identifying small or isolated subsegmental emboli.    CT Chest:    Visualized neck: normal    Lungs: The lungs are clear.    Airway: The trachea and central bronchial tree appear normal.    Pleura: There is no pleural effusion. There is no pneumothorax.    Cardiovascular: The heart is normal in size. There are no findings of right heart failure. The pulmonary artery is normal in size. There is no pericardial effusion. The thoracic aorta and great vessels are normal in course and caliber.    Mediastinum: There is no supraclavicular  axillary  mediastinal  or hilar lymphadenopathy.    Soft tissues: The peripheral soft tissues appear normal.    Musculoskeletal: The visualized osseous structures appear normal.  There are no suspicious osseous lesions.    Esophagus: normal    Upper Abdomen: visualized  Impression: 1. No evidence of pulmonary embolism to the segmental arterial level. If there is continued clinical concern, consider further evaluation with lower extremity doppler.    2.  No acute pulmonary process    Electronically signed by: Kari Rodriguez  Date:    12/09/2024  Time:    16:25  X-Ray Chest PA And  Lateral  Narrative: EXAMINATION:  XR CHEST PA AND LATERAL    CLINICAL HISTORY:  Cough, unspecified    TECHNIQUE:  PA and lateral views of the chest were performed.    COMPARISON:  10/18/2024    FINDINGS:  The cardiomediastinal silhouette is not enlarged.  There is no pleural effusion.  The trachea is midline.  The lungs are symmetrically expanded bilaterally without evidence of acute parenchymal process.  There are a few scattered calcified granulomas.  No large focal consolidation seen.  There is no pneumothorax.  The osseous structures are remarkable for degenerative change and levo scoliotic curvature of the spine..  Impression: 1. No acute cardiopulmonary process.    Electronically signed by: Bony Giraldo MD  Date:    12/09/2024  Time:    13:34

## 2024-12-10 NOTE — ASSESSMENT & PLAN NOTE
12/10/2024: Patient has Abnormal Magnesium: hypomagnesemia. Will continue to monitor electrolytes closely. Will replace the affected electrolytes and repeat labs to be done after interventions completed. The patient's magnesium results have been reviewed and are listed below.  Recent Labs   Lab 12/10/24  0442   MG 1.4*      -Mag 4 gm IV today  -Repeat Mag level in am

## 2024-12-10 NOTE — SUBJECTIVE & OBJECTIVE
Interval History: Continues to have SOB. O2 sat 91% on 2L/NC.    Review of Systems   Constitutional:  Positive for activity change and fatigue. Negative for appetite change, chills and fever.   HENT:  Positive for postnasal drip. Negative for congestion, ear discharge, ear pain, rhinorrhea, sinus pressure, sinus pain, sore throat and trouble swallowing.    Eyes:  Negative for photophobia, pain, redness and visual disturbance.   Respiratory:  Positive for cough, shortness of breath and wheezing. Negative for chest tightness.    Cardiovascular:  Negative for chest pain, palpitations and leg swelling.   Gastrointestinal:  Negative for abdominal pain, constipation, diarrhea and nausea.   Genitourinary:  Negative for dysuria, flank pain, frequency and hematuria.   Musculoskeletal:  Negative for back pain, gait problem, myalgias, neck pain and neck stiffness.   Neurological:  Negative for dizziness, syncope, speech difficulty, weakness, numbness and headaches.   Hematological: Negative.    Psychiatric/Behavioral: Negative.       Objective:     Vital Signs (Most Recent):  Temp: 98.7 °F (37.1 °C) (12/10/24 0730)  Pulse: 68 (12/10/24 0730)  Resp: (!) 21 (12/10/24 0730)  BP: 121/81 (12/10/24 0901)  SpO2: (!) 91 % (12/10/24 0904) Vital Signs (24h Range):  Temp:  [97.9 °F (36.6 °C)-99 °F (37.2 °C)] 98.7 °F (37.1 °C)  Pulse:  [] 68  Resp:  [10-25] 21  SpO2:  [90 %-100 %] 91 %  BP: ()/(47-81) 121/81     Weight: 87.1 kg (192 lb)  Body mass index is 34.01 kg/m².    Intake/Output Summary (Last 24 hours) at 12/10/2024 1101  Last data filed at 12/10/2024 0914  Gross per 24 hour   Intake 1596.46 ml   Output --   Net 1596.46 ml         Physical Exam  Vitals and nursing note reviewed.   Constitutional:       Appearance: Normal appearance.   HENT:      Right Ear: Tympanic membrane, ear canal and external ear normal.      Left Ear: Tympanic membrane, ear canal and external ear normal.      Mouth/Throat:      Mouth: Mucous  membranes are moist.   Eyes:      Extraocular Movements: Extraocular movements intact.      Pupils: Pupils are equal, round, and reactive to light.   Cardiovascular:      Rate and Rhythm: Normal rate and regular rhythm.      Pulses: Normal pulses.      Heart sounds: Normal heart sounds.   Pulmonary:      Effort: Pulmonary effort is normal.      Breath sounds: Examination of the right-lower field reveals decreased breath sounds. Examination of the left-lower field reveals decreased breath sounds. Decreased breath sounds present. No wheezing, rhonchi or rales.   Abdominal:      General: Bowel sounds are normal.      Palpations: Abdomen is soft.      Tenderness: There is no abdominal tenderness.   Musculoskeletal:         General: Normal range of motion.      Cervical back: Normal range of motion and neck supple.      Right lower leg: No edema.      Left lower leg: No edema.   Lymphadenopathy:      Cervical: No cervical adenopathy.   Skin:     General: Skin is warm and dry.      Capillary Refill: Capillary refill takes less than 2 seconds.      Findings: No rash.   Neurological:      General: No focal deficit present.      Mental Status: He is alert and oriented to person, place, and time. Mental status is at baseline.      Sensory: Sensation is intact.      Motor: Motor function is intact.   Psychiatric:         Attention and Perception: Attention normal.         Mood and Affect: Mood normal.         Speech: Speech normal.         Behavior: Behavior normal. Behavior is cooperative.         Judgment: Judgment normal.             Significant Labs: All pertinent labs within the past 24 hours have been reviewed.  Recent Lab Results  (Last 5 results in the past 24 hours)        12/10/24  1104   12/10/24  0559   12/10/24  0442   12/09/24  2043   12/09/24  1924        Anion Gap     17           Appearance, UA         Clear       Bands     1           Baso #     0.01           Basophil %     0.1           Bilirubin (UA)          Negative       BUN     19           BUN/CREAT RATIO     16           Calcium     10.0           Chloride     95           CO2     29           Color, UA         Yellow       Creatinine     1.20           D-Dimer     16.98           Differential Method     Manual           eGFR     70           Eos #     0.01           Eos %     0.1           Glucose     230           Glucose, UA         >=1000       Hematocrit     38.0           Hemoglobin     12.4           Ketones, UA         Negative       Leukocyte Esterase, UA         Negative       Lymph #     0.87           Lymph %     8.0                8           Magnesium      1.4           MCH     30.3           MCHC     32.6           MCV     92.9           Mono #     0.48           Mono %     4.4                7           MPV     9.1           Neutrophils, Abs     9.44           Neutrophils Relative     87.4           NITRITE UA         Negative       nRBC               Blood, UA         Negative       pH, UA         5.5       Phosphorus Level     2.5           Platelet Count     349           POC Glucose 380   299     465         Potassium     4.1           Protein, UA         Negative       RBC     4.09           RDW     11.9           Segmented Neutrophils, Man %     84           Sodium     137           Spec Grav UA         1.010       UROBILINOGEN UA         0.2       WBC     10.81                                  Significant Imaging: I have reviewed all pertinent imaging results/findings within the past 24 hours.

## 2024-12-11 PROBLEM — B49 YEAST CELLS AND FUNGAL ELEMENTS PRESENT ON DIAGNOSTIC TESTING: Status: ACTIVE | Noted: 2024-12-11

## 2024-12-11 PROBLEM — B37.9 YEAST CELLS AND FUNGAL ELEMENTS PRESENT ON DIAGNOSTIC TESTING: Status: ACTIVE | Noted: 2024-12-11

## 2024-12-11 LAB
ANION GAP SERPL CALCULATED.3IONS-SCNC: 17 MMOL/L (ref 7–16)
BASOPHILS # BLD AUTO: 0.02 K/UL (ref 0–0.2)
BASOPHILS NFR BLD AUTO: 0.3 % (ref 0–1)
BUN SERPL-MCNC: 17 MG/DL (ref 8–26)
BUN/CREAT SERPL: 16 (ref 6–20)
CALCIUM SERPL-MCNC: 8.9 MG/DL (ref 8.4–10.2)
CHLORIDE SERPL-SCNC: 97 MMOL/L (ref 98–107)
CO2 SERPL-SCNC: 28 MMOL/L (ref 22–29)
CREAT SERPL-MCNC: 1.06 MG/DL (ref 0.72–1.25)
DIFFERENTIAL METHOD BLD: ABNORMAL
EGFR (NO RACE VARIABLE) (RUSH/TITUS): 81 ML/MIN/1.73M2
EOSINOPHIL # BLD AUTO: 0.04 K/UL (ref 0–0.5)
EOSINOPHIL NFR BLD AUTO: 0.5 % (ref 1–4)
EOSINOPHIL NFR BLD MANUAL: 1 % (ref 1–4)
ERYTHROCYTE [DISTWIDTH] IN BLOOD BY AUTOMATED COUNT: 12.3 % (ref 11.5–14.5)
GLUCOSE SERPL-MCNC: 191 MG/DL (ref 74–100)
GLUCOSE SERPL-MCNC: 200 MG/DL (ref 70–105)
GLUCOSE SERPL-MCNC: 239 MG/DL (ref 70–105)
GLUCOSE SERPL-MCNC: 240 MG/DL (ref 70–105)
GLUCOSE SERPL-MCNC: 291 MG/DL (ref 70–105)
HCT VFR BLD AUTO: 36.8 % (ref 40–54)
HGB BLD-MCNC: 12 G/DL (ref 13.5–18)
LYMPHOCYTES # BLD AUTO: 2.46 K/UL (ref 1–4.8)
LYMPHOCYTES NFR BLD AUTO: 32.1 % (ref 27–41)
LYMPHOCYTES NFR BLD MANUAL: 33 % (ref 27–41)
MAGNESIUM SERPL-MCNC: 1.5 MG/DL (ref 1.6–2.6)
MCH RBC QN AUTO: 30.6 PG (ref 27–31)
MCHC RBC AUTO-ENTMCNC: 32.6 G/DL (ref 32–36)
MCV RBC AUTO: 93.9 FL (ref 80–96)
MONOCYTES # BLD AUTO: 0.62 K/UL (ref 0–0.8)
MONOCYTES NFR BLD AUTO: 8.1 % (ref 2–6)
MONOCYTES NFR BLD MANUAL: 3 % (ref 2–6)
MPC BLD CALC-MCNC: 8.7 FL (ref 9.4–12.4)
NEUTROPHILS # BLD AUTO: 4.52 K/UL (ref 1.8–7.7)
NEUTROPHILS NFR BLD AUTO: 59 % (ref 53–65)
NEUTS BAND NFR BLD MANUAL: 2 % (ref 1–5)
NEUTS SEG NFR BLD MANUAL: 61 % (ref 50–62)
NRBC BLD MANUAL-RTO: NORMAL %
PHOSPHATE SERPL-MCNC: 4 MG/DL (ref 2.3–4.7)
PLATELET # BLD AUTO: 321 K/UL (ref 150–400)
POTASSIUM SERPL-SCNC: 3.4 MMOL/L (ref 3.5–5.1)
RBC # BLD AUTO: 3.92 M/UL (ref 4.6–6.2)
SODIUM SERPL-SCNC: 139 MMOL/L (ref 136–145)
WBC # BLD AUTO: 7.66 K/UL (ref 4.5–11)

## 2024-12-11 PROCEDURE — 80048 BASIC METABOLIC PNL TOTAL CA: CPT | Performed by: NURSE PRACTITIONER

## 2024-12-11 PROCEDURE — 11000001 HC ACUTE MED/SURG PRIVATE ROOM

## 2024-12-11 PROCEDURE — 25000003 PHARM REV CODE 250

## 2024-12-11 PROCEDURE — 27000221 HC OXYGEN, UP TO 24 HOURS

## 2024-12-11 PROCEDURE — 84100 ASSAY OF PHOSPHORUS: CPT | Performed by: NURSE PRACTITIONER

## 2024-12-11 PROCEDURE — 25000003 PHARM REV CODE 250: Performed by: NURSE PRACTITIONER

## 2024-12-11 PROCEDURE — 85025 COMPLETE CBC W/AUTO DIFF WBC: CPT | Performed by: NURSE PRACTITIONER

## 2024-12-11 PROCEDURE — 63600175 PHARM REV CODE 636 W HCPCS: Performed by: NURSE PRACTITIONER

## 2024-12-11 PROCEDURE — 36415 COLL VENOUS BLD VENIPUNCTURE: CPT | Performed by: NURSE PRACTITIONER

## 2024-12-11 PROCEDURE — 83735 ASSAY OF MAGNESIUM: CPT | Performed by: NURSE PRACTITIONER

## 2024-12-11 PROCEDURE — 82962 GLUCOSE BLOOD TEST: CPT

## 2024-12-11 PROCEDURE — 94761 N-INVAS EAR/PLS OXIMETRY MLT: CPT

## 2024-12-11 PROCEDURE — 63600175 PHARM REV CODE 636 W HCPCS

## 2024-12-11 PROCEDURE — 94640 AIRWAY INHALATION TREATMENT: CPT

## 2024-12-11 PROCEDURE — 99900035 HC TECH TIME PER 15 MIN (STAT)

## 2024-12-11 PROCEDURE — 25000242 PHARM REV CODE 250 ALT 637 W/ HCPCS: Performed by: NURSE PRACTITIONER

## 2024-12-11 RX ORDER — FLUCONAZOLE 2 MG/ML
200 INJECTION, SOLUTION INTRAVENOUS
Status: DISCONTINUED | OUTPATIENT
Start: 2024-12-11 | End: 2024-12-13 | Stop reason: HOSPADM

## 2024-12-11 RX ORDER — POTASSIUM CHLORIDE 20 MEQ/1
40 TABLET, EXTENDED RELEASE ORAL ONCE
Status: COMPLETED | OUTPATIENT
Start: 2024-12-11 | End: 2024-12-11

## 2024-12-11 RX ADMIN — INSULIN ASPART 2 UNITS: 100 INJECTION, SOLUTION INTRAVENOUS; SUBCUTANEOUS at 06:12

## 2024-12-11 RX ADMIN — FLUCONAZOLE 200 MG: 2 INJECTION, SOLUTION INTRAVENOUS at 12:12

## 2024-12-11 RX ADMIN — LEVOFLOXACIN 750 MG: 750 INJECTION, SOLUTION INTRAVENOUS at 06:12

## 2024-12-11 RX ADMIN — BUDESONIDE 0.25 MG: 0.25 INHALANT RESPIRATORY (INHALATION) at 07:12

## 2024-12-11 RX ADMIN — ENOXAPARIN SODIUM 40 MG: 40 INJECTION SUBCUTANEOUS at 04:12

## 2024-12-11 RX ADMIN — IPRATROPIUM BROMIDE AND ALBUTEROL SULFATE 3 ML: 2.5; .5 SOLUTION RESPIRATORY (INHALATION) at 07:12

## 2024-12-11 RX ADMIN — SENNOSIDES AND DOCUSATE SODIUM 1 TABLET: 50; 8.6 TABLET ORAL at 08:12

## 2024-12-11 RX ADMIN — GUAIFENESIN AND DEXTROMETHORPHAN 10 ML: 100; 10 SYRUP ORAL at 09:12

## 2024-12-11 RX ADMIN — IPRATROPIUM BROMIDE AND ALBUTEROL SULFATE 3 ML: 2.5; .5 SOLUTION RESPIRATORY (INHALATION) at 01:12

## 2024-12-11 RX ADMIN — CITALOPRAM HYDROBROMIDE 20 MG: 20 TABLET ORAL at 08:12

## 2024-12-11 RX ADMIN — METOPROLOL TARTRATE 50 MG: 50 TABLET, FILM COATED ORAL at 08:12

## 2024-12-11 RX ADMIN — INSULIN ASPART 4 UNITS: 100 INJECTION, SOLUTION INTRAVENOUS; SUBCUTANEOUS at 04:12

## 2024-12-11 RX ADMIN — INSULIN GLARGINE 30 UNITS: 100 INJECTION, SOLUTION SUBCUTANEOUS at 08:12

## 2024-12-11 RX ADMIN — HYDROCHLOROTHIAZIDE 25 MG: 25 TABLET ORAL at 08:12

## 2024-12-11 RX ADMIN — LISINOPRIL 20 MG: 10 TABLET ORAL at 08:12

## 2024-12-11 RX ADMIN — INSULIN ASPART 6 UNITS: 100 INJECTION, SOLUTION INTRAVENOUS; SUBCUTANEOUS at 10:12

## 2024-12-11 RX ADMIN — ATORVASTATIN CALCIUM 80 MG: 40 TABLET, FILM COATED ORAL at 08:12

## 2024-12-11 RX ADMIN — GABAPENTIN 300 MG: 300 CAPSULE ORAL at 08:12

## 2024-12-11 RX ADMIN — GABAPENTIN 300 MG: 300 CAPSULE ORAL at 03:12

## 2024-12-11 RX ADMIN — ASPIRIN 81 MG: 81 TABLET, COATED ORAL at 08:12

## 2024-12-11 RX ADMIN — INSULIN ASPART 2 UNITS: 100 INJECTION, SOLUTION INTRAVENOUS; SUBCUTANEOUS at 08:12

## 2024-12-11 RX ADMIN — POTASSIUM CHLORIDE 40 MEQ: 1500 TABLET, EXTENDED RELEASE ORAL at 07:12
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
## 2024-12-11 NOTE — PLAN OF CARE
Problem: Adult Inpatient Plan of Care  Goal: Plan of Care Review  12/11/2024 0333 by Rosalia Mcclure RN  Outcome: Progressing  12/11/2024 0332 by Rosalia Mcclure RN  Outcome: Progressing  Goal: Patient-Specific Goal (Individualized)  12/11/2024 0333 by Rosalia Mcclure RN  Outcome: Progressing  12/11/2024 0332 by Rosalia Mcclure RN  Outcome: Progressing  Goal: Absence of Hospital-Acquired Illness or Injury  12/11/2024 0333 by Rosalia Mcclure RN  Outcome: Progressing  12/11/2024 0332 by Rosalia Mcclure RN  Outcome: Progressing  Goal: Optimal Comfort and Wellbeing  12/11/2024 0333 by Rosalia Mcclure RN  Outcome: Progressing  12/11/2024 0332 by Rosalia Mcclure RN  Outcome: Progressing  Goal: Readiness for Transition of Care  12/11/2024 0333 by Rosalia Mcclure RN  Outcome: Progressing  12/11/2024 0332 by Rosalia Mcclure RN  Outcome: Progressing     Problem: Diabetes Comorbidity  Goal: Blood Glucose Level Within Targeted Range  12/11/2024 0333 by Rosalia Mcclure RN  Outcome: Progressing  12/11/2024 0332 by Rosalia Mcclure RN  Outcome: Progressing     Problem: Breathing Pattern Ineffective  Goal: Effective Breathing Pattern  12/11/2024 0333 by Rosalia Mcclure RN  Outcome: Progressing  12/11/2024 0332 by Rosalia Mcclure RN  Outcome: Progressing     Problem: Fall Injury Risk  Goal: Absence of Fall and Fall-Related Injury  12/11/2024 0333 by Rosalia Mcclure RN  Outcome: Progressing  12/11/2024 0332 by Rosalia Mcclure RN  Outcome: Progressing     Problem: Pneumonia  Goal: Fluid Balance  12/11/2024 0333 by Rosalia Mcclure RN  Outcome: Progressing  12/11/2024 0332 by Rosalia Mcclure RN  Outcome: Progressing  Goal: Resolution of Infection Signs and Symptoms  12/11/2024 0333 by Rosalia Mcclure RN  Outcome: Progressing  12/11/2024 0332 by Rosalia Mcclure RN  Outcome: Progressing  Goal: Effective Oxygenation and Ventilation  12/11/2024 0333 by Rosalia Mcclure RN  Outcome: Progressing  12/11/2024 0332 by Rosalia Mcclure, RN  Outcome: Progressing

## 2024-12-11 NOTE — PLAN OF CARE
Patient is awake and alert. Hr 68, RR 18, o2 sat. 98% on 2L nc with clear and diminished bilateral breath sounds.    Problem: Pneumonia  Goal: Effective Oxygenation and Ventilation  Outcome: Progressing  Intervention: Promote Airway Secretion Clearance  Flowsheets (Taken 12/11/2024 0738)  Breathing Techniques/Airway Clearance: deep/controlled cough encouraged  Cough And Deep Breathing: done independently per patient  Intervention: Optimize Oxygenation and Ventilation  Flowsheets (Taken 12/11/2024 0738)  Airway/Ventilation Management:   airway patency maintained   calming measures promoted   humidification applied   pulmonary hygiene promoted  Head of Bed (HOB) Positioning: HOB at 20-30 degrees

## 2024-12-11 NOTE — PLAN OF CARE
Care plan reviewed  Problem: Adult Inpatient Plan of Care  Goal: Plan of Care Review  Outcome: Progressing     Problem: Adult Inpatient Plan of Care  Goal: Patient-Specific Goal (Individualized)  Outcome: Progressing     Problem: Adult Inpatient Plan of Care  Goal: Absence of Hospital-Acquired Illness or Injury  Outcome: Progressing     Problem: Adult Inpatient Plan of Care  Goal: Optimal Comfort and Wellbeing  Outcome: Progressing     Problem: Adult Inpatient Plan of Care  Goal: Readiness for Transition of Care  Outcome: Progressing     Problem: Breathing Pattern Ineffective  Goal: Effective Breathing Pattern  Outcome: Progressing

## 2024-12-11 NOTE — PLAN OF CARE
Problem: Adult Inpatient Plan of Care  Goal: Plan of Care Review  Outcome: Progressing  Goal: Patient-Specific Goal (Individualized)  Outcome: Progressing  Goal: Absence of Hospital-Acquired Illness or Injury  Outcome: Progressing  Goal: Optimal Comfort and Wellbeing  Outcome: Progressing  Goal: Readiness for Transition of Care  Outcome: Progressing     Problem: Diabetes Comorbidity  Goal: Blood Glucose Level Within Targeted Range  Outcome: Progressing     Problem: Breathing Pattern Ineffective  Goal: Effective Breathing Pattern  Outcome: Progressing     Problem: Fall Injury Risk  Goal: Absence of Fall and Fall-Related Injury  Outcome: Progressing     Problem: Pneumonia  Goal: Fluid Balance  Outcome: Progressing  Goal: Resolution of Infection Signs and Symptoms  Outcome: Progressing  Goal: Effective Oxygenation and Ventilation  Outcome: Progressing

## 2024-12-11 NOTE — SUBJECTIVE & OBJECTIVE
Interval History: No acute events over night reported by patient and/or RN staff.     Review of Systems   Constitutional: Negative.    HENT: Negative.     Eyes: Negative.    Respiratory:  Positive for shortness of breath and wheezing.    Cardiovascular: Negative.    Gastrointestinal: Negative.    Endocrine: Negative.    Musculoskeletal: Negative.    Skin: Negative.    Allergic/Immunologic: Negative.    Neurological: Negative.    Hematological: Negative.    Psychiatric/Behavioral: Negative.       Objective:     Vital Signs (Most Recent):  Temp: 98 °F (36.7 °C) (12/11/24 0713)  Pulse: 68 (12/11/24 0900)  Resp: 18 (12/11/24 0733)  BP: 139/81 (12/11/24 0713)  SpO2: 97 % (12/11/24 0733) Vital Signs (24h Range):  Temp:  [98 °F (36.7 °C)-99.2 °F (37.3 °C)] 98 °F (36.7 °C)  Pulse:  [58-81] 68  Resp:  [13-24] 18  SpO2:  [89 %-98 %] 97 %  BP: (115-148)/(63-84) 139/81     Weight: 87.1 kg (192 lb)  Body mass index is 34.01 kg/m².    Intake/Output Summary (Last 24 hours) at 12/11/2024 1054  Last data filed at 12/11/2024 0645  Gross per 24 hour   Intake 1220.02 ml   Output --   Net 1220.02 ml         Physical Exam  Vitals and nursing note reviewed.   Constitutional:       Appearance: He is ill-appearing.   HENT:      Head: Normocephalic and atraumatic.      Right Ear: Tympanic membrane, ear canal and external ear normal. There is no impacted cerumen.      Left Ear: Tympanic membrane, ear canal and external ear normal. There is no impacted cerumen.      Nose: Nose normal. No congestion or rhinorrhea.      Mouth/Throat:      Mouth: Mucous membranes are moist.      Pharynx: Oropharynx is clear. No oropharyngeal exudate or posterior oropharyngeal erythema.   Eyes:      General: No scleral icterus.        Right eye: No discharge.         Left eye: No discharge.      Extraocular Movements: Extraocular movements intact.      Conjunctiva/sclera: Conjunctivae normal.      Pupils: Pupils are equal, round, and reactive to light.   Neck:       Vascular: No carotid bruit.   Cardiovascular:      Rate and Rhythm: Normal rate and regular rhythm.      Pulses: Normal pulses.      Heart sounds: Normal heart sounds.   Pulmonary:      Effort: Pulmonary effort is normal.      Breath sounds: Wheezing and rhonchi present.      Comments: Supplemental O2 @ 2 liters via nasal bi-prong.   Abdominal:      General: Abdomen is flat. Bowel sounds are normal. There is no distension.      Palpations: Abdomen is soft. There is no mass.      Tenderness: There is no abdominal tenderness. There is no right CVA tenderness, left CVA tenderness, guarding or rebound.      Hernia: No hernia is present.   Musculoskeletal:         General: No swelling, tenderness, deformity or signs of injury. Normal range of motion.      Cervical back: Normal range of motion and neck supple. No rigidity or tenderness.      Right lower leg: No edema.      Left lower leg: No edema.   Lymphadenopathy:      Cervical: No cervical adenopathy.   Skin:     General: Skin is warm and dry.      Capillary Refill: Capillary refill takes less than 2 seconds.      Coloration: Skin is not jaundiced or pale.      Findings: No bruising, erythema, lesion or rash.   Neurological:      General: No focal deficit present.      Mental Status: He is alert and oriented to person, place, and time.      Cranial Nerves: No cranial nerve deficit.      Sensory: No sensory deficit.      Motor: No weakness.      Coordination: Coordination normal.      Gait: Gait normal.      Deep Tendon Reflexes: Reflexes normal.   Psychiatric:         Mood and Affect: Mood normal.         Behavior: Behavior normal.         Thought Content: Thought content normal.         Judgment: Judgment normal.             Significant Labs: All pertinent labs within the past 24 hours have been reviewed.    Significant Imaging: I have reviewed all pertinent imaging results/findings within the past 24 hours.

## 2024-12-11 NOTE — PROGRESS NOTES
[facility] - Medical Surgical Unit  Hospital Medicine  Progress Note    Patient Name: Syed Kelley  MRN: [id number]  Patient Class: IP- Inpatient   Admission Date: 12/9/2024  Length of Stay: 2 days  Attending Physician: Julio Cruz DO  Primary Care Provider: Julio Cruz DO        Subjective     Principal Problem:Acute lower respiratory infection        HPI:  GENIE is a 60 y/o  male with a PMH of hypertension, IDDM, ADHD, depression, AC, PAD, & neuropathy who presented to the ER today via private vehicle for complaints of URI symptoms x1 week. He had been taking OTC cold/cough medications with no improvement in symptoms. He had no known fever. He did endorse a productive cough with green sputum, dizziness, headache, dyspnea, and chest tightness. He is a previous cigarette smoker and a current smokeless tobacco user. While in the ER he was given Duoneb with no significant improvement in symptoms. He was 87-90% on room air and placed on supplemental oxygen. Patient was also slightly hypotensive and had an elevated d-dimer. Labs were otherwise unremarkable.  CT chest PE and chest xray were negative. Clinical findings raised concerned for possible pneumonia. Patient is being admitted inpatient for supplemental oxygen, IV rehydration, and IV antibiotics. He will also continue Duonebs ATC with Pulmicort BID. He was given one dose of IV solumedrol in ED. Will not continue on admission since Mr. Kelley has IDDM. Sputum and blood culture pending.     Overview/Hospital Course:  12/10/2024: Hospital Day # 2. Patient continues to have SOB. O2 sat 91% on 2L/NC. Continues to receive Duo Nebs, Budesonide nebs, IV Levaquin and oxygen support. Labs to day WBC 10.81, H/H 12.4/38.0, Na 137, K+ 4.1, Mag 1.4, D-dimer down to 16.98 today. Had negative CT chest PE yesterday. Had BLE venous doppler today that was negative for DVT. Patient reports only minimal improvement of symptoms today.    12/11/2024;  Inpatient Day # 3, SOB is mildly improve with O2 sats in the lower 90% on 2 Liters of supplemental oxygen via nasal bi-prong. Will add Pulmicort to therapy regimen. K+ down to 3.4, mostly due to nebs will replace today. Respiratory Culture grew out yeast, will cover with Diflucan 150 mg IV. Will qualify patient for home oxygen.     Interval History: No acute events over night reported by patient and/or RN staff.     Review of Systems   Constitutional: Negative.    HENT: Negative.     Eyes: Negative.    Respiratory:  Positive for shortness of breath and wheezing.    Cardiovascular: Negative.    Gastrointestinal: Negative.    Endocrine: Negative.    Musculoskeletal: Negative.    Skin: Negative.    Allergic/Immunologic: Negative.    Neurological: Negative.    Hematological: Negative.    Psychiatric/Behavioral: Negative.       Objective:     Vital Signs (Most Recent):  Temp: 98 °F (36.7 °C) (12/11/24 0713)  Pulse: 68 (12/11/24 0900)  Resp: 18 (12/11/24 0733)  BP: 139/81 (12/11/24 0713)  SpO2: 97 % (12/11/24 0733) Vital Signs (24h Range):  Temp:  [98 °F (36.7 °C)-99.2 °F (37.3 °C)] 98 °F (36.7 °C)  Pulse:  [58-81] 68  Resp:  [13-24] 18  SpO2:  [89 %-98 %] 97 %  BP: (115-148)/(63-84) 139/81     Weight: 87.1 kg (192 lb)  Body mass index is 34.01 kg/m².    Intake/Output Summary (Last 24 hours) at 12/11/2024 1054  Last data filed at 12/11/2024 0645  Gross per 24 hour   Intake 1220.02 ml   Output --   Net 1220.02 ml         Physical Exam  Vitals and nursing note reviewed.   Constitutional:       Appearance: He is ill-appearing.   HENT:      Head: Normocephalic and atraumatic.      Right Ear: Tympanic membrane, ear canal and external ear normal. There is no impacted cerumen.      Left Ear: Tympanic membrane, ear canal and external ear normal. There is no impacted cerumen.      Nose: Nose normal. No congestion or rhinorrhea.      Mouth/Throat:      Mouth: Mucous membranes are moist.      Pharynx: Oropharynx is clear. No  oropharyngeal exudate or posterior oropharyngeal erythema.   Eyes:      General: No scleral icterus.        Right eye: No discharge.         Left eye: No discharge.      Extraocular Movements: Extraocular movements intact.      Conjunctiva/sclera: Conjunctivae normal.      Pupils: Pupils are equal, round, and reactive to light.   Neck:      Vascular: No carotid bruit.   Cardiovascular:      Rate and Rhythm: Normal rate and regular rhythm.      Pulses: Normal pulses.      Heart sounds: Normal heart sounds.   Pulmonary:      Effort: Pulmonary effort is normal.      Breath sounds: Wheezing and rhonchi present.      Comments: Supplemental O2 @ 2 liters via nasal bi-prong.   Abdominal:      General: Abdomen is flat. Bowel sounds are normal. There is no distension.      Palpations: Abdomen is soft. There is no mass.      Tenderness: There is no abdominal tenderness. There is no right CVA tenderness, left CVA tenderness, guarding or rebound.      Hernia: No hernia is present.   Musculoskeletal:         General: No swelling, tenderness, deformity or signs of injury. Normal range of motion.      Cervical back: Normal range of motion and neck supple. No rigidity or tenderness.      Right lower leg: No edema.      Left lower leg: No edema.   Lymphadenopathy:      Cervical: No cervical adenopathy.   Skin:     General: Skin is warm and dry.      Capillary Refill: Capillary refill takes less than 2 seconds.      Coloration: Skin is not jaundiced or pale.      Findings: No bruising, erythema, lesion or rash.   Neurological:      General: No focal deficit present.      Mental Status: He is alert and oriented to person, place, and time.      Cranial Nerves: No cranial nerve deficit.      Sensory: No sensory deficit.      Motor: No weakness.      Coordination: Coordination normal.      Gait: Gait normal.      Deep Tendon Reflexes: Reflexes normal.   Psychiatric:         Mood and Affect: Mood normal.         Behavior: Behavior normal.          Thought Content: Thought content normal.         Judgment: Judgment normal.             Significant Labs: All pertinent labs within the past 24 hours have been reviewed.    Significant Imaging: I have reviewed all pertinent imaging results/findings within the past 24 hours.    Assessment and Plan     * Acute lower respiratory infection  Sputum culture pending  Levaquin 750 mg IV daily  IV fluid replacment  Continue supplemental oxygen to maintain sats %  Duonebs every 6 hours  Pulmicort BID    12/10/2024: O2 sat 91% on 2L/NC. Continues to received Duo Nebs and Pulmicort nebs. Sputum culture collected and is pending results. Due to D-dimer elevated will repeat COVID swab.  -Continue Lovenox 750 mg IV daily (Day 2)  -Repeat COVID swab    Elevated d-dimer  12/10/2024: D-dimer was >20.0 on admission. Had negative CT PE chest on admission. Today d-dimer down to 16.98. Had negative BLE venous doppler. Will repeat COVID swab today.      Hypomagnesemia  12/10/2024: Patient has Abnormal Magnesium: hypomagnesemia. Will continue to monitor electrolytes closely. Will replace the affected electrolytes and repeat labs to be done after interventions completed. The patient's magnesium results have been reviewed and are listed below.  Recent Labs   Lab 12/10/24  0442   MG 1.4*      -Mag 4 gm IV today  -Repeat Mag level in am    Obstructive sleep apnea syndrome  12/10/2024: Has AC, but is not on CPAP at home due to financial difficulty.      Essential hypertension, benign  Patient's blood pressure range in the last 24 hours was: BP  Min: 95/68  Max: 122/69.The patient's inpatient anti-hypertensive regimen is listed below:  Current Antihypertensives  lisinopriL tablet 20 mg, Daily, Oral  metoprolol tartrate (LOPRESSOR) tablet 50 mg, 2 times daily, Oral  hydroCHLOROthiazide tablet 25 mg, Daily, Oral    Plan  - BP is controlled, no changes needed to their regimen  - Hold antihypertensives for SBP <120 or DBP<60    Type 2  "diabetes mellitus with hyperglycemia  Patient's FSGs are uncontrolled due to hyperglycemia on current medication regimen.  Last A1c reviewed-   Lab Results   Component Value Date    HGBA1C 7.4 (H) 10/17/2024     Most recent fingerstick glucose reviewed- No results for input(s): "POCTGLUCOSE" in the last 24 hours.  Current correctional scale   Tresiba 30 units with instructions to titrate as needed  Maintain anti-hyperglycemic dose as follows-   Antihyperglycemics (From admission, onward)      Start     Stop Route Frequency Ordered    12/09/24 2100  insulin glargine U-100 (Lantus) injection 30 Units         -- SubQ Nightly 12/09/24 1753        Tresiba substituted with Lantus  Hold Oral hypoglycemics while patient is in the hospital.  Hold Metformin until 12/11    12/10/2024: RBC up to 380 mg/dL today. Currently receiving Lantus 30 units at HS. Still holding Metformin due to recently having IV contrast to rule out PE.  -Accuchecks AC & HS with moderate SSI coverage      VTE Risk Mitigation (From admission, onward)           Ordered     enoxaparin injection 40 mg  Every 24 hours         12/10/24 1218     IP VTE HIGH RISK PATIENT  Once         12/09/24 1745     Place FENG hose  Until discontinued         12/09/24 1745                    Discharge Planning   MILANA:      Code Status: Full Code   Medical Readiness for Discharge Date:   Discharge Plan A: Home                        REMEDIOS Escobar  Department of Hospital Medicine   [facility] - [facility] Unit    "

## 2024-12-11 NOTE — CARE UPDATE
Pt. had been on room air for 1 hour, o2 sat. Read 91% and pt. Felt slightly short of breath.  placed o2 nasal cannula back on patient at 1L. Will recheck tomorrow.

## 2024-12-12 LAB
ANION GAP SERPL CALCULATED.3IONS-SCNC: 15 MMOL/L (ref 7–16)
ATYPICAL LYMPHOCYTES: NORMAL
BASOPHILS # BLD AUTO: 0.02 K/UL (ref 0–0.2)
BASOPHILS NFR BLD AUTO: 0.3 % (ref 0–1)
BUN SERPL-MCNC: 13 MG/DL (ref 8–26)
BUN/CREAT SERPL: 12 (ref 6–20)
CALCIUM SERPL-MCNC: 9.2 MG/DL (ref 8.4–10.2)
CHLORIDE SERPL-SCNC: 99 MMOL/L (ref 98–107)
CO2 SERPL-SCNC: 28 MMOL/L (ref 22–29)
CREAT SERPL-MCNC: 1.05 MG/DL (ref 0.72–1.25)
CULTURE, LOWER RESPIRATORY: ABNORMAL
DIFFERENTIAL METHOD BLD: ABNORMAL
EGFR (NO RACE VARIABLE) (RUSH/TITUS): 82 ML/MIN/1.73M2
EOSINOPHIL # BLD AUTO: 0.18 K/UL (ref 0–0.5)
EOSINOPHIL NFR BLD AUTO: 2.3 % (ref 1–4)
EOSINOPHIL NFR BLD MANUAL: 2 % (ref 1–4)
ERYTHROCYTE [DISTWIDTH] IN BLOOD BY AUTOMATED COUNT: 12.2 % (ref 11.5–14.5)
GLUCOSE SERPL-MCNC: 142 MG/DL (ref 70–105)
GLUCOSE SERPL-MCNC: 143 MG/DL (ref 74–100)
GLUCOSE SERPL-MCNC: 263 MG/DL (ref 70–105)
GLUCOSE SERPL-MCNC: 293 MG/DL (ref 70–105)
GLUCOSE SERPL-MCNC: 384 MG/DL (ref 70–105)
GRAM STN SPEC: ABNORMAL
HCT VFR BLD AUTO: 38.2 % (ref 40–54)
HGB BLD-MCNC: 12.4 G/DL (ref 13.5–18)
LYMPHOCYTES # BLD AUTO: 2.11 K/UL (ref 1–4.8)
LYMPHOCYTES NFR BLD AUTO: 26.9 % (ref 27–41)
LYMPHOCYTES NFR BLD MANUAL: 38 % (ref 27–41)
MAGNESIUM SERPL-MCNC: 1.4 MG/DL (ref 1.6–2.6)
MCH RBC QN AUTO: 30.4 PG (ref 27–31)
MCHC RBC AUTO-ENTMCNC: 32.5 G/DL (ref 32–36)
MCV RBC AUTO: 93.6 FL (ref 80–96)
MONOCYTES # BLD AUTO: 0.68 K/UL (ref 0–0.8)
MONOCYTES NFR BLD AUTO: 8.7 % (ref 2–6)
MONOCYTES NFR BLD MANUAL: 2 % (ref 2–6)
MPC BLD CALC-MCNC: 8.6 FL (ref 9.4–12.4)
NEUTROPHILS # BLD AUTO: 4.86 K/UL (ref 1.8–7.7)
NEUTROPHILS NFR BLD AUTO: 61.8 % (ref 53–65)
NEUTS SEG NFR BLD MANUAL: 58 % (ref 50–62)
NRBC BLD MANUAL-RTO: NORMAL %
PHOSPHATE SERPL-MCNC: 4 MG/DL (ref 2.3–4.7)
PLATELET # BLD AUTO: 328 K/UL (ref 150–400)
PLATELET MORPHOLOGY: NORMAL
POTASSIUM SERPL-SCNC: 3.9 MMOL/L (ref 3.5–5.1)
RBC # BLD AUTO: 4.08 M/UL (ref 4.6–6.2)
RBC MORPH BLD: NORMAL
SODIUM SERPL-SCNC: 138 MMOL/L (ref 136–145)
WBC # BLD AUTO: 7.85 K/UL (ref 4.5–11)

## 2024-12-12 PROCEDURE — 63600175 PHARM REV CODE 636 W HCPCS: Performed by: NURSE PRACTITIONER

## 2024-12-12 PROCEDURE — 27000982 HC MATTRESS, MATRIX LAL RENTAL

## 2024-12-12 PROCEDURE — 94761 N-INVAS EAR/PLS OXIMETRY MLT: CPT

## 2024-12-12 PROCEDURE — 63600175 PHARM REV CODE 636 W HCPCS

## 2024-12-12 PROCEDURE — 82962 GLUCOSE BLOOD TEST: CPT

## 2024-12-12 PROCEDURE — 25000003 PHARM REV CODE 250: Performed by: NURSE PRACTITIONER

## 2024-12-12 PROCEDURE — 11000001 HC ACUTE MED/SURG PRIVATE ROOM

## 2024-12-12 PROCEDURE — 25000242 PHARM REV CODE 250 ALT 637 W/ HCPCS: Performed by: NURSE PRACTITIONER

## 2024-12-12 PROCEDURE — 85025 COMPLETE CBC W/AUTO DIFF WBC: CPT | Performed by: NURSE PRACTITIONER

## 2024-12-12 PROCEDURE — 80048 BASIC METABOLIC PNL TOTAL CA: CPT | Performed by: NURSE PRACTITIONER

## 2024-12-12 PROCEDURE — 36415 COLL VENOUS BLD VENIPUNCTURE: CPT | Performed by: NURSE PRACTITIONER

## 2024-12-12 PROCEDURE — 27000221 HC OXYGEN, UP TO 24 HOURS

## 2024-12-12 PROCEDURE — 27000958

## 2024-12-12 PROCEDURE — 94640 AIRWAY INHALATION TREATMENT: CPT

## 2024-12-12 PROCEDURE — 83735 ASSAY OF MAGNESIUM: CPT | Performed by: NURSE PRACTITIONER

## 2024-12-12 PROCEDURE — 84100 ASSAY OF PHOSPHORUS: CPT | Performed by: NURSE PRACTITIONER

## 2024-12-12 RX ORDER — MAGNESIUM SULFATE HEPTAHYDRATE 40 MG/ML
2 INJECTION, SOLUTION INTRAVENOUS EVERY 4 HOURS
Status: COMPLETED | OUTPATIENT
Start: 2024-12-12 | End: 2024-12-12

## 2024-12-12 RX ADMIN — GABAPENTIN 300 MG: 300 CAPSULE ORAL at 08:12

## 2024-12-12 RX ADMIN — METOPROLOL TARTRATE 50 MG: 50 TABLET, FILM COATED ORAL at 08:12

## 2024-12-12 RX ADMIN — GUAIFENESIN AND DEXTROMETHORPHAN 10 ML: 100; 10 SYRUP ORAL at 09:12

## 2024-12-12 RX ADMIN — IPRATROPIUM BROMIDE AND ALBUTEROL SULFATE 3 ML: 2.5; .5 SOLUTION RESPIRATORY (INHALATION) at 07:12

## 2024-12-12 RX ADMIN — METOPROLOL TARTRATE 50 MG: 50 TABLET, FILM COATED ORAL at 09:12

## 2024-12-12 RX ADMIN — GUAIFENESIN AND DEXTROMETHORPHAN 10 ML: 100; 10 SYRUP ORAL at 08:12

## 2024-12-12 RX ADMIN — LISINOPRIL 20 MG: 10 TABLET ORAL at 08:12

## 2024-12-12 RX ADMIN — INSULIN ASPART 5 UNITS: 100 INJECTION, SOLUTION INTRAVENOUS; SUBCUTANEOUS at 09:12

## 2024-12-12 RX ADMIN — IPRATROPIUM BROMIDE AND ALBUTEROL SULFATE 3 ML: 2.5; .5 SOLUTION RESPIRATORY (INHALATION) at 12:12

## 2024-12-12 RX ADMIN — GABAPENTIN 300 MG: 300 CAPSULE ORAL at 09:12

## 2024-12-12 RX ADMIN — ENOXAPARIN SODIUM 40 MG: 40 INJECTION SUBCUTANEOUS at 05:12

## 2024-12-12 RX ADMIN — ASPIRIN 81 MG: 81 TABLET, COATED ORAL at 08:12

## 2024-12-12 RX ADMIN — IPRATROPIUM BROMIDE AND ALBUTEROL SULFATE 3 ML: 2.5; .5 SOLUTION RESPIRATORY (INHALATION) at 01:12

## 2024-12-12 RX ADMIN — BUDESONIDE 0.25 MG: 0.25 INHALANT RESPIRATORY (INHALATION) at 07:12

## 2024-12-12 RX ADMIN — FLUCONAZOLE 200 MG: 2 INJECTION, SOLUTION INTRAVENOUS at 11:12

## 2024-12-12 RX ADMIN — INSULIN GLARGINE 30 UNITS: 100 INJECTION, SOLUTION SUBCUTANEOUS at 09:12

## 2024-12-12 RX ADMIN — LEVOFLOXACIN 750 MG: 750 INJECTION, SOLUTION INTRAVENOUS at 05:12

## 2024-12-12 RX ADMIN — GABAPENTIN 300 MG: 300 CAPSULE ORAL at 02:12

## 2024-12-12 RX ADMIN — HYDROCHLOROTHIAZIDE 25 MG: 25 TABLET ORAL at 08:12

## 2024-12-12 RX ADMIN — INSULIN ASPART 6 UNITS: 100 INJECTION, SOLUTION INTRAVENOUS; SUBCUTANEOUS at 05:12

## 2024-12-12 RX ADMIN — CITALOPRAM HYDROBROMIDE 20 MG: 20 TABLET ORAL at 08:12

## 2024-12-12 RX ADMIN — MAGNESIUM SULFATE HEPTAHYDRATE 2 G: 40 INJECTION, SOLUTION INTRAVENOUS at 05:12

## 2024-12-12 RX ADMIN — INSULIN ASPART 6 UNITS: 100 INJECTION, SOLUTION INTRAVENOUS; SUBCUTANEOUS at 11:12

## 2024-12-12 RX ADMIN — ATORVASTATIN CALCIUM 80 MG: 40 TABLET, FILM COATED ORAL at 08:12

## 2024-12-12 RX ADMIN — MAGNESIUM SULFATE HEPTAHYDRATE 2 G: 40 INJECTION, SOLUTION INTRAVENOUS at 02:12

## 2024-12-12 NOTE — ASSESSMENT & PLAN NOTE
12/10/2024: Patient has Abnormal Magnesium: hypomagnesemia. Will continue to monitor electrolytes closely. Will replace the affected electrolytes and repeat labs to be done after interventions completed. The patient's magnesium results have been reviewed and are listed below.  Recent Labs   Lab 12/12/24  0520   MG 1.4*        - Repeat Mag 4 gm IV today  -Repeat Mag level in am

## 2024-12-12 NOTE — ASSESSMENT & PLAN NOTE
"Patient's FSGs are uncontrolled due to hyperglycemia on current medication regimen.  Last A1c reviewed-   Lab Results   Component Value Date    HGBA1C 7.4 (H) 10/17/2024     Most recent fingerstick glucose reviewed- No results for input(s): "POCTGLUCOSE" in the last 24 hours.  Current correctional scale   Tresiba 30 units with instructions to titrate as needed  Maintain anti-hyperglycemic dose as follows-   Antihyperglycemics (From admission, onward)      Start     Stop Route Frequency Ordered    12/10/24 1239  insulin aspart U-100 injection 0-10 Units         -- SubQ Before meals & nightly PRN 12/10/24 1140    12/09/24 2100  insulin glargine U-100 (Lantus) injection 30 Units         -- SubQ Nightly 12/09/24 1753        Tresiba substituted with Lantus  Hold Oral hypoglycemics while patient is in the hospital.  Hold Metformin until 12/11 - will resume Metformin today, 12/12    12/10/2024: RBC up to 380 mg/dL today. Currently receiving Lantus 30 units at HS. Still holding Metformin due to recently having IV contrast to rule out PE.  -Accuchecks AC & HS with moderate SSI coverage  "

## 2024-12-12 NOTE — PLAN OF CARE
Problem: Adult Inpatient Plan of Care  Goal: Plan of Care Review  12/12/2024 0036 by Rochelle Albarran RN  Outcome: Progressing  12/12/2024 0036 by Rochelle Albarran RN  Outcome: Not Progressing     Problem: Diabetes Comorbidity  Goal: Blood Glucose Level Within Targeted Range  12/12/2024 0036 by Rochelle Albarran RN  Outcome: Progressing  12/12/2024 0036 by Rochelle Albarran RN  Outcome: Not Progressing     Problem: Fall Injury Risk  Goal: Absence of Fall and Fall-Related Injury  12/12/2024 0036 by Rochelle Albarran RN  Outcome: Progressing  12/12/2024 0036 by Rochelle Albarran RN  Outcome: Not Progressing

## 2024-12-12 NOTE — PLAN OF CARE
Patient is awake and alert. HR 70, RR 18, o2 sat. 95% on 1L nasal cannula with clear bilateral breath sounds.    Problem: Breathing Pattern Ineffective  Goal: Effective Breathing Pattern  Outcome: Progressing  Intervention: Promote Improved Breathing Pattern  Flowsheets (Taken 12/12/2024 0802)  Airway/Ventilation Management:   airway patency maintained   calming measures promoted   pulmonary hygiene promoted  Head of Bed (HOB) Positioning: HOB at 20-30 degrees

## 2024-12-12 NOTE — PROGRESS NOTES
[facility] - [facility] Surgical Unit  Hospital Medicine  Progress Note    Patient Name: Syed Kelley  MRN: [id number]  Patient Class: IP- Inpatient   Admission Date: 12/9/2024  Length of Stay: 3 days  Attending Physician: Julio Cruz DO  Primary Care Provider: Julio Cruz DO        Subjective     Principal Problem:Acute lower respiratory infection        HPI:  GENIE is a 58 y/o  male with a PMH of hypertension, IDDM, ADHD, depression, AC, PAD, & neuropathy who presented to the ER today via private vehicle for complaints of URI symptoms x1 week. He had been taking OTC cold/cough medications with no improvement in symptoms. He had no known fever. He did endorse a productive cough with green sputum, dizziness, headache, dyspnea, and chest tightness. He is a previous cigarette smoker and a current smokeless tobacco user. While in the ER he was given Duoneb with no significant improvement in symptoms. He was 87-90% on room air and placed on supplemental oxygen. Patient was also slightly hypotensive and had an elevated d-dimer. Labs were otherwise unremarkable.  CT chest PE and chest xray were negative. Clinical findings raised concerned for possible pneumonia. Patient is being admitted inpatient for supplemental oxygen, IV rehydration, and IV antibiotics. He will also continue Duonebs ATC with Pulmicort BID. He was given one dose of IV solumedrol in ED. Will not continue on admission since Mr. Kelley has IDDM. Sputum and blood culture pending.     Overview/Hospital Course:  12/10/2024: Hospital Day # 2. Patient continues to have SOB. O2 sat 91% on 2L/NC. Continues to receive Duo Nebs, Budesonide nebs, IV Levaquin and oxygen support. Labs to day WBC 10.81, H/H 12.4/38.0, Na 137, K+ 4.1, Mag 1.4, D-dimer down to 16.98 today. Had negative CT chest PE yesterday. Had BLE venous doppler today that was negative for DVT. Patient reports only minimal improvement of symptoms today.    12/11/2024;  Inpatient Day # 3, SOB is mildly improve with O2 sats in the lower 90% on 2 Liters of supplemental oxygen via nasal bi-prong. Will add Pulmicort to therapy regimen. K+ down to 3.4, mostly due to nebs will replace today. Respiratory Culture grew out yeast, will cover with Diflucan 150 mg IV. Will qualify patient for home oxygen. Estimated date of discharge is 12/13/2024 12/12/2024: Hospital Day #4 Patient reports improvement in shortness of breath, still with c/o cough. Attempting to wean O2 today and at time of exam he is on room air and oxygenating well with no respiratory distress or increased work of breathing. Pt previously diagnosed with sleep apnea but states did not get his CPAP d/t financial restraints. He has been afebrile. WBC count is 7.85. Blood cultures no growth to date. Magnesium was low on yesterday and supplemented with repeat level 1.4 so remains low, will repeat supplementation and recheck in AM. Overall, pt improving, anticipate discharge home in AM.     No new subjective & objective note has been filed under this hospital service since the last note was generated.      Assessment and Plan     * Acute lower respiratory infection  Sputum culture pending  Levaquin 750 mg IV daily  IV fluid replacment  Continue supplemental oxygen to maintain sats %  Duonebs every 6 hours  Pulmicort BID    12/10/2024: O2 sat 91% on 2L/NC. Continues to received Duo Nebs and Pulmicort nebs. Sputum culture collected and is pending results. Due to D-dimer elevated will repeat COVID swab.  -Continue Lovenox 750 mg IV daily (Day 2)  -Repeat COVID swab    Elevated d-dimer  12/10/2024: D-dimer was >20.0 on admission. Had negative CT PE chest on admission. Today d-dimer down to 16.98. Had negative BLE venous doppler. Will repeat COVID swab today.      Hypomagnesemia  12/10/2024: Patient has Abnormal Magnesium: hypomagnesemia. Will continue to monitor electrolytes closely. Will replace the affected electrolytes and  "repeat labs to be done after interventions completed. The patient's magnesium results have been reviewed and are listed below.  Recent Labs   Lab 12/12/24  0520   MG 1.4*        - Repeat Mag 4 gm IV today  -Repeat Mag level in am    Obstructive sleep apnea syndrome  12/10/2024: Has AC, but is not on CPAP at home due to financial difficulty.      Essential hypertension, benign  Patient's blood pressure range in the last 24 hours was: BP  Min: 95/68  Max: 122/69.The patient's inpatient anti-hypertensive regimen is listed below:  Current Antihypertensives  lisinopriL tablet 20 mg, Daily, Oral  metoprolol tartrate (LOPRESSOR) tablet 50 mg, 2 times daily, Oral  hydroCHLOROthiazide tablet 25 mg, Daily, Oral    Plan  - BP is controlled, no changes needed to their regimen  - Hold antihypertensives for SBP <120 or DBP<60    Type 2 diabetes mellitus with hyperglycemia  Patient's FSGs are uncontrolled due to hyperglycemia on current medication regimen.  Last A1c reviewed-   Lab Results   Component Value Date    HGBA1C 7.4 (H) 10/17/2024     Most recent fingerstick glucose reviewed- No results for input(s): "POCTGLUCOSE" in the last 24 hours.  Current correctional scale   Tresiba 30 units with instructions to titrate as needed  Maintain anti-hyperglycemic dose as follows-   Antihyperglycemics (From admission, onward)      Start     Stop Route Frequency Ordered    12/10/24 1239  insulin aspart U-100 injection 0-10 Units         -- SubQ Before meals & nightly PRN 12/10/24 1140    12/09/24 2100  insulin glargine U-100 (Lantus) injection 30 Units         -- SubQ Nightly 12/09/24 1753        Tresiba substituted with Lantus  Hold Oral hypoglycemics while patient is in the hospital.  Hold Metformin until 12/11 - will resume Metformin today, 12/12    12/10/2024: RBC up to 380 mg/dL today. Currently receiving Lantus 30 units at HS. Still holding Metformin due to recently having IV contrast to rule out PE.  -Accuchecks AC & HS with " moderate SSI coverage      VTE Risk Mitigation (From admission, onward)           Ordered     enoxaparin injection 40 mg  Every 24 hours         12/10/24 1218     IP VTE HIGH RISK PATIENT  Once         12/09/24 1745     Place FENG hose  Until discontinued         12/09/24 1745                    Discharge Planning   MILANA:      Code Status: Full Code   Medical Readiness for Discharge Date:   Discharge Plan A: Home                        REMEDIOS Williamson  Department of Hospital Medicine   [facility]

## 2024-12-12 NOTE — SUBJECTIVE & OBJECTIVE
Interval History: Pt seen sitting up in bed. Improving overall, oxygenating well and in no acute distress on room air at time of exam.    Review of Systems   Constitutional:  Negative for chills and fever.   All other systems reviewed and are negative.    Objective:     Vital Signs (Most Recent):  Temp: 97.6 °F (36.4 °C) (12/12/24 0709)  Pulse: 67 (12/12/24 0740)  Resp: 18 (12/12/24 0740)  BP: (!) 149/85 (12/12/24 0709)  SpO2: 97 % (12/12/24 0740) Vital Signs (24h Range):  Temp:  [97.5 °F (36.4 °C)-98.4 °F (36.9 °C)] 97.6 °F (36.4 °C)  Pulse:  [60-81] 67  Resp:  [18-22] 18  SpO2:  [91 %-97 %] 97 %  BP: (120-154)/(78-95) 149/85     Weight: 87.1 kg (192 lb)  Body mass index is 34.01 kg/m².    Intake/Output Summary (Last 24 hours) at 12/12/2024 1040  Last data filed at 12/12/2024 0545  Gross per 24 hour   Intake 1663.89 ml   Output --   Net 1663.89 ml         Physical Exam  Constitutional:       General: He is awake. He is not in acute distress.     Appearance: He is well-developed and well-groomed. He is not ill-appearing or toxic-appearing.   Cardiovascular:      Rate and Rhythm: Normal rate and regular rhythm.      Heart sounds: Normal heart sounds.   Pulmonary:      Effort: Pulmonary effort is normal.      Breath sounds: Normal breath sounds and air entry.      Comments: Room air  Abdominal:      General: Bowel sounds are normal.      Palpations: Abdomen is soft.      Tenderness: There is no abdominal tenderness.   Skin:     General: Skin is warm and dry.   Neurological:      Mental Status: He is alert and oriented to person, place, and time.      GCS: GCS eye subscore is 4. GCS verbal subscore is 5. GCS motor subscore is 6.   Psychiatric:         Behavior: Behavior is cooperative.             Significant Labs: All pertinent labs within the past 24 hours have been reviewed.    Significant Imaging: I have reviewed all pertinent imaging results/findings within the past 24 hours.

## 2024-12-12 NOTE — CARE UPDATE
Oxygen removed due to a sat. Of 95%.  Patient will remain off o2 for the remainder of the day to try to qualify for home o2.

## 2024-12-12 NOTE — PLAN OF CARE
Patient is on 1 LPM NC with an O2 sat of 96%. Respirations are even and unlabored. Lungs are clear.

## 2024-12-12 NOTE — PLAN OF CARE
Care plan reviewed  Problem: Adult Inpatient Plan of Care  Goal: Plan of Care Review  Outcome: Progressing     Problem: Adult Inpatient Plan of Care  Goal: Patient-Specific Goal (Individualized)  Outcome: Progressing     Problem: Adult Inpatient Plan of Care  Goal: Absence of Hospital-Acquired Illness or Injury  Outcome: Progressing     Problem: Adult Inpatient Plan of Care  Goal: Optimal Comfort and Wellbeing  Outcome: Progressing     Problem: Adult Inpatient Plan of Care  Goal: Readiness for Transition of Care  Outcome: Progressing     Problem: Fall Injury Risk  Goal: Absence of Fall and Fall-Related Injury  Outcome: Progressing

## 2024-12-12 NOTE — RESPIRATORY THERAPY
"Patient's o2 sat. Remains in the 90's on room air when he is awake and alert.  Sat. Does drop during sleep. Lowest today was 88% during a nap.  This does not qualify for home oxygen. Patient has a previous diagnosis of AC but is not compliant with CPAP treatment.  He stated "it was $200 after insurance covers their part" referring to the CPAP machine so they did not get it.      He is not to be placed on o2 during the night. Wake him and recheck the o2 sat. If it is below 88%.  "

## 2024-12-13 VITALS
SYSTOLIC BLOOD PRESSURE: 143 MMHG | WEIGHT: 192 LBS | RESPIRATION RATE: 18 BRPM | TEMPERATURE: 99 F | BODY MASS INDEX: 34.02 KG/M2 | HEIGHT: 63 IN | OXYGEN SATURATION: 95 % | HEART RATE: 73 BPM | DIASTOLIC BLOOD PRESSURE: 94 MMHG

## 2024-12-13 LAB
ANION GAP SERPL CALCULATED.3IONS-SCNC: 14 MMOL/L (ref 7–16)
BASOPHILS # BLD AUTO: 0.02 K/UL (ref 0–0.2)
BASOPHILS NFR BLD AUTO: 0.2 % (ref 0–1)
BUN SERPL-MCNC: 14 MG/DL (ref 8–26)
BUN/CREAT SERPL: 11 (ref 6–20)
CALCIUM SERPL-MCNC: 9.3 MG/DL (ref 8.4–10.2)
CHLORIDE SERPL-SCNC: 98 MMOL/L (ref 98–107)
CO2 SERPL-SCNC: 28 MMOL/L (ref 22–29)
CREAT SERPL-MCNC: 1.3 MG/DL (ref 0.72–1.25)
DIFFERENTIAL METHOD BLD: ABNORMAL
EGFR (NO RACE VARIABLE) (RUSH/TITUS): 63 ML/MIN/1.73M2
EOSINOPHIL # BLD AUTO: 0.26 K/UL (ref 0–0.5)
EOSINOPHIL NFR BLD AUTO: 3 % (ref 1–4)
EOSINOPHIL NFR BLD MANUAL: 3 % (ref 1–4)
ERYTHROCYTE [DISTWIDTH] IN BLOOD BY AUTOMATED COUNT: 12.1 % (ref 11.5–14.5)
GLUCOSE SERPL-MCNC: 238 MG/DL (ref 74–100)
GLUCOSE SERPL-MCNC: 269 MG/DL (ref 70–105)
HCT VFR BLD AUTO: 38.2 % (ref 40–54)
HGB BLD-MCNC: 12.6 G/DL (ref 13.5–18)
LYMPHOCYTES # BLD AUTO: 1.83 K/UL (ref 1–4.8)
LYMPHOCYTES NFR BLD AUTO: 21.1 % (ref 27–41)
LYMPHOCYTES NFR BLD MANUAL: 25 % (ref 27–41)
MAGNESIUM SERPL-MCNC: 1.8 MG/DL (ref 1.6–2.6)
MCH RBC QN AUTO: 30.7 PG (ref 27–31)
MCHC RBC AUTO-ENTMCNC: 33 G/DL (ref 32–36)
MCV RBC AUTO: 93.2 FL (ref 80–96)
MONOCYTES # BLD AUTO: 0.67 K/UL (ref 0–0.8)
MONOCYTES NFR BLD AUTO: 7.7 % (ref 2–6)
MONOCYTES NFR BLD MANUAL: 8 % (ref 2–6)
MPC BLD CALC-MCNC: 8.9 FL (ref 9.4–12.4)
NEUTROPHILS # BLD AUTO: 5.9 K/UL (ref 1.8–7.7)
NEUTROPHILS NFR BLD AUTO: 68 % (ref 53–65)
NEUTS SEG NFR BLD MANUAL: 64 % (ref 50–62)
NRBC BLD MANUAL-RTO: ABNORMAL %
PHOSPHATE SERPL-MCNC: 4.1 MG/DL (ref 2.3–4.7)
PLATELET # BLD AUTO: 353 K/UL (ref 150–400)
POTASSIUM SERPL-SCNC: 3.9 MMOL/L (ref 3.5–5.1)
RBC # BLD AUTO: 4.1 M/UL (ref 4.6–6.2)
SODIUM SERPL-SCNC: 136 MMOL/L (ref 136–145)
WBC # BLD AUTO: 8.68 K/UL (ref 4.5–11)

## 2024-12-13 PROCEDURE — 25000003 PHARM REV CODE 250: Performed by: NURSE PRACTITIONER

## 2024-12-13 PROCEDURE — 99238 HOSP IP/OBS DSCHRG MGMT 30/<: CPT | Mod: ,,, | Performed by: NURSE PRACTITIONER

## 2024-12-13 PROCEDURE — 63600175 PHARM REV CODE 636 W HCPCS: Performed by: NURSE PRACTITIONER

## 2024-12-13 PROCEDURE — 85025 COMPLETE CBC W/AUTO DIFF WBC: CPT | Performed by: NURSE PRACTITIONER

## 2024-12-13 PROCEDURE — 94761 N-INVAS EAR/PLS OXIMETRY MLT: CPT

## 2024-12-13 PROCEDURE — 80048 BASIC METABOLIC PNL TOTAL CA: CPT | Performed by: NURSE PRACTITIONER

## 2024-12-13 PROCEDURE — 82962 GLUCOSE BLOOD TEST: CPT

## 2024-12-13 PROCEDURE — 99900035 HC TECH TIME PER 15 MIN (STAT)

## 2024-12-13 PROCEDURE — 83735 ASSAY OF MAGNESIUM: CPT | Performed by: NURSE PRACTITIONER

## 2024-12-13 PROCEDURE — 27000941

## 2024-12-13 PROCEDURE — 36415 COLL VENOUS BLD VENIPUNCTURE: CPT | Performed by: NURSE PRACTITIONER

## 2024-12-13 PROCEDURE — 25000242 PHARM REV CODE 250 ALT 637 W/ HCPCS: Performed by: NURSE PRACTITIONER

## 2024-12-13 PROCEDURE — 94640 AIRWAY INHALATION TREATMENT: CPT

## 2024-12-13 PROCEDURE — 84100 ASSAY OF PHOSPHORUS: CPT | Performed by: NURSE PRACTITIONER

## 2024-12-13 RX ORDER — MAGNESIUM GLYCINATE 100 MG
1 CAPSULE ORAL DAILY
Qty: 30 CAPSULE | Refills: 2 | Status: SHIPPED | OUTPATIENT
Start: 2024-12-13

## 2024-12-13 RX ORDER — FLUCONAZOLE 100 MG/1
100 TABLET ORAL DAILY
Qty: 3 TABLET | Refills: 0 | Status: SHIPPED | OUTPATIENT
Start: 2024-12-13 | End: 2024-12-16

## 2024-12-13 RX ADMIN — METOPROLOL TARTRATE 50 MG: 50 TABLET, FILM COATED ORAL at 09:12

## 2024-12-13 RX ADMIN — BUDESONIDE 0.25 MG: 0.25 INHALANT RESPIRATORY (INHALATION) at 07:12

## 2024-12-13 RX ADMIN — SENNOSIDES AND DOCUSATE SODIUM 1 TABLET: 50; 8.6 TABLET ORAL at 09:12

## 2024-12-13 RX ADMIN — HYDROCHLOROTHIAZIDE 25 MG: 25 TABLET ORAL at 09:12

## 2024-12-13 RX ADMIN — INSULIN ASPART 4 UNITS: 100 INJECTION, SOLUTION INTRAVENOUS; SUBCUTANEOUS at 06:12

## 2024-12-13 RX ADMIN — CITALOPRAM HYDROBROMIDE 20 MG: 20 TABLET ORAL at 09:12

## 2024-12-13 RX ADMIN — ASPIRIN 81 MG: 81 TABLET, COATED ORAL at 09:12

## 2024-12-13 RX ADMIN — IPRATROPIUM BROMIDE AND ALBUTEROL SULFATE 3 ML: 2.5; .5 SOLUTION RESPIRATORY (INHALATION) at 07:12

## 2024-12-13 RX ADMIN — LEVOFLOXACIN 750 MG: 500 TABLET, FILM COATED ORAL at 10:12

## 2024-12-13 RX ADMIN — ATORVASTATIN CALCIUM 80 MG: 40 TABLET, FILM COATED ORAL at 09:12

## 2024-12-13 RX ADMIN — IPRATROPIUM BROMIDE AND ALBUTEROL SULFATE 3 ML: 2.5; .5 SOLUTION RESPIRATORY (INHALATION) at 12:12

## 2024-12-13 RX ADMIN — LISINOPRIL 20 MG: 10 TABLET ORAL at 09:12

## 2024-12-13 RX ADMIN — INSULIN ASPART 6 UNITS: 100 INJECTION, SOLUTION INTRAVENOUS; SUBCUTANEOUS at 11:12

## 2024-12-13 RX ADMIN — GABAPENTIN 300 MG: 300 CAPSULE ORAL at 09:12

## 2024-12-13 NOTE — ASSESSMENT & PLAN NOTE
Sputum culture pending  Levaquin 750 mg IV daily  IV fluid replacment  Continue supplemental oxygen to maintain sats %  Duonebs every 6 hours  Pulmicort BID    12/10/2024: O2 sat 91% on 2L/NC. Continues to received Duo Nebs and Pulmicort nebs. Sputum culture collected and is pending results. Due to D-dimer elevated will repeat COVID swab.  -Continue Lovenox 750 mg IV daily (Day 2)  -Repeat COVID swab    12/13/2024: Patient is ambulating in hallway with no SOB. Lungs CTA. O2 sat  95% on room air. Patient completed 5 days Levaquin 750 mg antibiotic therapy. Sputum culture grew out yeast.   -Diflucan 100 mg by mouth daily x 3 days to complete therapy.  -Discharge home today   -Schedule follow up with PCP in one week

## 2024-12-13 NOTE — PLAN OF CARE
Patient is awake and alert. HR 97, RR 18, o2 sat 94% on room air with clear bilateral breath sound..    Problem: Gas Exchange Impaired  Goal: Optimal Gas Exchange  Outcome: Progressing  Intervention: Optimize Oxygenation and Ventilation  Flowsheets (Taken 12/13/2024 0813)  Airway/Ventilation Management:   airway patency maintained   calming measures promoted   pulmonary hygiene promoted  Head of Bed (HOB) Positioning: HOB at 20-30 degrees

## 2024-12-13 NOTE — ASSESSMENT & PLAN NOTE
12/13/2024: Patient has Abnormal Magnesium: hypomagnesemia. Will continue to monitor electrolytes closely. Will replace the affected electrolytes and repeat labs to be done after interventions completed. The patient's magnesium results have been reviewed and are listed below.  Recent Labs   Lab 12/13/24  0503   MG 1.8        -Discharge home on Magnesium glycinate 100 mg daily  -Repeat Mag level next week at follow up with PCP

## 2024-12-13 NOTE — NURSING
Patient wheeled out the front door to pov. Wife present. Belongings gathered and with them. No c/o pain or SOB. AVS has been reviewed with the patient and his wife and printed copy given. Discharged home

## 2024-12-13 NOTE — ASSESSMENT & PLAN NOTE
12/13/2024: Patient's blood pressure range in the last 24 hours was: BP  Min: 124/81  Max: 143/94.The patient's inpatient anti-hypertensive regimen is listed below:  Current Antihypertensives     -Continue Metoprolol 50 mg one tab by mouth twice a day   -Continue Lisinopril HCT 20-25 take one tab by mouth daily  -Continue to monitor BP/HR at home

## 2024-12-13 NOTE — PLAN OF CARE
Discharged home today with scripts and f/u appointment made    Problem: Adult Inpatient Plan of Care  Goal: Plan of Care Review  Outcome: Met  Goal: Patient-Specific Goal (Individualized)  Outcome: Met  Goal: Absence of Hospital-Acquired Illness or Injury  Outcome: Met  Goal: Optimal Comfort and Wellbeing  Outcome: Met  Goal: Readiness for Transition of Care  Outcome: Met     Problem: Diabetes Comorbidity  Goal: Blood Glucose Level Within Targeted Range  Outcome: Met     Problem: Fall Injury Risk  Goal: Absence of Fall and Fall-Related Injury  Outcome: Met     Problem: Pneumonia  Goal: Fluid Balance  Outcome: Met  Goal: Resolution of Infection Signs and Symptoms  Outcome: Met     Problem: Gas Exchange Impaired  Goal: Optimal Gas Exchange  Outcome: Met

## 2024-12-13 NOTE — DISCHARGE SUMMARY
[facility] - Medical Surgical Unit  Hospital Medicine  Discharge Summary      Patient Name: Syed Kelley  MRN: [id number]  SARAH: [id number]  Patient Class: IP- Inpatient  Admission Date: 12/9/2024  Hospital Length of Stay: 4 days  Discharge Date and Time:  12/13/2024 4:39 PM  Attending Physician: No att. providers found   Discharging Provider: REMEDIOS Avendano  Primary Care Provider: Julio Cruz DO    Primary Care Team: Networked reference to record PCT     HPI:   GENIE is a 60 y/o  male with a PMH of hypertension, IDDM, ADHD, depression, AC, PAD, & neuropathy who presented to the ER today via private vehicle for complaints of URI symptoms x1 week. He had been taking OTC cold/cough medications with no improvement in symptoms. He had no known fever. He did endorse a productive cough with green sputum, dizziness, headache, dyspnea, and chest tightness. He is a previous cigarette smoker and a current smokeless tobacco user. While in the ER he was given Duoneb with no significant improvement in symptoms. He was 87-90% on room air and placed on supplemental oxygen. Patient was also slightly hypotensive and had an elevated d-dimer. Labs were otherwise unremarkable.  CT chest PE and chest xray were negative. Clinical findings raised concerned for possible pneumonia. Patient is being admitted inpatient for supplemental oxygen, IV rehydration, and IV antibiotics. He will also continue Duonebs ATC with Pulmicort BID. He was given one dose of IV solumedrol in ED. Will not continue on admission since Mr. Kelley has IDDM. Sputum and blood culture pending.     * No surgery found *      Hospital Course:   12/10/2024: Hospital Day # 2. Patient continues to have SOB. O2 sat 91% on 2L/NC. Continues to receive Duo Nebs, Budesonide nebs, IV Levaquin and oxygen support. Labs to day WBC 10.81, H/H 12.4/38.0, Na 137, K+ 4.1, Mag 1.4, D-dimer down to 16.98 today. Had negative CT chest PE yesterday. Had BLE venous  doppler today that was negative for DVT. Patient reports only minimal improvement of symptoms today.    12/11/2024; Inpatient Day # 3, SOB is mildly improve with O2 sats in the lower 90% on 2 Liters of supplemental oxygen via nasal bi-prong. Will add Pulmicort to therapy regimen. K+ down to 3.4, mostly due to nebs will replace today. Respiratory Culture grew out yeast, will cover with Diflucan 150 mg IV. Will qualify patient for home oxygen. Estimated date of discharge is 12/13/2024 12/12/2024: Hospital Day #4 Patient reports improvement in shortness of breath, still with c/o cough. Attempting to wean O2 today and at time of exam he is on room air and oxygenating well with no respiratory distress or increased work of breathing. Pt previously diagnosed with sleep apnea but states did not get his CPAP d/t financial restraints. He has been afebrile. WBC count is 7.85. Blood cultures no growth to date. Magnesium was low on yesterday and supplemented with repeat level 1.4 so remains low, will repeat supplementation and recheck in AM. Overall, pt improving, anticipate discharge home in AM.     12/13/2024: Hospital Day #5. Reports feeling much better today. Weaned off oxygen and ambulating down hallway with no complaint of SOB. O2 sat 95% on room air. Lungs CTA. Mag up to 1.8 will start on po mag supplement. Will discharge home today with hospital follow up with PCP next week.     Goals of Care Treatment Preferences:  Code Status: Full Code      SDOH Screening:  The patient was screened for utility difficulties, food insecurity, transport difficulties, housing insecurity, and interpersonal safety and there were no concerns identified this admission.     Consults:     * Acute lower respiratory infection  Sputum culture pending  Levaquin 750 mg IV daily  IV fluid replacment  Continue supplemental oxygen to maintain sats %  Duonebs every 6 hours  Pulmicort BID    12/10/2024: O2 sat 91% on 2L/NC. Continues to received  Duo Nebs and Pulmicort nebs. Sputum culture collected and is pending results. Due to D-dimer elevated will repeat COVID swab.  -Continue Lovenox 750 mg IV daily (Day 2)  -Repeat COVID swab    12/13/2024: Patient is ambulating in hallway with no SOB. Lungs CTA. O2 sat  95% on room air. Patient completed 5 days Levaquin 750 mg antibiotic therapy. Sputum culture grew out yeast.   -Diflucan 100 mg by mouth daily x 3 days to complete therapy.  -Discharge home today   -Schedule follow up with PCP in one week     Elevated d-dimer  12/10/2024: D-dimer was >20.0 on admission. Had negative CT PE chest on admission. Today d-dimer down to 16.98. Had negative BLE venous doppler. Will repeat COVID swab today.      Hypomagnesemia  12/13/2024: Patient has Abnormal Magnesium: hypomagnesemia. Will continue to monitor electrolytes closely. Will replace the affected electrolytes and repeat labs to be done after interventions completed. The patient's magnesium results have been reviewed and are listed below.  Recent Labs   Lab 12/13/24  0503   MG 1.8        -Discharge home on Magnesium glycinate 100 mg daily  -Repeat Mag level next week at follow up with PCP    Obstructive sleep apnea syndrome  12/10/2024: Has AC, but is not on CPAP at home due to financial difficulty.      Essential hypertension, benign  12/13/2024: Patient's blood pressure range in the last 24 hours was: BP  Min: 124/81  Max: 143/94.The patient's inpatient anti-hypertensive regimen is listed below:  Current Antihypertensives     -Continue Metoprolol 50 mg one tab by mouth twice a day   -Continue Lisinopril HCT 20-25 take one tab by mouth daily  -Continue to monitor BP/HR at home        Type 2 diabetes mellitus with hyperglycemia  Patient's FSGs are uncontrolled due to hyperglycemia on current medication regimen.  Last A1c reviewed-   Lab Results   Component Value Date    HGBA1C 7.4 (H) 10/17/2024     Most recent fingerstick glucose reviewed- No results for input(s):  ""POCTGLUCOSE" in the last 24 hours.  Current correctional scale   Tresiba 30 units with instructions to titrate as needed  Maintain anti-hyperglycemic dose as follows-   Antihyperglycemics (From admission, onward)      None        Tresiba substituted with Lantus  Hold Oral hypoglycemics while patient is in the hospital.  Hold Metformin until 12/11 - will resume Metformin today, 12/12    12/10/2024: RBC up to 380 mg/dL today. Currently receiving Lantus 30 units at HS. Still holding Metformin due to recently having IV contrast to rule out PE.  -Accuchecks AC & HS with moderate SSI coverage    12/13/2024: Glucose 238 mg/dL today.  -Restart Metformin 1000 mg by mouth twice a day  -Restart Glyburide 5 mg by mouth daily  -Continue Tresiba 30 unit sc daily  -Continue to monitor blood sugar levels at home and record readings. Take record of reading to follow up appointment with PCP.      Final Active Diagnoses:    Diagnosis Date Noted POA    PRINCIPAL PROBLEM:  Acute lower respiratory infection [J22] 12/09/2024 Yes    Yeast cells and fungal elements present on diagnostic testing [B37.9, B49] 12/11/2024 Yes    Hypomagnesemia [E83.42] 12/10/2024 No    Elevated d-dimer [R79.89] 12/10/2024 Yes    Obstructive sleep apnea syndrome [G47.33] 08/23/2024 Yes    Type 2 diabetes mellitus with hyperglycemia [E11.65] 11/10/2023 Yes    Essential hypertension, benign [I10] 11/10/2023 Yes      Problems Resolved During this Admission:       Discharged Condition: good    Disposition: Home or Self Care    Follow Up:   Follow-up Information       Julio Cruz, DO Follow up in 1 week(s).    Specialty: Family Medicine  Contact information:  [id number] [address]  [facility] [place] [address] [phone]                           Patient Instructions:      Diet Cardiac     Notify your health care provider if you experience any of the following:  temperature >100.4     Notify your health care provider if you experience any of the " "following:  difficulty breathing or increased cough     Activity as tolerated       Significant Diagnostic Studies: Labs: All labs within the past 24 hours have been reviewed    Pending Diagnostic Studies:       None           Medications:  Reconciled Home Medications:      Medication List        START taking these medications      fluconazole 100 MG tablet  Commonly known as: DIFLUCAN  Take 1 tablet (100 mg total) by mouth once daily. for 3 days     magnesium glycinate 100 mg magnesium Cap  Take 1 tablet by mouth Daily.            CONTINUE taking these medications      aspirin 81 MG EC tablet  Commonly known as: ECOTRIN  Take 1 tablet (81 mg total) by mouth once daily.     atorvastatin 80 MG tablet  Commonly known as: LIPITOR  Take 1 tablet (80 mg total) by mouth once daily.     * BD ULTRA-FINE SHORT PEN NEEDLE 31 gauge x 5/16" Ndle  Generic drug: pen needle, diabetic  USE AS DIRECTED TO administer insulin TWICE DAILY for 50     citalopram 20 MG tablet  Commonly known as: CeleXA  Take 1 tablet (20 mg total) by mouth once daily.     * dextroamphetamine-amphetamine 20 mg tablet  Commonly known as: AdderalL  Take 1 tablet by mouth each afternoon     * dextroamphetamine-amphetamine 30 mg Tab  Commonly known as: AdderalL  Take 1 tablet by mouth each morning     gabapentin 300 MG capsule  Commonly known as: NEURONTIN  Take 1 capsule (300 mg total) by mouth 3 (three) times daily.     glyBURIDE 5 MG tablet  Commonly known as: DIABETA  Take 1 tablet (5 mg total) by mouth daily with breakfast.     HYDROcodone-acetaminophen 7.5-325 mg per tablet  Commonly known as: NORCO  Take 1 tablet by mouth every 6 (six) hours as needed for Pain.     insulin degludec 200 unit/mL (3 mL) insulin pen  Commonly known as: TRESIBA FLEXTOUCH U-200  Inject 30 Units into the skin once daily. Increase by 5 units if blood glucose is elevated above 200. weekly     lisinopriL-hydrochlorothiazide 20-25 mg Tab  Commonly known as: " "PRINZIDE,ZESTORETIC  Take 1 tablet by mouth once daily.     metFORMIN 1000 MG tablet  Commonly known as: GLUCOPHAGE  Take 1 tablet (1,000 mg total) by mouth 2 (two) times daily with meals.     metoprolol tartrate 50 MG tablet  Commonly known as: LOPRESSOR  Take 1 tablet (50 mg total) by mouth 2 (two) times daily.           * This list has 3 medication(s) that are the same as other medications prescribed for you. Read the directions carefully, and ask your doctor or other care provider to review them with you.                STOP taking these medications      INV vitamin d3 50,000 iu/placebo Cap capsule            ASK your doctor about these medications      * pen needle, diabetic 31 gauge x 5/16" Ndle  100 each by abdominal subcutaneous route once daily. Use to administer insulin twice daily     * dextroamphetamine-amphetamine 20 mg tablet  Commonly known as: AdderalL  TAKE ONE TABLET BY MOUTH EVERY AFTERNOON.     * dextroamphetamine-amphetamine 30 mg Tab  Commonly known as: AdderalL  Take 1 tablet (30 mg total) by mouth every morning.     * dextroamphetamine-amphetamine 20 mg tablet  Commonly known as: AdderalL  TAKE ONE TABLET BY MOUTH EVERY AFTERNOON.     * dextroamphetamine-amphetamine 30 mg Tab  Commonly known as: AdderalL  Take 1 tablet (30 mg total) by mouth every morning.           * This list has 5 medication(s) that are the same as other medications prescribed for you. Read the directions carefully, and ask your doctor or other care provider to review them with you.                  Indwelling Lines/Drains at time of discharge:   Lines/Drains/Airways       None                   Time spent on the discharge of patient: 30 minutes     Discussed patient's case, labs and medications with Dr. Anna. He agreed with discharge home today.    Smita Sanchez, REMEDIOS  Department of Hospital Medicine  [facility]  "

## 2024-12-13 NOTE — ASSESSMENT & PLAN NOTE
"Patient's FSGs are uncontrolled due to hyperglycemia on current medication regimen.  Last A1c reviewed-   Lab Results   Component Value Date    HGBA1C 7.4 (H) 10/17/2024     Most recent fingerstick glucose reviewed- No results for input(s): "POCTGLUCOSE" in the last 24 hours.  Current correctional scale   Tresiba 30 units with instructions to titrate as needed  Maintain anti-hyperglycemic dose as follows-   Antihyperglycemics (From admission, onward)      None        Tresiba substituted with Lantus  Hold Oral hypoglycemics while patient is in the hospital.  Hold Metformin until 12/11 - will resume Metformin today, 12/12    12/10/2024: RBC up to 380 mg/dL today. Currently receiving Lantus 30 units at HS. Still holding Metformin due to recently having IV contrast to rule out PE.  -Accuchecks AC & HS with moderate SSI coverage    12/13/2024: Glucose 238 mg/dL today.  -Restart Metformin 1000 mg by mouth twice a day  -Restart Glyburide 5 mg by mouth daily  -Continue Tresiba 30 unit sc daily  -Continue to monitor blood sugar levels at home and record readings. Take record of reading to follow up appointment with PCP.  "

## 2024-12-15 LAB — BACTERIA BLD CULT: NORMAL

## 2024-12-17 LAB
OHS QRS DURATION: 84 MS
OHS QTC CALCULATION: 432 MS

## 2024-12-23 ENCOUNTER — OFFICE VISIT (OUTPATIENT)
Dept: FAMILY MEDICINE | Facility: CLINIC | Age: 59
End: 2024-12-23
Payer: COMMERCIAL

## 2024-12-23 VITALS
WEIGHT: 194.38 LBS | BODY MASS INDEX: 34.44 KG/M2 | TEMPERATURE: 99 F | RESPIRATION RATE: 18 BRPM | OXYGEN SATURATION: 97 % | HEIGHT: 63 IN | DIASTOLIC BLOOD PRESSURE: 76 MMHG | SYSTOLIC BLOOD PRESSURE: 110 MMHG | HEART RATE: 96 BPM

## 2024-12-23 DIAGNOSIS — E78.2 MIXED HYPERLIPIDEMIA: ICD-10-CM

## 2024-12-23 DIAGNOSIS — E11.65 TYPE 2 DIABETES MELLITUS WITH HYPERGLYCEMIA, WITH LONG-TERM CURRENT USE OF INSULIN: ICD-10-CM

## 2024-12-23 DIAGNOSIS — J18.9 PNEUMONIA OF BOTH LUNGS DUE TO INFECTIOUS ORGANISM, UNSPECIFIED PART OF LUNG: ICD-10-CM

## 2024-12-23 DIAGNOSIS — I10 ESSENTIAL HYPERTENSION, BENIGN: Primary | ICD-10-CM

## 2024-12-23 DIAGNOSIS — Z79.4 TYPE 2 DIABETES MELLITUS WITH HYPERGLYCEMIA, WITH LONG-TERM CURRENT USE OF INSULIN: ICD-10-CM

## 2024-12-23 DIAGNOSIS — G62.9 NEUROPATHY: ICD-10-CM

## 2024-12-23 DIAGNOSIS — F90.2 ATTENTION DEFICIT HYPERACTIVITY DISORDER (ADHD), COMBINED TYPE: ICD-10-CM

## 2024-12-23 LAB
ALBUMIN SERPL BCP-MCNC: 3.7 G/DL (ref 3.5–5)
ALBUMIN/GLOB SERPL: 1.1 {RATIO}
ALP SERPL-CCNC: 102 U/L (ref 40–150)
ALT SERPL W P-5'-P-CCNC: 28 U/L
ANION GAP SERPL CALCULATED.3IONS-SCNC: 14 MMOL/L (ref 7–16)
AST SERPL W P-5'-P-CCNC: 19 U/L (ref 5–34)
BASOPHILS # BLD AUTO: 0.05 K/UL (ref 0–0.2)
BASOPHILS NFR BLD AUTO: 0.6 % (ref 0–1)
BILIRUB SERPL-MCNC: 0.3 MG/DL
BUN SERPL-MCNC: 22 MG/DL (ref 8–26)
BUN/CREAT SERPL: 19 (ref 6–20)
CALCIUM SERPL-MCNC: 9.3 MG/DL (ref 8.4–10.2)
CHLORIDE SERPL-SCNC: 98 MMOL/L (ref 98–107)
CO2 SERPL-SCNC: 26 MMOL/L (ref 22–29)
CREAT SERPL-MCNC: 1.18 MG/DL (ref 0.72–1.25)
DIFFERENTIAL METHOD BLD: ABNORMAL
EGFR (NO RACE VARIABLE) (RUSH/TITUS): 71 ML/MIN/1.73M2
EOSINOPHIL # BLD AUTO: 0.23 K/UL (ref 0–0.5)
EOSINOPHIL NFR BLD AUTO: 2.8 % (ref 1–4)
ERYTHROCYTE [DISTWIDTH] IN BLOOD BY AUTOMATED COUNT: 12.5 % (ref 11.5–14.5)
GLOBULIN SER-MCNC: 3.5 G/DL (ref 2–4)
GLUCOSE SERPL-MCNC: 137 MG/DL (ref 74–100)
HCT VFR BLD AUTO: 41.4 % (ref 40–54)
HGB BLD-MCNC: 13.8 G/DL (ref 13.5–18)
HIV 1+O+2 AB SERPL QL: NORMAL
IMM GRANULOCYTES # BLD AUTO: 0.06 K/UL (ref 0–0.04)
IMM GRANULOCYTES NFR BLD: 0.7 % (ref 0–0.4)
LYMPHOCYTES # BLD AUTO: 2.6 K/UL (ref 1–4.8)
LYMPHOCYTES NFR BLD AUTO: 31.1 % (ref 27–41)
MCH RBC QN AUTO: 30.9 PG (ref 27–31)
MCHC RBC AUTO-ENTMCNC: 33.3 G/DL (ref 32–36)
MCV RBC AUTO: 92.6 FL (ref 80–96)
MONOCYTES # BLD AUTO: 0.56 K/UL (ref 0–0.8)
MONOCYTES NFR BLD AUTO: 6.7 % (ref 2–6)
MPC BLD CALC-MCNC: 9.9 FL (ref 9.4–12.4)
NEUTROPHILS # BLD AUTO: 4.86 K/UL (ref 1.8–7.7)
NEUTROPHILS NFR BLD AUTO: 58.1 % (ref 53–65)
NRBC # BLD AUTO: 0 X10E3/UL
NRBC, AUTO (.00): 0 %
PLATELET # BLD AUTO: 502 K/UL (ref 150–400)
POTASSIUM SERPL-SCNC: 4.4 MMOL/L (ref 3.5–5.1)
PROT SERPL-MCNC: 7.2 G/DL (ref 6.4–8.3)
RBC # BLD AUTO: 4.47 M/UL (ref 4.6–6.2)
SODIUM SERPL-SCNC: 134 MMOL/L (ref 136–145)
WBC # BLD AUTO: 8.36 K/UL (ref 4.5–11)

## 2024-12-23 PROCEDURE — 80053 COMPREHEN METABOLIC PANEL: CPT | Mod: ,,, | Performed by: CLINICAL MEDICAL LABORATORY

## 2024-12-23 PROCEDURE — 85025 COMPLETE CBC W/AUTO DIFF WBC: CPT | Mod: ,,, | Performed by: CLINICAL MEDICAL LABORATORY

## 2024-12-23 PROCEDURE — 3074F SYST BP LT 130 MM HG: CPT | Mod: CPTII,,, | Performed by: FAMILY MEDICINE

## 2024-12-23 PROCEDURE — 99214 OFFICE O/P EST MOD 30 MIN: CPT | Mod: ,,, | Performed by: FAMILY MEDICINE

## 2024-12-23 PROCEDURE — 3078F DIAST BP <80 MM HG: CPT | Mod: CPTII,,, | Performed by: FAMILY MEDICINE

## 2024-12-23 PROCEDURE — 87389 HIV-1 AG W/HIV-1&-2 AB AG IA: CPT | Mod: ,,, | Performed by: CLINICAL MEDICAL LABORATORY

## 2024-12-23 PROCEDURE — 1111F DSCHRG MED/CURRENT MED MERGE: CPT | Mod: CPTII,,, | Performed by: FAMILY MEDICINE

## 2024-12-23 PROCEDURE — 3008F BODY MASS INDEX DOCD: CPT | Mod: CPTII,,, | Performed by: FAMILY MEDICINE

## 2024-12-23 PROCEDURE — 4010F ACE/ARB THERAPY RXD/TAKEN: CPT | Mod: CPTII,,, | Performed by: FAMILY MEDICINE

## 2024-12-23 PROCEDURE — 3051F HG A1C>EQUAL 7.0%<8.0%: CPT | Mod: CPTII,,, | Performed by: FAMILY MEDICINE

## 2024-12-23 RX ORDER — INSULIN ASPART 100 [IU]/ML
20 INJECTION, SUSPENSION SUBCUTANEOUS 2 TIMES DAILY
Qty: 10 ML | Refills: 11 | Status: SHIPPED | OUTPATIENT
Start: 2024-12-23 | End: 2025-12-23

## 2024-12-23 RX ORDER — DOXYCYCLINE 100 MG/1
100 CAPSULE ORAL 2 TIMES DAILY
Qty: 20 CAPSULE | Refills: 0 | Status: SHIPPED | OUTPATIENT
Start: 2024-12-23 | End: 2025-02-14

## 2024-12-23 RX ORDER — PREDNISONE 10 MG/1
10 TABLET ORAL DAILY
Qty: 7 TABLET | Refills: 0 | Status: SHIPPED | OUTPATIENT
Start: 2024-12-23 | End: 2024-12-30

## 2024-12-23 NOTE — PROGRESS NOTES
Julio Cruz DO   [id number] [facility] [address]  [place]  [address]      PATIENT NAME: Syed Kelley  : 1965  DATE: 24  MRN: [id number]      Billing Provider: Julio Cruz DO  Level of Service:   Patient PCP Information       Provider PCP Type    Julio Cruz DO General            Reason for Visit / Chief Complaint: Transitional Care (Mr. Kelley is a 59 yr old male presenting for TCC from [facility] for hypoxia and resp infection. States since being home he still is not having energy to get up and go like before the hospital. Has some SOB when talking too long and when up doing things.Pt state he would like to get HIV testing done today)       Update PCP  Update Chief Complaint         History of Present Illness / Problem Focused Workflow     Syed Kelley presents to the clinic with Transitional Care (Mr. Kelley is a 59 yr old male presenting for TCC from [facility] for hypoxia and resp infection. States since being home he still is not having energy to get up and go like before the hospital. Has some SOB when talking too long and when up doing things.Pt state he would like to get HIV testing done today)     Patient is in for follow-up from hospitalization.  He did have hypoxia and has been using his inhalers at home.  He is not having any energy in the a.m. but states as the day goes by gets better but is still short of breath when walking distances or talking too much.  He denies any fever or chills.  He denies any PND but does have some orthopnea        Review of Systems     Review of Systems   Constitutional:  Positive for activity change. Negative for appetite change, chills, fatigue and fever.   HENT:  Negative for nasal congestion, ear discharge, ear pain, mouth dryness, mouth sores, postnasal drip, sinus pressure/congestion, sore throat and voice change.    Eyes:  Negative for pain, discharge, redness, itching and visual disturbance.   Respiratory:  Positive for cough,  shortness of breath and wheezing. Negative for apnea and chest tightness.    Cardiovascular:  Negative for chest pain, palpitations and leg swelling.   Gastrointestinal:  Negative for abdominal distention, abdominal pain, anal bleeding, blood in stool, change in bowel habit, constipation, diarrhea, nausea, vomiting and reflux.   Endocrine: Negative for cold intolerance, heat intolerance, polydipsia, polyphagia and polyuria.   Genitourinary:  Negative for difficulty urinating, enuresis, erectile dysfunction, frequency, genital sores, hematuria and urgency.   Musculoskeletal:  Negative for arthralgias, back pain, gait problem, leg pain, myalgias and neck pain.   Integumentary:  Negative for rash, mole/lesion, breast mass and breast discharge.   Allergic/Immunologic: Negative for environmental allergies and food allergies.   Neurological:  Negative for dizziness, vertigo, tremors, seizures, syncope, facial asymmetry, speech difficulty, weakness, light-headedness, numbness, headaches, memory loss and coordination difficulties.   Hematological:  Negative for adenopathy. Does not bruise/bleed easily.   Psychiatric/Behavioral:  Negative for agitation, behavioral problems, confusion, decreased concentration, dysphoric mood, hallucinations, self-injury, sleep disturbance and suicidal ideas. The patient is not nervous/anxious and is not hyperactive.    Breast: Negative for mass      Medical / Social / Family History     Past Medical History:   Diagnosis Date    ADHD     Adult attention deficit disorder 03/22/2021    Depression 12/22/2022    Essential hypertension 03/22/2021    Hyperlipidemia     Neuropathy 08/23/2023    AC (obstructive sleep apnea) 10/19/2023    PAD (peripheral artery disease) 02/17/2023    Swollen leg 01/20/2024    Type 2 diabetes mellitus        Past Surgical History:   Procedure Laterality Date    APPENDECTOMY      CARDIAC CATHETERIZATION      COLONOSCOPY  10/09/2024    DEBRIDEMENT OF LOWER EXTREMITY  "Bilateral 02/18/2023    Procedure: DEBRIDEMENT, LOWER EXTREMITY;  Surgeon: Yael Cohen MD;  Location: [facility] OR;  Service: General;  Laterality: Bilateral;  debride toes    FINGER AMPUTATION Right     1st digit    KIDNEY SURGERY Right     Patient states right kidney removed at age 5.    LEFT HEART CATHETERIZATION N/A 05/07/2021    Procedure: Left heart cath with possible intervention;  Surgeon: Federico James DO;  Location: [facility] CATH LAB;  Service: Cardiology;  Laterality: N/A;    TOE AMPUTATION Right 03/29/2023    Procedure: AMPUTATION, TOE;  Surgeon: Yael Cohen MD;  Location: [facility] OR;  Service: General;  Laterality: Right;  Right great toe amp dr cohen wednesday       Social History    reports that he quit smoking about 40 years ago. His smoking use included cigarettes. He started smoking about 44 years ago. He has a 20 pack-year smoking history. He has been exposed to tobacco smoke. His smokeless tobacco use includes chew and snuff. He reports that he does not currently use alcohol. He reports current drug use. Drug: Amphetamines.    Family History  's family history includes Cancer in his sister; Hypertension in his father; Stroke in his father.    Medications and Allergies     Medications  Outpatient Medications Marked as Taking for the 12/23/24 encounter (Office Visit) with Julio Cruz,    Medication Sig Dispense Refill    aspirin (ECOTRIN) 81 MG EC tablet Take 1 tablet (81 mg total) by mouth once daily. 90 tablet 3    atorvastatin (LIPITOR) 80 MG tablet Take 1 tablet (80 mg total) by mouth once daily. 90 tablet 1    lisinopriL-hydrochlorothiazide (PRINZIDE,ZESTORETIC) 20-25 mg Tab Take 1 tablet by mouth once daily. 90 tablet 1    metoprolol tartrate (LOPRESSOR) 50 MG tablet Take 1 tablet (50 mg total) by mouth 2 (two) times daily. 180 tablet 1    pen needle, diabetic (BD ULTRA-FINE SHORT PEN NEEDLE) 31 gauge x 5/16" Ndle USE AS DIRECTED TO administer insulin TWICE DAILY for " 50      [DISCONTINUED] citalopram (CELEXA) 20 MG tablet Take 1 tablet (20 mg total) by mouth once daily. 90 tablet 1    [DISCONTINUED] dextroamphetamine-amphetamine (ADDERALL) 20 mg tablet Take 1 tablet by mouth each afternoon 30 tablet 0    [DISCONTINUED] dextroamphetamine-amphetamine (ADDERALL) 30 mg Tab Take 1 tablet by mouth each morning 30 tablet 0    [DISCONTINUED] gabapentin (NEURONTIN) 300 MG capsule Take 1 capsule (300 mg total) by mouth 3 (three) times daily. 90 capsule 1    [DISCONTINUED] glyBURIDE (DIABETA) 5 MG tablet Take 1 tablet (5 mg total) by mouth daily with breakfast. 90 tablet 1    [DISCONTINUED] insulin degludec (TRESIBA FLEXTOUCH U-200) 200 unit/mL (3 mL) insulin pen Inject 30 Units into the skin once daily. Increase by 5 units if blood glucose is elevated above 200. weekly 3 Pen 3    [DISCONTINUED] metFORMIN (GLUCOPHAGE) 1000 MG tablet Take 1 tablet (1,000 mg total) by mouth 2 (two) times daily with meals. 180 tablet 1       Allergies  Review of patient's allergies indicates:   Allergen Reactions    Azithromycin     Erythromycin Other (See Comments)    M-mycin        Physical Examination     Vitals:    12/23/24 1341   BP: 110/76   Pulse: 96   Resp: 18   Temp: 99.1 °F (37.3 °C)     Physical Exam  Constitutional:       Appearance: Normal appearance.   HENT:      Head: Normocephalic.      Nose: Nose normal.      Mouth/Throat:      Mouth: Mucous membranes are moist.      Pharynx: Oropharynx is clear. No oropharyngeal exudate or posterior oropharyngeal erythema.   Eyes:      General: No scleral icterus.        Right eye: No discharge.         Left eye: No discharge.      Conjunctiva/sclera: Conjunctivae normal.      Pupils: Pupils are equal, round, and reactive to light.   Cardiovascular:      Rate and Rhythm: Normal rate and regular rhythm.      Pulses: Normal pulses.      Heart sounds: Normal heart sounds. No murmur heard.  Pulmonary:      Effort: Pulmonary effort is normal.      Breath sounds:  Wheezing present.   Abdominal:      General: Abdomen is flat. Bowel sounds are normal.      Tenderness: There is no abdominal tenderness.   Musculoskeletal:         General: Normal range of motion.      Right lower leg: No edema.      Left lower leg: No edema.   Skin:     General: Skin is warm.      Capillary Refill: Capillary refill takes less than 2 seconds.   Neurological:      General: No focal deficit present.      Mental Status: He is alert and oriented to person, place, and time.   Psychiatric:         Mood and Affect: Mood normal.         Behavior: Behavior normal.         Thought Content: Thought content normal.         Judgment: Judgment normal.               Lab Results   Component Value Date    WBC 3.91 (L) 02/22/2025    HGB 12.4 (L) 02/22/2025    HCT 38.9 (L) 02/22/2025    MCV 95.6 02/22/2025     02/22/2025          Sodium   Date Value Ref Range Status   02/22/2025 133 (L) 136 - 145 mmol/L Final     Potassium   Date Value Ref Range Status   02/22/2025 4.3 3.5 - 5.1 mmol/L Final     Chloride   Date Value Ref Range Status   02/22/2025 99 98 - 107 mmol/L Final     CO2   Date Value Ref Range Status   02/22/2025 25 23 - 31 mmol/L Final     Glucose   Date Value Ref Range Status   02/22/2025 125 (H) 82 - 115 mg/dL Final     BUN   Date Value Ref Range Status   02/22/2025 22 8 - 26 mg/dL Final     Creatinine   Date Value Ref Range Status   02/22/2025 1.93 (H) 0.72 - 1.25 mg/dL Final     Calcium   Date Value Ref Range Status   02/22/2025 9.4 8.8 - 10.0 mg/dL Final     Total Protein   Date Value Ref Range Status   02/22/2025 7.3 5.8 - 7.6 g/dL Final     Albumin   Date Value Ref Range Status   02/22/2025 3.5 3.4 - 4.8 g/dL Final     Bilirubin, Total   Date Value Ref Range Status   02/22/2025 0.3 <=1.5 mg/dL Final     Alk Phos   Date Value Ref Range Status   02/22/2025 75 40 - 150 U/L Final     AST   Date Value Ref Range Status   02/22/2025 36 (H) 5 - 34 U/L Final     ALT   Date Value Ref Range Status    02/22/2025 24 <=55 U/L Final     Anion Gap   Date Value Ref Range Status   02/22/2025 13 7 - 16 mmol/L Final     eGFR    Date Value Ref Range Status   05/06/2021 89       eGFR   Date Value Ref Range Status   06/08/2022 76 >=60 mL/min/1.73m² Final      X-Ray Chest AP Portable  Narrative: EXAMINATION:  XR CHEST AP PORTABLE    CLINICAL HISTORY:  Chest Pain;    TECHNIQUE:  Single frontal view of the chest was performed.    COMPARISON:  Chest radiograph 12/23/2024, CTA chest 12/09/2024    FINDINGS:  Patient is slightly rotated.    Trachea is midline and patent.  Cardiomediastinal silhouette is midline and within normal limits for age, stable.  Pulmonary vasculature and hilar contours are within normal limits.  The lungs are symmetrically well expanded and clear.  No sizable pleural effusion or pneumothorax.  No acute osseous process seen.  PA and lateral views can be obtained.  Impression: No radiographic acute intrathoracic process seen on this single view.    Electronically signed by: Humberto Rashid MD  Date:    02/22/2025  Time:    13:19     Procedures   Lab Results   Component Value Date    CHOL 147 10/17/2024    CHOL 143 08/23/2023    CHOL 128 12/22/2022     Lab Results   Component Value Date    HDL 32 (L) 10/17/2024    HDL 29 (L) 08/23/2023    HDL 32 (L) 12/22/2022     Lab Results   Component Value Date    LDLCALC 46 08/23/2023    LDLCALC 53 12/22/2022    LDLCALC 43 08/15/2022     Lab Results   Component Value Date    TRIG 436 (H) 10/17/2024    TRIG 338 (H) 08/23/2023    TRIG 215 (H) 12/22/2022     Lab Results   Component Value Date    CHOLHDL 4.6 10/17/2024    CHOLHDL 4.9 08/23/2023    CHOLHDL 4.0 12/22/2022      Lab Results   Component Value Date    HGBA1C 7.2 (H) 02/14/2025      Lab Results   Component Value Date    TSH 3.160 01/03/2025    [id number] 8.6 01/03/2025      Assessment and Plan (including Health Maintenance)      Problem List  Smart Sets  Document Outside HM   :    Plan:         Health  Maintenance Due   Topic Date Due    Pneumococcal Vaccines (Age 50+) (1 of 2 - PCV) Never done    Shingles Vaccine (1 of 2) Never done    RSV Vaccine (Age 60+ and Pregnant patients) (1 - Risk 60-74 years 1-dose series) Never done    Diabetic Eye Exam  02/07/2025       Problem List Items Addressed This Visit       Type 2 diabetes mellitus with hyperglycemia    Current Assessment & Plan   Lab Results   Component Value Date    HGBA1C 7.2 (H) 02/14/2025    Patient currently taking metformin as well as insulin and glyburide.  Last A1c was 7.2.  He does not report any hypoglycemic episodes.  No change in medication.  Monitor his diet and decrease his carb intake.         Relevant Medications    insulin asp prt-insulin aspart, NovoLOG 70/30, (NOVOLOG 70/30) 100 unit/mL (70-30) Soln    Mixed hyperlipidemia    Current Assessment & Plan   Lab Results   Component Value Date    LDLCALC 46 08/23/2023    Currently taking atorvastatin and his last LDL was well controlled.  No change in his medication at this time.         Essential hypertension, benign - Primary    Current Assessment & Plan   Hypertension is well controlled.  Will maintain his current medications i.e. is metoprolol and lisinopril hydrochlorothiazide at current dose levels.  Recent electrolytes are normal except for a sodium 134 and glucose of 137 WBC count was 8300 with hemoglobin 13 8 hematocrit 41.4 and platelets [id number].         ADHD    Current Assessment & Plan   ADHD which is chronic.  We have discussed with him getting off the Adderall which he has been taking for several years.  Do still recommend he get off the medication and least decrease his dose to once daily.         Neuropathy    Current Assessment & Plan   Patient has diabetic neuropathy and has been controlled with the gabapentin.  He has had some increasing symptoms recently.  He can increase his dose by 1 additional gabapentin daily if his symptoms worsen follow-up in 3 months         Pneumonia of  both lungs due to infectious organism    Current Assessment & Plan   Hospital follow-up patient is doing well.  Patient had does have some shortness of breath but cough has improved.  No change in his treatment at this time.  P.r.n. albuterol inhaler.  Chest x-ray today does not reveal any infiltrates         Relevant Orders    X-Ray Chest PA And Lateral (Completed)    Comprehensive Metabolic Panel (Completed)    CBC Auto Differential (Completed)    HIV 1/2 Ag/Ab (4th Gen) (Completed)       Health Maintenance Topics with due status: Not Due       Topic Last Completion Date    Foot Exam 06/06/2024    Colorectal Cancer Screening 10/10/2024    Lipid Panel 10/17/2024    TETANUS VACCINE 02/10/2025    Low Dose Statin 02/14/2025    Diabetes Urine Screening 02/14/2025    Hemoglobin A1c 02/14/2025       Future Appointments   Date Time Provider Department Center   2/28/2025 10:00 [facility]   3/10/2025  9:30 AM [facility] GI ROOM 03 [facility]        Follow up in about 4 weeks (around 1/20/2025).     Signature:  Julio Cruz,   [facility]  [address] [place] [address], [place]  [address]    Date of encounter: 12/23/24

## 2024-12-26 RX ORDER — PEN NEEDLE, DIABETIC 30 GX3/16"
100 NEEDLE, DISPOSABLE MISCELLANEOUS DAILY
Qty: 100 EACH | Refills: 2 | Status: SHIPPED | OUTPATIENT
Start: 2024-12-26

## 2024-12-27 NOTE — TELEPHONE ENCOUNTER
Called and spoke with pharmacy the pt is wanting a flexpen not vial. When I told them the pt stated pharmacy couldn't get flexpen she looked it up and stated that they can get the flexpen. Requesting flexpen be sent in and they will get it that next month instead.

## 2024-12-27 NOTE — TELEPHONE ENCOUNTER
----- Message from Rabia sent at 12/27/2024  3:01 PM CST -----  [facility] - [place], [place] - [address]  [address] [place] [address]  Phone: [phone] Fax: [phone]    insulin asp prt-insulin aspart, NovoLOG 70/30, (NOVOLOG 70/30) 100 unit/mL (70-30) [facility]    Pharmacy requests call back about this Prescription

## 2024-12-30 RX ORDER — HUMAN INSULIN 100 [IU]/ML
30 INJECTION, SUSPENSION SUBCUTANEOUS 2 TIMES DAILY
Qty: 10 ML | Refills: 5 | Status: SHIPPED | OUTPATIENT
Start: 2024-12-30 | End: 2025-12-30

## 2025-01-03 ENCOUNTER — OFFICE VISIT (OUTPATIENT)
Dept: FAMILY MEDICINE | Facility: CLINIC | Age: 60
End: 2025-01-03
Payer: COMMERCIAL

## 2025-01-03 VITALS
DIASTOLIC BLOOD PRESSURE: 68 MMHG | OXYGEN SATURATION: 97 % | BODY MASS INDEX: 33.73 KG/M2 | RESPIRATION RATE: 18 BRPM | SYSTOLIC BLOOD PRESSURE: 120 MMHG | WEIGHT: 190.38 LBS | HEIGHT: 63 IN | TEMPERATURE: 99 F | HEART RATE: 70 BPM

## 2025-01-03 DIAGNOSIS — R06.02 SHORTNESS OF BREATH: ICD-10-CM

## 2025-01-03 DIAGNOSIS — G62.9 NEUROPATHY: Chronic | ICD-10-CM

## 2025-01-03 DIAGNOSIS — Z79.899 LONG-TERM CURRENT USE OF STIMULANT: ICD-10-CM

## 2025-01-03 DIAGNOSIS — F98.8 ADULT ATTENTION DEFICIT DISORDER: Chronic | ICD-10-CM

## 2025-01-03 DIAGNOSIS — G93.31 POSTVIRAL FATIGUE SYNDROME: ICD-10-CM

## 2025-01-03 DIAGNOSIS — G47.33 OBSTRUCTIVE SLEEP APNEA SYNDROME: ICD-10-CM

## 2025-01-03 DIAGNOSIS — I10 ESSENTIAL HYPERTENSION, BENIGN: Primary | ICD-10-CM

## 2025-01-03 DIAGNOSIS — E11.65 TYPE 2 DIABETES MELLITUS WITH HYPERGLYCEMIA, WITH LONG-TERM CURRENT USE OF INSULIN: ICD-10-CM

## 2025-01-03 DIAGNOSIS — F32.A DEPRESSION, UNSPECIFIED DEPRESSION TYPE: ICD-10-CM

## 2025-01-03 DIAGNOSIS — Z79.4 TYPE 2 DIABETES MELLITUS WITH HYPERGLYCEMIA, WITH LONG-TERM CURRENT USE OF INSULIN: ICD-10-CM

## 2025-01-03 DIAGNOSIS — E78.2 MIXED HYPERLIPIDEMIA: ICD-10-CM

## 2025-01-03 LAB
ALBUMIN SERPL BCP-MCNC: 3.8 G/DL (ref 3.5–5)
ALBUMIN/GLOB SERPL: 1 {RATIO}
ALP SERPL-CCNC: 85 U/L (ref 40–150)
ALT SERPL W P-5'-P-CCNC: 16 U/L
ANION GAP SERPL CALCULATED.3IONS-SCNC: 15 MMOL/L (ref 7–16)
AST SERPL W P-5'-P-CCNC: 20 U/L (ref 5–34)
BASOPHILS # BLD AUTO: 0.06 K/UL (ref 0–0.2)
BASOPHILS NFR BLD AUTO: 0.8 % (ref 0–1)
BILIRUB SERPL-MCNC: 0.5 MG/DL
BUN SERPL-MCNC: 33 MG/DL (ref 8–26)
BUN/CREAT SERPL: 25 (ref 6–20)
CALCIUM SERPL-MCNC: 9.6 MG/DL (ref 8.4–10.2)
CHLORIDE SERPL-SCNC: 98 MMOL/L (ref 98–107)
CO2 SERPL-SCNC: 24 MMOL/L (ref 22–29)
CREAT SERPL-MCNC: 1.34 MG/DL (ref 0.72–1.25)
DIFFERENTIAL METHOD BLD: ABNORMAL
EGFR (NO RACE VARIABLE) (RUSH/TITUS): 61 ML/MIN/1.73M2
EOSINOPHIL # BLD AUTO: 0.17 K/UL (ref 0–0.5)
EOSINOPHIL NFR BLD AUTO: 2.2 % (ref 1–4)
ERYTHROCYTE [DISTWIDTH] IN BLOOD BY AUTOMATED COUNT: 12.8 % (ref 11.5–14.5)
GLOBULIN SER-MCNC: 3.8 G/DL (ref 2–4)
GLUCOSE SERPL-MCNC: 120 MG/DL (ref 74–100)
HCT VFR BLD AUTO: 43.5 % (ref 40–54)
HGB BLD-MCNC: 14.5 G/DL (ref 13.5–18)
IMM GRANULOCYTES # BLD AUTO: 0.07 K/UL (ref 0–0.04)
IMM GRANULOCYTES NFR BLD: 0.9 % (ref 0–0.4)
LYMPHOCYTES # BLD AUTO: 2.32 K/UL (ref 1–4.8)
LYMPHOCYTES NFR BLD AUTO: 30.6 % (ref 27–41)
MCH RBC QN AUTO: 30.7 PG (ref 27–31)
MCHC RBC AUTO-ENTMCNC: 33.3 G/DL (ref 32–36)
MCV RBC AUTO: 92.2 FL (ref 80–96)
MONOCYTES # BLD AUTO: 0.68 K/UL (ref 0–0.8)
MONOCYTES NFR BLD AUTO: 9 % (ref 2–6)
MPC BLD CALC-MCNC: 9.5 FL (ref 9.4–12.4)
NEUTROPHILS # BLD AUTO: 4.27 K/UL (ref 1.8–7.7)
NEUTROPHILS NFR BLD AUTO: 56.5 % (ref 53–65)
NRBC # BLD AUTO: 0 X10E3/UL
NRBC, AUTO (.00): 0 %
PLATELET # BLD AUTO: 382 K/UL (ref 150–400)
POTASSIUM SERPL-SCNC: 4 MMOL/L (ref 3.5–5.1)
PROT SERPL-MCNC: 7.6 G/DL (ref 6.4–8.3)
RBC # BLD AUTO: 4.72 M/UL (ref 4.6–6.2)
SODIUM SERPL-SCNC: 133 MMOL/L (ref 136–145)
T4 SERPL-MCNC: 8.6 ΜG/DL (ref 4.9–11.7)
TSH SERPL DL<=0.005 MIU/L-ACNC: 3.16 UIU/ML (ref 0.35–4.94)
WBC # BLD AUTO: 7.57 K/UL (ref 4.5–11)

## 2025-01-03 PROCEDURE — 84436 ASSAY OF TOTAL THYROXINE: CPT | Mod: ,,, | Performed by: CLINICAL MEDICAL LABORATORY

## 2025-01-03 PROCEDURE — 80050 GENERAL HEALTH PANEL: CPT | Mod: ,,, | Performed by: CLINICAL MEDICAL LABORATORY

## 2025-01-03 RX ORDER — BLOOD-GLUCOSE SENSOR
1 EACH MISCELLANEOUS 4 TIMES DAILY
Qty: 2 EACH | Refills: 5 | Status: SHIPPED | OUTPATIENT
Start: 2025-01-03 | End: 2026-01-03

## 2025-01-03 RX ORDER — DEXTROAMPHETAMINE SACCHARATE, AMPHETAMINE ASPARTATE, DEXTROAMPHETAMINE SULFATE AND AMPHETAMINE SULFATE 5; 5; 5; 5 MG/1; MG/1; MG/1; MG/1
TABLET ORAL
Qty: 30 TABLET | Refills: 0 | Status: SHIPPED | OUTPATIENT
Start: 2025-01-03

## 2025-01-03 RX ORDER — DEXTROAMPHETAMINE SACCHARATE, AMPHETAMINE ASPARTATE, DEXTROAMPHETAMINE SULFATE AND AMPHETAMINE SULFATE 7.5; 7.5; 7.5; 7.5 MG/1; MG/1; MG/1; MG/1
TABLET ORAL
Qty: 30 TABLET | Refills: 0 | Status: SHIPPED | OUTPATIENT
Start: 2025-01-03

## 2025-01-03 RX ORDER — GABAPENTIN 300 MG/1
300 CAPSULE ORAL 3 TIMES DAILY
Qty: 90 CAPSULE | Refills: 1 | Status: SHIPPED | OUTPATIENT
Start: 2025-01-03

## 2025-01-03 NOTE — ASSESSMENT & PLAN NOTE
Blood pressure currently well controlled.  No change in his medication but discussed with the patient that taking Adderall could raise his pressures.  Advised the patient that he has a blood pressure cuff at home.  Follow-up every 3 months.

## 2025-01-03 NOTE — ASSESSMENT & PLAN NOTE
Major depression which is currently controlled with citalopram.  Patient has no suicidal or homicidal ideation.  He is having difficulty with his sleep apnea likely is contributing to some of his fatigue and shortness in breath.

## 2025-01-03 NOTE — ASSESSMENT & PLAN NOTE
Patient has been diagnosed in the past with adult attention deficit disorder.  He has been taking Adderall for a considerable amount of time.  Refilled his medications today side effects of the medicine explained.  Patient will need a drug screen at his next visit.  Follow-up in 1 month.

## 2025-01-03 NOTE — ASSESSMENT & PLAN NOTE
Patient is status post pneumoniae a proximally 3-4 weeks ago.  He has had persistent shortness breath and fatigue or weakness since then.  He is still coughing but has no sputum production.  He states he gets short of breath after about 20 ft.  Albuterol treatments every 4 hours.  Discussed with the patient the post viral.  Will check CBC and a CMP on him.  Follow-up in 2 weeks

## 2025-01-03 NOTE — PROGRESS NOTES
Julio Cruz DO   [address] [place]  [address]      PATIENT NAME: Syed Kelley  : 1965  DATE: 1/3/25  MRN: [id number]      Billing Provider: Julio Cruz DO  Level of Service:   Patient PCP Information       Provider PCP Type    Julio Cruz DO General            Reason for Visit / Chief Complaint: Follow-up (Mr. Kelley is a 58 y/o male presenting today for a 2 week follow up. States he is feeling about the same. Still gets SOB with activity or talking a lot. Still has fatigue as well. ) and Medication Refill (Would like to see about getting his adderall refilled. And needs one on his neurontin as well. )       Update PCP  Update Chief Complaint         History of Present Illness / Problem Focused Workflow     Syed Kelley presents to the clinic with Follow-up (Mr. Kelley is a 58 y/o male presenting today for a 2 week follow up. States he is feeling about the same. Still gets SOB with activity or talking a lot. Still has fatigue as well. ) and Medication Refill (Would like to see about getting his adderall refilled. And needs one on his neurontin as well. )     Patient is in today because he feels very fatigued.  He post hospital 2 weeks ago when he had an ammonia.  States he has been very tired since then.  He does have a CPAP which she uses sometimes but not always.  Patient is in need of equipment for it at this time.  He denies any fever or chills.  No nausea vomiting or diarrhea.  He is not sleeping well.  Patient does have a history of depression with some anxiety associated with it.  No suicidal or homicidal ideation        Review of Systems     Review of Systems   Constitutional:  Positive for fatigue. Negative for activity change, appetite change, chills and fever.   HENT:  Negative for nasal congestion, ear discharge, ear pain, mouth dryness, mouth sores, postnasal drip, sinus pressure/congestion, sore throat and voice change.    Eyes:  Negative for  pain, discharge, redness, itching and visual disturbance.   Respiratory:  Positive for shortness of breath and wheezing. Negative for apnea, cough and chest tightness.    Cardiovascular:  Negative for chest pain, palpitations and leg swelling.   Gastrointestinal:  Negative for abdominal distention, abdominal pain, anal bleeding, blood in stool, change in bowel habit, constipation, diarrhea, nausea, vomiting and reflux.   Endocrine: Negative for cold intolerance, heat intolerance, polydipsia, polyphagia and polyuria.   Genitourinary:  Negative for difficulty urinating, enuresis, erectile dysfunction, frequency, genital sores, hematuria and urgency.   Musculoskeletal:  Positive for back pain. Negative for arthralgias, gait problem, leg pain, myalgias and neck pain.   Integumentary:  Negative for rash, mole/lesion, breast mass and breast discharge.   Allergic/Immunologic: Negative for environmental allergies and food allergies.   Neurological:  Positive for weakness. Negative for dizziness, vertigo, tremors, seizures, syncope, facial asymmetry, speech difficulty, light-headedness, numbness, headaches, memory loss and coordination difficulties.   Hematological:  Negative for adenopathy. Does not bruise/bleed easily.   Psychiatric/Behavioral:  Positive for decreased concentration and depressed mood. Negative for agitation, behavioral problems, confusion, dysphoric mood, hallucinations, self-injury, sleep disturbance and suicidal ideas. The patient is not nervous/anxious and is not hyperactive.    Breast: Negative for mass      Medical / Social / Family History     Past Medical History:   Diagnosis Date    ADHD     Adult attention deficit disorder 03/22/2021    Depression 12/22/2022    Essential hypertension 03/22/2021    Hyperlipidemia     Neuropathy 08/23/2023    AC (obstructive sleep apnea) 10/19/2023    PAD (peripheral artery disease) 02/17/2023    Swollen leg 01/20/2024    Type 2 diabetes mellitus        Past  Surgical History:   Procedure Laterality Date    APPENDECTOMY      CARDIAC CATHETERIZATION      COLONOSCOPY  10/09/2024    DEBRIDEMENT OF LOWER EXTREMITY Bilateral 02/18/2023    Procedure: DEBRIDEMENT, LOWER EXTREMITY;  Surgeon: Yael Cohen MD;  Location: [facility] OR;  Service: General;  Laterality: Bilateral;  debride toes    FINGER AMPUTATION Right     1st digit    KIDNEY SURGERY Right     Patient states right kidney removed at age 5.    LEFT HEART CATHETERIZATION N/A 05/07/2021    Procedure: Left heart cath with possible intervention;  Surgeon: Federico James DO;  Location: [facility] CATH LAB;  Service: Cardiology;  Laterality: N/A;    TOE AMPUTATION Right 03/29/2023    Procedure: AMPUTATION, TOE;  Surgeon: Yael Cohen MD;  Location: [facility] OR;  Service: General;  Laterality: Right;  Right great toe amp dr darian wednesday       Social History    reports that he quit smoking about 40 years ago. His smoking use included cigarettes. He started smoking about 44 years ago. He has a 20 pack-year smoking history. He has been exposed to tobacco smoke. His smokeless tobacco use includes chew and snuff. He reports that he does not currently use alcohol. He reports current drug use. Drug: Amphetamines.    Family History  's family history includes Cancer in his sister; Hypertension in his father; Stroke in his father.    Medications and Allergies     Medications  Outpatient Medications Marked as Taking for the 1/3/25 encounter (Office Visit) with Julio Cruz DO   Medication Sig Dispense Refill    aspirin (ECOTRIN) 81 MG EC tablet Take 1 tablet (81 mg total) by mouth once daily. 90 tablet 3    atorvastatin (LIPITOR) 80 MG tablet Take 1 tablet (80 mg total) by mouth once daily. 90 tablet 1    citalopram (CELEXA) 20 MG tablet Take 1 tablet (20 mg total) by mouth once daily. 90 tablet 1    doxycycline (VIBRAMYCIN) 100 MG Cap Take 1 capsule (100 mg total) by mouth 2 (two) times daily. 20 capsule 0     "glyBURIDE (DIABETA) 5 MG tablet Take 1 tablet (5 mg total) by mouth daily with breakfast. 90 tablet 1    insulin asp prt-insulin aspart, NovoLOG 70/30, (NOVOLOG 70/30) 100 unit/mL (70-30) Soln Inject 20 Units into the skin 2 (two) times daily. 10 mL 11    insulin degludec (TRESIBA FLEXTOUCH U-200) 200 unit/mL (3 mL) insulin pen Inject 30 Units into the skin once daily. Increase by 5 units if blood glucose is elevated above 200. weekly 3 Pen 3    insulin NPH/Reg human (NOVOLIN 70-30 FLEXPEN U-100) 100 unit/mL (70-30) InPn pen Inject 30 Units into the skin 2 (two) times daily. 10 mL 5    lisinopriL-hydrochlorothiazide (PRINZIDE,ZESTORETIC) 20-25 mg Tab Take 1 tablet by mouth once daily. 90 tablet 1    metFORMIN (GLUCOPHAGE) 1000 MG tablet Take 1 tablet (1,000 mg total) by mouth 2 (two) times daily with meals. 180 tablet 1    metoprolol tartrate (LOPRESSOR) 50 MG tablet Take 1 tablet (50 mg total) by mouth 2 (two) times daily. 180 tablet 1    pen needle, diabetic (BD ULTRA-FINE SHORT PEN NEEDLE) 31 gauge x 5/16" Ndle USE AS DIRECTED TO administer insulin TWICE DAILY for 50      pen needle, diabetic 31 gauge x 5/16" Ndle 100 each by abdominal subcutaneous route once daily. Use to administer insulin twice daily 100 each 2    [DISCONTINUED] dextroamphetamine-amphetamine (ADDERALL) 20 mg tablet Take 1 tablet by mouth each afternoon 30 tablet 0    [DISCONTINUED] dextroamphetamine-amphetamine (ADDERALL) 30 mg Tab Take 1 tablet by mouth each morning 30 tablet 0    [DISCONTINUED] gabapentin (NEURONTIN) 300 MG capsule Take 1 capsule (300 mg total) by mouth 3 (three) times daily. 90 capsule 1       Allergies  Review of patient's allergies indicates:   Allergen Reactions    Azithromycin     Erythromycin Other (See Comments)    M-mycin        Physical Examination     Vitals:    01/03/25 0949   BP: 120/68   Pulse: 70   Resp: 18   Temp: 98.9 °F (37.2 °C)     Physical Exam  Constitutional:       General: He is not in acute " distress.     Appearance: Normal appearance. He is obese.   HENT:      Head: Normocephalic.      Nose: Nose normal.      Mouth/Throat:      Mouth: Mucous membranes are moist.      Pharynx: Oropharynx is clear. No oropharyngeal exudate or posterior oropharyngeal erythema.   Eyes:      General: No scleral icterus.        Right eye: No discharge.         Left eye: No discharge.      Conjunctiva/sclera: Conjunctivae normal.      Pupils: Pupils are equal, round, and reactive to light.   Neck:      Vascular: No carotid bruit.   Cardiovascular:      Rate and Rhythm: Normal rate and regular rhythm.      Pulses: Normal pulses.      Heart sounds: Normal heart sounds. No murmur heard.     No friction rub. No gallop.   Pulmonary:      Effort: Pulmonary effort is normal.      Breath sounds: Wheezing present.   Abdominal:      General: Abdomen is flat. Bowel sounds are normal.      Tenderness: There is no abdominal tenderness.   Musculoskeletal:         General: Normal range of motion.      Cervical back: Normal range of motion and neck supple.      Right lower leg: No edema.      Left lower leg: No edema.   Skin:     General: Skin is warm.      Capillary Refill: Capillary refill takes less than 2 seconds.   Neurological:      General: No focal deficit present.      Mental Status: He is alert and oriented to person, place, and time. Mental status is at baseline.      Cranial Nerves: No cranial nerve deficit.      Sensory: No sensory deficit.      Motor: No weakness.      Coordination: Coordination normal.      Gait: Gait normal.      Deep Tendon Reflexes: Reflexes normal.   Psychiatric:         Mood and Affect: Mood normal.         Behavior: Behavior normal.         Thought Content: Thought content normal.         Judgment: Judgment normal.               Lab Results   Component Value Date    WBC 8.36 12/23/2024    HGB 13.8 12/23/2024    HCT 41.4 12/23/2024    MCV 92.6 12/23/2024     (H) 12/23/2024          Sodium   Date  Value Ref Range Status   12/23/2024 134 (L) 136 - 145 mmol/L Final     Potassium   Date Value Ref Range Status   12/23/2024 4.4 3.5 - 5.1 mmol/L Final     Chloride   Date Value Ref Range Status   12/23/2024 98 98 - 107 mmol/L Final     CO2   Date Value Ref Range Status   12/23/2024 26 22 - 29 mmol/L Final     Glucose   Date Value Ref Range Status   12/23/2024 137 (H) 74 - 100 mg/dL Final     BUN   Date Value Ref Range Status   12/23/2024 22 8 - 26 mg/dL Final     Creatinine   Date Value Ref Range Status   12/23/2024 1.18 0.72 - 1.25 mg/dL Final     Calcium   Date Value Ref Range Status   12/23/2024 9.3 8.4 - 10.2 mg/dL Final     Total Protein   Date Value Ref Range Status   12/23/2024 7.2 6.4 - 8.3 g/dL Final     Albumin   Date Value Ref Range Status   12/23/2024 3.7 3.5 - 5.0 g/dL Final     Bilirubin, Total   Date Value Ref Range Status   12/23/2024 0.3 <=1.5 mg/dL Final     Alk Phos   Date Value Ref Range Status   12/23/2024 102 40 - 150 U/L Final     AST   Date Value Ref Range Status   12/23/2024 19 5 - 34 U/L Final     ALT   Date Value Ref Range Status   12/23/2024 28 <=55 U/L Final     Anion Gap   Date Value Ref Range Status   12/23/2024 14 7 - 16 mmol/L Final     eGFR    Date Value Ref Range Status   05/06/2021 89       eGFR   Date Value Ref Range Status   06/08/2022 76 >=60 mL/min/1.73m² Final      X-Ray Chest PA And Lateral  Narrative: EXAMINATION:  XR CHEST PA AND LATERAL    CLINICAL HISTORY:  Pneumonia, unspecified organism    TECHNIQUE:  PA and lateral chest    COMPARISON:  05/06/2021, 01/01/2024, 10/18/2024, and 12/09/2024    FINDINGS:  The cardiac size is normal.  No acute infiltrates or pleural effusions are seen.  There is scoliosis with degenerative changes in the thoracic spine.  Old rib fractures are seen on the right.  Impression: No evidence of acute disease.    Place of service: [facility]    Electronically signed by: Fernanda  Pupa  Date:    12/23/2024  Time:    16:45     Procedures   Lab Results   Component Value Date    CHOL 147 10/17/2024    CHOL 143 08/23/2023    CHOL 128 12/22/2022     Lab Results   Component Value Date    HDL 32 (L) 10/17/2024    HDL 29 (L) 08/23/2023    HDL 32 (L) 12/22/2022     Lab Results   Component Value Date    LDLCALC 46 08/23/2023    LDLCALC 53 12/22/2022    LDLCALC 43 08/15/2022     Lab Results   Component Value Date    TRIG 436 (H) 10/17/2024    TRIG 338 (H) 08/23/2023    TRIG 215 (H) 12/22/2022     Lab Results   Component Value Date    CHOLHDL 4.6 10/17/2024    CHOLHDL 4.9 08/23/2023    CHOLHDL 4.0 12/22/2022      Lab Results   Component Value Date    HGBA1C 7.4 (H) 10/17/2024      Lab Results   Component Value Date    TSH 1.980 01/19/2024      Assessment and Plan (including Health Maintenance)      Problem List  Smart Sets  Document Outside HM   :    Plan:         Health Maintenance Due   Topic Date Due    TETANUS VACCINE  Never done    Pneumococcal Vaccines (Age 50+) (1 of 2 - PCV) Never done    Shingles Vaccine (1 of 2) Never done    Diabetes Urine Screening  01/19/2025    Eye Exam  02/07/2025       Problem List Items Addressed This Visit       Type 2 diabetes mellitus with hyperglycemia    Current Assessment & Plan     Patient currently is on insulin and he has noticed that his blood sugars have been dropping sometimes later in the day.  He does not have a freestyle Jc or Dexcom continue his glucose monitor.  Will fill his CGM freestyle Jc 3.  Discussed with him eating regularly.  Patient is to avoid carbohydrates as much as possible.  Follow-up in 1 month           Relevant Medications    blood-glucose sensor (FREESTYLE JC 3 SENSOR) Luisa    Mixed hyperlipidemia    Essential hypertension, benign - Primary    Current Assessment & Plan     Blood pressure currently well controlled.  No change in his medication but discussed with the patient that taking Adderall could raise his pressures.   Advised the patient that he has a blood pressure cuff at home.  Follow-up every 3 months.         Relevant Orders    Comprehensive Metabolic Panel    CBC Auto Differential    TSH    T4    Depression    Current Assessment & Plan     Major depression which is currently controlled with citalopram.  Patient has no suicidal or homicidal ideation.  He is having difficulty with his sleep apnea likely is contributing to some of his fatigue and shortness in breath.         Adult attention deficit disorder    Relevant Medications    dextroamphetamine-amphetamine (ADDERALL) 20 mg tablet    dextroamphetamine-amphetamine (ADDERALL) 30 mg Tab    Long-term current use of stimulant    Current Assessment & Plan     Patient has been diagnosed in the past with adult attention deficit disorder.  He has been taking Adderall for a considerable amount of time.  Refilled his medications today side effects of the medicine explained.  Patient will need a drug screen at his next visit.  Follow-up in 1 month.         Relevant Medications    dextroamphetamine-amphetamine (ADDERALL) 20 mg tablet    dextroamphetamine-amphetamine (ADDERALL) 30 mg Tab    Neuropathy    Relevant Medications    gabapentin (NEURONTIN) 300 MG capsule    Obstructive sleep apnea syndrome    Current Assessment & Plan     Sleep apnea is controlled however patient has problems with his machine at times.  Have discussed with him that fatigue is frequently associated with not sleeping well.  He does admit to not using his mask all the time.  Again encouraged him to use his CPAP machine whenever sleeping.         Shortness of breath    Current Assessment & Plan     Shortness of breath likely due to COPD.  Will continue the patient on albuterol treatments every 4 with a MDI.  Follow-up in 1 month         Postviral fatigue syndrome    Current Assessment & Plan     Patient is status post pneumoniae a proximally 3-4 weeks ago.  He has had persistent shortness breath and fatigue or  weakness since then.  He is still coughing but has no sputum production.  He states he gets short of breath after about 20 ft.  Albuterol treatments every 4 hours.  Discussed with the patient the post viral.  Will check CBC and a CMP on him.  Follow-up in 2 weeks            Health Maintenance Topics with due status: Not Due       Topic Last Completion Date    Foot Exam 06/06/2024    Colorectal Cancer Screening 10/10/2024    Lipid Panel 10/17/2024    Hemoglobin A1c 10/17/2024    Low Dose Statin 12/23/2024    RSV Vaccine (Age 60+ and Pregnant patients) Not Due       Future Appointments   Date Time Provider Department Center   1/22/2025  3:00 PM Julio Cruz,  [facility]   2/14/2025  8:00 AM Julio Cruz DO [facility]   3/10/2025  9:30 AM [facility] GI ROOM 03 [facility]        Follow up in about 2 weeks (around 1/17/2025).     Signature:  DO [facility]  [id number] [place]  [address]    Date of encounter: 1/3/25

## 2025-01-03 NOTE — ASSESSMENT & PLAN NOTE
Shortness of breath likely due to COPD.  Will continue the patient on albuterol treatments every 4 with a MDI.  Follow-up in 1 month

## 2025-01-03 NOTE — ASSESSMENT & PLAN NOTE
Patient currently is on insulin and he has noticed that his blood sugars have been dropping sometimes later in the day.  He does not have a freestyle Jc or Dexcom continue his glucose monitor.  Will fill his CGM freestyle Jc 3.  Discussed with him eating regularly.  Patient is to avoid carbohydrates as much as possible.  Follow-up in 1 month

## 2025-01-03 NOTE — ASSESSMENT & PLAN NOTE
Sleep apnea is controlled however patient has problems with his machine at times.  Have discussed with him that fatigue is frequently associated with not sleeping well.  He does admit to not using his mask all the time.  Again encouraged him to use his CPAP machine whenever sleeping.

## 2025-01-06 NOTE — TELEPHONE ENCOUNTER
"Called and discussed with pt and wife. He can not afford the $50 that it cost to get the CGM patches. Would "like to just still with finger sticks but need refills"   "

## 2025-01-06 NOTE — TELEPHONE ENCOUNTER
----- Message from Brandi sent at 1/6/2025  1:44 PM CST -----  Pt said that his diabetic patch was 50.00 copay and they could not get it and wants to know if their is something else they can get.     PT # [phone]

## 2025-01-09 RX ORDER — INSULIN PUMP SYRINGE, 3 ML
EACH MISCELLANEOUS
Qty: 1 EACH | Refills: 0 | Status: SHIPPED | OUTPATIENT
Start: 2025-01-09 | End: 2026-01-09

## 2025-01-09 NOTE — TELEPHONE ENCOUNTER
----- Message from Steffi sent at 1/8/2025 10:26 AM CST -----  Patient called and said he needs a new sugar meter. Said that they did not have the copay for the sensor so he needs a new meter

## 2025-01-23 ENCOUNTER — TELEPHONE (OUTPATIENT)
Dept: FAMILY MEDICINE | Facility: CLINIC | Age: 60
End: 2025-01-23
Payer: COMMERCIAL

## 2025-01-23 NOTE — TELEPHONE ENCOUNTER
Call received from [facility]. Pt has been taking Novolog 70/30 20 U BID. At last MD visit, Novolin 70/30 Flexpen was sent to pharmacy, with a dose of 30 u Bid. I cannot find in the chart that the Novolog was discontinued or if MD wants pt to take both insulins. When  gets back next week, I will ask him what he wants to do.

## 2025-02-05 DIAGNOSIS — F32.A DEPRESSION, UNSPECIFIED DEPRESSION TYPE: ICD-10-CM

## 2025-02-10 ENCOUNTER — HOSPITAL ENCOUNTER (EMERGENCY)
Facility: HOSPITAL | Age: 60
Discharge: HOME | End: 2025-02-10
Payer: COMMERCIAL

## 2025-02-10 VITALS
WEIGHT: 200 LBS | TEMPERATURE: 98 F | HEART RATE: 64 BPM | DIASTOLIC BLOOD PRESSURE: 69 MMHG | RESPIRATION RATE: 16 BRPM | SYSTOLIC BLOOD PRESSURE: 109 MMHG | OXYGEN SATURATION: 97 % | BODY MASS INDEX: 32.14 KG/M2 | HEIGHT: 66 IN

## 2025-02-10 DIAGNOSIS — R60.9 SWELLING: ICD-10-CM

## 2025-02-10 DIAGNOSIS — T14.8XXA PUNCTURE WOUND: Primary | ICD-10-CM

## 2025-02-10 DIAGNOSIS — T14.90XA TRAUMA: ICD-10-CM

## 2025-02-10 LAB
ALBUMIN SERPL BCP-MCNC: 3.2 G/DL (ref 3.4–4.8)
ALBUMIN/GLOB SERPL: 0.9 {RATIO}
ALP SERPL-CCNC: 83 U/L (ref 40–150)
ALT SERPL W P-5'-P-CCNC: 11 U/L
ANION GAP SERPL CALCULATED.3IONS-SCNC: 17 MMOL/L (ref 7–16)
AST SERPL W P-5'-P-CCNC: 14 U/L (ref 5–34)
BASOPHILS # BLD AUTO: 0.02 K/UL (ref 0–0.2)
BASOPHILS NFR BLD AUTO: 0.4 % (ref 0–1)
BILIRUB SERPL-MCNC: 0.5 MG/DL
BUN SERPL-MCNC: 14 MG/DL (ref 8–26)
BUN/CREAT SERPL: 12 (ref 6–20)
CALCIUM SERPL-MCNC: 10 MG/DL (ref 8.8–10)
CHLORIDE SERPL-SCNC: 98 MMOL/L (ref 98–107)
CO2 SERPL-SCNC: 28 MMOL/L (ref 23–31)
CREAT SERPL-MCNC: 1.19 MG/DL (ref 0.72–1.25)
DIFFERENTIAL METHOD BLD: ABNORMAL
EGFR (NO RACE VARIABLE) (RUSH/TITUS): 70 ML/MIN/1.73M2
EOSINOPHIL # BLD AUTO: 0.32 K/UL (ref 0–0.5)
EOSINOPHIL NFR BLD AUTO: 6.1 % (ref 1–4)
ERYTHROCYTE [DISTWIDTH] IN BLOOD BY AUTOMATED COUNT: 13 % (ref 11.5–14.5)
GLOBULIN SER-MCNC: 3.7 G/DL (ref 2–4)
GLUCOSE SERPL-MCNC: 199 MG/DL (ref 82–115)
HCT VFR BLD AUTO: 37.9 % (ref 40–54)
HGB BLD-MCNC: 12.4 G/DL (ref 13.5–18)
LYMPHOCYTES # BLD AUTO: 1.39 K/UL (ref 1–4.8)
LYMPHOCYTES NFR BLD AUTO: 26.3 % (ref 27–41)
MCH RBC QN AUTO: 30.9 PG (ref 27–31)
MCHC RBC AUTO-ENTMCNC: 32.7 G/DL (ref 32–36)
MCV RBC AUTO: 94.5 FL (ref 80–96)
MONOCYTES # BLD AUTO: 0.48 K/UL (ref 0–0.8)
MONOCYTES NFR BLD AUTO: 9.1 % (ref 2–6)
MPC BLD CALC-MCNC: 9 FL (ref 9.4–12.4)
NEUTROPHILS # BLD AUTO: 3.07 K/UL (ref 1.8–7.7)
NEUTROPHILS NFR BLD AUTO: 58.1 % (ref 53–65)
PLATELET # BLD AUTO: 295 K/UL (ref 150–400)
POTASSIUM SERPL-SCNC: 3.4 MMOL/L (ref 3.5–5.1)
PROT SERPL-MCNC: 6.9 G/DL (ref 5.8–7.6)
RBC # BLD AUTO: 4.01 M/UL (ref 4.6–6.2)
SODIUM SERPL-SCNC: 140 MMOL/L (ref 136–145)
WBC # BLD AUTO: 5.28 K/UL (ref 4.5–11)

## 2025-02-10 PROCEDURE — 63600175 PHARM REV CODE 636 W HCPCS: Performed by: NURSE PRACTITIONER

## 2025-02-10 PROCEDURE — 80053 COMPREHEN METABOLIC PANEL: CPT | Performed by: NURSE PRACTITIONER

## 2025-02-10 PROCEDURE — 90715 TDAP VACCINE 7 YRS/> IM: CPT | Performed by: NURSE PRACTITIONER

## 2025-02-10 PROCEDURE — 36415 COLL VENOUS BLD VENIPUNCTURE: CPT | Performed by: NURSE PRACTITIONER

## 2025-02-10 PROCEDURE — 96372 THER/PROPH/DIAG INJ SC/IM: CPT | Performed by: NURSE PRACTITIONER

## 2025-02-10 PROCEDURE — 85025 COMPLETE CBC W/AUTO DIFF WBC: CPT | Performed by: NURSE PRACTITIONER

## 2025-02-10 PROCEDURE — 90471 IMMUNIZATION ADMIN: CPT | Performed by: NURSE PRACTITIONER

## 2025-02-10 PROCEDURE — 99284 EMERGENCY DEPT VISIT MOD MDM: CPT | Mod: ,,, | Performed by: NURSE PRACTITIONER

## 2025-02-10 PROCEDURE — 99284 EMERGENCY DEPT VISIT MOD MDM: CPT | Mod: 25

## 2025-02-10 RX ORDER — CEFTRIAXONE 1 G/1
1 INJECTION, POWDER, FOR SOLUTION INTRAMUSCULAR; INTRAVENOUS ONCE
Status: COMPLETED | OUTPATIENT
Start: 2025-02-10 | End: 2025-02-10

## 2025-02-10 RX ORDER — CEFTRIAXONE 1 G/1
1 INJECTION, POWDER, FOR SOLUTION INTRAMUSCULAR; INTRAVENOUS
Status: DISCONTINUED | OUTPATIENT
Start: 2025-02-10 | End: 2025-02-10

## 2025-02-10 RX ORDER — LIDOCAINE HYDROCHLORIDE 10 MG/ML
2.1 INJECTION, SOLUTION INFILTRATION; PERINEURAL ONCE
Status: COMPLETED | OUTPATIENT
Start: 2025-02-10 | End: 2025-02-10

## 2025-02-10 RX ORDER — MUPIROCIN 20 MG/G
OINTMENT TOPICAL 2 TIMES DAILY
Qty: 22 G | Refills: 0 | Status: SHIPPED | OUTPATIENT
Start: 2025-02-10 | End: 2025-02-17

## 2025-02-10 RX ORDER — SULFAMETHOXAZOLE AND TRIMETHOPRIM 800; 160 MG/1; MG/1
1 TABLET ORAL 2 TIMES DAILY
Qty: 20 TABLET | Refills: 0 | Status: SHIPPED | OUTPATIENT
Start: 2025-02-10 | End: 2025-02-20

## 2025-02-10 RX ADMIN — CEFTRIAXONE SODIUM 1 G: 1 INJECTION, POWDER, FOR SOLUTION INTRAMUSCULAR; INTRAVENOUS at 09:02

## 2025-02-10 RX ADMIN — LIDOCAINE HYDROCHLORIDE 2.1 ML: 10 INJECTION, SOLUTION INFILTRATION; PERINEURAL at 09:02

## 2025-02-10 RX ADMIN — CLOSTRIDIUM TETANI TOXOID ANTIGEN (FORMALDEHYDE INACTIVATED), CORYNEBACTERIUM DIPHTHERIAE TOXOID ANTIGEN (FORMALDEHYDE INACTIVATED), BORDETELLA PERTUSSIS TOXOID ANTIGEN (GLUTARALDEHYDE INACTIVATED), BORDETELLA PERTUSSIS FILAMENTOUS HEMAGGLUTININ ANTIGEN (FORMALDEHYDE INACTIVATED), BORDETELLA PERTUSSIS PERTACTIN ANTIGEN, AND BORDETELLA PERTUSSIS FIMBRIAE 2/3 ANTIGEN 0.5 ML: 5; 2; 2.5; 5; 3; 5 INJECTION, SUSPENSION INTRAMUSCULAR at 08:02

## 2025-02-10 NOTE — ED PROVIDER NOTES
Encounter Date: 2/10/2025       History     Chief Complaint   Patient presents with    Puncture Wound     61 y/o male arrived to the ED via POV with complaint of puncture wound to left foot that occurred on Friday when stepped on a tack. Rates pain 9/10. Has been taking Tylenol for pain with minimal relief. Denies fever, redness or drainage at puncture wound site. Patient does have diabetes. A1C 6.7% on 7/17/24.      The history is provided by the patient.     Review of patient's allergies indicates:   Allergen Reactions    Azithromycin     Erythromycin Other (See Comments)    M-mycin      Past Medical History:   Diagnosis Date    ADHD     Adult attention deficit disorder 03/22/2021    Depression 12/22/2022    Essential hypertension 03/22/2021    Hyperlipidemia     Neuropathy 08/23/2023    AC (obstructive sleep apnea) 10/19/2023    PAD (peripheral artery disease) 02/17/2023    Swollen leg 01/20/2024    Type 2 diabetes mellitus      Past Surgical History:   Procedure Laterality Date    APPENDECTOMY      CARDIAC CATHETERIZATION      COLONOSCOPY  10/09/2024    DEBRIDEMENT OF LOWER EXTREMITY Bilateral 02/18/2023    Procedure: DEBRIDEMENT, LOWER EXTREMITY;  Surgeon: Yael Cohen MD;  Location: [facility] OR;  Service: General;  Laterality: Bilateral;  debride toes    FINGER AMPUTATION Right     1st digit    KIDNEY SURGERY Right     Patient states right kidney removed at age 5.    LEFT HEART CATHETERIZATION N/A 05/07/2021    Procedure: Left heart cath with possible intervention;  Surgeon: Federico James DO;  Location: [facility] CATH LAB;  Service: Cardiology;  Laterality: N/A;    TOE AMPUTATION Right 03/29/2023    Procedure: AMPUTATION, TOE;  Surgeon: Yael Cohen MD;  Location: [facility] OR;  Service: General;  Laterality: Right;  Right great toe amp dr cohen wednesday     Family History   Problem Relation Name Age of Onset    Hypertension Father      Stroke Father      Cancer Sister       Social History      Tobacco Use    Smoking status: Former     Current packs/day: 0.00     Average packs/day: 5.0 packs/day for 4.0 years (20.0 ttl pk-yrs)     Types: Cigarettes     Start date:      Quit date:      Years since quittin.1     Passive exposure: Past    Smokeless tobacco: Current     Types: Chew, Snuff    Tobacco comments:     1 can/day   Substance Use Topics    Alcohol use: Not Currently     Comment: 2-3 drinks per/3 months    Drug use: Yes     Types: Amphetamines     Comment: prescibed adderal     Review of Systems   Musculoskeletal:  Positive for gait problem. Negative for arthralgias and joint swelling.   Skin:  Positive for wound (left foot plantar puncture wound).   All other systems reviewed and are negative.      Physical Exam     Initial Vitals [02/10/25 0834]   BP Pulse Resp Temp SpO2   109/69 64 16 97.9 °F (36.6 °C) 97 %      MAP       --         Physical Exam    Nursing note and vitals reviewed.  Constitutional: Vital signs are normal. He appears well-developed and well-nourished. He is cooperative.  Non-toxic appearance. He does not have a sickly appearance. He does not appear ill. No distress.   Cardiovascular:  Normal rate, regular rhythm, normal heart sounds, intact distal pulses and normal pulses.           Pulses:       Dorsalis pedis pulses are 2+ on the right side and 2+ on the left side.   Pulmonary/Chest: Effort normal and breath sounds normal.     Neurological: He is alert.   Skin: Skin is warm and dry. Capillary refill takes less than 2 seconds.        Psychiatric: He has a normal mood and affect. His speech is normal and behavior is normal. Judgment normal.         Medical Screening Exam   See Full Note    ED Course   Procedures  Labs Reviewed   COMPREHENSIVE METABOLIC PANEL - Abnormal       Result Value    Sodium 140      Potassium 3.4 (*)     Chloride 98      CO2 28      Anion Gap 17 (*)     Glucose 199 (*)     BUN 14      Creatinine 1.19      BUN/Creatinine Ratio 12      Calcium  10.0      Total Protein 6.9      Albumin 3.2 (*)     Globulin 3.7      A/G Ratio 0.9      Bilirubin, Total 0.5      Alk Phos 83      ALT 11      AST 14      eGFR 70     CBC WITH DIFFERENTIAL - Abnormal    WBC 5.28      RBC 4.01 (*)     Hemoglobin 12.4 (*)     Hematocrit 37.9 (*)     MCV 94.5      MCH 30.9      MCHC 32.7      RDW 13.0      Platelet Count 295      MPV 9.0 (*)     Neutrophils % 58.1      Lymphocytes % 26.3 (*)     Neutrophils, Abs 3.07      Lymphocytes, Absolute 1.39      Diff Type Auto      Monocytes % 9.1 (*)     Eosinophils % 6.1 (*)     Basophils % 0.4      Monocytes, Absolute 0.48      Eosinophils, Absolute 0.32      Basophils, Absolute 0.02     CBC W/ AUTO DIFFERENTIAL    Narrative:     The following orders were created for panel order CBC auto differential.  Procedure                               Abnormality         Status                     ---------                               -----------         ------                     CBC with Differential[[id number]]       Abnormal            Final result                 Please view results for these tests on the individual orders.          Imaging Results              X-Ray Foot Complete Left (Final result)  Result time 02/10/25 09:29:42      Final result by Keenan Fuller MD (02/10/25 09:29:42)                   Impression:      No convincing evidence of acute fracture or dislocation.  No definite radiopaque foreign body.      Electronically signed by: Keenan Fuller  Date:    02/10/2025  Time:    09:29               Narrative:    EXAMINATION:  XR FOOT COMPLETE 3 VIEW LEFT    CLINICAL HISTORY:  .  Edema, unspecified    TECHNIQUE:  AP, lateral and oblique views of the left foot were performed.    COMPARISON:  None    FINDINGS:  Bandage/dressing material over the midfoot and forefoot somewhat limits assessment of fine bony detail.    Noting this, no convincing evidence of acute displaced fracture or dislocation.  Lisfranc joint congruent.  Joint  spaces appear maintained.  No definite radiopaque foreign body.                                       Medications   Tdap vaccine injection 0.5 mL (0.5 mLs Intramuscular Given 2/10/25 6843)   cefTRIAXone injection 1 g (1 g Intramuscular Given 2/10/25 5311)   LIDOcaine HCL 10 mg/ml (1%) injection 2.1 mL (2.1 mLs Intramuscular Given 2/10/25 7596)     Medical Decision Making  59 y/o male arrived to the ED via POV with complaint of puncture wound to left foot that occurred on Friday when stepped on a tack. Rates pain 9/10. Has been taking Tylenol for pain with minimal relief. Denies fever, redness or drainage at puncture wound site. Patient does have diabetes. A1C 6.7% on 7/17/24.      Problems Addressed:  Puncture wound: acute illness or injury     Details: Left foot puncture wound. Rocephin 1 gm give IM. Tdap given IM. RX for Doxycycline 100 mg and Mupirocin ointment sent to pharmacy. Reviewed discharge instructions with patient. Detailed strict return precautions. He agreed to treatment plan and verbalized understanding.    Amount and/or Complexity of Data Reviewed  Labs: ordered. Decision-making details documented in ED Course.  Radiology: ordered. Decision-making details documented in ED Course.     Details: Left foot x-ray    Risk  Prescription drug management.                                      Clinical Impression:   Final diagnoses:  [T14.8XXA] Puncture wound (Primary)        ED Disposition Condition    Discharge Stable          ED Prescriptions       Medication Sig Dispense Start Date End Date Auth. Provider    mupirocin (BACTROBAN) 2 % ointment Apply topically 2 (two) times daily. Apply to bottom of left foot puncture wound for 7 days 22 g 2/10/2025 2/17/2025 Smita Sanchez FNP    sulfamethoxazole-trimethoprim 800-160mg (BACTRIM DS) 800-160 mg Tab Take 1 tablet by mouth 2 (two) times daily. for 10 days 20 tablet 2/10/2025 2/20/2025 Smita Sanchez FNP          Follow-up Information       Follow up  With Specialties Details Why Contact Info    Nydia Martines, [facility] Go on 2/14/2025 at 10:40 am for follow up with primary care provider [address] [place] [address] [place] [id number]  [phone]               Smita Sanchez, [facility]  02/10/25 1003

## 2025-02-10 NOTE — DISCHARGE INSTRUCTIONS
Shower only, NO tub baths. Do not soak foot in water.  -Cleanse wounds with plain dial soap, warm water, rinse well and pat dry.   -Apply thin layer to open wound twice a day and cover with band aid x 7 days.  -Take Bactrim DS by mouth twice a day x 10 days  -Monitor for redness, drainage and swelling. If symptoms occur follow up with primary care provider sooner.   -Take Motrin 200 mg 3 tabs by mouth with food every 6 hours as needed for pain.   -Elevate left foot to help with pain.

## 2025-02-11 ENCOUNTER — TELEPHONE (OUTPATIENT)
Dept: EMERGENCY MEDICINE | Facility: HOSPITAL | Age: 60
End: 2025-02-11
Payer: COMMERCIAL

## 2025-02-14 ENCOUNTER — OFFICE VISIT (OUTPATIENT)
Dept: FAMILY MEDICINE | Facility: CLINIC | Age: 60
End: 2025-02-14
Payer: COMMERCIAL

## 2025-02-14 VITALS
HEART RATE: 65 BPM | WEIGHT: 196 LBS | SYSTOLIC BLOOD PRESSURE: 113 MMHG | OXYGEN SATURATION: 96 % | RESPIRATION RATE: 18 BRPM | BODY MASS INDEX: 31.5 KG/M2 | TEMPERATURE: 98 F | DIASTOLIC BLOOD PRESSURE: 73 MMHG | HEIGHT: 66 IN

## 2025-02-14 DIAGNOSIS — F98.8 ADULT ATTENTION DEFICIT DISORDER: Chronic | ICD-10-CM

## 2025-02-14 DIAGNOSIS — T14.8XXA PUNCTURE WOUND: Primary | ICD-10-CM

## 2025-02-14 DIAGNOSIS — Z79.899 LONG-TERM CURRENT USE OF STIMULANT: ICD-10-CM

## 2025-02-14 DIAGNOSIS — E11.9 TYPE 2 DIABETES MELLITUS WITHOUT COMPLICATION, WITHOUT LONG-TERM CURRENT USE OF INSULIN: ICD-10-CM

## 2025-02-14 LAB
CREAT UR-MCNC: 91 MG/DL (ref 23–375)
EST. AVERAGE GLUCOSE BLD GHB EST-MCNC: 160 MG/DL
HBA1C MFR BLD HPLC: 7.2 %
MICROALBUMIN UR-MCNC: <0.5 MG/DL
MICROALBUMIN/CREAT RATIO PNL UR: NORMAL

## 2025-02-14 PROCEDURE — 82570 ASSAY OF URINE CREATININE: CPT | Mod: ,,, | Performed by: CLINICAL MEDICAL LABORATORY

## 2025-02-14 PROCEDURE — 83036 HEMOGLOBIN GLYCOSYLATED A1C: CPT | Mod: ,,, | Performed by: CLINICAL MEDICAL LABORATORY

## 2025-02-14 PROCEDURE — 82043 UR ALBUMIN QUANTITATIVE: CPT | Mod: ,,, | Performed by: CLINICAL MEDICAL LABORATORY

## 2025-02-14 RX ORDER — GLYBURIDE 5 MG/1
5 TABLET ORAL
Qty: 90 TABLET | Refills: 1 | Status: SHIPPED | OUTPATIENT
Start: 2025-02-14

## 2025-02-14 RX ORDER — DEXTROAMPHETAMINE SACCHARATE, AMPHETAMINE ASPARTATE, DEXTROAMPHETAMINE SULFATE AND AMPHETAMINE SULFATE 7.5; 7.5; 7.5; 7.5 MG/1; MG/1; MG/1; MG/1
1 TABLET ORAL EVERY MORNING
Qty: 30 TABLET | Refills: 0 | Status: SHIPPED | OUTPATIENT
Start: 2025-02-14

## 2025-02-14 RX ORDER — METFORMIN HYDROCHLORIDE 1000 MG/1
1000 TABLET ORAL 2 TIMES DAILY WITH MEALS
Qty: 180 TABLET | Refills: 1 | Status: SHIPPED | OUTPATIENT
Start: 2025-02-14

## 2025-02-14 RX ORDER — DEXTROAMPHETAMINE SACCHARATE, AMPHETAMINE ASPARTATE, DEXTROAMPHETAMINE SULFATE AND AMPHETAMINE SULFATE 7.5; 7.5; 7.5; 7.5 MG/1; MG/1; MG/1; MG/1
TABLET ORAL
Qty: 30 TABLET | Refills: 0 | Status: SHIPPED | OUTPATIENT
Start: 2025-02-14

## 2025-02-14 RX ORDER — DEXTROAMPHETAMINE SACCHARATE, AMPHETAMINE ASPARTATE, DEXTROAMPHETAMINE SULFATE AND AMPHETAMINE SULFATE 5; 5; 5; 5 MG/1; MG/1; MG/1; MG/1
TABLET ORAL
Qty: 30 TABLET | Refills: 0 | Status: SHIPPED | OUTPATIENT
Start: 2025-02-14

## 2025-02-14 NOTE — PROGRESS NOTES
REMEDIOS Orr   [facility]  [address]  [place] [address]     PATIENT NAME: Syed Kelley  : 1965  DATE: 25  MRN: [id number]      Billing Provider: REMEDIOS Orr  Level of Service:   Patient PCP Information       Provider PCP Type    Julio Cruz DO General            Reason for Visit / Chief Complaint: Transitional Care (Mr. Carias is a 59 yo male presenting today for a ER follow up from 2/10/25 for stepping on a tack with left foot. States that it hasn't been drainage.) and Medication Refill (States he needs refills on metformin, both adderall, and glyburide. Also needs strips for his machine. )   Health Maintenance Due   Topic Date Due    Pneumococcal Vaccines (Age 50+) (1 of 2 - PCV) Never done    Shingles Vaccine (1 of 2) Never done    Diabetes Urine Screening  2025    RSV Vaccine (Age 60+ and Pregnant patients) (1 - Risk 60-74 years 1-dose series) Never done    Diabetic Eye Exam  2025          Update PCP  Update Chief Complaint         History of Present Illness / Problem Focused Workflow     History of Present Illness    CHIEF COMPLAINT:  Patient presents today for follow up of foot wound. He was seen at the ER on 2/10/25 due to a tack that had been in his foot for a week. Wife states she thinks area looks better to her, but he does still have an open wound. He is currently on abx for this. They have been keeping clean and applying abx ointment to area. Pt is diabetic and is needing refills on some of his meds. He is due for A1C and urine.     HISTORY OF PRESENT ILLNESS:  He reports a foot wound present for approximately one week prior to seeking medical attention. Initially sought care via ambulance due to transportation issues, followed by an ER visit the next day. The wound is reported to be less purulent, though he does not perceive overall improvement. Pt unable to feel on the bottom of his feet.     MEDICATIONS:  He requires refills for metformin,  glyburide, and Adderall.    VISION CARE:  He has not had an eye exam in 1-2 years. He declines at this time and he does not have vision insurance.          Hemoglobin A1C   Date Value Ref Range Status   10/17/2024 7.4 (H) 4.5 - 6.6 % Final     Comment:       Normal:               <5.7%  Pre-Diabetic:       5.7% to 6.4%  Diabetic:             >6.4%  Diabetic Goal:     <7%   07/17/2024 6.7 (H) 4.5 - 6.6 % Final     Comment:       Normal:               <5.7%  Pre-Diabetic:       5.7% to 6.4%  Diabetic:             >6.4%  Diabetic Goal:     <7%   04/19/2024 6.8 (H) 4.5 - 6.6 % Final     Comment:       Normal:               <5.7%  Pre-Diabetic:       5.7% to 6.4%  Diabetic:             >6.4%  Diabetic Goal:     <7%        CMP  Sodium   Date Value Ref Range Status   02/10/2025 140 136 - 145 mmol/L Final     Potassium   Date Value Ref Range Status   02/10/2025 3.4 (L) 3.5 - 5.1 mmol/L Final     Chloride   Date Value Ref Range Status   02/10/2025 98 98 - 107 mmol/L Final     CO2   Date Value Ref Range Status   02/10/2025 28 23 - 31 mmol/L Final     Glucose   Date Value Ref Range Status   02/10/2025 199 (H) 82 - 115 mg/dL Final     BUN   Date Value Ref Range Status   02/10/2025 14 8 - 26 mg/dL Final     Creatinine   Date Value Ref Range Status   02/10/2025 1.19 0.72 - 1.25 mg/dL Final     Calcium   Date Value Ref Range Status   02/10/2025 10.0 8.8 - 10.0 mg/dL Final     Total Protein   Date Value Ref Range Status   02/10/2025 6.9 5.8 - 7.6 g/dL Final     Albumin   Date Value Ref Range Status   02/10/2025 3.2 (L) 3.4 - 4.8 g/dL Final     Bilirubin, Total   Date Value Ref Range Status   02/10/2025 0.5 <=1.5 mg/dL Final     Alk Phos   Date Value Ref Range Status   02/10/2025 83 40 - 150 U/L Final     AST   Date Value Ref Range Status   02/10/2025 14 5 - 34 U/L Final     ALT   Date Value Ref Range Status   02/10/2025 11 <=55 U/L Final     Anion Gap   Date Value Ref Range Status   02/10/2025 17 (H) 7 - 16 mmol/L Final     eGFR    Date Value Ref Range Status   02/10/2025 70 >=60 mL/min/1.73m2 Final     Comment:     Estimated GFR calculated using the CKD-EPI creatinine (2021) equation.        Lab Results   Component Value Date    WBC 5.28 02/10/2025    RBC 4.01 (L) 02/10/2025    HGB 12.4 (L) 02/10/2025    HCT 37.9 (L) 02/10/2025    MCV 94.5 02/10/2025    MCH 30.9 02/10/2025    MCHC 32.7 02/10/2025    RDW 13.0 02/10/2025     02/10/2025    MPV 9.0 (L) 02/10/2025    LYMPH 26.3 (L) 02/10/2025    LYMPH 1.39 02/10/2025    MONO 9.1 (H) 02/10/2025    EOS 0.32 02/10/2025    BASO 0.02 02/10/2025    EOSINOPHIL 6.1 (H) 02/10/2025    BASOPHIL 0.4 02/10/2025        Lab Results   Component Value Date    CHOL 147 10/17/2024    CHOL 143 08/23/2023    CHOL 128 12/22/2022     Lab Results   Component Value Date    HDL 32 (L) 10/17/2024    HDL 29 (L) 08/23/2023    HDL 32 (L) 12/22/2022     Lab Results   Component Value Date    LDLCALC 46 08/23/2023    LDLCALC 53 12/22/2022    LDLCALC 43 08/15/2022     Lab Results   Component Value Date    TRIG 436 (H) 10/17/2024    TRIG 338 (H) 08/23/2023    TRIG 215 (H) 12/22/2022     Lab Results   Component Value Date    CHOLHDL 4.6 10/17/2024    CHOLHDL 4.9 08/23/2023    CHOLHDL 4.0 12/22/2022        Wt Readings from Last 3 Encounters:   02/14/25 0956 88.9 kg (196 lb)   02/10/25 0834 90.7 kg (200 lb)   01/03/25 0949 86.4 kg (190 lb 6.4 oz)        BP Readings from Last 3 Encounters:   02/14/25 113/73   02/10/25 109/69   01/03/25 120/68        Review of Systems     Review of Systems       Medical / Social / Family History     Past Medical History:   Diagnosis Date    ADHD     Adult attention deficit disorder 03/22/2021    Depression 12/22/2022    Essential hypertension 03/22/2021    Hyperlipidemia     Neuropathy 08/23/2023    AC (obstructive sleep apnea) 10/19/2023    PAD (peripheral artery disease) 02/17/2023    Swollen leg 01/20/2024    Type 2 diabetes mellitus        Past Surgical History:   Procedure Laterality Date     APPENDECTOMY      CARDIAC CATHETERIZATION      COLONOSCOPY  10/09/2024    DEBRIDEMENT OF LOWER EXTREMITY Bilateral 02/18/2023    Procedure: DEBRIDEMENT, LOWER EXTREMITY;  Surgeon: Yael Cohen MD;  Location: [facility];  Service: General;  Laterality: Bilateral;  debride toes    FINGER AMPUTATION Right     1st digit    KIDNEY SURGERY Right     Patient states right kidney removed at age 5.    LEFT HEART CATHETERIZATION N/A 05/07/2021    Procedure: Left heart cath with possible intervention;  Surgeon: Federico James DO;  Location: [facility] CATH LAB;  Service: Cardiology;  Laterality: N/A;    TOE AMPUTATION Right 03/29/2023    Procedure: AMPUTATION, TOE;  Surgeon: Yael Cohen MD;  Location: [facility] OR;  Service: General;  Laterality: Right;  Right great toe amp dr cohen wednesday       Social History    reports that he quit smoking about 40 years ago. His smoking use included cigarettes. He started smoking about 44 years ago. He has a 20 pack-year smoking history. He has been exposed to tobacco smoke. His smokeless tobacco use includes chew and snuff. He reports that he does not currently use alcohol. He reports current drug use. Drug: Amphetamines.    Family History  's family history includes Cancer in his sister; Hypertension in his father; Stroke in his father.    Medications and Allergies     Medications  Outpatient Medications Marked as Taking for the 2/14/25 encounter (Office Visit) with Nydia Martines FNP   Medication Sig Dispense Refill    aspirin (ECOTRIN) 81 MG EC tablet Take 1 tablet (81 mg total) by mouth once daily. 90 tablet 3    atorvastatin (LIPITOR) 80 MG tablet Take 1 tablet (80 mg total) by mouth once daily. 90 tablet 1    blood sugar diagnostic Strp 1 strip by Misc.(Non-Drug; Combo Route) route 3 (three) times daily. 180 strip 3    blood-glucose meter kit Use as instructed 1 each 0    citalopram (CELEXA) 20 MG tablet Take 1 tablet (20 mg total) by mouth once daily. 90 tablet 1  "   gabapentin (NEURONTIN) 300 MG capsule Take 1 capsule (300 mg total) by mouth 3 (three) times daily. 90 capsule 1    HYDROcodone-acetaminophen (NORCO) 7.5-325 mg per tablet Take 1 tablet by mouth every 6 (six) hours as needed for Pain.      insulin asp prt-insulin aspart, NovoLOG 70/30, (NOVOLOG 70/30) 100 unit/mL (70-30) Soln Inject 20 Units into the skin 2 (two) times daily. 10 mL 11    insulin NPH/Reg human (NOVOLIN 70-30 FLEXPEN U-100) 100 unit/mL (70-30) InPn pen Inject 30 Units into the skin 2 (two) times daily. 10 mL 5    lisinopriL-hydrochlorothiazide (PRINZIDE,ZESTORETIC) 20-25 mg Tab Take 1 tablet by mouth once daily. 90 tablet 1    magnesium glycinate 100 mg magnesium Cap Take 1 tablet by mouth Daily. 30 capsule 2    metoprolol tartrate (LOPRESSOR) 50 MG tablet Take 1 tablet (50 mg total) by mouth 2 (two) times daily. 180 tablet 1    mupirocin (BACTROBAN) 2 % ointment Apply topically 2 (two) times daily. Apply to bottom of left foot puncture wound for 7 days 22 g 0    pen needle, diabetic (BD ULTRA-FINE SHORT PEN NEEDLE) 31 gauge x 5/16" Ndle USE AS DIRECTED TO administer insulin TWICE DAILY for 50      pen needle, diabetic 31 gauge x 5/16" Ndle 100 each by abdominal subcutaneous route once daily. Use to administer insulin twice daily 100 each 2    sulfamethoxazole-trimethoprim 800-160mg (BACTRIM DS) 800-160 mg Tab Take 1 tablet by mouth 2 (two) times daily. for 10 days 20 tablet 0    [DISCONTINUED] dextroamphetamine-amphetamine (ADDERALL) 20 mg tablet Take 1 tablet by mouth each afternoon 30 tablet 0    [DISCONTINUED] dextroamphetamine-amphetamine (ADDERALL) 30 mg Tab Take 1 tablet by mouth each morning 30 tablet 0    [DISCONTINUED] doxycycline (VIBRAMYCIN) 100 MG Cap Take 1 capsule (100 mg total) by mouth 2 (two) times daily. 20 capsule 0    [DISCONTINUED] glyBURIDE (DIABETA) 5 MG tablet Take 1 tablet (5 mg total) by mouth daily with breakfast. 90 tablet 1    [DISCONTINUED] insulin degludec (TRESIBA " "FLEXTOUCH U-200) 200 unit/mL (3 mL) insulin pen Inject 30 Units into the skin once daily. Increase by 5 units if blood glucose is elevated above 200. weekly 3 Pen 3    [DISCONTINUED] metFORMIN (GLUCOPHAGE) 1000 MG tablet Take 1 tablet (1,000 mg total) by mouth 2 (two) times daily with meals. 180 tablet 1       Allergies  Review of patient's allergies indicates:   Allergen Reactions    Azithromycin     Erythromycin Other (See Comments)    M-mycin        Physical Examination     Vitals:    02/14/25 0956   BP: 113/73   BP Location: Left arm   Patient Position: Sitting   Pulse: 65   Resp: 18   Temp: 98.2 °F (36.8 °C)   TempSrc: Oral   SpO2: 96%   Weight: 88.9 kg (196 lb)   Height: 5' 6" (1.676 m)      Physical Exam  Constitutional:       Appearance: Normal appearance. He is obese.   HENT:      Head: Normocephalic.   Eyes:      Extraocular Movements: Extraocular movements intact.   Cardiovascular:      Rate and Rhythm: Normal rate and regular rhythm.      Pulses: Normal pulses.      Heart sounds: Normal heart sounds.   Pulmonary:      Effort: Pulmonary effort is normal.      Breath sounds: Normal breath sounds.   Skin:     General: Skin is warm and dry.      Capillary Refill: Capillary refill takes less than 2 seconds.      Findings: Wound present.      Comments: See pic; wound measures 1X0.5X0.3CM   Neurological:      General: No focal deficit present.      Mental Status: He is alert and oriented to person, place, and time.   Psychiatric:         Mood and Affect: Mood normal.         Behavior: Behavior normal.          Assessment and Plan (including Health Maintenance)      Problem List  Smart Sets  Document Outside HM   :    Plan:     There are no Patient Instructions on file for this visit.       Health Maintenance Due   Topic Date Due    Pneumococcal Vaccines (Age 50+) (1 of 2 - PCV) Never done    Shingles Vaccine (1 of 2) Never done    Diabetes Urine Screening  01/19/2025    RSV Vaccine (Age 60+ and Pregnant " patients) (1 - Risk 60-74 years 1-dose series) Never done    Diabetic Eye Exam  02/07/2025       Problem List Items Addressed This Visit       Adult attention deficit disorder    Relevant Medications    dextroamphetamine-amphetamine (ADDERALL) 20 mg tablet    dextroamphetamine-amphetamine (ADDERALL) 30 mg Tab    Diabetes    Relevant Medications    glyBURIDE (DIABETA) 5 MG tablet    metFORMIN (GLUCOPHAGE) 1000 MG tablet    Other Relevant Orders    Hemoglobin A1C    Microalbumin/creatinine urine ratio    Long-term current use of stimulant    Relevant Medications    dextroamphetamine-amphetamine (ADDERALL) 20 mg tablet    dextroamphetamine-amphetamine (ADDERALL) 30 mg Tab    Other Relevant Orders    POCT Urine Drug Screen Presump    Puncture wound - Primary     Assessment & Plan    IMPRESSION:  - Assessed foot wound, likely requiring packing or further treatment due to depth and persistence  - Evaluated need for A1C testing due to lack of recent results  - Reviewed x-ray results    FOOT WOUND:  - Evaluated the patient's foot wound, which has been present for approximately 1 week and is deep enough to insert a Q-tip.  - Documented the wound with a photograph for medical records.  - Considered packing the wound and potential referral to wound care.  - Referred the patient to home health ([facility] [place] for weekly wound checks and management due to transportation issues.  - Instructed to follow up with home health for wound assessment and potential need for wound care referral if not improving.  - Planned to redress the wound and possibly pack it.  - Provider instructed wife on daily wound care for left foot wound: cleanse with normal saline, pat dry, pack with iodoform gauze loosely (cut to fit using clean scissors), cover with nonadherent dressing. Wife observed wound care and states she will be able to assist pt with wound care.   - Continue abx.     DIABETES:  - Ordered A1C test to assess glycemic control.  -  Ordered urine test for further evaluation.  - Inquired about the patient's ophthalmological care, which is crucial for diabetic patients.  - Continued metformin (Glucophage) for diabetes management.  - Refilled metformin (Glucophage) and Glyburide for long-term diabetes management.    TRANSPORTATION ISSUES:  - Evaluated the patient's transportation issues affecting their ability to access healthcare.  - Considered alternative care options due to the patient's transportation challenges.  - Recommend home health care as a solution to the patient's transportation issues.    ATTENTION DEFICIT HYPERACTIVITY DISORDER (ADHD):  - Refilled Adderall for ongoing ADHD management.        Puncture wound    Adult attention deficit disorder  -     dextroamphetamine-amphetamine (ADDERALL) 20 mg tablet; Take 1 tablet by mouth each afternoon  Dispense: 30 tablet; Refill: 0  -     dextroamphetamine-amphetamine (ADDERALL) 30 mg Tab; Take 1 tablet by mouth each morning  Dispense: 30 tablet; Refill: 0    Long-term current use of stimulant  -     dextroamphetamine-amphetamine (ADDERALL) 20 mg tablet; Take 1 tablet by mouth each afternoon  Dispense: 30 tablet; Refill: 0  -     dextroamphetamine-amphetamine (ADDERALL) 30 mg Tab; Take 1 tablet by mouth each morning  Dispense: 30 tablet; Refill: 0  -     POCT Urine Drug Screen Presump    Type 2 diabetes mellitus without complication, without long-term current use of insulin  -     glyBURIDE (DIABETA) 5 MG tablet; Take 1 tablet (5 mg total) by mouth daily with breakfast.  Dispense: 90 tablet; Refill: 1  -     metFORMIN (GLUCOPHAGE) 1000 MG tablet; Take 1 tablet (1,000 mg total) by mouth 2 (two) times daily with meals.  Dispense: 180 tablet; Refill: 1  -     Hemoglobin A1C; Future; Expected date: 02/14/2025  -     Microalbumin/creatinine urine ratio       Health Maintenance Topics with due status: Not Due       Topic Last Completion Date    Foot Exam 06/06/2024    Colorectal Cancer Screening  10/10/2024    Lipid Panel 10/17/2024    Hemoglobin A1c 10/17/2024    TETANUS VACCINE 02/10/2025    Low Dose Statin 02/14/2025         Future Appointments   Date Time Provider Department Center   3/10/2025  9:30 AM [facility] GI ROOM 03 [facility]        No follow-ups on file.    Health Maintenance Due   Topic Date Due    Pneumococcal Vaccines (Age 50+) (1 of 2 - PCV) Never done    Shingles Vaccine (1 of 2) Never done    Diabetes Urine Screening  01/19/2025    RSV Vaccine (Age 60+ and Pregnant patients) (1 - Risk 60-74 years 1-dose series) Never done    Diabetic Eye Exam  02/07/2025        Signature:  REMEDIOS Orr    Date of encounter: 2/14/25  This note was generated with the assistance of ambient listening technology. Verbal consent was obtained by the patient and accompanying visitor(s) for the recording of patient appointment to facilitate this note. I attest to having reviewed and edited the generated note for accuracy, though some syntax or spelling errors may persist. Please contact the author of this note for any clarification.

## 2025-02-17 ENCOUNTER — RESULTS FOLLOW-UP (OUTPATIENT)
Dept: FAMILY MEDICINE | Facility: CLINIC | Age: 60
End: 2025-02-17
Payer: COMMERCIAL

## 2025-02-22 ENCOUNTER — HOSPITAL ENCOUNTER (EMERGENCY)
Facility: HOSPITAL | Age: 60
Discharge: HOME | End: 2025-02-22
Payer: COMMERCIAL

## 2025-02-22 VITALS
BODY MASS INDEX: 28.93 KG/M2 | OXYGEN SATURATION: 90 % | WEIGHT: 180 LBS | TEMPERATURE: 99 F | RESPIRATION RATE: 21 BRPM | HEIGHT: 66 IN | DIASTOLIC BLOOD PRESSURE: 53 MMHG | HEART RATE: 80 BPM | SYSTOLIC BLOOD PRESSURE: 102 MMHG

## 2025-02-22 DIAGNOSIS — R07.9 CHEST PAIN: ICD-10-CM

## 2025-02-22 DIAGNOSIS — R07.89 COSTOCHONDRAL CHEST PAIN: Primary | ICD-10-CM

## 2025-02-22 LAB
ALBUMIN SERPL BCP-MCNC: 3.5 G/DL (ref 3.4–4.8)
ALBUMIN/GLOB SERPL: 0.9 {RATIO}
ALP SERPL-CCNC: 75 U/L (ref 40–150)
ALT SERPL W P-5'-P-CCNC: 24 U/L
ANION GAP SERPL CALCULATED.3IONS-SCNC: 13 MMOL/L (ref 7–16)
AST SERPL W P-5'-P-CCNC: 36 U/L (ref 5–34)
BASOPHILS # BLD AUTO: 0 K/UL (ref 0–0.2)
BASOPHILS NFR BLD AUTO: 0 % (ref 0–1)
BILIRUB SERPL-MCNC: 0.3 MG/DL
BUN SERPL-MCNC: 22 MG/DL (ref 8–26)
BUN/CREAT SERPL: 11 (ref 6–20)
CALCIUM SERPL-MCNC: 9.4 MG/DL (ref 8.8–10)
CHLORIDE SERPL-SCNC: 99 MMOL/L (ref 98–107)
CO2 SERPL-SCNC: 25 MMOL/L (ref 23–31)
CREAT SERPL-MCNC: 1.93 MG/DL (ref 0.72–1.25)
D DIMER PPP FEU-MCNC: <0.27 ΜG/ML (ref 0–0.47)
DIFFERENTIAL METHOD BLD: ABNORMAL
EGFR (NO RACE VARIABLE) (RUSH/TITUS): 39 ML/MIN/1.73M2
EOSINOPHIL # BLD AUTO: 0.01 K/UL (ref 0–0.5)
EOSINOPHIL NFR BLD AUTO: 0.3 % (ref 1–4)
ERYTHROCYTE [DISTWIDTH] IN BLOOD BY AUTOMATED COUNT: 14 % (ref 11.5–14.5)
GLOBULIN SER-MCNC: 3.8 G/DL (ref 2–4)
GLUCOSE SERPL-MCNC: 125 MG/DL (ref 82–115)
HCT VFR BLD AUTO: 38.9 % (ref 40–54)
HGB BLD-MCNC: 12.4 G/DL (ref 13.5–18)
LYMPHOCYTES # BLD AUTO: 0.87 K/UL (ref 1–4.8)
LYMPHOCYTES NFR BLD AUTO: 22.3 % (ref 27–41)
LYMPHOCYTES NFR BLD MANUAL: 22 % (ref 27–41)
MCH RBC QN AUTO: 30.5 PG (ref 27–31)
MCHC RBC AUTO-ENTMCNC: 31.9 G/DL (ref 32–36)
MCV RBC AUTO: 95.6 FL (ref 80–96)
MONOCYTES # BLD AUTO: 0.52 K/UL (ref 0–0.8)
MONOCYTES NFR BLD AUTO: 13.3 % (ref 2–6)
MONOCYTES NFR BLD MANUAL: 8 % (ref 2–6)
MPC BLD CALC-MCNC: 8.7 FL (ref 9.4–12.4)
NEUTROPHILS # BLD AUTO: 2.51 K/UL (ref 1.8–7.7)
NEUTROPHILS NFR BLD AUTO: 64.1 % (ref 53–65)
NEUTS SEG NFR BLD MANUAL: 70 % (ref 50–62)
NRBC BLD MANUAL-RTO: ABNORMAL %
NT-PROBNP SERPL-MCNC: 57 PG/ML (ref 1–125)
PLATELET # BLD AUTO: 299 K/UL (ref 150–400)
PLATELET MORPHOLOGY: NORMAL
POTASSIUM SERPL-SCNC: 4.3 MMOL/L (ref 3.5–5.1)
PROT SERPL-MCNC: 7.3 G/DL (ref 5.8–7.6)
RBC # BLD AUTO: 4.07 M/UL (ref 4.6–6.2)
RBC MORPH BLD: NORMAL
SODIUM SERPL-SCNC: 133 MMOL/L (ref 136–145)
TROPONIN I SERPL HS-MCNC: 4.5 NG/L
WBC # BLD AUTO: 3.91 K/UL (ref 4.5–11)

## 2025-02-22 PROCEDURE — 83880 ASSAY OF NATRIURETIC PEPTIDE: CPT | Performed by: NURSE PRACTITIONER

## 2025-02-22 PROCEDURE — 85379 FIBRIN DEGRADATION QUANT: CPT | Performed by: NURSE PRACTITIONER

## 2025-02-22 PROCEDURE — 80053 COMPREHEN METABOLIC PANEL: CPT | Performed by: NURSE PRACTITIONER

## 2025-02-22 PROCEDURE — 63600175 PHARM REV CODE 636 W HCPCS: Performed by: NURSE PRACTITIONER

## 2025-02-22 PROCEDURE — 99284 EMERGENCY DEPT VISIT MOD MDM: CPT | Mod: ,,, | Performed by: NURSE PRACTITIONER

## 2025-02-22 PROCEDURE — 25000003 PHARM REV CODE 250: Performed by: NURSE PRACTITIONER

## 2025-02-22 PROCEDURE — 96374 THER/PROPH/DIAG INJ IV PUSH: CPT

## 2025-02-22 PROCEDURE — 85025 COMPLETE CBC W/AUTO DIFF WBC: CPT | Performed by: NURSE PRACTITIONER

## 2025-02-22 PROCEDURE — 93005 ELECTROCARDIOGRAM TRACING: CPT

## 2025-02-22 PROCEDURE — 96375 TX/PRO/DX INJ NEW DRUG ADDON: CPT

## 2025-02-22 PROCEDURE — 96361 HYDRATE IV INFUSION ADD-ON: CPT

## 2025-02-22 PROCEDURE — 96372 THER/PROPH/DIAG INJ SC/IM: CPT | Performed by: NURSE PRACTITIONER

## 2025-02-22 PROCEDURE — 36415 COLL VENOUS BLD VENIPUNCTURE: CPT | Performed by: NURSE PRACTITIONER

## 2025-02-22 PROCEDURE — 99285 EMERGENCY DEPT VISIT HI MDM: CPT | Mod: 25

## 2025-02-22 PROCEDURE — 84484 ASSAY OF TROPONIN QUANT: CPT | Performed by: NURSE PRACTITIONER

## 2025-02-22 PROCEDURE — 93010 ELECTROCARDIOGRAM REPORT: CPT | Mod: ,,, | Performed by: HOSPITALIST

## 2025-02-22 RX ORDER — KETOROLAC TROMETHAMINE 30 MG/ML
15 INJECTION, SOLUTION INTRAMUSCULAR; INTRAVENOUS
Status: COMPLETED | OUTPATIENT
Start: 2025-02-22 | End: 2025-02-22

## 2025-02-22 RX ORDER — ORPHENADRINE CITRATE 30 MG/ML
60 INJECTION INTRAMUSCULAR; INTRAVENOUS
Status: COMPLETED | OUTPATIENT
Start: 2025-02-22 | End: 2025-02-22

## 2025-02-22 RX ADMIN — SODIUM CHLORIDE 500 ML: 9 INJECTION, SOLUTION INTRAVENOUS at 04:02

## 2025-02-22 RX ADMIN — METHYLPREDNISOLONE SODIUM SUCCINATE 60 MG: 40 INJECTION, POWDER, FOR SOLUTION INTRAMUSCULAR; INTRAVENOUS at 04:02

## 2025-02-22 RX ADMIN — ORPHENADRINE CITRATE 60 MG: 30 INJECTION, SOLUTION INTRAMUSCULAR; INTRAVENOUS at 04:02

## 2025-02-22 RX ADMIN — KETOROLAC TROMETHAMINE 15 MG: 30 INJECTION, SOLUTION INTRAMUSCULAR at 04:02

## 2025-02-22 NOTE — ED PROVIDER NOTES
Encounter Date: 2/22/2025       History     Chief Complaint   Patient presents with    Chest Pain     Patient Identification  Syed Kelley is a 60 y.o. male.    Patient information was obtained from patient, spouse/SO, and EMS personnel.  History/Exam limitations: none.  Patient presented to the Emergency Department by ambulance where the patient received oxygen, IV, GCS at scene 15, and EKG prior to arrival.    Chief Complaint   Chest Pain          Chest Pain  At its most intense, the chest pain is at 10/10. Primary symptoms include fatigue. Pertinent negatives for primary symptoms include no fever, no shortness of breath, no cough, no nausea and no vomiting.     Review of patient's allergies indicates:   Allergen Reactions    Azithromycin     Erythromycin Other (See Comments)    M-mycin      Past Medical History:   Diagnosis Date    ADHD     Adult attention deficit disorder 03/22/2021    Depression 12/22/2022    Essential hypertension 03/22/2021    Hyperlipidemia     Neuropathy 08/23/2023    AC (obstructive sleep apnea) 10/19/2023    PAD (peripheral artery disease) 02/17/2023    Swollen leg 01/20/2024    Type 2 diabetes mellitus      Past Surgical History:   Procedure Laterality Date    APPENDECTOMY      CARDIAC CATHETERIZATION      COLONOSCOPY  10/09/2024    DEBRIDEMENT OF LOWER EXTREMITY Bilateral 02/18/2023    Procedure: DEBRIDEMENT, LOWER EXTREMITY;  Surgeon: Yael Cohen MD;  Location: [facility] OR;  Service: General;  Laterality: Bilateral;  debride toes    FINGER AMPUTATION Right     1st digit    KIDNEY SURGERY Right     Patient states right kidney removed at age 5.    LEFT HEART CATHETERIZATION N/A 05/07/2021    Procedure: Left heart cath with possible intervention;  Surgeon: Federico James DO;  Location: [facility] CATH LAB;  Service: Cardiology;  Laterality: N/A;    TOE AMPUTATION Right 03/29/2023    Procedure: AMPUTATION, TOE;  Surgeon: Yael Cohen MD;  Location: [facility] OR;  Service: General;   Laterality: Right;  Right great toe amp dr farmer wednesday     Family History   Problem Relation Name Age of Onset    Hypertension Father      Stroke Father      Cancer Sister       Social History[1]  Review of Systems   Constitutional:  Positive for appetite change and fatigue. Negative for activity change, fever and unexpected weight change.   Respiratory:  Negative for cough, chest tightness and shortness of breath.    Cardiovascular:  Positive for chest pain.   Gastrointestinal:  Negative for diarrhea, nausea and vomiting.   Musculoskeletal:  Negative for gait problem.   Neurological:  Positive for light-headedness.   Psychiatric/Behavioral:  Negative for suicidal ideas.      Physical Exam     Initial Vitals [02/22/25 1235]   BP Pulse Resp Temp SpO2   125/87 94 18 98.9 °F (37.2 °C) 96 %      MAP       --         Physical Exam    Vitals reviewed.  Constitutional: Vital signs are normal. He appears well-developed and well-nourished. He is cooperative.   HENT:   Head: Normocephalic and atraumatic.   Right Ear: Hearing normal.   Left Ear: Hearing normal.   Nose: Nose normal. Mouth/Throat: Mucous membranes are normal.   Eyes: Conjunctivae and lids are normal. Right eye exhibits no nystagmus. Left eye exhibits no nystagmus.   Neck: Trachea normal.   Normal range of motion.  Cardiovascular:  Normal rate and regular rhythm.           No murmur heard.  Abdominal: There is abdominal tenderness.   Musculoskeletal:      Cervical back: Normal range of motion.     Neurological: He is alert and oriented to person, place, and time. Gait normal.   Skin: Skin is warm.   Psychiatric: He has a normal mood and affect. His speech is normal and behavior is normal. Judgment and thought content normal. Cognition and memory are normal.         Medical Screening Exam   See Full Note    ED Course   Procedures  Labs Reviewed   COMPREHENSIVE METABOLIC PANEL - Abnormal       Result Value    Sodium 133 (*)     Potassium 4.3      Chloride 99       CO2 25      Anion Gap 13      Glucose 125 (*)     BUN 22      Creatinine 1.93 (*)     BUN/Creatinine Ratio 11      Calcium 9.4      Total Protein 7.3      Albumin 3.5      Globulin 3.8      A/G Ratio 0.9      Bilirubin, Total 0.3      Alk Phos 75      ALT 24      AST 36 (*)     eGFR 39 (*)    CBC WITH DIFFERENTIAL - Abnormal    WBC 3.91 (*)     RBC 4.07 (*)     Hemoglobin 12.4 (*)     Hematocrit 38.9 (*)     MCV 95.6      MCH 30.5      MCHC 31.9 (*)     RDW 14.0      Platelet Count 299      MPV 8.7 (*)     Neutrophils % 64.1      Lymphocytes % 22.3 (*)     Neutrophils, Abs 2.51      Lymphocytes, Absolute 0.87 (*)     Diff Type Manual      Monocytes % 13.3 (*)     Eosinophils % 0.3 (*)     Basophils % 0.0      Monocytes, Absolute 0.52      Eosinophils, Absolute 0.01      Basophils, Absolute 0.00     MANUAL DIFFERENTIAL - Abnormal    Segmented Neutrophils, Man % 70 (*)     Lymphocytes, Man % 22 (*)     Monocytes, Man % 8 (*)     nRBC, Manual        Platelet Morphology Normal      RBC Morphology Normal     TROPONIN I - Normal    Troponin I High Sensitivity 4.5     NT-PRO NATRIURETIC PEPTIDE - Normal    ProBNP 57     D DIMER, QUANTITATIVE - Normal    D-Dimer <0.27     CBC W/ AUTO DIFFERENTIAL    Narrative:     The following orders were created for panel order CBC auto differential.  Procedure                               Abnormality         Status                     ---------                               -----------         ------                     CBC with Differential[[id number]]       Abnormal            Final result               Manual Differential[[id number]]         Abnormal            Final result                 Please view results for these tests on the individual orders.     EKG Readings: (Independently Interpreted)   Initial Reading: No STEMI. Previous EKG: Compared with most recent EKG Previous EKG Date: 12/09/2024. Rhythm: Normal Sinus Rhythm.       Imaging Results              X-Ray Chest AP  Portable (Final result)  Result time 02/22/25 13:19:16      Final result by Humberto Rashid MD (02/22/25 13:19:16)                   Impression:      No radiographic acute intrathoracic process seen on this single view.      Electronically signed by: Humberto Rashid MD  Date:    02/22/2025  Time:    13:19               Narrative:    EXAMINATION:  XR CHEST AP PORTABLE    CLINICAL HISTORY:  Chest Pain;    TECHNIQUE:  Single frontal view of the chest was performed.    COMPARISON:  Chest radiograph 12/23/2024, CTA chest 12/09/2024    FINDINGS:  Patient is slightly rotated.    Trachea is midline and patent.  Cardiomediastinal silhouette is midline and within normal limits for age, stable.  Pulmonary vasculature and hilar contours are within normal limits.  The lungs are symmetrically well expanded and clear.  No sizable pleural effusion or pneumothorax.  No acute osseous process seen.  PA and lateral views can be obtained.                                       Medications   methylPREDNISolone sodium succinate injection 60 mg (60 mg Intravenous Given 2/22/25 1610)   orphenadrine injection 60 mg (60 mg Intramuscular Given 2/22/25 1611)   sodium chloride 0.9% bolus 500 mL 500 mL (0 mLs Intravenous Stopped 2/22/25 1712)   ketorolac injection 15 mg (15 mg Intravenous Given 2/22/25 1611)     Medical Decision Making  Exam without evidence of volume overload so doubt heart failure. EKG without signs of active ischemia. Given the timing of pain to ER presentation, single troponin negative so doubt NSTEMI. Presentation not consistent with acute PE, pneumonia (no infectious symptoms, clear chest xray). Discharge patient home with PMD follow up with PCP in 2-3 days.    Amount and/or Complexity of Data Reviewed  Labs: ordered. Decision-making details documented in ED Course.  Radiology: ordered.    Risk  Prescription drug management.  Risk Details: Patient is not from SNF, chronically ill or immunosuppressed therefore he is low risk  for morbidity/mortality.        Clinical Impression:   Final diagnoses:  [R07.9] Chest pain  [R07.89] Costochondral chest pain (Primary)            [1]   Social History  Tobacco Use    Smoking status: Former     Current packs/day: 0.00     Average packs/day: 5.0 packs/day for 4.0 years (20.0 ttl pk-yrs)     Types: Cigarettes     Start date:      Quit date:      Years since quittin.1     Passive exposure: Past    Smokeless tobacco: Current     Types: Chew, Snuff    Tobacco comments:     1 can/day   Substance Use Topics    Alcohol use: Not Currently     Comment: 2-3 drinks per/3 months    Drug use: Yes     Types: Amphetamines     Comment: prescibed addBetty Hilario, [facility]  25 [phone]

## 2025-02-24 ENCOUNTER — TELEPHONE (OUTPATIENT)
Dept: EMERGENCY MEDICINE | Facility: HOSPITAL | Age: 60
End: 2025-02-24
Payer: COMMERCIAL

## 2025-02-24 NOTE — TELEPHONE ENCOUNTER
PT WIFE STATES PT IS VOMITING AND COUGHING, BUT THAT HE DIDN'T TELL ABOUT THAT WHEN IN ER. ENCOURAGED HER TO HAVE PT FU WITH PCP ASAP.

## 2025-02-25 LAB
OHS QRS DURATION: 88 MS
OHS QTC CALCULATION: 401 MS

## 2025-02-25 RX ORDER — CITALOPRAM 20 MG/1
20 TABLET, FILM COATED ORAL
Qty: 90 TABLET | Refills: 1 | Status: SHIPPED | OUTPATIENT
Start: 2025-02-25

## 2025-02-27 ENCOUNTER — TELEPHONE (OUTPATIENT)
Dept: GASTROENTEROLOGY | Facility: CLINIC | Age: 60
End: 2025-02-27
Payer: COMMERCIAL

## 2025-02-27 PROBLEM — J18.9 PNEUMONIA OF BOTH LUNGS DUE TO INFECTIOUS ORGANISM: Status: ACTIVE | Noted: 2025-02-27

## 2025-02-27 NOTE — ASSESSMENT & PLAN NOTE
ADHD which is chronic.  We have discussed with him getting off the Adderall which he has been taking for several years.  Do still recommend he get off the medication and least decrease his dose to once daily.

## 2025-02-27 NOTE — ASSESSMENT & PLAN NOTE
Hypertension is well controlled.  Will maintain his current medications i.e. is metoprolol and lisinopril hydrochlorothiazide at current dose levels.  Recent electrolytes are normal except for a sodium 134 and glucose of 137 WBC count was 8300 with hemoglobin 13 8 hematocrit 41.4 and platelets [id number].

## 2025-02-27 NOTE — ASSESSMENT & PLAN NOTE
Lab Results   Component Value Date    HGBA1C 7.2 (H) 02/14/2025    Patient currently taking metformin as well as insulin and glyburide.  Last A1c was 7.2.  He does not report any hypoglycemic episodes.  No change in medication.  Monitor his diet and decrease his carb intake.

## 2025-02-27 NOTE — ASSESSMENT & PLAN NOTE
Lab Results   Component Value Date    LDLCALC 46 08/23/2023    Currently taking atorvastatin and his last LDL was well controlled.  No change in his medication at this time.

## 2025-02-27 NOTE — ASSESSMENT & PLAN NOTE
Hospital follow-up patient is doing well.  Patient had does have some shortness of breath but cough has improved.  No change in his treatment at this time.  P.r.n. albuterol inhaler.  Chest x-ray today does not reveal any infiltrates

## 2025-02-27 NOTE — ASSESSMENT & PLAN NOTE
Patient has diabetic neuropathy and has been controlled with the gabapentin.  He has had some increasing symptoms recently.  He can increase his dose by 1 additional gabapentin daily if his symptoms worsen follow-up in 3 months

## 2025-02-28 ENCOUNTER — RESULTS FOLLOW-UP (OUTPATIENT)
Dept: FAMILY MEDICINE | Facility: CLINIC | Age: 60
End: 2025-02-28

## 2025-02-28 ENCOUNTER — OFFICE VISIT (OUTPATIENT)
Dept: FAMILY MEDICINE | Facility: CLINIC | Age: 60
End: 2025-02-28
Payer: COMMERCIAL

## 2025-02-28 VITALS
BODY MASS INDEX: 29.12 KG/M2 | HEIGHT: 66 IN | SYSTOLIC BLOOD PRESSURE: 94 MMHG | TEMPERATURE: 98 F | OXYGEN SATURATION: 98 % | WEIGHT: 181.19 LBS | HEART RATE: 72 BPM | RESPIRATION RATE: 19 BRPM | DIASTOLIC BLOOD PRESSURE: 66 MMHG

## 2025-02-28 DIAGNOSIS — R05.1 ACUTE COUGH: ICD-10-CM

## 2025-02-28 DIAGNOSIS — J04.0 ACUTE LARYNGITIS: Primary | ICD-10-CM

## 2025-02-28 DIAGNOSIS — R91.1 SOLITARY PULMONARY NODULE: Primary | ICD-10-CM

## 2025-02-28 DIAGNOSIS — K52.9 GASTROENTERITIS: ICD-10-CM

## 2025-02-28 DIAGNOSIS — R11.0 NAUSEA: ICD-10-CM

## 2025-02-28 LAB
ANION GAP SERPL CALCULATED.3IONS-SCNC: 16 MMOL/L (ref 7–16)
BASOPHILS # BLD AUTO: 0.02 K/UL (ref 0–0.2)
BASOPHILS NFR BLD AUTO: 0.4 % (ref 0–1)
BUN SERPL-MCNC: 22 MG/DL (ref 8–26)
BUN/CREAT SERPL: 15 (ref 6–20)
CALCIUM SERPL-MCNC: 9.2 MG/DL (ref 8.8–10)
CHLORIDE SERPL-SCNC: 96 MMOL/L (ref 98–107)
CO2 SERPL-SCNC: 25 MMOL/L (ref 23–31)
CREAT SERPL-MCNC: 1.44 MG/DL (ref 0.72–1.25)
DIFFERENTIAL METHOD BLD: ABNORMAL
EGFR (NO RACE VARIABLE) (RUSH/TITUS): 56 ML/MIN/1.73M2
EOSINOPHIL # BLD AUTO: 0.02 K/UL (ref 0–0.5)
EOSINOPHIL NFR BLD AUTO: 0.4 % (ref 1–4)
EOSINOPHIL NFR BLD MANUAL: 2 % (ref 1–4)
ERYTHROCYTE [DISTWIDTH] IN BLOOD BY AUTOMATED COUNT: 13.4 % (ref 11.5–14.5)
GLUCOSE SERPL-MCNC: 146 MG/DL (ref 82–115)
HCT VFR BLD AUTO: 43.5 % (ref 40–54)
HGB BLD-MCNC: 14.1 G/DL (ref 13.5–18)
IMM GRANULOCYTES # BLD AUTO: 0.03 K/UL (ref 0–0.04)
IMM GRANULOCYTES NFR BLD: 0.5 % (ref 0–0.4)
LYMPHOCYTES # BLD AUTO: 1.68 K/UL (ref 1–4.8)
LYMPHOCYTES NFR BLD AUTO: 30.4 % (ref 27–41)
LYMPHOCYTES NFR BLD MANUAL: 33 % (ref 27–41)
MCH RBC QN AUTO: 30.4 PG (ref 27–31)
MCHC RBC AUTO-ENTMCNC: 32.4 G/DL (ref 32–36)
MCV RBC AUTO: 93.8 FL (ref 80–96)
MONOCYTES # BLD AUTO: 0.5 K/UL (ref 0–0.8)
MONOCYTES NFR BLD AUTO: 9 % (ref 2–6)
MONOCYTES NFR BLD MANUAL: 3 % (ref 2–6)
MPC BLD CALC-MCNC: 10 FL (ref 9.4–12.4)
NEUTROPHILS # BLD AUTO: 3.28 K/UL (ref 1.8–7.7)
NEUTROPHILS NFR BLD AUTO: 59.3 % (ref 53–65)
NEUTS SEG NFR BLD MANUAL: 62 % (ref 50–62)
NRBC # BLD AUTO: 0 X10E3/UL
NRBC, AUTO (.00): 0 %
PLATELET # BLD AUTO: 392 K/UL (ref 150–400)
PLATELET MORPHOLOGY: ABNORMAL
POTASSIUM SERPL-SCNC: 4.1 MMOL/L (ref 3.5–5.1)
RBC # BLD AUTO: 4.64 M/UL (ref 4.6–6.2)
RBC MORPH BLD: NORMAL
SODIUM SERPL-SCNC: 133 MMOL/L (ref 136–145)
WBC # BLD AUTO: 5.53 K/UL (ref 4.5–11)

## 2025-02-28 PROCEDURE — 3051F HG A1C>EQUAL 7.0%<8.0%: CPT | Mod: CPTII,,, | Performed by: NURSE PRACTITIONER

## 2025-02-28 PROCEDURE — 3078F DIAST BP <80 MM HG: CPT | Mod: CPTII,,, | Performed by: NURSE PRACTITIONER

## 2025-02-28 PROCEDURE — 3074F SYST BP LT 130 MM HG: CPT | Mod: CPTII,,, | Performed by: NURSE PRACTITIONER

## 2025-02-28 PROCEDURE — 85025 COMPLETE CBC W/AUTO DIFF WBC: CPT | Mod: ,,, | Performed by: CLINICAL MEDICAL LABORATORY

## 2025-02-28 PROCEDURE — 4010F ACE/ARB THERAPY RXD/TAKEN: CPT | Mod: CPTII,,, | Performed by: NURSE PRACTITIONER

## 2025-02-28 PROCEDURE — 99214 OFFICE O/P EST MOD 30 MIN: CPT | Mod: ,,, | Performed by: NURSE PRACTITIONER

## 2025-02-28 PROCEDURE — 3008F BODY MASS INDEX DOCD: CPT | Mod: CPTII,,, | Performed by: NURSE PRACTITIONER

## 2025-02-28 PROCEDURE — 80048 BASIC METABOLIC PNL TOTAL CA: CPT | Mod: ,,, | Performed by: CLINICAL MEDICAL LABORATORY

## 2025-02-28 PROCEDURE — 1160F RVW MEDS BY RX/DR IN RCRD: CPT | Mod: CPTII,,, | Performed by: NURSE PRACTITIONER

## 2025-02-28 PROCEDURE — 1159F MED LIST DOCD IN RCRD: CPT | Mod: CPTII,,, | Performed by: NURSE PRACTITIONER

## 2025-02-28 RX ORDER — AMOXICILLIN AND CLAVULANATE POTASSIUM 875; 125 MG/1; MG/1
1 TABLET, FILM COATED ORAL EVERY 12 HOURS
Qty: 20 TABLET | Refills: 0 | Status: SHIPPED | OUTPATIENT
Start: 2025-02-28 | End: 2025-03-10

## 2025-02-28 RX ORDER — ONDANSETRON 4 MG/1
4 TABLET, ORALLY DISINTEGRATING ORAL EVERY 8 HOURS PRN
Qty: 15 TABLET | Refills: 0 | Status: SHIPPED | OUTPATIENT
Start: 2025-02-28

## 2025-03-02 NOTE — PROGRESS NOTES
Please check to see how pt is doing and let him know labs are  looking better than when he went to ER. They should also be hearing from ct scan I told them about Friday. Thanks!

## 2025-03-04 PROBLEM — K52.9 GASTROENTERITIS: Status: ACTIVE | Noted: 2025-03-04

## 2025-03-04 PROBLEM — R05.1 ACUTE COUGH: Status: ACTIVE | Noted: 2025-03-04

## 2025-03-04 PROBLEM — R11.0 NAUSEA: Status: ACTIVE | Noted: 2025-03-04

## 2025-03-04 NOTE — PROGRESS NOTES
REMEDIOS Orr   [facility]  [address]  [place] [address]     PATIENT NAME: Syed Kelley  : 1965  DATE: 25  MRN: [id number]      Billing Provider: REMEDIOS Orr  Level of Service:   Patient PCP Information       Provider PCP Type    Julio Cruz DO General            Reason for Visit / Chief Complaint: Follow-up (Pt is here for a hospital follow up. He said that he is no better. He is still feeling weak and fatigue. Also vomiting and diarrhea for a week.)   Health Maintenance Due   Topic Date Due    Pneumococcal Vaccines (Age 50+) (1 of 2 - PCV) Never done    Shingles Vaccine (1 of 2) Never done    RSV Vaccine (Age 60+ and Pregnant patients) (1 - Risk 60-74 years 1-dose series) Never done    Diabetic Eye Exam  2025          Update PCP  Update Chief Complaint         History of Present Illness / Problem Focused Workflow     History of Present Illness    CHIEF COMPLAINT:  Patient presents today for follow up after recent ER visit with viral illness symptoms.    VIRAL ILLNESS:  He reports a viral illness affecting the entire household for approximately one week without improvement. Symptoms include fatigue, weakness, nausea, and a deep cough without colored sputum. He denies vomiting or increased shortness of breath.    GASTROINTESTINAL SYMPTOMS:  He has had diarrhea for about a week and describes an empty feeling in his abdomen. His diet is currently limited to Jell-O tolerance.    RECENT ER VISIT:  He recently visited the ER due to chest pain where he received IV fluids. Chest XR was normal.    HOME HEALTH STATUS:  He confirms home health visits have started with a visit already completed this week.          Hemoglobin A1C   Date Value Ref Range Status   2025 7.2 (H) <=7.0 % Final     Comment:       Normal:               <5.7%  Pre-Diabetic:       5.7% to 6.4%  Diabetic:             >6.4%  Diabetic Goal:     <7%   10/17/2024 7.4 (H) 4.5 - 6.6 % Final     Comment:        Normal:               <5.7%  Pre-Diabetic:       5.7% to 6.4%  Diabetic:             >6.4%  Diabetic Goal:     <7%   07/17/2024 6.7 (H) 4.5 - 6.6 % Final     Comment:       Normal:               <5.7%  Pre-Diabetic:       5.7% to 6.4%  Diabetic:             >6.4%  Diabetic Goal:     <7%        CMP  Sodium   Date Value Ref Range Status   02/28/2025 133 (L) 136 - 145 mmol/L Final     Potassium   Date Value Ref Range Status   02/28/2025 4.1 3.5 - 5.1 mmol/L Final     Chloride   Date Value Ref Range Status   02/28/2025 96 (L) 98 - 107 mmol/L Final     CO2   Date Value Ref Range Status   02/28/2025 25 23 - 31 mmol/L Final     Glucose   Date Value Ref Range Status   02/28/2025 146 (H) 82 - 115 mg/dL Final     BUN   Date Value Ref Range Status   02/28/2025 22 8 - 26 mg/dL Final     Creatinine   Date Value Ref Range Status   02/28/2025 1.44 (H) 0.72 - 1.25 mg/dL Final     Calcium   Date Value Ref Range Status   02/28/2025 9.2 8.8 - 10.0 mg/dL Final     Total Protein   Date Value Ref Range Status   02/22/2025 7.3 5.8 - 7.6 g/dL Final     Albumin   Date Value Ref Range Status   02/22/2025 3.5 3.4 - 4.8 g/dL Final     Bilirubin, Total   Date Value Ref Range Status   02/22/2025 0.3 <=1.5 mg/dL Final     Alk Phos   Date Value Ref Range Status   02/22/2025 75 40 - 150 U/L Final     AST   Date Value Ref Range Status   02/22/2025 36 (H) 5 - 34 U/L Final     ALT   Date Value Ref Range Status   02/22/2025 24 <=55 U/L Final     Anion Gap   Date Value Ref Range Status   02/28/2025 16 7 - 16 mmol/L Final     eGFR   Date Value Ref Range Status   02/28/2025 56 (L) >=60 mL/min/1.73m2 Final     Comment:     Estimated GFR calculated using the CKD-EPI creatinine (2021) equation.        Lab Results   Component Value Date    WBC 5.53 02/28/2025    RBC 4.64 02/28/2025    HGB 14.1 02/28/2025    HCT 43.5 02/28/2025    MCV 93.8 02/28/2025    MCH 30.4 02/28/2025    MCHC 32.4 02/28/2025    RDW 13.4 02/28/2025     02/28/2025    MPV 10.0  02/28/2025    LYMPH 30.4 02/28/2025    LYMPH 1.68 02/28/2025    LYMPH 33 02/28/2025    MONO 9.0 (H) 02/28/2025    MONO 3 02/28/2025    EOS 0.02 02/28/2025    BASO 0.02 02/28/2025    EOSINOPHIL 0.4 (L) 02/28/2025    EOSINOPHIL 2 02/28/2025    BASOPHIL 0.4 02/28/2025        Lab Results   Component Value Date    CHOL 147 10/17/2024    CHOL 143 08/23/2023    CHOL 128 12/22/2022     Lab Results   Component Value Date    HDL 32 (L) 10/17/2024    HDL 29 (L) 08/23/2023    HDL 32 (L) 12/22/2022     Lab Results   Component Value Date    LDLCALC 46 08/23/2023    LDLCALC 53 12/22/2022    LDLCALC 43 08/15/2022     Lab Results   Component Value Date    TRIG 436 (H) 10/17/2024    TRIG 338 (H) 08/23/2023    TRIG 215 (H) 12/22/2022     Lab Results   Component Value Date    CHOLHDL 4.6 10/17/2024    CHOLHDL 4.9 08/23/2023    CHOLHDL 4.0 12/22/2022        Wt Readings from Last 3 Encounters:   02/28/25 1510 82.2 kg (181 lb 3.2 oz)   02/22/25 1235 81.6 kg (180 lb)   02/14/25 0956 88.9 kg (196 lb)        BP Readings from Last 3 Encounters:   02/28/25 94/66   02/22/25 (!) 102/53   02/14/25 113/73        Review of Systems     Review of Systems       Medical / Social / Family History     Past Medical History:   Diagnosis Date    ADHD     Adult attention deficit disorder 03/22/2021    Depression 12/22/2022    Essential hypertension 03/22/2021    Hyperlipidemia     Neuropathy 08/23/2023    AC (obstructive sleep apnea) 10/19/2023    PAD (peripheral artery disease) 02/17/2023    Swollen leg 01/20/2024    Type 2 diabetes mellitus        Past Surgical History:   Procedure Laterality Date    APPENDECTOMY      CARDIAC CATHETERIZATION      COLONOSCOPY  10/09/2024    DEBRIDEMENT OF LOWER EXTREMITY Bilateral 02/18/2023    Procedure: DEBRIDEMENT, LOWER EXTREMITY;  Surgeon: Yael Cohen MD;  Location: [facility];  Service: General;  Laterality: Bilateral;  debride toes    FINGER AMPUTATION Right     1st digit    KIDNEY SURGERY Right     Patient  [facility] right kidney removed at age 5.    LEFT HEART CATHETERIZATION N/A 05/07/2021    Procedure: Left heart cath with possible intervention;  Surgeon: Federico James DO;  Location: [facility] CATH LAB;  Service: Cardiology;  Laterality: N/A;    TOE AMPUTATION Right 03/29/2023    Procedure: AMPUTATION, TOE;  Surgeon: Yael Cohen MD;  Location: [facility] OR;  Service: General;  Laterality: Right;  Right great toe amp dr cohen wednesday       Social History    reports that he quit smoking about 40 years ago. His smoking use included cigarettes. He started smoking about 44 years ago. He has a 20 pack-year smoking history. He has been exposed to tobacco smoke. His smokeless tobacco use includes chew and snuff. He reports that he does not currently use alcohol. He reports current drug use. Drug: Amphetamines.    Family History  's family history includes Cancer in his sister; Hypertension in his father; Stroke in his father.    Medications and Allergies     Medications  Outpatient Medications Marked as Taking for the 2/28/25 encounter (Office Visit) with Nydia Martines FNP   Medication Sig Dispense Refill    aspirin (ECOTRIN) 81 MG EC tablet Take 1 tablet (81 mg total) by mouth once daily. 90 tablet 3    atorvastatin (LIPITOR) 80 MG tablet Take 1 tablet (80 mg total) by mouth once daily. 90 tablet 1    blood sugar diagnostic Strp 1 strip by Misc.(Non-Drug; Combo Route) route 3 (three) times daily. 180 strip 3    blood-glucose meter kit Use as instructed 1 each 0    citalopram (CELEXA) 20 MG tablet TAKE ONE TABLET BY MOUTH ONCE DAILY 90 tablet 1    dextroamphetamine-amphetamine (ADDERALL) 20 mg tablet Take 1 tablet by mouth each afternoon 30 tablet 0    dextroamphetamine-amphetamine (ADDERALL) 20 mg tablet TAKE ONE TABLET BY MOUTH EVERY AFTERNOON. 30 tablet 0    dextroamphetamine-amphetamine (ADDERALL) 20 mg tablet TAKE ONE TABLET BY MOUTH EVERY AFTERNOON. 30 tablet 0    dextroamphetamine-amphetamine (ADDERALL) 30  "mg Tab Take 1 tablet by mouth each morning 30 tablet 0    dextroamphetamine-amphetamine (ADDERALL) 30 mg Tab Take 1 tablet (30 mg total) by mouth every morning. 30 tablet 0    dextroamphetamine-amphetamine (ADDERALL) 30 mg Tab Take 1 tablet (30 mg total) by mouth every morning. 30 tablet 0    gabapentin (NEURONTIN) 300 MG capsule Take 1 capsule (300 mg total) by mouth 3 (three) times daily. 90 capsule 1    glyBURIDE (DIABETA) 5 MG tablet Take 1 tablet (5 mg total) by mouth daily with breakfast. 90 tablet 1    insulin NPH/Reg human (NOVOLIN 70-30 FLEXPEN U-100) 100 unit/mL (70-30) InPn pen Inject 30 Units into the skin 2 (two) times daily. 10 mL 5    lisinopriL-hydrochlorothiazide (PRINZIDE,ZESTORETIC) 20-25 mg Tab Take 1 tablet by mouth once daily. 90 tablet 1    magnesium glycinate 100 mg magnesium Cap Take 1 tablet by mouth Daily. 30 capsule 2    metFORMIN (GLUCOPHAGE) 1000 MG tablet Take 1 tablet (1,000 mg total) by mouth 2 (two) times daily with meals. 180 tablet 1    metoprolol tartrate (LOPRESSOR) 50 MG tablet Take 1 tablet (50 mg total) by mouth 2 (two) times daily. 180 tablet 1    pen needle, diabetic (BD ULTRA-FINE SHORT PEN NEEDLE) 31 gauge x 5/16" Ndle USE AS DIRECTED TO administer insulin TWICE DAILY for 50      pen needle, diabetic 31 gauge x 5/16" Ndle 100 each by abdominal subcutaneous route once daily. Use to administer insulin twice daily 100 each 2       Allergies  Review of patient's allergies indicates:   Allergen Reactions    Azithromycin     Erythromycin Other (See Comments)    M-mycin        Physical Examination     Vitals:    02/28/25 1510   BP: 94/66   BP Location: Right arm   Patient Position: Sitting   Pulse: 72   Resp: 19   Temp: 98 °F (36.7 °C)   TempSrc: Oral   SpO2: 98%   Weight: 82.2 kg (181 lb 3.2 oz)   Height: 5' 6" (1.676 m)      Physical Exam  Constitutional:       Appearance: Normal appearance.   HENT:      Head: Normocephalic.   Eyes:      Extraocular Movements: Extraocular " movements intact.   Cardiovascular:      Rate and Rhythm: Normal rate and regular rhythm.      Pulses: Normal pulses.      Heart sounds: Normal heart sounds.   Pulmonary:      Effort: Pulmonary effort is normal.      Breath sounds: Normal breath sounds.   Skin:     General: Skin is warm and dry.      Capillary Refill: Capillary refill takes less than 2 seconds.   Neurological:      General: No focal deficit present.      Mental Status: He is alert and oriented to person, place, and time.   Psychiatric:         Mood and Affect: Mood normal.         Behavior: Behavior normal.          Assessment and Plan (including Health Maintenance)      Problem List  Smart Sets  Document Outside HM   :    Plan:     There are no Patient Instructions on file for this visit.       Health Maintenance Due   Topic Date Due    Pneumococcal Vaccines (Age 50+) (1 of 2 - PCV) Never done    Shingles Vaccine (1 of 2) Never done    RSV Vaccine (Age 60+ and Pregnant patients) (1 - Risk 60-74 years 1-dose series) Never done    Diabetic Eye Exam  02/07/2025       Problem List Items Addressed This Visit       Acute cough    Relevant Orders    X-Ray Chest PA And Lateral (Completed)    Acute laryngitis - Primary    Relevant Medications    amoxicillin-clavulanate 875-125mg (AUGMENTIN) 875-125 mg per tablet    Gastroenteritis    Relevant Orders    CBC Auto Differential (Completed)    Basic Metabolic Panel (Completed)    Nausea    Relevant Medications    ondansetron (ZOFRAN-ODT) 4 MG TbDL     Assessment & Plan    IMPRESSION:  - Assessed recent ER visit for chest pain, attributed to viral infection affecting household  - Reviewed unremarkable labs and chest XR from ER  - Considered viral gastroenteritis as likely diagnosis based on symptoms and household spread  - Evaluated for dehydration and electrolyte imbalance due to prolonged diarrhea  - Will recheck CMP and CBC to monitor kidney function and rule out anemia  - Noted fatigue and weakness, likely  due to viral illness and dehydration    VIRAL INFECTION:  - Noted that the patient and entire household have been affected by a viral infection, with symptoms lasting for about a week.  - Observed that the patient is experiencing fatigue, weakness, and nausea as ongoing symptoms.  - Evaluated the patient's deep cough without colored sputum.  - Reviewed chest XR, which showed no signs of respiratory complications.  - Assessed that the viral infection has likely caused dehydration.  - Emphasized the importance of fluid intake to prevent dehydration.  - Instructed the patient to contact the office if symptoms worsen or new concerns arise.  - Planned to recheck labs, including complete blood count and basic metabolic panel.  - Assessed that the viral infection has likely caused the fatigue.    GASTROENTERITIS:  - Noted that the patient has had ongoing diarrhea for about a week.  - Observed that the patient reports feeling empty in the stomach and has only been able to consume Jell-O.  - Assessed that it is now safe to use anti-diarrheal medication due to the prolonged duration of diarrhea.  - Discussed appropriate foods for gastroenteritis recovery (e.g., Jell-O, bland foods).  - Explained progression of diet as tolerated.  - Recommend OTC loperamide for diarrhea if symptoms persist.  - Pt had GI appt on 3/10/25 but had to reschedule for July 1st     DIABETES:  - Noted that the patient has not checked blood sugar recently due to running out of test strips.  - Emphasized the importance of monitoring blood sugar, especially during illness.  - Instructed the patient to monitor blood sugar regularly.  - Planned to obtain new glucose testing strips for the patient.    CHEST PAIN:  - Noted that the patient had previously experienced chest pain, which led to an emergency department visit.  - Reviewed emergency department evaluation, which ruled out heart and lung issues as the cause of chest pain.    NAUSEA:  - Noted that the  patient reports ongoing nausea.  - Assessed the need for anti-nausea medication.  - Prescribed ondansetron for nausea management.    FATIGUE:  - Noted that the patient reports feeling tired and weak.  - Observed that the patient denies increased dyspnea, only reports fatigue.  - Planned to recheck complete blood count to rule out anemia as a cause of fatigue.    OTHER INSTRUCTIONS:  - Patient to increase fluid intake (water, Sprite, Gatorade) and continue with bland diet, including Jell-O.    LABS:  - CMP ordered to monitor kidney function.  - CBC ordered to check for anemia.        Acute laryngitis  -     amoxicillin-clavulanate 875-125mg (AUGMENTIN) 875-125 mg per tablet; Take 1 tablet by mouth every 12 (twelve) hours. for 10 days  Dispense: 20 tablet; Refill: 0    Nausea  -     ondansetron (ZOFRAN-ODT) 4 MG TbDL; Take 1 tablet (4 mg total) by mouth every 8 (eight) hours as needed (nausea).  Dispense: 15 tablet; Refill: 0    Gastroenteritis  -     CBC Auto Differential; Future; Expected date: 02/28/2025  -     Basic Metabolic Panel; Future; Expected date: 02/28/2025    Acute cough  -     X-Ray Chest PA And Lateral; Future; Expected date: 02/28/2025       Health Maintenance Topics with due status: Not Due       Topic Last Completion Date    Foot Exam 06/06/2024    Colorectal Cancer Screening 10/10/2024    Lipid Panel 10/17/2024    TETANUS VACCINE 02/10/2025    Diabetes Urine Screening 02/14/2025    Hemoglobin A1c 02/14/2025    Low Dose Statin 02/28/2025         Future Appointments   Date Time Provider Department Center   3/13/2025  8:30 AM [facility] [place] [facility]   7/1/2025  9:00 AM [facility] GI ROOM 03 [facility]        No follow-ups on file.    Health Maintenance Due   Topic Date Due    Pneumococcal Vaccines (Age 50+) (1 of 2 - PCV) Never done    Shingles Vaccine (1 of 2) Never done    RSV Vaccine (Age 60+ and Pregnant patients) (1 - Risk 60-74 years 1-dose series) Never done    Diabetic  Eye Exam  02/07/2025        Signature:  REMEDIOS Orr    Date of encounter: 2/28/25  This note was generated with the assistance of ambient listening technology. Verbal consent was obtained by the patient and accompanying visitor(s) for the recording of patient appointment to facilitate this note. I attest to having reviewed and edited the generated note for accuracy, though some syntax or spelling errors may persist. Please contact the author of this note for any clarification.

## 2025-04-03 DIAGNOSIS — G62.9 NEUROPATHY: Chronic | ICD-10-CM

## 2025-04-03 RX ORDER — GABAPENTIN 300 MG/1
300 CAPSULE ORAL 3 TIMES DAILY
Qty: 90 CAPSULE | Refills: 1 | Status: SHIPPED | OUTPATIENT
Start: 2025-04-03

## 2025-04-16 ENCOUNTER — EXTERNAL HOME HEALTH (OUTPATIENT)
Dept: HOME HEALTH SERVICES | Facility: HOSPITAL | Age: 60
End: 2025-04-16

## 2025-05-14 ENCOUNTER — OFFICE VISIT (OUTPATIENT)
Dept: FAMILY MEDICINE | Facility: CLINIC | Age: 60
End: 2025-05-14
Payer: MEDICARE

## 2025-05-14 VITALS
BODY MASS INDEX: 32.14 KG/M2 | WEIGHT: 200 LBS | DIASTOLIC BLOOD PRESSURE: 61 MMHG | HEART RATE: 73 BPM | HEIGHT: 66 IN | RESPIRATION RATE: 18 BRPM | TEMPERATURE: 98 F | OXYGEN SATURATION: 95 % | SYSTOLIC BLOOD PRESSURE: 111 MMHG

## 2025-05-14 DIAGNOSIS — Z79.899 LONG-TERM CURRENT USE OF STIMULANT: ICD-10-CM

## 2025-05-14 DIAGNOSIS — F32.A DEPRESSION, UNSPECIFIED DEPRESSION TYPE: Primary | ICD-10-CM

## 2025-05-14 DIAGNOSIS — E11.65 TYPE 2 DIABETES MELLITUS WITH HYPERGLYCEMIA, WITH LONG-TERM CURRENT USE OF INSULIN: ICD-10-CM

## 2025-05-14 DIAGNOSIS — Z79.4 TYPE 2 DIABETES MELLITUS WITH HYPERGLYCEMIA, WITH LONG-TERM CURRENT USE OF INSULIN: ICD-10-CM

## 2025-05-14 DIAGNOSIS — I10 ESSENTIAL HYPERTENSION, BENIGN: ICD-10-CM

## 2025-05-14 DIAGNOSIS — E11.9 TYPE 2 DIABETES MELLITUS WITHOUT COMPLICATION, WITHOUT LONG-TERM CURRENT USE OF INSULIN: ICD-10-CM

## 2025-05-14 DIAGNOSIS — F98.8 ADULT ATTENTION DEFICIT DISORDER: Chronic | ICD-10-CM

## 2025-05-14 DIAGNOSIS — E78.2 MIXED HYPERLIPIDEMIA: ICD-10-CM

## 2025-05-14 LAB
ALBUMIN SERPL BCP-MCNC: 3.6 G/DL (ref 3.4–4.8)
ALBUMIN/GLOB SERPL: 1 {RATIO}
ALP SERPL-CCNC: 84 U/L (ref 40–150)
ALT SERPL W P-5'-P-CCNC: 16 U/L
ANION GAP SERPL CALCULATED.3IONS-SCNC: 16 MMOL/L (ref 7–16)
AST SERPL W P-5'-P-CCNC: 19 U/L (ref 11–45)
BASOPHILS # BLD AUTO: 0.03 K/UL (ref 0–0.2)
BASOPHILS NFR BLD AUTO: 0.4 % (ref 0–1)
BILIRUB SERPL-MCNC: 0.2 MG/DL
BUN SERPL-MCNC: 14 MG/DL (ref 8–26)
BUN/CREAT SERPL: 11 (ref 6–20)
CALCIUM SERPL-MCNC: 9.6 MG/DL (ref 8.8–10)
CHLORIDE SERPL-SCNC: 100 MMOL/L (ref 98–107)
CHOLEST SERPL-MCNC: 177 MG/DL
CHOLEST/HDLC SERPL: 6.8 {RATIO}
CO2 SERPL-SCNC: 24 MMOL/L (ref 23–31)
CREAT SERPL-MCNC: 1.31 MG/DL (ref 0.72–1.25)
DIFFERENTIAL METHOD BLD: ABNORMAL
EGFR (NO RACE VARIABLE) (RUSH/TITUS): 62 ML/MIN/1.73M2
EOSINOPHIL # BLD AUTO: 0.61 K/UL (ref 0–0.5)
EOSINOPHIL NFR BLD AUTO: 8.5 % (ref 1–4)
ERYTHROCYTE [DISTWIDTH] IN BLOOD BY AUTOMATED COUNT: 13.1 % (ref 11.5–14.5)
EST. AVERAGE GLUCOSE BLD GHB EST-MCNC: 157 MG/DL
GLOBULIN SER-MCNC: 3.7 G/DL (ref 2–4)
GLUCOSE SERPL-MCNC: 160 MG/DL (ref 82–115)
HBA1C MFR BLD HPLC: 7.1 %
HCT VFR BLD AUTO: 38.7 % (ref 40–54)
HDLC SERPL-MCNC: 26 MG/DL (ref 35–60)
HGB BLD-MCNC: 12.9 G/DL (ref 13.5–18)
IMM GRANULOCYTES # BLD AUTO: 0.05 K/UL (ref 0–0.04)
IMM GRANULOCYTES NFR BLD: 0.7 % (ref 0–0.4)
LYMPHOCYTES # BLD AUTO: 2.16 K/UL (ref 1–4.8)
LYMPHOCYTES NFR BLD AUTO: 30 % (ref 27–41)
MCH RBC QN AUTO: 31.5 PG (ref 27–31)
MCHC RBC AUTO-ENTMCNC: 33.3 G/DL (ref 32–36)
MCV RBC AUTO: 94.6 FL (ref 80–96)
MONOCYTES # BLD AUTO: 0.56 K/UL (ref 0–0.8)
MONOCYTES NFR BLD AUTO: 7.8 % (ref 2–6)
MPC BLD CALC-MCNC: 9.7 FL (ref 9.4–12.4)
NEUTROPHILS # BLD AUTO: 3.78 K/UL (ref 1.8–7.7)
NEUTROPHILS NFR BLD AUTO: 52.6 % (ref 53–65)
NONHDLC SERPL-MCNC: 151 MG/DL
NRBC # BLD AUTO: 0 X10E3/UL
NRBC, AUTO (.00): 0 %
PLATELET # BLD AUTO: 392 K/UL (ref 150–400)
POTASSIUM SERPL-SCNC: 3.9 MMOL/L (ref 3.5–5.1)
PROT SERPL-MCNC: 7.3 G/DL (ref 5.8–7.6)
RBC # BLD AUTO: 4.09 M/UL (ref 4.6–6.2)
SODIUM SERPL-SCNC: 136 MMOL/L (ref 136–145)
TRIGL SERPL-MCNC: 574 MG/DL (ref 34–140)
TSH SERPL DL<=0.005 MIU/L-ACNC: 3.16 UIU/ML (ref 0.35–4.94)
VLDLC SERPL-MCNC: ABNORMAL MG/DL
WBC # BLD AUTO: 7.19 K/UL (ref 4.5–11)

## 2025-05-14 PROCEDURE — 80053 COMPREHEN METABOLIC PANEL: CPT | Mod: ,,, | Performed by: CLINICAL MEDICAL LABORATORY

## 2025-05-14 PROCEDURE — 83036 HEMOGLOBIN GLYCOSYLATED A1C: CPT | Mod: ,,, | Performed by: CLINICAL MEDICAL LABORATORY

## 2025-05-14 PROCEDURE — 84436 ASSAY OF TOTAL THYROXINE: CPT | Mod: ,,, | Performed by: CLINICAL MEDICAL LABORATORY

## 2025-05-14 PROCEDURE — 85025 COMPLETE CBC W/AUTO DIFF WBC: CPT | Mod: ,,, | Performed by: CLINICAL MEDICAL LABORATORY

## 2025-05-14 PROCEDURE — 84443 ASSAY THYROID STIM HORMONE: CPT | Mod: ,,, | Performed by: CLINICAL MEDICAL LABORATORY

## 2025-05-14 PROCEDURE — 99214 OFFICE O/P EST MOD 30 MIN: CPT | Mod: ,,, | Performed by: FAMILY MEDICINE

## 2025-05-14 PROCEDURE — 80061 LIPID PANEL: CPT | Mod: ,,, | Performed by: CLINICAL MEDICAL LABORATORY

## 2025-05-14 RX ORDER — METFORMIN HYDROCHLORIDE 1000 MG/1
1000 TABLET ORAL 2 TIMES DAILY WITH MEALS
Qty: 180 TABLET | Refills: 1 | Status: SHIPPED | OUTPATIENT
Start: 2025-05-14

## 2025-05-14 RX ORDER — INSULIN ASPART 100 [IU]/ML
20 INJECTION, SUSPENSION SUBCUTANEOUS 2 TIMES DAILY
Qty: 10 ML | Refills: 11 | Status: SHIPPED | OUTPATIENT
Start: 2025-05-14 | End: 2026-05-14

## 2025-05-14 RX ORDER — ATORVASTATIN CALCIUM 80 MG/1
80 TABLET, FILM COATED ORAL DAILY
Qty: 90 TABLET | Refills: 1 | Status: SHIPPED | OUTPATIENT
Start: 2025-05-14 | End: 2025-06-16 | Stop reason: SDUPTHER

## 2025-05-14 RX ORDER — PEN NEEDLE, DIABETIC 30 GX3/16"
100 NEEDLE, DISPOSABLE MISCELLANEOUS DAILY
Qty: 100 EACH | Refills: 2 | Status: SHIPPED | OUTPATIENT
Start: 2025-05-14

## 2025-05-14 RX ORDER — GLYBURIDE 5 MG/1
5 TABLET ORAL
Qty: 90 TABLET | Refills: 1 | Status: SHIPPED | OUTPATIENT
Start: 2025-05-14

## 2025-05-14 RX ORDER — HUMAN INSULIN 100 [IU]/ML
30 INJECTION, SUSPENSION SUBCUTANEOUS 2 TIMES DAILY
Qty: 10 ML | Refills: 5 | Status: SHIPPED | OUTPATIENT
Start: 2025-05-14 | End: 2026-05-14

## 2025-05-14 RX ORDER — DEXTROAMPHETAMINE SACCHARATE, AMPHETAMINE ASPARTATE, DEXTROAMPHETAMINE SULFATE AND AMPHETAMINE SULFATE 5; 5; 5; 5 MG/1; MG/1; MG/1; MG/1
TABLET ORAL
Qty: 30 TABLET | Refills: 0 | Status: SHIPPED | OUTPATIENT
Start: 2025-05-14 | End: 2025-06-11 | Stop reason: ALTCHOICE

## 2025-05-14 RX ORDER — METOPROLOL TARTRATE 50 MG/1
50 TABLET ORAL 2 TIMES DAILY
Qty: 180 TABLET | Refills: 1 | Status: SHIPPED | OUTPATIENT
Start: 2025-05-14

## 2025-05-14 RX ORDER — LISINOPRIL AND HYDROCHLOROTHIAZIDE 20; 25 MG/1; MG/1
1 TABLET ORAL DAILY
Qty: 90 TABLET | Refills: 1 | Status: SHIPPED | OUTPATIENT
Start: 2025-05-14

## 2025-05-14 RX ORDER — DEXTROAMPHETAMINE SACCHARATE, AMPHETAMINE ASPARTATE, DEXTROAMPHETAMINE SULFATE AND AMPHETAMINE SULFATE 7.5; 7.5; 7.5; 7.5 MG/1; MG/1; MG/1; MG/1
1 TABLET ORAL EVERY MORNING
Qty: 30 TABLET | Refills: 0 | Status: SHIPPED | OUTPATIENT
Start: 2025-05-14 | End: 2025-06-16 | Stop reason: SDUPTHER

## 2025-05-16 LAB — T4 SERPL-MCNC: 7.1 ΜG/DL (ref 4.9–11.7)

## 2025-05-19 RX ORDER — BUDESONIDE AND FORMOTEROL FUMARATE DIHYDRATE 160; 4.5 UG/1; UG/1
2 AEROSOL RESPIRATORY (INHALATION) EVERY 12 HOURS
Refills: 0 | Status: CANCELLED | OUTPATIENT
Start: 2025-05-19 | End: 2026-05-19

## 2025-05-19 NOTE — TELEPHONE ENCOUNTER
----- Message from Rabia sent at 5/19/2025  4:00 PM CDT -----  Patient's wife called and said Patient is out of breath really bad  Requests refill soon Liza Solares [phone]  ----- Message -----  From: Rabia Churchill  Sent: 5/19/2025   8:50 AM CDT  To: Anthony MELISSA Staff    [facility] - [place], [place] - [address] [id number] [facility] [address] [facility] [address] [place] [address]Bfrgj: [phone] Fax: [phone]

## 2025-05-20 RX ORDER — BUDESONIDE 0.5 MG/2ML
0.5 INHALANT ORAL DAILY
Qty: 360 ML | Refills: 1 | Status: SHIPPED | OUTPATIENT
Start: 2025-05-20 | End: 2025-05-20 | Stop reason: SDUPTHER

## 2025-05-22 RX ORDER — BUDESONIDE 0.5 MG/2ML
0.5 INHALANT ORAL DAILY
Qty: 360 ML | Refills: 1 | Status: SHIPPED | OUTPATIENT
Start: 2025-05-22 | End: 2026-05-22

## 2025-06-03 RX ORDER — BUDESONIDE AND FORMOTEROL FUMARATE DIHYDRATE 160; 4.5 UG/1; UG/1
2 AEROSOL RESPIRATORY (INHALATION) EVERY 12 HOURS
Qty: 15 G | Refills: 3 | Status: SHIPPED | OUTPATIENT
Start: 2025-06-03

## 2025-06-11 DIAGNOSIS — F98.8 ADULT ATTENTION DEFICIT DISORDER: Chronic | ICD-10-CM

## 2025-06-11 NOTE — TELEPHONE ENCOUNTER
Called pt's wife and asked what medication was needed. Stated that he needed both of his adderal. Informed I would send request to Dr. Cruz and he would have to sign off on it.

## 2025-06-11 NOTE — TELEPHONE ENCOUNTER
----- Message from Rabia sent at 6/11/2025  9:55 AM CDT -----  [facility] [phone]'s Wife called and said they were out of town and requests refill for [facility] in [facility]. Allie requests call back to know the Prescription was sent/filled

## 2025-06-16 ENCOUNTER — APPOINTMENT (OUTPATIENT)
Dept: RADIOLOGY | Facility: CLINIC | Age: 60
End: 2025-06-16
Attending: NURSE PRACTITIONER
Payer: MEDICARE

## 2025-06-16 ENCOUNTER — OFFICE VISIT (OUTPATIENT)
Dept: FAMILY MEDICINE | Facility: CLINIC | Age: 60
End: 2025-06-16
Payer: MEDICARE

## 2025-06-16 VITALS
TEMPERATURE: 98 F | WEIGHT: 205 LBS | BODY MASS INDEX: 32.95 KG/M2 | HEART RATE: 65 BPM | DIASTOLIC BLOOD PRESSURE: 84 MMHG | SYSTOLIC BLOOD PRESSURE: 160 MMHG | RESPIRATION RATE: 18 BRPM | HEIGHT: 66 IN | OXYGEN SATURATION: 98 %

## 2025-06-16 DIAGNOSIS — R31.9 HEMATURIA, UNSPECIFIED TYPE: ICD-10-CM

## 2025-06-16 DIAGNOSIS — R30.9 PAIN WITH URINATION: ICD-10-CM

## 2025-06-16 DIAGNOSIS — M54.50 ACUTE MIDLINE LOW BACK PAIN WITHOUT SCIATICA: ICD-10-CM

## 2025-06-16 DIAGNOSIS — F98.8 ADULT ATTENTION DEFICIT DISORDER: Chronic | ICD-10-CM

## 2025-06-16 DIAGNOSIS — N17.9 ACUTE RENAL FAILURE SUPERIMPOSED ON STAGE 3A CHRONIC KIDNEY DISEASE, UNSPECIFIED ACUTE RENAL FAILURE TYPE: ICD-10-CM

## 2025-06-16 DIAGNOSIS — Z79.899 HIGH RISK MEDICATION USE: ICD-10-CM

## 2025-06-16 DIAGNOSIS — C18.6 MALIGNANT NEOPLASM OF DESCENDING COLON: ICD-10-CM

## 2025-06-16 DIAGNOSIS — N18.31 ACUTE RENAL FAILURE SUPERIMPOSED ON STAGE 3A CHRONIC KIDNEY DISEASE, UNSPECIFIED ACUTE RENAL FAILURE TYPE: ICD-10-CM

## 2025-06-16 DIAGNOSIS — E78.2 MIXED HYPERLIPIDEMIA: ICD-10-CM

## 2025-06-16 DIAGNOSIS — M54.50 ACUTE MIDLINE LOW BACK PAIN WITHOUT SCIATICA: Primary | ICD-10-CM

## 2025-06-16 LAB
BILIRUB SERPL-MCNC: NEGATIVE MG/DL
BLOOD URINE, POC: ABNORMAL
CLARITY, UA: CLEAR
COLOR, UA: YELLOW
CTP QC/QA: YES
GLUCOSE UR QL STRIP: ABNORMAL
KETONES UR QL STRIP: NEGATIVE
LEUKOCYTE ESTERASE URINE, POC: NEGATIVE
NITRITE, POC UA: NEGATIVE
PH, POC UA: 5.5
POC (AMP) AMPHETAMINE: NEGATIVE
POC (BAR) BARBITURATES: NEGATIVE
POC (BUP) BUPRENORPHINE: NEGATIVE
POC (BZO) BENZODIAZEPINES: NEGATIVE
POC (COC) COCAINE: NEGATIVE
POC (MDMA) METHYLENEDIOXYMETHAMPHETAMINE 3,4: NEGATIVE
POC (MET) METHAMPHETAMINE: NEGATIVE
POC (MOP) OPIATES: NEGATIVE
POC (MTD) METHADONE: NEGATIVE
POC (OXY) OXYCODONE: NEGATIVE
POC (PCP) PHENCYCLIDINE: NEGATIVE
POC (TCA) TRICYCLIC ANTIDEPRESSANTS: NEGATIVE
POC TEMPERATURE (URINE): 99
POC THC: NEGATIVE
PROTEIN, POC: NEGATIVE
SPECIFIC GRAVITY, POC UA: 1.02
UROBILINOGEN, POC UA: ABNORMAL

## 2025-06-16 PROCEDURE — 80305 DRUG TEST PRSMV DIR OPT OBS: CPT | Mod: RHCUB | Performed by: NURSE PRACTITIONER

## 2025-06-16 PROCEDURE — 87086 URINE CULTURE/COLONY COUNT: CPT | Mod: ,,, | Performed by: CLINICAL MEDICAL LABORATORY

## 2025-06-16 PROCEDURE — 74018 RADEX ABDOMEN 1 VIEW: CPT | Mod: TC,RHCUB | Performed by: NURSE PRACTITIONER

## 2025-06-16 PROCEDURE — 99214 OFFICE O/P EST MOD 30 MIN: CPT | Mod: ,,, | Performed by: NURSE PRACTITIONER

## 2025-06-16 PROCEDURE — 74018 RADEX ABDOMEN 1 VIEW: CPT | Mod: 26,,, | Performed by: RADIOLOGY

## 2025-06-16 RX ORDER — DEXTROAMPHETAMINE SACCHARATE, AMPHETAMINE ASPARTATE, DEXTROAMPHETAMINE SULFATE AND AMPHETAMINE SULFATE 7.5; 7.5; 7.5; 7.5 MG/1; MG/1; MG/1; MG/1
1 TABLET ORAL EVERY MORNING
Qty: 30 TABLET | Refills: 0 | Status: SHIPPED | OUTPATIENT
Start: 2025-06-16

## 2025-06-16 RX ORDER — DEXTROAMPHETAMINE SACCHARATE, AMPHETAMINE ASPARTATE, DEXTROAMPHETAMINE SULFATE AND AMPHETAMINE SULFATE 5; 5; 5; 5 MG/1; MG/1; MG/1; MG/1
TABLET ORAL
Qty: 30 TABLET | Refills: 0 | Status: SHIPPED | OUTPATIENT
Start: 2025-06-16

## 2025-06-16 RX ORDER — DEXTROAMPHETAMINE SACCHARATE, AMPHETAMINE ASPARTATE, DEXTROAMPHETAMINE SULFATE AND AMPHETAMINE SULFATE 7.5; 7.5; 7.5; 7.5 MG/1; MG/1; MG/1; MG/1
1 TABLET ORAL EVERY MORNING
Qty: 30 TABLET | Refills: 0 | Status: CANCELLED | OUTPATIENT
Start: 2025-06-16

## 2025-06-16 RX ORDER — DEXTROAMPHETAMINE SACCHARATE, AMPHETAMINE ASPARTATE, DEXTROAMPHETAMINE SULFATE AND AMPHETAMINE SULFATE 5; 5; 5; 5 MG/1; MG/1; MG/1; MG/1
TABLET ORAL
Qty: 30 TABLET | Refills: 0 | Status: CANCELLED | OUTPATIENT
Start: 2025-06-16

## 2025-06-16 RX ORDER — ATORVASTATIN CALCIUM 80 MG/1
80 TABLET, FILM COATED ORAL DAILY
Qty: 90 TABLET | Refills: 1 | Status: CANCELLED | OUTPATIENT
Start: 2025-06-16

## 2025-06-16 RX ORDER — DEXTROAMPHETAMINE SACCHARATE, AMPHETAMINE ASPARTATE, DEXTROAMPHETAMINE SULFATE AND AMPHETAMINE SULFATE 5; 5; 5; 5 MG/1; MG/1; MG/1; MG/1
1 TABLET ORAL DAILY
Qty: 30 TABLET | Refills: 0 | Status: SHIPPED | OUTPATIENT
Start: 2025-06-16

## 2025-06-16 RX ORDER — ATORVASTATIN CALCIUM 80 MG/1
80 TABLET, FILM COATED ORAL DAILY
Qty: 90 TABLET | Refills: 1 | Status: SHIPPED | OUTPATIENT
Start: 2025-06-16

## 2025-06-16 RX ORDER — DEXTROAMPHETAMINE SACCHARATE, AMPHETAMINE ASPARTATE, DEXTROAMPHETAMINE SULFATE AND AMPHETAMINE SULFATE 7.5; 7.5; 7.5; 7.5 MG/1; MG/1; MG/1; MG/1
1 TABLET ORAL EVERY MORNING
Qty: 30 TABLET | Refills: 0 | OUTPATIENT
Start: 2025-06-16

## 2025-06-16 RX ORDER — DEXTROAMPHETAMINE SACCHARATE, AMPHETAMINE ASPARTATE, DEXTROAMPHETAMINE SULFATE AND AMPHETAMINE SULFATE 7.5; 7.5; 7.5; 7.5 MG/1; MG/1; MG/1; MG/1
1 TABLET ORAL DAILY
Qty: 30 TABLET | Refills: 0 | Status: SHIPPED | OUTPATIENT
Start: 2025-06-16

## 2025-06-16 RX ORDER — DEXTROAMPHETAMINE SACCHARATE, AMPHETAMINE ASPARTATE, DEXTROAMPHETAMINE SULFATE AND AMPHETAMINE SULFATE 5; 5; 5; 5 MG/1; MG/1; MG/1; MG/1
TABLET ORAL
Qty: 30 TABLET | Refills: 0 | OUTPATIENT
Start: 2025-06-16

## 2025-06-16 NOTE — PROGRESS NOTES
REMEDIOS Orr   [facility]  [address]  [place] [address]     PATIENT NAME: Syed Kelley  : 1965  DATE: 25  MRN: [id number]      Billing Provider: REMEDIOS Orr  Level of Service:   Patient PCP Information       Provider PCP Type    Julio Cruz DO General            Reason for Visit / Chief Complaint: Dysuria (Mr.Syed Kelley is a 60 y.o. male who presents to the clinic today with c/o pain with urination and low back pain.) and wants medicine refills (Pt says he needs the Adderol refilled. )   Health Maintenance Due   Topic Date Due    Shingles Vaccine (1 of 2) Never done    Pneumococcal Vaccines (Age 50+) (1 of 2 - PCV) Never done    RSV Vaccine (Age 60+ and Pregnant patients) (1 - Risk 60-74 years 1-dose series) Never done    Diabetic Eye Exam  2025    Foot Exam  2025          Update PCP  Update Chief Complaint         History of Present Illness / Problem Focused Workflow     History of Present Illness    CHIEF COMPLAINT:  Patient presents today as a walk in due to low back pain and pain on urination. He is also needing med refills.     GENITOURINARY/RENAL HISTORY:  Pt states he has had history of kidney problems, but never known to have kidney stones. He underwent cystoscopy with evaluation up to kidneys several years ago. His urinary symptoms improve with cranberry juice consumption but return after drinking sodas. He states they have been on a trip to [place] the last 2 weeks and he has drank a lot sodas.     MEDICATIONS:  He takes Adderall 30 mg and 20 mg but reports losing pills during multiple hotel stays. Due to lack of medication access while traveling, he consumed multiple energy drinks and five-hour energy drinks to maintain focus while driving 740 miles. He denies taking any pain medications.    LABS:  A1C was 7.1 in May.      ROS:  General: -fever, -chills, -fatigue, -weight gain, -weight loss  Eyes: -vision changes, -redness, -discharge  ENT: -ear  pain, -nasal congestion, -sore throat  Cardiovascular: -chest pain, -palpitations, -lower extremity edema  Respiratory: -cough, -shortness of breath  Gastrointestinal: -abdominal pain, -nausea, -vomiting, -diarrhea, -constipation, -blood in stool  Genitourinary: -dysuria, +hematuria, -frequency  Musculoskeletal: -joint pain, -muscle pain, +back pain  Skin: -rash, -lesion  Neurological: -headache, -dizziness, -numbness, -tingling  Psychiatric: -anxiety, -depression, -sleep difficulty          Hemoglobin A1C   Date Value Ref Range Status   05/14/2025 7.1 (H) <=7.0 % Final     Comment:       Normal:               <5.7%  Pre-Diabetic:       5.7% to 6.4%  Diabetic:             >6.4%  Diabetic Goal:     <7%   02/14/2025 7.2 (H) <=7.0 % Final     Comment:       Normal:               <5.7%  Pre-Diabetic:       5.7% to 6.4%  Diabetic:             >6.4%  Diabetic Goal:     <7%   10/17/2024 7.4 (H) 4.5 - 6.6 % Final     Comment:       Normal:               <5.7%  Pre-Diabetic:       5.7% to 6.4%  Diabetic:             >6.4%  Diabetic Goal:     <7%        CMP  Sodium   Date Value Ref Range Status   05/14/2025 136 136 - 145 mmol/L Final     Potassium   Date Value Ref Range Status   05/14/2025 3.9 3.5 - 5.1 mmol/L Final     Chloride   Date Value Ref Range Status   05/14/2025 100 98 - 107 mmol/L Final     CO2   Date Value Ref Range Status   05/14/2025 24 23 - 31 mmol/L Final     Glucose   Date Value Ref Range Status   05/14/2025 160 (H) 82 - 115 mg/dL Final     BUN   Date Value Ref Range Status   05/14/2025 14 8 - 26 mg/dL Final     Creatinine   Date Value Ref Range Status   05/14/2025 1.31 (H) 0.72 - 1.25 mg/dL Final     Calcium   Date Value Ref Range Status   05/14/2025 9.6 8.8 - 10.0 mg/dL Final     Total Protein   Date Value Ref Range Status   05/14/2025 7.3 5.8 - 7.6 g/dL Final     Albumin   Date Value Ref Range Status   05/14/2025 3.6 3.4 - 4.8 g/dL Final     Bilirubin, Total   Date Value Ref Range Status   05/14/2025 0.2  <=1.5 mg/dL Final     Alk Phos   Date Value Ref Range Status   05/14/2025 84 40 - 150 U/L Final     AST   Date Value Ref Range Status   05/14/2025 19 11 - 45 U/L Final     ALT   Date Value Ref Range Status   05/14/2025 16 <=55 U/L Final     Anion Gap   Date Value Ref Range Status   05/14/2025 16 7 - 16 mmol/L Final     eGFR   Date Value Ref Range Status   05/14/2025 62 >=60 mL/min/1.73m2 Final     Comment:     Estimated GFR calculated using the CKD-EPI creatinine (2021) equation.        Lab Results   Component Value Date    WBC 7.19 05/14/2025    RBC 4.09 (L) 05/14/2025    HGB 12.9 (L) 05/14/2025    HCT 38.7 (L) 05/14/2025    MCV 94.6 05/14/2025    MCH 31.5 (H) 05/14/2025    MCHC 33.3 05/14/2025    RDW 13.1 05/14/2025     05/14/2025    MPV 9.7 05/14/2025    LYMPH 30.0 05/14/2025    LYMPH 2.16 05/14/2025    MONO 7.8 (H) 05/14/2025    EOS 0.61 (H) 05/14/2025    BASO 0.03 05/14/2025    EOSINOPHIL 8.5 (H) 05/14/2025    BASOPHIL 0.4 05/14/2025        Lab Results   Component Value Date    CHOL 177 05/14/2025    CHOL 147 10/17/2024    CHOL 143 08/23/2023     Lab Results   Component Value Date    HDL 26 (L) 05/14/2025    HDL 32 (L) 10/17/2024    HDL 29 (L) 08/23/2023     Lab Results   Component Value Date    LDLCALC 46 08/23/2023    LDLCALC 53 12/22/2022    LDLCALC 43 08/15/2022     Lab Results   Component Value Date    TRIG 574 (H) 05/14/2025    TRIG 436 (H) 10/17/2024    TRIG 338 (H) 08/23/2023     Lab Results   Component Value Date    CHOLHDL 6.8 05/14/2025    CHOLHDL 4.6 10/17/2024    CHOLHDL 4.9 08/23/2023        Wt Readings from Last 3 Encounters:   06/16/25 0901 93 kg (205 lb)   05/14/25 1309 90.7 kg (200 lb)   02/28/25 1510 82.2 kg (181 lb 3.2 oz)        BP Readings from Last 3 Encounters:   06/16/25 (!) 160/84   05/14/25 111/61   02/28/25 94/66        Review of Systems     Review of Systems       Medical / Social / Family History     Past Medical History:   Diagnosis Date    ADHD     Adult attention deficit  disorder 03/22/2021    Depression 12/22/2022    Essential hypertension 03/22/2021    Hyperlipidemia     Neuropathy 08/23/2023    AC (obstructive sleep apnea) 10/19/2023    PAD (peripheral artery disease) 02/17/2023    Swollen leg 01/20/2024    Type 2 diabetes mellitus        Past Surgical History:   Procedure Laterality Date    APPENDECTOMY      CARDIAC CATHETERIZATION      COLONOSCOPY  10/09/2024    DEBRIDEMENT OF LOWER EXTREMITY Bilateral 02/18/2023    Procedure: DEBRIDEMENT, LOWER EXTREMITY;  Surgeon: Yael Cohen MD;  Location: [facility] OR;  Service: General;  Laterality: Bilateral;  debride toes    FINGER AMPUTATION Right     1st digit    KIDNEY SURGERY Right     Patient states right kidney removed at age 5.    LEFT HEART CATHETERIZATION N/A 05/07/2021    Procedure: Left heart cath with possible intervention;  Surgeon: Federico James DO;  Location: [facility] CATH LAB;  Service: Cardiology;  Laterality: N/A;    TOE AMPUTATION Right 03/29/2023    Procedure: AMPUTATION, TOE;  Surgeon: Yael Cohen MD;  Location: [facility] OR;  Service: General;  Laterality: Right;  Right great toe amp dr cohen wednesday       Social History    reports that he quit smoking about 40 years ago. His smoking use included cigarettes. He started smoking about 44 years ago. He has a 20 pack-year smoking history. He has been exposed to tobacco smoke. His smokeless tobacco use includes chew and snuff. He reports that he does not currently use alcohol. He reports current drug use. Drug: Amphetamines.    Family History  's family history includes Cancer in his sister; Hypertension in his father; Stroke in his father.    Medications and Allergies     Medications  Outpatient Medications Marked as Taking for the 6/16/25 encounter (Office Visit) with Nydia Martines FNP   Medication Sig Dispense Refill    aspirin (ECOTRIN) 81 MG EC tablet Take 1 tablet (81 mg total) by mouth once daily. 90 tablet 3    blood sugar diagnostic Strp 1  "strip by Misc.(Non-Drug; Combo Route) route 3 (three) times daily. 180 strip 3    blood-glucose meter kit Use as instructed 1 each 0    citalopram (CELEXA) 20 MG tablet TAKE ONE TABLET BY MOUTH ONCE DAILY 90 tablet 1    gabapentin (NEURONTIN) 300 MG capsule TAKE ONE CAPSULE BY MOUTH THREE TIMES DAILY 90 capsule 1    glyBURIDE (DIABETA) 5 MG tablet Take 1 tablet (5 mg total) by mouth daily with breakfast. 90 tablet 1    insulin asp prt-insulin aspart, NovoLOG 70/30, (NOVOLOG 70/30) 100 unit/mL (70-30) Soln Inject 20 Units into the skin 2 (two) times daily. 10 mL 11    insulin NPH/Reg human (NOVOLIN 70-30 FLEXPEN U-100) 100 unit/mL (70-30) InPn pen Inject 30 Units into the skin 2 (two) times daily. 10 mL 5    lisinopriL-hydrochlorothiazide (PRINZIDE,ZESTORETIC) 20-25 mg Tab Take 1 tablet by mouth once daily. 90 tablet 1    metoprolol tartrate (LOPRESSOR) 50 MG tablet Take 1 tablet (50 mg total) by mouth 2 (two) times daily. 180 tablet 1    pen needle, diabetic (BD ULTRA-FINE SHORT PEN NEEDLE) 31 gauge x 5/16" Ndle USE AS DIRECTED TO administer insulin TWICE DAILY for 50      pen needle, diabetic 31 gauge x 5/16" Ndle 100 each by abdominal subcutaneous route once daily. Use to administer insulin twice daily 100 each 2    [DISCONTINUED] atorvastatin (LIPITOR) 80 MG tablet Take 1 tablet (80 mg total) by mouth once daily. 90 tablet 1    [DISCONTINUED] dextroamphetamine-amphetamine (ADDERALL) 20 mg tablet TAKE ONE TABLET BY MOUTH EVERY AFTERNOON. 30 tablet 0    [DISCONTINUED] dextroamphetamine-amphetamine (ADDERALL) 30 mg Tab Take 1 tablet (30 mg total) by mouth every morning. 30 tablet 0       Allergies  Review of patient's allergies indicates:   Allergen Reactions    Azithromycin     Erythromycin Other (See Comments)    M-mycin        Physical Examination     Vitals:    06/16/25 0901 06/16/25 1006   BP: (!) 179/90 (!) 160/84   Pulse: 65    Resp: 18    Temp: 98.3 °F (36.8 °C)    TempSrc: Oral    SpO2: 98%    Weight: 93 kg " "(205 lb)    Height: 5' 6" (1.676 m)       Physical Exam  Constitutional:       Appearance: Normal appearance. He is obese.   HENT:      Head: Normocephalic.   Eyes:      Extraocular Movements: Extraocular movements intact.   Cardiovascular:      Rate and Rhythm: Normal rate and regular rhythm.      Pulses: Normal pulses.      Heart sounds: Normal heart sounds.   Pulmonary:      Effort: Pulmonary effort is normal.      Breath sounds: Normal breath sounds.   Skin:     General: Skin is warm and dry.      Capillary Refill: Capillary refill takes less than 2 seconds.   Neurological:      General: No focal deficit present.      Mental Status: He is alert and oriented to person, place, and time.   Psychiatric:         Mood and Affect: Mood normal.         Behavior: Behavior normal.          Assessment and Plan (including Health Maintenance)      Problem List  Smart Sets  Document Outside HM   :    Plan:     There are no Patient Instructions on file for this visit.       Health Maintenance Due   Topic Date Due    Shingles Vaccine (1 of 2) Never done    Pneumococcal Vaccines (Age 50+) (1 of 2 - PCV) Never done    RSV Vaccine (Age 60+ and Pregnant patients) (1 - Risk 60-74 years 1-dose series) Never done    Diabetic Eye Exam  02/07/2025    Foot Exam  06/06/2025       Problem List Items Addressed This Visit       Acute midline low back pain without sciatica - Primary    Relevant Orders    X-Ray KUB    Acute renal failure superimposed on stage 3a chronic kidney disease, unspecified acute renal failure type    Adult attention deficit disorder    Relevant Medications    dextroamphetamine-amphetamine (ADDERALL) 30 mg Tab    dextroamphetamine-amphetamine (ADDERALL) 20 mg tablet    dextroamphetamine-amphetamine (ADDERALL) 20 mg tablet    dextroamphetamine-amphetamine (ADDERALL) 20 mg tablet    dextroamphetamine-amphetamine (ADDERALL) 30 mg Tab    dextroamphetamine-amphetamine (ADDERALL) 30 mg Tab    Hematuria    Relevant Orders    " Urine Culture High Risk ($$)    X-Ray KUB    High risk medication use    Relevant Orders    POCT Urine Drug Screen Presump (Completed)    Malignant neoplasm of descending colon    Mixed hyperlipidemia    Relevant Medications    atorvastatin (LIPITOR) 80 MG tablet    Pain with urination    Relevant Orders    POCT URINALYSIS W/O SCOPE (Completed)     Assessment & Plan    N17.9, N18.31 Acute kidney failure, unspecified  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  - Detected trace blood in urine and ordered culture to check for bacteria.  - Discussed past kidney issues and potential kidney stones.  - Ordered abdominal X-ray (KUB) to evaluate for possible kidney stones.    HYPERTENSION:  - Measured elevated blood pressure on second check (160/84), still high after rechecking.  - Heart rate evaluation showed no concerns.  - Requested home BP log for potential medication adjustment.  - Patient currently takes two antihypertensive medications daily, including metoprolol.  - Instructed patient to monitor and log BP readings at home.  - Scheduled nurse follow-up call on Friday to review home BP logs and determine if medication changes are needed.    HYPERLIPIDEMIA:  - Determined current statin dose (80 mg atorvastatin) is appropriate based on risk calculator.  - Reviewed cholesterol levels to determine if additional medication is needed.  - Continued Atorvastatin 80 mg for cholesterol management.  - Discussed dietary factors affecting cholesterol levels, advising to limit fried and fatty foods and recommending olive oil instead of vegetable oil for cooking.    HYPERTRIGLYCERIDEMIA:  - Noted  elevated triglycerides, possibly due to eating before the test.  - Recommend OTC fish oil or omega-3 supplements to help lower triglycerides.    ATTENTION-DEFICIT HYPERACTIVITY DISORDER:  - Refilled Adderall 30 mg and 20 mg, providing a 3-month supply with do not fill dates.  - UDS and  reviewed with no aberrant behavior noted.  - Pt stable on current medication. Med refilled.     SUSPECTED RENAL COLIC (KIDNEY STONES):  - Noted the patient reports pain in the lower back area.  - Discussed the possibility of kidney stones as a cause.    HEMATURIA:  - Explained importance of hydration for flushing urinary system and recommended increasing water intake.  - Patient can consider using sugar-free flavor enhancers to improve water palatability.  - Anticipated urine culture results by Wednesday or Thursday of the current week. Should have radiology results by the end of the day.       Acute midline low back pain without sciatica  -     X-Ray KUB; Future; Expected date: 06/16/2025    Pain with urination  -     POCT URINALYSIS W/O SCOPE    Adult attention deficit disorder  -     dextroamphetamine-amphetamine (ADDERALL) 30 mg Tab; Take 1 tablet (30 mg total) by mouth every morning.  Dispense: 30 tablet; Refill: 0  -     dextroamphetamine-amphetamine (ADDERALL) 20 mg tablet; TAKE ONE TABLET BY MOUTH EVERY AFTERNOON.  Dispense: 30 tablet; Refill: 0  -     dextroamphetamine-amphetamine (ADDERALL) 20 mg tablet; Take 1 tablet by mouth once daily.  Dispense: 30 tablet; Refill: 0  -     dextroamphetamine-amphetamine (ADDERALL) 20 mg tablet; Take 1 tablet by mouth once daily.  Dispense: 30 tablet; Refill: 0  -     dextroamphetamine-amphetamine (ADDERALL) 30 mg Tab; Take 1 tablet (30 mg total) by mouth once daily.  Dispense: 30 tablet; Refill: 0  -     dextroamphetamine-amphetamine (ADDERALL) 30 mg Tab; Take 1 tablet (30 mg total) by mouth once daily.  Dispense: 30 tablet; Refill: 0    Mixed hyperlipidemia  -     atorvastatin  (LIPITOR) 80 MG tablet; Take 1 tablet (80 mg total) by mouth once daily.  Dispense: 90 tablet; Refill: 1    High risk medication use  -     POCT Urine Drug Screen Presump    Hematuria, unspecified type  -     Urine Culture High Risk ($$)  -     X-Ray KUB; Future; Expected date: 06/16/2025    Malignant neoplasm of descending colon    Acute renal failure superimposed on stage 3a chronic kidney disease, unspecified acute renal failure type       Health Maintenance Topics with due status: Not Due       Topic Last Completion Date    Colorectal Cancer Screening 10/10/2024    TETANUS VACCINE 02/10/2025    Diabetes Urine Screening 02/14/2025    Lipid Panel 05/14/2025    Hemoglobin A1c 05/14/2025    Low Dose Statin 06/16/2025         Future Appointments   Date Time Provider Department Center   7/1/2025  9:00 AM [facility] GI ROOM 03 [facility]   9/16/2025  1:00 PM Julio Cruz, DO [facility]        No follow-ups on file.    Health Maintenance Due   Topic Date Due    Shingles Vaccine (1 of 2) Never done    Pneumococcal Vaccines (Age 50+) (1 of 2 - PCV) Never done    RSV Vaccine (Age 60+ and Pregnant patients) (1 - Risk 60-74 years 1-dose series) Never done    Diabetic Eye Exam  02/07/2025    Foot Exam  06/06/2025        Signature:  REMEDIOS Orr    Date of encounter: 6/16/25  This note was generated with the assistance of ambient listening technology. Verbal consent was obtained by the patient and accompanying visitor(s) for the recording of patient appointment to facilitate this note. I attest to having reviewed and edited the generated note for accuracy, though some syntax or spelling errors may persist. Please contact the author of this note for any clarification.

## 2025-06-17 ENCOUNTER — PATIENT MESSAGE (OUTPATIENT)
Dept: GASTROENTEROLOGY | Facility: CLINIC | Age: 60
End: 2025-06-17
Payer: MEDICARE

## 2025-06-17 ENCOUNTER — RESULTS FOLLOW-UP (OUTPATIENT)
Dept: FAMILY MEDICINE | Facility: CLINIC | Age: 60
End: 2025-06-17
Payer: MEDICARE

## 2025-06-17 DIAGNOSIS — Z12.11 SCREENING FOR COLON CANCER: Primary | ICD-10-CM

## 2025-06-17 DIAGNOSIS — R10.9 ABDOMINAL PAIN, UNSPECIFIED ABDOMINAL LOCATION: Primary | ICD-10-CM

## 2025-06-17 RX ORDER — POLYETHYLENE GLYCOL 3350, SODIUM SULFATE ANHYDROUS, SODIUM BICARBONATE, SODIUM CHLORIDE, POTASSIUM CHLORIDE 236; 22.74; 6.74; 5.86; 2.97 G/4L; G/4L; G/4L; G/4L; G/4L
4 POWDER, FOR SOLUTION ORAL ONCE
Qty: 4000 ML | Refills: 0 | Status: SHIPPED | OUTPATIENT
Start: 2025-06-17 | End: 2025-06-17

## 2025-06-17 NOTE — PROGRESS NOTES
Please let pt know his xray and urine culture are normal. Since he did still have blood in his urine, we can set him up for a ct scan of his abdomen to make sure he does not have anything else going on. If he is okay with this, we can schedule. He will need ct abd/pelvis with contrast. It will not have to be precerted because he has medicare so we can  just call lai and schedule. Thanks!

## 2025-06-18 DIAGNOSIS — E11.9 TYPE 2 DIABETES MELLITUS WITHOUT COMPLICATION, WITHOUT LONG-TERM CURRENT USE OF INSULIN: Primary | ICD-10-CM

## 2025-06-18 LAB — UA COMPLETE W REFLEX CULTURE PNL UR: NO GROWTH

## 2025-06-19 ENCOUNTER — HOSPITAL ENCOUNTER (OUTPATIENT)
Dept: RADIOLOGY | Facility: HOSPITAL | Age: 60
Discharge: HOME | End: 2025-06-19
Attending: NURSE PRACTITIONER
Payer: MEDICARE

## 2025-06-19 ENCOUNTER — RESULTS FOLLOW-UP (OUTPATIENT)
Dept: FAMILY MEDICINE | Facility: CLINIC | Age: 60
End: 2025-06-19
Payer: MEDICARE

## 2025-06-19 DIAGNOSIS — Z12.5 SCREENING FOR PROSTATE CANCER: Primary | ICD-10-CM

## 2025-06-19 DIAGNOSIS — N40.1 BENIGN PROSTATIC HYPERPLASIA WITH LOWER URINARY TRACT SYMPTOMS, SYMPTOM DETAILS UNSPECIFIED: Primary | ICD-10-CM

## 2025-06-19 DIAGNOSIS — R10.9 ABDOMINAL PAIN, UNSPECIFIED ABDOMINAL LOCATION: ICD-10-CM

## 2025-06-19 DIAGNOSIS — E11.9 TYPE 2 DIABETES MELLITUS WITHOUT COMPLICATION, WITHOUT LONG-TERM CURRENT USE OF INSULIN: ICD-10-CM

## 2025-06-19 DIAGNOSIS — Z12.5 SCREENING FOR PROSTATE CANCER: ICD-10-CM

## 2025-06-19 LAB
CREAT SERPL-MCNC: 1.08 MG/DL (ref 0.72–1.25)
EGFR (NO RACE VARIABLE) (RUSH/TITUS): 79 ML/MIN/1.73M2

## 2025-06-19 PROCEDURE — 82565 ASSAY OF CREATININE: CPT | Performed by: NURSE PRACTITIONER

## 2025-06-19 PROCEDURE — 74178 CT ABD&PLV WO CNTR FLWD CNTR: CPT | Mod: 26,,, | Performed by: STUDENT IN AN ORGANIZED HEALTH CARE EDUCATION/TRAINING PROGRAM

## 2025-06-19 PROCEDURE — 25500020 PHARM REV CODE 255: Performed by: NURSE PRACTITIONER

## 2025-06-19 PROCEDURE — 36415 COLL VENOUS BLD VENIPUNCTURE: CPT | Performed by: NURSE PRACTITIONER

## 2025-06-19 PROCEDURE — 74178 CT ABD&PLV WO CNTR FLWD CNTR: CPT | Mod: TC

## 2025-06-19 RX ORDER — TAMSULOSIN HYDROCHLORIDE 0.4 MG/1
0.4 CAPSULE ORAL DAILY
Qty: 30 CAPSULE | Refills: 5 | Status: SHIPPED | OUTPATIENT
Start: 2025-06-19 | End: 2026-06-19

## 2025-06-19 RX ORDER — IOPAMIDOL 755 MG/ML
100 INJECTION, SOLUTION INTRAVASCULAR
Status: COMPLETED | OUTPATIENT
Start: 2025-06-19 | End: 2025-06-19

## 2025-06-19 RX ADMIN — IOPAMIDOL 100 ML: 755 INJECTION, SOLUTION INTRAVENOUS at 11:06

## 2025-06-19 NOTE — PROGRESS NOTES
"Please let pt know his ct was normal other than I saw where he had "mild prostatomegaly" which is an enlarged prostate. I have added a psa screening test to the lab he had done this morning which he is due for. His last one was done in May of last year and we try to get these yearly. I am also going to send him in a medication that may help with urination since the ct showed enlarged prostate called Flomax. We will let him know about his psa when it comes back. Thanks!  "

## 2025-06-20 LAB — PSA SERPL-MCNC: 0.86 NG/ML

## 2025-06-23 ENCOUNTER — RESULTS FOLLOW-UP (OUTPATIENT)
Dept: FAMILY MEDICINE | Facility: CLINIC | Age: 60
End: 2025-06-23

## 2025-06-25 ENCOUNTER — TELEPHONE (OUTPATIENT)
Dept: FAMILY MEDICINE | Facility: CLINIC | Age: 60
End: 2025-06-25
Payer: MEDICARE

## 2025-06-25 NOTE — TELEPHONE ENCOUNTER
I called to check on pt, specifically his bp. His phone went straight to . I left a message as to why I was calling. I will call back later if pt does not return phone call.  ----- Message from REMEDIOS Orr sent at 6/16/2025 10:06 AM CDT -----  Please call pt friday to see how bp is doing since elevated at his visit. Thanks!

## 2025-06-26 ENCOUNTER — PATIENT MESSAGE (OUTPATIENT)
Dept: FAMILY MEDICINE | Facility: CLINIC | Age: 60
End: 2025-06-26
Payer: MEDICARE

## 2025-07-01 ENCOUNTER — HOSPITAL ENCOUNTER (OUTPATIENT)
Dept: GASTROENTEROLOGY | Facility: HOSPITAL | Age: 60
Discharge: HOME | End: 2025-07-01
Attending: INTERNAL MEDICINE
Payer: MEDICARE

## 2025-07-01 DIAGNOSIS — Z12.11 COLON CANCER SCREENING: Primary | ICD-10-CM

## 2025-07-01 DIAGNOSIS — Z12.11 SCREENING FOR COLON CANCER: ICD-10-CM

## 2025-07-02 ENCOUNTER — HOSPITAL ENCOUNTER (OUTPATIENT)
Dept: GASTROENTEROLOGY | Facility: HOSPITAL | Age: 60
Discharge: HOME | End: 2025-07-02
Attending: INTERNAL MEDICINE
Payer: MEDICARE

## 2025-07-02 ENCOUNTER — ANESTHESIA EVENT (OUTPATIENT)
Dept: GASTROENTEROLOGY | Facility: HOSPITAL | Age: 60
End: 2025-07-02
Payer: MEDICARE

## 2025-07-02 ENCOUNTER — ANESTHESIA (OUTPATIENT)
Dept: GASTROENTEROLOGY | Facility: HOSPITAL | Age: 60
End: 2025-07-02
Payer: MEDICARE

## 2025-07-02 VITALS
HEART RATE: 61 BPM | SYSTOLIC BLOOD PRESSURE: 127 MMHG | TEMPERATURE: 98 F | DIASTOLIC BLOOD PRESSURE: 76 MMHG | OXYGEN SATURATION: 97 % | WEIGHT: 200 LBS | HEIGHT: 63 IN | RESPIRATION RATE: 11 BRPM | BODY MASS INDEX: 35.44 KG/M2

## 2025-07-02 DIAGNOSIS — Z12.11 COLON CANCER SCREENING: Primary | ICD-10-CM

## 2025-07-02 DIAGNOSIS — Z12.11 COLON CANCER SCREENING: ICD-10-CM

## 2025-07-02 LAB — GLUCOSE SERPL-MCNC: 192 MG/DL (ref 70–105)

## 2025-07-02 PROCEDURE — 37000009 HC ANESTHESIA EA ADD 15 MINS

## 2025-07-02 PROCEDURE — 27000284 HC CANNULA NASAL: Performed by: NURSE ANESTHETIST, CERTIFIED REGISTERED

## 2025-07-02 PROCEDURE — 63600175 PHARM REV CODE 636 W HCPCS: Performed by: NURSE ANESTHETIST, CERTIFIED REGISTERED

## 2025-07-02 PROCEDURE — 37000008 HC ANESTHESIA 1ST 15 MINUTES

## 2025-07-02 PROCEDURE — 27201423 OPTIME MED/SURG SUP & DEVICES STERILE SUPPLY

## 2025-07-02 PROCEDURE — 88305 TISSUE EXAM BY PATHOLOGIST: CPT | Mod: TC,91,SUR | Performed by: INTERNAL MEDICINE

## 2025-07-02 RX ORDER — PROPOFOL 10 MG/ML
VIAL (ML) INTRAVENOUS
Status: DISCONTINUED | OUTPATIENT
Start: 2025-07-02 | End: 2025-07-02

## 2025-07-02 RX ORDER — LIDOCAINE HYDROCHLORIDE 20 MG/ML
INJECTION, SOLUTION EPIDURAL; INFILTRATION; INTRACAUDAL; PERINEURAL
Status: DISCONTINUED | OUTPATIENT
Start: 2025-07-02 | End: 2025-07-02

## 2025-07-02 RX ADMIN — PROPOFOL 50 MG: 10 INJECTION, EMULSION INTRAVENOUS at 10:07

## 2025-07-02 RX ADMIN — LIDOCAINE HYDROCHLORIDE 50 MG: 20 INJECTION, SOLUTION EPIDURAL; INFILTRATION; INTRACAUDAL; PERINEURAL at 10:07

## 2025-07-02 NOTE — ANESTHESIA PREPROCEDURE EVALUATION
07/02/2025  Syed Kelley is a 60 y.o., male.      Pre-op Assessment    I have reviewed the Patient Summary Reports.     I have reviewed the Nursing Notes. I have reviewed the NPO Status.   I have reviewed the Medications.     Review of Systems  Anesthesia Hx:  No problems with previous Anesthesia                Cardiovascular:     Hypertension                       Shortness of Breath                    Hypertension         Pulmonary:  Pneumonia    Shortness of breath  Sleep Apnea     Obstructive Sleep Apnea (AC).       Pulmonary Infection:  Pneumonia.     Renal/:  Chronic Renal Disease        Kidney Function/Disease             Endocrine:  Diabetes    Diabetes                      Psych:  Psychiatric History                  Physical Exam  General: Well nourished, Cooperative, Alert and Oriented    Airway:  Mallampati: II   Mouth Opening: Normal  TM Distance: Normal  Tongue: Normal  Neck ROM: Normal ROM    Dental:  Intact        Anesthesia Plan  Type of Anesthesia, risks & benefits discussed:    Anesthesia Type: Gen Natural Airway, MAC  Intra-op Monitoring Plan: Standard ASA Monitors  Post Op Pain Control Plan: multimodal analgesia  Induction:  IV  Informed Consent: Informed consent signed with the Patient and all parties understand the risks and agree with anesthesia plan.  All questions answered. Patient consented to blood products? Yes  ASA Score: 3  Day of Surgery Review of History & Physical: I have interviewed and examined the patient. I have reviewed the patient's H&P dated: There are no significant changes.     Ready For Surgery From Anesthesia Perspective.     .  Problem List[1]   Diagnosis    Blurred vision, bilateral    Type 2 diabetes mellitus with hyperglycemia    Mixed hyperlipidemia    Essential hypertension, benign    Diabetes    Depression    ADHD    Vitamin D deficiency    Onychomycosis     Adult attention deficit disorder    High risk medication use    Callus of foot    Neuropathy    Obstructive sleep apnea syndrome    Hypertension    Acute laryngitis    Shortness of breath    Acute lower respiratory infection    Hypomagnesemia    Elevated d-dimer    Yeast cells and fungal elements present on diagnostic testing    Postviral fatigue syndrome    Puncture wound    Pneumonia of both lungs due to infectious organism    Acute cough    Gastroenteritis    Nausea    Malignant neoplasm of descending colon    Acute renal failure superimposed on stage 3a chronic kidney disease, unspecified acute renal failure type    Acute midline low back pain without sciatica    Hematuria    Pain with urination     Past Medical History:   Diagnosis Date    ADHD     Adult attention deficit disorder 03/22/2021    Depression 12/22/2022    Essential hypertension 03/22/2021    Hyperlipidemia     Neuropathy 08/23/2023    AC (obstructive sleep apnea) 10/19/2023    PAD (peripheral artery disease) 02/17/2023    Swollen leg 01/20/2024    Type 2 diabetes mellitus        Past Surgical History:   Procedure Laterality Date    APPENDECTOMY      CARDIAC CATHETERIZATION      COLONOSCOPY  10/09/2024    DEBRIDEMENT OF LOWER EXTREMITY Bilateral 02/18/2023    Procedure: DEBRIDEMENT, LOWER EXTREMITY;  Surgeon: Yael Cohen MD;  Location: [facility] OR;  Service: General;  Laterality: Bilateral;  debride toes    FINGER AMPUTATION Right     1st digit    KIDNEY SURGERY Right     Patient states right kidney removed at age 5.    LEFT HEART CATHETERIZATION N/A 05/07/2021    Procedure: Left heart cath with possible intervention;  Surgeon: Federico James DO;  Location: [facility] CATH LAB;  Service: Cardiology;  Laterality: N/A;    TOE AMPUTATION Right 03/29/2023    Procedure: AMPUTATION, TOE;  Surgeon: Yael Cohen MD;  Location: [facility] OR;  Service: General;  Laterality: Right;  Right great toe amp dr cohen wednesday       Family History    Problem Relation Name Age of Onset    Hypertension Father      Stroke Father      Cancer Sister         Social History     Socioeconomic History    Marital status:     Number of children: 4   Tobacco Use    Smoking status: Former     Current packs/day: 0.00     Average packs/day: 5.0 packs/day for 4.0 years (20.0 ttl pk-yrs)     Types: Cigarettes     Start date:      Quit date:      Years since quittin.5     Passive exposure: Past    Smokeless tobacco: Current     Types: Chew, Snuff    Tobacco comments:     1 can/day   Substance and Sexual Activity    Alcohol use: Not Currently     Comment: 2-3 drinks per/3 months    Drug use: Yes     Types: Amphetamines     Comment: prescibed adderal    Sexual activity: Yes     Partners: Female     Social Drivers of Health     Financial Resource Strain: Low Risk  (2024)    Overall Financial Resource Strain (CARDIA)     Difficulty of Paying Living Expenses: Not hard at all   Recent Concern: Financial Resource Strain - Medium Risk (2024)    Overall Financial Resource Strain (CARDIA)     Difficulty of Paying Living Expenses: Somewhat hard   Food Insecurity: No Food Insecurity (2024)    Hunger Vital Sign     Worried About Running Out of Food in the Last Year: Never true     Ran Out of Food in the Last Year: Never true   Recent Concern: Food Insecurity - Food Insecurity Present (2024)    Hunger Vital Sign     Worried About Running Out of Food in the Last Year: Sometimes true     Ran Out of Food in the Last Year: Sometimes true   Transportation Needs: No Transportation Needs (2024)    TRANSPORTATION NEEDS     Transportation : No   Physical Activity: Inactive (2024)    Exercise Vital Sign     Days of Exercise per Week: 0 days     Minutes of Exercise per Session: 0 min   Stress: No Stress Concern Present (2024)    [employer] - Occupational Stress Questionnaire     Feeling of Stress : Not at all    Housing Stability: Unknown (12/11/2024)    Housing Stability Vital Sign     Unable to Pay for Housing in the Last Year: No     Homeless in the Last Year: No       Current Medications[2]    Review of patient's allergies indicates:   Allergen Reactions    Azithromycin     Erythromycin Other (See Comments)    M-mycin            [1]   Patient Active Problem List  Diagnosis    Blurred vision, bilateral    Type 2 diabetes mellitus with hyperglycemia    Mixed hyperlipidemia    Essential hypertension, benign    Diabetes    Depression    ADHD    Vitamin D deficiency    Onychomycosis    Adult attention deficit disorder    High risk medication use    Callus of foot    Neuropathy    Obstructive sleep apnea syndrome    Hypertension    Acute laryngitis    Shortness of breath    Acute lower respiratory infection    Hypomagnesemia    Elevated d-dimer    Yeast cells and fungal elements present on diagnostic testing    Postviral fatigue syndrome    Puncture wound    Pneumonia of both lungs due to infectious organism    Acute cough    Gastroenteritis    Nausea    Malignant neoplasm of descending colon    Acute renal failure superimposed on stage 3a chronic kidney disease, unspecified acute renal failure type    Acute midline low back pain without sciatica    Hematuria    Pain with urination   [2]   Current Outpatient Medications   Medication Sig Dispense Refill    aspirin (ECOTRIN) 81 MG EC tablet Take 1 tablet (81 mg total) by mouth once daily. 90 tablet 3    atorvastatin (LIPITOR) 80 MG tablet Take 1 tablet (80 mg total) by mouth once daily. 90 tablet 1    blood sugar diagnostic Strp 1 strip by Misc.(Non-Drug; Combo Route) route 3 (three) times daily. 180 strip 3    blood-glucose meter kit Use as instructed 1 each 0    budesonide (PULMICORT) 0.5 mg/2 mL nebulizer solution Take 2 mLs (0.5 mg total) by nebulization once daily. Controller 360 mL 1    budesonide-formoterol 160-4.5 mcg (SYMBICORT) 160-4.5 mcg/actuation HFAA Inhale 2  puffs into the lungs every 12 (twelve) hours. Controller 15 g 3    citalopram (CELEXA) 20 MG tablet TAKE ONE TABLET BY MOUTH ONCE DAILY 90 tablet 1    dextroamphetamine-amphetamine (ADDERALL) 20 mg tablet TAKE ONE TABLET BY MOUTH EVERY AFTERNOON. 30 tablet 0    dextroamphetamine-amphetamine (ADDERALL) 20 mg tablet Take 1 tablet by mouth once daily. 30 tablet 0    dextroamphetamine-amphetamine (ADDERALL) 20 mg tablet Take 1 tablet by mouth once daily. 30 tablet 0    dextroamphetamine-amphetamine (ADDERALL) 30 mg Tab Take 1 tablet (30 mg total) by mouth every morning. 30 tablet 0    dextroamphetamine-amphetamine (ADDERALL) 30 mg Tab Take 1 tablet (30 mg total) by mouth once daily. 30 tablet 0    dextroamphetamine-amphetamine (ADDERALL) 30 mg Tab Take 1 tablet (30 mg total) by mouth once daily. 30 tablet 0    gabapentin (NEURONTIN) 300 MG capsule TAKE ONE CAPSULE BY MOUTH THREE TIMES DAILY 90 capsule 1    glyBURIDE (DIABETA) 5 MG tablet Take 1 tablet (5 mg total) by mouth daily with breakfast. 90 tablet 1    insulin asp prt-insulin aspart, NovoLOG 70/30, (NOVOLOG 70/30) 100 unit/mL (70-30) Soln Inject 20 Units into the skin 2 (two) times daily. 10 mL 11    insulin NPH/Reg human (NOVOLIN 70-30 FLEXPEN U-100) 100 unit/mL (70-30) InPn pen Inject 30 Units into the skin 2 (two) times daily. 10 mL 5    lisinopriL-hydrochlorothiazide (PRINZIDE,ZESTORETIC) 20-25 mg Tab Take 1 tablet by mouth once daily. 90 tablet 1    magnesium glycinate 100 mg magnesium Cap Take 1 tablet by mouth Daily. 30 capsule 2    metFORMIN (GLUCOPHAGE) 1000 MG tablet Take 1 tablet (1,000 mg total) by mouth 2 (two) times daily with meals. 180 tablet 1    metoprolol tartrate (LOPRESSOR) 50 MG tablet Take 1 tablet (50 mg total) by mouth 2 (two) times daily. 180 tablet 1    ondansetron (ZOFRAN-ODT) 4 MG TbDL Take 1 tablet (4 mg total) by mouth every 8 (eight) hours as needed (nausea). 15 tablet 0    pen needle, diabetic (BD ULTRA-FINE SHORT PEN NEEDLE) 31  "gauge x 5/16" Ndle USE AS DIRECTED TO administer insulin TWICE DAILY for 50      pen needle, diabetic 31 gauge x 5/16" Ndle 100 each by abdominal subcutaneous route once daily. Use to administer insulin twice daily 100 each 2    tamsulosin (FLOMAX) 0.4 mg Cap Take 1 capsule (0.4 mg total) by mouth once daily. 30 capsule 5     No current facility-administered medications for this encounter.     "

## 2025-07-02 NOTE — TRANSFER OF CARE
"Anesthesia Transfer of Care Note    Patient: Syed Kelley    Procedure(s) Performed: * No procedures listed *    Patient location: GI    Anesthesia Type: MAC    Transport from OR: Transported from OR on room air with adequate spontaneous ventilation    Post pain: adequate analgesia    Post assessment: no apparent anesthetic complications    Post vital signs: stable    Level of consciousness: responds to stimulation    Nausea/Vomiting: no nausea/vomiting    Complications: none    Transfer of care protocol was followed      Last vitals: Visit Vitals  BP (!) 92/56   Pulse 61   Temp 36.8 °C (98.2 °F) (Oral)   Resp 17   Ht 5' 3" (1.6 m)   Wt 90.7 kg (200 lb)   SpO2 95%   BMI 35.43 kg/m²     "

## 2025-07-02 NOTE — DISCHARGE INSTRUCTIONS
Procedure Date  7/2/25     Impression  Overall Impression:   4 polyps  Internal hemorrhoids        Recommendation  - Repeat colonoscopy in 3 years  - Discharge patient to home  - Advance diet as tolerated  - Continue present medications  - Await pathology results  - Patient has a contact number available for emergencies. The signs and symptoms of potential delayed complications were discussed with the patient. Return to normal activities tomorrow. Written discharge instructions were provided to the patient.        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

## 2025-07-03 NOTE — ANESTHESIA POSTPROCEDURE EVALUATION
Anesthesia Post Evaluation    Patient: Syed Kelley    Procedure(s) Performed: * No procedures listed *    Final Anesthesia Type: MAC      Patient location during evaluation: GI PACU  Patient participation: Yes- Able to Participate  Level of consciousness: awake and alert and oriented  Post-procedure vital signs: reviewed and stable  Pain management: adequate  Airway patency: patent    PONV status at discharge: No PONV  Anesthetic complications: no      Cardiovascular status: blood pressure returned to baseline  Respiratory status: unassisted, spontaneous ventilation and room air  Hydration status: euvolemic  Follow-up not needed.  Comments: See nursing note for post op pain/basilio score              Vitals Value Taken Time   /76 07/02/25 11:00   Temp 36.8 °C (98.2 °F) 07/02/25 10:37   Pulse 58 07/02/25 11:00   Resp 16 07/02/25 11:00   SpO2 95 % 07/02/25 11:00   Vitals shown include unfiled device data.      Event Time   Out of Recovery 11:15:11         Pain/Basilio Score: Basilio Score: 8 (7/2/2025 10:30 AM)

## 2025-07-07 DIAGNOSIS — G62.9 NEUROPATHY: Chronic | ICD-10-CM

## 2025-07-08 RX ORDER — GABAPENTIN 300 MG/1
300 CAPSULE ORAL 3 TIMES DAILY
Qty: 90 CAPSULE | Refills: 1 | Status: SHIPPED | OUTPATIENT
Start: 2025-07-08

## 2025-07-17 ENCOUNTER — TELEPHONE (OUTPATIENT)
Dept: GASTROENTEROLOGY | Facility: CLINIC | Age: 60
End: 2025-07-17
Payer: MEDICARE

## 2025-07-17 NOTE — TELEPHONE ENCOUNTER
----- Message from Nish Alberto MD sent at 7/11/2025  7:16 AM CDT -----  One polyp returned an adenoma. No concerning findings. Plan clinic follow up with me to discuss prior cancer within a polyp.  ----- Message -----  From: Interface, Lab In [facility]  Sent: 7/2/2025  10:07 AM CDT  To: Nish Alberto MD

## 2025-07-21 ENCOUNTER — TELEPHONE (OUTPATIENT)
Dept: GASTROENTEROLOGY | Facility: CLINIC | Age: 60
End: 2025-07-21
Payer: MEDICARE

## 2025-07-21 NOTE — TELEPHONE ENCOUNTER
----- Message from Nish Alberto MD sent at 7/11/2025  7:16 AM CDT -----  One polyp returned an adenoma. No concerning findings. Plan clinic follow up with me to discuss prior cancer within a polyp.  ----- Message -----  From: Interface, Lab In [facility]  Sent: 7/2/2025  10:07 AM CDT  To: Nihs Alberto MD

## 2025-07-21 NOTE — TELEPHONE ENCOUNTER
Call to pt - spoke w/ spouse - results reviewed w/ good vu noted - appt date for in clinic discussion given for 10/8//25 at 1 - good vu noted

## 2025-07-31 ENCOUNTER — PATIENT MESSAGE (OUTPATIENT)
Facility: HOSPITAL | Age: 60
End: 2025-07-31
Payer: MEDICARE

## 2025-08-19 DIAGNOSIS — G62.9 NEUROPATHY: Chronic | ICD-10-CM

## 2025-08-19 RX ORDER — GABAPENTIN 300 MG/1
300 CAPSULE ORAL 3 TIMES DAILY
Qty: 90 CAPSULE | Refills: 1 | Status: SHIPPED | OUTPATIENT
Start: 2025-08-19

## 2025-08-20 DIAGNOSIS — E11.9 TYPE 2 DIABETES MELLITUS TREATED WITH INSULIN: Primary | ICD-10-CM

## 2025-08-20 DIAGNOSIS — Z79.4 TYPE 2 DIABETES MELLITUS TREATED WITH INSULIN: Primary | ICD-10-CM

## 2025-08-20 RX ORDER — BLOOD-GLUCOSE SENSOR
1 EACH MISCELLANEOUS
Qty: 5 EACH | Refills: 3 | Status: SHIPPED | OUTPATIENT
Start: 2025-08-20 | End: 2026-08-20

## 2025-09-02 ENCOUNTER — TELEPHONE (OUTPATIENT)
Dept: FAMILY MEDICINE | Facility: CLINIC | Age: 60
End: 2025-09-02
Payer: MEDICARE

## (undated) DEVICE — GLOVE PROTEXIS PI SYN SURG 7.5

## (undated) DEVICE — TUBING CAPNOLINE PLUS SMART 02 CONNECTOR(ORDER 65110)

## (undated) DEVICE — CDS ANGIOGRAPHY PACK

## (undated) DEVICE — TOWEL OR STERILE BLUE 4/PK 20PK/CS

## (undated) DEVICE — BANDAGE KERLIX AMD

## (undated) DEVICE — POSITIONER ULNAR NERVE

## (undated) DEVICE — SHEATH INTRODUCER 6FR 10CM X 0.038 PINNACLE

## (undated) DEVICE — GLOVE PROTEXIS PI SYN SURG 7

## (undated) DEVICE — KIT ANGIO MANIFOLD CUSTOM RFH LEFT HEART KIT

## (undated) DEVICE — KIT BASIC RUSH

## (undated) DEVICE — GLOVE PROTEXIS PI SYN SURG 6.5

## (undated) DEVICE — SYR 10CC LUER LOCK

## (undated) DEVICE — SUT ETHILON 4-0 PS2 18 BLK

## (undated) DEVICE — TRAY SKIN SCRUB WET PREMIUM

## (undated) DEVICE — APPLICATOR CHLORAPREP LITE ORANGE 10.5ML STERILE

## (undated) DEVICE — SET IV EXTENSION 42IN W/2 PORT CLEARLINK

## (undated) DEVICE — Device

## (undated) DEVICE — GOWN NONREINF SET-IN SLV 2XL

## (undated) DEVICE — ISOVUE 370 100ML

## (undated) DEVICE — SPONGE COTTON WOVEN 4X4IN

## (undated) DEVICE — GLOVE SURGICAL PROTEXIS PI SIZE 7

## (undated) DEVICE — SOL NACL IRR 1000ML BTL

## (undated) DEVICE — DRESSING IV TEGADERM 10X12CM

## (undated) DEVICE — BAG-A-JET FLUID DISPENSER SYSTEM

## (undated) DEVICE — CLIPPER SURGICAL BLADE ASSEMBLY STERILE SNGL USE

## (undated) DEVICE — GLOVE 6.5 PROTEXIS PI BLUE

## (undated) DEVICE — SEAL ANGIO 6FR VIP - VASCULAR CLOSURE DEVICE BX/10

## (undated) DEVICE — GLOVE SURGICAL PROTEXIS PI SIZE 8.0

## (undated) DEVICE — SEE MEDLINE ITEM 159592

## (undated) DEVICE — SENSOR PULSE OX ADULT

## (undated) DEVICE — CATH IMPULSE 6FR MULTIPACK

## (undated) DEVICE — GAUZE XEROFORM NONADH 5X9IN

## (undated) DEVICE — KIT MICROINTRODUCER 4FR MINI STICK II

## (undated) DEVICE — DERM CURETTE 5MM DISP